# Patient Record
Sex: FEMALE | Race: OTHER | HISPANIC OR LATINO
[De-identification: names, ages, dates, MRNs, and addresses within clinical notes are randomized per-mention and may not be internally consistent; named-entity substitution may affect disease eponyms.]

---

## 2020-12-15 ENCOUNTER — APPOINTMENT (OUTPATIENT)
Dept: RADIOLOGY | Facility: CLINIC | Age: 60
End: 2020-12-15
Payer: MEDICAID

## 2020-12-15 ENCOUNTER — OUTPATIENT (OUTPATIENT)
Dept: OUTPATIENT SERVICES | Facility: HOSPITAL | Age: 60
LOS: 1 days | End: 2020-12-15

## 2020-12-15 PROCEDURE — 77080 DXA BONE DENSITY AXIAL: CPT | Mod: 26

## 2021-01-26 ENCOUNTER — APPOINTMENT (OUTPATIENT)
Dept: INTERNAL MEDICINE | Facility: CLINIC | Age: 61
End: 2021-01-26
Payer: MEDICAID

## 2021-01-26 ENCOUNTER — RESULT CHARGE (OUTPATIENT)
Age: 61
End: 2021-01-26

## 2021-01-26 VITALS
RESPIRATION RATE: 15 BRPM | TEMPERATURE: 97.6 F | WEIGHT: 218 LBS | HEART RATE: 79 BPM | BODY MASS INDEX: 43.95 KG/M2 | DIASTOLIC BLOOD PRESSURE: 80 MMHG | OXYGEN SATURATION: 99 % | HEIGHT: 59 IN | SYSTOLIC BLOOD PRESSURE: 143 MMHG

## 2021-01-26 DIAGNOSIS — Z86.79 PERSONAL HISTORY OF OTHER DISEASES OF THE CIRCULATORY SYSTEM: ICD-10-CM

## 2021-01-26 DIAGNOSIS — E23.6 OTHER DISORDERS OF PITUITARY GLAND: ICD-10-CM

## 2021-01-26 DIAGNOSIS — Z87.19 PERSONAL HISTORY OF OTHER DISEASES OF THE DIGESTIVE SYSTEM: ICD-10-CM

## 2021-01-26 DIAGNOSIS — Z87.2 PERSONAL HISTORY OF DISEASES OF THE SKIN AND SUBCUTANEOUS TISSUE: ICD-10-CM

## 2021-01-26 DIAGNOSIS — G40.909 EPILEPSY, UNSPECIFIED, NOT INTRACTABLE, W/OUT STATUS EPILEPTICUS: ICD-10-CM

## 2021-01-26 DIAGNOSIS — Z86.39 PERSONAL HISTORY OF OTHER ENDOCRINE, NUTRITIONAL AND METABOLIC DISEASE: ICD-10-CM

## 2021-01-26 DIAGNOSIS — H40.9 UNSPECIFIED GLAUCOMA: ICD-10-CM

## 2021-01-26 DIAGNOSIS — Z86.19 PERSONAL HISTORY OF OTHER INFECTIOUS AND PARASITIC DISEASES: ICD-10-CM

## 2021-01-26 DIAGNOSIS — Z87.898 PERSONAL HISTORY OF OTHER SPECIFIED CONDITIONS: ICD-10-CM

## 2021-01-26 DIAGNOSIS — Z86.69 PERSONAL HISTORY OF OTHER DISEASES OF THE NERVOUS SYSTEM AND SENSE ORGANS: ICD-10-CM

## 2021-01-26 DIAGNOSIS — T78.2XXA ANAPHYLACTIC SHOCK, UNSPECIFIED, INITIAL ENCOUNTER: ICD-10-CM

## 2021-01-26 LAB — HBA1C MFR BLD HPLC: 6.3

## 2021-01-26 PROCEDURE — 99386 PREV VISIT NEW AGE 40-64: CPT | Mod: 25

## 2021-01-26 PROCEDURE — 99072 ADDL SUPL MATRL&STAF TM PHE: CPT

## 2021-01-26 PROCEDURE — 99214 OFFICE O/P EST MOD 30 MIN: CPT | Mod: 25,GC

## 2021-01-26 PROCEDURE — 83036 HEMOGLOBIN GLYCOSYLATED A1C: CPT | Mod: QW

## 2021-01-27 LAB
CREAT SPEC-SCNC: 46 MG/DL
MICROALBUMIN 24H UR DL<=1MG/L-MCNC: <1.2 MG/DL
MICROALBUMIN/CREAT 24H UR-RTO: NORMAL MG/G

## 2021-02-05 ENCOUNTER — OUTPATIENT (OUTPATIENT)
Dept: OUTPATIENT SERVICES | Facility: HOSPITAL | Age: 61
LOS: 1 days | End: 2021-02-05
Payer: COMMERCIAL

## 2021-02-05 ENCOUNTER — APPOINTMENT (OUTPATIENT)
Dept: ULTRASOUND IMAGING | Facility: HOSPITAL | Age: 61
End: 2021-02-05
Payer: MEDICAID

## 2021-02-05 PROCEDURE — 93971 EXTREMITY STUDY: CPT | Mod: 26,LT

## 2021-02-05 PROCEDURE — 93971 EXTREMITY STUDY: CPT

## 2021-02-05 PROCEDURE — 73590 X-RAY EXAM OF LOWER LEG: CPT

## 2021-02-05 PROCEDURE — 73590 X-RAY EXAM OF LOWER LEG: CPT | Mod: 26,50

## 2021-02-10 ENCOUNTER — TRANSCRIPTION ENCOUNTER (OUTPATIENT)
Age: 61
End: 2021-02-10

## 2021-03-13 ENCOUNTER — INPATIENT (INPATIENT)
Facility: HOSPITAL | Age: 61
LOS: 2 days | Discharge: HOME | DRG: 190 | End: 2021-03-16
Attending: INTERNAL MEDICINE | Admitting: INTERNAL MEDICINE
Payer: COMMERCIAL

## 2021-03-13 VITALS
OXYGEN SATURATION: 98 % | RESPIRATION RATE: 20 BRPM | DIASTOLIC BLOOD PRESSURE: 111 MMHG | WEIGHT: 220.02 LBS | SYSTOLIC BLOOD PRESSURE: 181 MMHG | HEART RATE: 91 BPM | TEMPERATURE: 98 F

## 2021-03-13 DIAGNOSIS — I10 ESSENTIAL (PRIMARY) HYPERTENSION: ICD-10-CM

## 2021-03-13 DIAGNOSIS — E05.00 THYROTOXICOSIS WITH DIFFUSE GOITER WITHOUT THYROTOXIC CRISIS OR STORM: ICD-10-CM

## 2021-03-13 DIAGNOSIS — J96.91 RESPIRATORY FAILURE, UNSPECIFIED WITH HYPOXIA: ICD-10-CM

## 2021-03-13 DIAGNOSIS — Z59.0 HOMELESSNESS: ICD-10-CM

## 2021-03-13 DIAGNOSIS — J44.9 CHRONIC OBSTRUCTIVE PULMONARY DISEASE, UNSPECIFIED: ICD-10-CM

## 2021-03-13 DIAGNOSIS — R06.02 SHORTNESS OF BREATH: ICD-10-CM

## 2021-03-13 DIAGNOSIS — C50.919 MALIGNANT NEOPLASM OF UNSPECIFIED SITE OF UNSPECIFIED FEMALE BREAST: ICD-10-CM

## 2021-03-13 DIAGNOSIS — R63.8 OTHER SYMPTOMS AND SIGNS CONCERNING FOOD AND FLUID INTAKE: ICD-10-CM

## 2021-03-13 LAB
ALBUMIN SERPL ELPH-MCNC: 3.9 G/DL — SIGNIFICANT CHANGE UP (ref 3.3–5)
ALP SERPL-CCNC: 96 U/L — SIGNIFICANT CHANGE UP (ref 40–120)
ALT FLD-CCNC: 25 U/L — SIGNIFICANT CHANGE UP (ref 10–45)
ANION GAP SERPL CALC-SCNC: 10 MMOL/L — SIGNIFICANT CHANGE UP (ref 5–17)
AST SERPL-CCNC: 25 U/L — SIGNIFICANT CHANGE UP (ref 10–40)
BASOPHILS # BLD AUTO: 0.02 K/UL — SIGNIFICANT CHANGE UP (ref 0–0.2)
BASOPHILS NFR BLD AUTO: 0.3 % — SIGNIFICANT CHANGE UP (ref 0–2)
BILIRUB SERPL-MCNC: 0.2 MG/DL — SIGNIFICANT CHANGE UP (ref 0.2–1.2)
BUN SERPL-MCNC: 16 MG/DL — SIGNIFICANT CHANGE UP (ref 7–23)
CALCIUM SERPL-MCNC: 9 MG/DL — SIGNIFICANT CHANGE UP (ref 8.4–10.5)
CHLORIDE SERPL-SCNC: 103 MMOL/L — SIGNIFICANT CHANGE UP (ref 96–108)
CO2 SERPL-SCNC: 28 MMOL/L — SIGNIFICANT CHANGE UP (ref 22–31)
CREAT SERPL-MCNC: 0.61 MG/DL — SIGNIFICANT CHANGE UP (ref 0.5–1.3)
CRP SERPL-MCNC: 18.9 MG/L — HIGH (ref 0–4)
D DIMER BLD IA.RAPID-MCNC: 199 NG/ML DDU — SIGNIFICANT CHANGE UP
EOSINOPHIL # BLD AUTO: 0.25 K/UL — SIGNIFICANT CHANGE UP (ref 0–0.5)
EOSINOPHIL NFR BLD AUTO: 3.4 % — SIGNIFICANT CHANGE UP (ref 0–6)
FERRITIN SERPL-MCNC: 62 NG/ML — SIGNIFICANT CHANGE UP (ref 15–150)
FLU A RESULT: SIGNIFICANT CHANGE UP
FLU A RESULT: SIGNIFICANT CHANGE UP
FLUAV AG NPH QL: SIGNIFICANT CHANGE UP
FLUBV AG NPH QL: SIGNIFICANT CHANGE UP
GLUCOSE SERPL-MCNC: 127 MG/DL — HIGH (ref 70–99)
HCT VFR BLD CALC: 36.9 % — SIGNIFICANT CHANGE UP (ref 34.5–45)
HGB BLD-MCNC: 11.5 G/DL — SIGNIFICANT CHANGE UP (ref 11.5–15.5)
IMM GRANULOCYTES NFR BLD AUTO: 0.4 % — SIGNIFICANT CHANGE UP (ref 0–1.5)
LYMPHOCYTES # BLD AUTO: 1.6 K/UL — SIGNIFICANT CHANGE UP (ref 1–3.3)
LYMPHOCYTES # BLD AUTO: 21.7 % — SIGNIFICANT CHANGE UP (ref 13–44)
MCHC RBC-ENTMCNC: 26.1 PG — LOW (ref 27–34)
MCHC RBC-ENTMCNC: 31.2 GM/DL — LOW (ref 32–36)
MCV RBC AUTO: 83.9 FL — SIGNIFICANT CHANGE UP (ref 80–100)
MONOCYTES # BLD AUTO: 0.55 K/UL — SIGNIFICANT CHANGE UP (ref 0–0.9)
MONOCYTES NFR BLD AUTO: 7.5 % — SIGNIFICANT CHANGE UP (ref 2–14)
NEUTROPHILS # BLD AUTO: 4.92 K/UL — SIGNIFICANT CHANGE UP (ref 1.8–7.4)
NEUTROPHILS NFR BLD AUTO: 66.7 % — SIGNIFICANT CHANGE UP (ref 43–77)
NRBC # BLD: 0 /100 WBCS — SIGNIFICANT CHANGE UP (ref 0–0)
NT-PROBNP SERPL-SCNC: 27 PG/ML — SIGNIFICANT CHANGE UP (ref 0–300)
PLATELET # BLD AUTO: 318 K/UL — SIGNIFICANT CHANGE UP (ref 150–400)
POTASSIUM SERPL-MCNC: 4.2 MMOL/L — SIGNIFICANT CHANGE UP (ref 3.5–5.3)
POTASSIUM SERPL-SCNC: 4.2 MMOL/L — SIGNIFICANT CHANGE UP (ref 3.5–5.3)
PROCALCITONIN SERPL-MCNC: 0.04 NG/ML — SIGNIFICANT CHANGE UP (ref 0.02–0.1)
PROT SERPL-MCNC: 7.6 G/DL — SIGNIFICANT CHANGE UP (ref 6–8.3)
RAPID RVP RESULT: SIGNIFICANT CHANGE UP
RBC # BLD: 4.4 M/UL — SIGNIFICANT CHANGE UP (ref 3.8–5.2)
RBC # FLD: 14.1 % — SIGNIFICANT CHANGE UP (ref 10.3–14.5)
RSV RESULT: SIGNIFICANT CHANGE UP
RSV RNA RESP QL NAA+PROBE: SIGNIFICANT CHANGE UP
SARS-COV-2 RNA SPEC QL NAA+PROBE: SIGNIFICANT CHANGE UP
SARS-COV-2 RNA SPEC QL NAA+PROBE: SIGNIFICANT CHANGE UP
SODIUM SERPL-SCNC: 141 MMOL/L — SIGNIFICANT CHANGE UP (ref 135–145)
WBC # BLD: 7.37 K/UL — SIGNIFICANT CHANGE UP (ref 3.8–10.5)
WBC # FLD AUTO: 7.37 K/UL — SIGNIFICANT CHANGE UP (ref 3.8–10.5)

## 2021-03-13 PROCEDURE — 99223 1ST HOSP IP/OBS HIGH 75: CPT | Mod: GC

## 2021-03-13 PROCEDURE — 93010 ELECTROCARDIOGRAM REPORT: CPT

## 2021-03-13 PROCEDURE — 71045 X-RAY EXAM CHEST 1 VIEW: CPT | Mod: 26

## 2021-03-13 PROCEDURE — 99285 EMERGENCY DEPT VISIT HI MDM: CPT | Mod: 25

## 2021-03-13 RX ORDER — LATANOPROST 0.05 MG/ML
1 SOLUTION/ DROPS OPHTHALMIC; TOPICAL AT BEDTIME
Refills: 0 | Status: DISCONTINUED | OUTPATIENT
Start: 2021-03-13 | End: 2021-03-16

## 2021-03-13 RX ORDER — GABAPENTIN 400 MG/1
300 CAPSULE ORAL AT BEDTIME
Refills: 0 | Status: DISCONTINUED | OUTPATIENT
Start: 2021-03-13 | End: 2021-03-16

## 2021-03-13 RX ORDER — METOPROLOL TARTRATE 50 MG
25 TABLET ORAL ONCE
Refills: 0 | Status: COMPLETED | OUTPATIENT
Start: 2021-03-13 | End: 2021-03-13

## 2021-03-13 RX ORDER — ATORVASTATIN CALCIUM 80 MG/1
10 TABLET, FILM COATED ORAL DAILY
Refills: 0 | Status: DISCONTINUED | OUTPATIENT
Start: 2021-03-13 | End: 2021-03-16

## 2021-03-13 RX ORDER — AMLODIPINE BESYLATE 2.5 MG/1
10 TABLET ORAL DAILY
Refills: 0 | Status: DISCONTINUED | OUTPATIENT
Start: 2021-03-14 | End: 2021-03-16

## 2021-03-13 RX ORDER — ACETAMINOPHEN 500 MG
650 TABLET ORAL EVERY 6 HOURS
Refills: 0 | Status: DISCONTINUED | OUTPATIENT
Start: 2021-03-13 | End: 2021-03-16

## 2021-03-13 RX ORDER — LOSARTAN POTASSIUM 100 MG/1
100 TABLET, FILM COATED ORAL DAILY
Refills: 0 | Status: DISCONTINUED | OUTPATIENT
Start: 2021-03-13 | End: 2021-03-16

## 2021-03-13 RX ORDER — ENOXAPARIN SODIUM 100 MG/ML
40 INJECTION SUBCUTANEOUS AT BEDTIME
Refills: 0 | Status: DISCONTINUED | OUTPATIENT
Start: 2021-03-13 | End: 2021-03-16

## 2021-03-13 RX ORDER — BRIMONIDINE TARTRATE 2 MG/MG
1 SOLUTION/ DROPS OPHTHALMIC THREE TIMES A DAY
Refills: 0 | Status: DISCONTINUED | OUTPATIENT
Start: 2021-03-13 | End: 2021-03-16

## 2021-03-13 RX ORDER — IPRATROPIUM/ALBUTEROL SULFATE 18-103MCG
3 AEROSOL WITH ADAPTER (GRAM) INHALATION
Refills: 0 | Status: COMPLETED | OUTPATIENT
Start: 2021-03-13 | End: 2021-03-13

## 2021-03-13 RX ORDER — DORZOLAMIDE HYDROCHLORIDE TIMOLOL MALEATE 20; 5 MG/ML; MG/ML
1 SOLUTION/ DROPS OPHTHALMIC
Refills: 0 | Status: DISCONTINUED | OUTPATIENT
Start: 2021-03-13 | End: 2021-03-16

## 2021-03-13 RX ORDER — FLUTICASONE PROPIONATE 220 MCG
1 AEROSOL WITH ADAPTER (GRAM) INHALATION EVERY 12 HOURS
Refills: 0 | Status: DISCONTINUED | OUTPATIENT
Start: 2021-03-13 | End: 2021-03-16

## 2021-03-13 RX ORDER — IPRATROPIUM/ALBUTEROL SULFATE 18-103MCG
3 AEROSOL WITH ADAPTER (GRAM) INHALATION EVERY 6 HOURS
Refills: 0 | Status: DISCONTINUED | OUTPATIENT
Start: 2021-03-13 | End: 2021-03-13

## 2021-03-13 RX ORDER — IPRATROPIUM/ALBUTEROL SULFATE 18-103MCG
3 AEROSOL WITH ADAPTER (GRAM) INHALATION EVERY 4 HOURS
Refills: 0 | Status: DISCONTINUED | OUTPATIENT
Start: 2021-03-13 | End: 2021-03-15

## 2021-03-13 RX ORDER — FAMOTIDINE 10 MG/ML
40 INJECTION INTRAVENOUS AT BEDTIME
Refills: 0 | Status: DISCONTINUED | OUTPATIENT
Start: 2021-03-13 | End: 2021-03-16

## 2021-03-13 RX ORDER — ATORVASTATIN CALCIUM 80 MG/1
10 TABLET, FILM COATED ORAL DAILY
Refills: 0 | Status: DISCONTINUED | OUTPATIENT
Start: 2021-03-13 | End: 2021-03-13

## 2021-03-13 RX ADMIN — Medication 3 MILLILITER(S): at 23:19

## 2021-03-13 RX ADMIN — Medication 25 MILLIGRAM(S): at 09:56

## 2021-03-13 RX ADMIN — ATORVASTATIN CALCIUM 10 MILLIGRAM(S): 80 TABLET, FILM COATED ORAL at 15:15

## 2021-03-13 RX ADMIN — Medication 3 MILLILITER(S): at 10:49

## 2021-03-13 RX ADMIN — BRIMONIDINE TARTRATE 1 DROP(S): 2 SOLUTION/ DROPS OPHTHALMIC at 13:13

## 2021-03-13 RX ADMIN — ENOXAPARIN SODIUM 40 MILLIGRAM(S): 100 INJECTION SUBCUTANEOUS at 21:35

## 2021-03-13 RX ADMIN — LOSARTAN POTASSIUM 100 MILLIGRAM(S): 100 TABLET, FILM COATED ORAL at 13:13

## 2021-03-13 RX ADMIN — Medication 3 MILLILITER(S): at 09:25

## 2021-03-13 RX ADMIN — GABAPENTIN 300 MILLIGRAM(S): 400 CAPSULE ORAL at 21:33

## 2021-03-13 RX ADMIN — Medication 1 PUFF(S): at 21:37

## 2021-03-13 RX ADMIN — LATANOPROST 1 DROP(S): 0.05 SOLUTION/ DROPS OPHTHALMIC; TOPICAL at 21:36

## 2021-03-13 RX ADMIN — FAMOTIDINE 40 MILLIGRAM(S): 10 INJECTION INTRAVENOUS at 21:35

## 2021-03-13 RX ADMIN — Medication 200 MILLIGRAM(S): at 21:35

## 2021-03-13 RX ADMIN — Medication 125 MILLIGRAM(S): at 09:25

## 2021-03-13 RX ADMIN — Medication 3 MILLILITER(S): at 10:30

## 2021-03-13 RX ADMIN — DORZOLAMIDE HYDROCHLORIDE TIMOLOL MALEATE 1 DROP(S): 20; 5 SOLUTION/ DROPS OPHTHALMIC at 17:54

## 2021-03-13 RX ADMIN — BRIMONIDINE TARTRATE 1 DROP(S): 2 SOLUTION/ DROPS OPHTHALMIC at 21:38

## 2021-03-13 SDOH — ECONOMIC STABILITY - HOUSING INSECURITY: HOMELESSNESS: Z59.0

## 2021-03-13 NOTE — H&P ADULT - PROBLEM SELECTOR PLAN 8
F: None   E: Replete for K<4 Mag<2  N: DASH Diet  A: HSQ  Disposition: RMF F: None   E: Replete for K<4 Mag<2  N: DASH Diet  A: Lovenox   Disposition: RMF

## 2021-03-13 NOTE — CHART NOTE - NSCHARTNOTEFT_GEN_A_CORE
Asked by primary team to evaluate whether patient can be stepped down.    She presented with subjective fevers and body aches.  Admitted for COPD exacerbation.  Multiple COVID tests in house are negative.  She also had a negative one outside the hospital.  She is not having fevers or hypoxia.  No evidence of lymphopenia in labs.  CXR is clear.    COVID-19 is unlikely. More likely cause is a different viral pathogen triggered COPD exacerbation.  From ID standpoint, airborne/droplet precautions can be stopped and she can be moved off COVID-19 floor

## 2021-03-13 NOTE — PATIENT PROFILE ADULT - NSPROIMPLANTSMEDDEV_GEN_A_NUR
None Patient has right chest wall chemo port. Patient states that she has had the port in 2005. Was changed in 2007 ./None

## 2021-03-13 NOTE — H&P ADULT - ASSESSMENT
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

## 2021-03-13 NOTE — H&P ADULT - PROBLEM SELECTOR PLAN 5
PMH of Breast CA s/p mastectomy Dx___ PMH of HTN, home medication valsartan missed today, therefore sBP 180s initially s/p lopressor 25 with improvement.  - C/w home amlodipine and valsartan

## 2021-03-13 NOTE — H&P ADULT - PROBLEM SELECTOR PLAN 1
Patient endorsing increasing SOB resistant to outpatient albuterol. DDX include COPD exacerbation vs infectious (CXR with no definitive infiltrate, possibly viral in setting of symptoms mentioned), vs less likely PE (as dimer low), vs cardiac (HF).   - Treat COPD exacerbation as below  - F/u BNP to r/o HF   - F/u RVP/covid swabs Patient endorsing increasing SOB resistant to outpatient albuterol. DDX include COPD exacerbation vs infectious (CXR with no definitive infiltrate, possibly viral in setting of symptoms mentioned), vs less likely PE (as dimer low), vs cardiac (HF).   - Treat COPD exacerbation as below  - F/u BNP to r/o HF   - F/u RVP/covid swabs  - high suspicion for COVID, if 2 negative tests, ID consult

## 2021-03-13 NOTE — H&P ADULT - ATTENDING COMMENTS
Pt. seen and examined by me earlier today; I have read Dr. Raines's H&P, I agree w/ his findings and plan of care as documented

## 2021-03-13 NOTE — CHART NOTE - NSCHARTNOTEFT_GEN_A_CORE
Patient seen for COPD bundle  In COPD exacerbation  Currently on short acting bronchodilators appropriately and on Steroids.   Seems mild- moderate COPD exacerbation  she is on ICS ?? she does not know much about her medications.   Would benefit with addition of ICS + LABA inhaler ( HFA ) once she good enough to use it. use Adviar or symbicort.   Does not smoke.       Would need out patient follow up to establish care for COPD.  - Please make an appointment with in 2 weeks of discharge with [facility] on [address], 3rd floor Wednesday afternoon.   - Rest as per medicine team  - call us with questions.

## 2021-03-13 NOTE — H&P ADULT - NSICDXPASTMEDICALHX_GEN_ALL_CORE_FT
PAST MEDICAL HISTORY:  Breast cancer     COPD exacerbation     Epilepsy     Graves disease     HTN (hypertension)

## 2021-03-13 NOTE — H&P ADULT - PROBLEM SELECTOR PLAN 6
F: None   E: Replete for K<4 Mag<2  N: DASH Diet  A: HSQ  Disposition: RMF Pt reports history of graves disease that she says is 2/2 a pituitary tumor that has been stable for years.  -cont home synthroid

## 2021-03-13 NOTE — H&P ADULT - HISTORY OF PRESENT ILLNESS
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 Pt has baseline COPD, but is not on home O2, no chronic cough, able to ambulate normally, and has not had previous exacerbations. 7 days ago pt started to have dry cough and was tested for covid, but was negative. Pt continued to have cough and then 4 days ago started to have sore throat, b/l ear aches, diffuse myalgias, pleuritic chest pain, and worsening SOB with only being able to walk a block. Then last night, pt started to notice that her voice was a little "squeakier" than normal and started to have fever, body aches, diarrhea, nausea, and a sense that her mouth was dry.     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 Pt has baseline COPD, but is not on home O2, no chronic cough, able to ambulate normally, and has not had previous exacerbations. 7 days ago pt started to have dry cough and was tested for covid, but was negative. Pt continued to have cough and then 4 days ago started to have sore throat, loss of taste/smell, b/l ear aches, diffuse myalgias, pleuritic chest pain, and worsening SOB with only being able to walk a block. Then last night, pt started to notice that her voice was a little "squeakier" than normal and started to have fever, body aches, diarrhea, nausea, and a sense that her mouth was dry.     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 Pt has baseline COPD, but is not on home O2, no chronic cough, able to ambulate normally, and has not had previous exacerbations. 7 days ago pt started to have dry cough and was tested for covid, but was negative. Pt continued to have cough and then 4 days ago started to have sore throat, loss of taste/smell, b/l ear aches, diffuse myalgias, pleuritic chest pain, and worsening SOB with only being able to walk a block. Then last night, pt started to notice that her voice was a little "squeakier" than normal and started to have fever, body aches, diarrhea, nausea, and a sense that her mouth was dry.     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 Pt has baseline COPD, but is not on home O2, no chronic cough, able to ambulate normally, and has not had previous exacerbations. 7 days ago pt started to have dry cough and was tested for covid, but was negative. Pt continued to have cough and then 4 days ago started to have sore throat, loss of taste/smell, b/l ear aches, diffuse myalgias, pleuritic chest pain, and worsening SOB with only being able to walk a block. Then last night, pt started to notice that her voice was a little "squeakier" than normal and started to have fever, body aches, diarrhea, nausea, and a sense that her mouth was dry.     ED Course:  VS: T max 98,1, HR 70-90s, -180/80-110s (had not taken BP meds this AM), R20s 98%RA at rest, 85% RA upon ambulation   Labs s/f: CBC, CMP WNL, Flu/RSV neg   CXR: possible Left sided infiltrate, port in place   EKG: NSR NL axis no ischemic changes   Pt received: Solumedrol 125, Lopressor 25, and Duoneb x3

## 2021-03-13 NOTE — H&P ADULT - PROBLEM SELECTOR PLAN 2
PMH of COPD presenting with SOB. S/p Solumedrol 125 and nebs in ED.  - C/w Prednisone 40qd for 4 more days  - C/w Duonebs q6h standing PMH of COPD presenting with SOB. S/p Solumedrol 125 and nebs in ED.  - C/w Prednisone 40qd for total 5 days (3/13-)  - C/w Duonebs q6h standing  - c/w home flovent PMH of COPD presenting with SOB, increased cough and sputum. S/p Solumedrol 125 and nebs in ED.  - C/w Prednisone 40qd for total 5 days (3/13-)  - C/w Duonebs q6h standing  - c/w home flovent PMH of COPD presenting with SOB, increased cough and sputum. S/p Solumedrol 125 and nebs in ED.  - C/w Prednisone 40qd for total 5 days (3/13-)  - C/w Duonebs q6h standing  - c/w home flovent  - f/u pulm COPD bundle

## 2021-03-13 NOTE — H&P ADULT - PROBLEM SELECTOR PLAN 4
PMH of HTN, home medication __ missed today, therefore sBP 180s initially s/p lopressor 25 with improvement.  - C/w home__ Pt says she is currently homeless and is staying at her daughter's and friend's houses when available.  -social work consult

## 2021-03-13 NOTE — ED ADULT NURSE NOTE - OBJECTIVE STATEMENT
Patient A&Ox4, respirations even and unlabored, hoarse voice, ambulatory, skin warm and dry good for color, bilateral limb bracelets observed due to bilateral mastectomy, right chest port observed and accessed, access ok by provider. Patient arrives with complaints of cough, sore throat, SOB. States took covid test last week and was negative. Patient denies chest pain. Hx breast CA, HTN. Maintained in isolation room 7. Will continue to monitor patient.

## 2021-03-13 NOTE — H&P ADULT - NSHPSOCIALHISTORY_GEN_ALL_CORE
Previous smoked 1.5PPD x40 years quit 20 years ago. Occasional alcohol use. No other drugs. Currently homeless and will sleep at friend's and daughter's houses.

## 2021-03-13 NOTE — ED PROVIDER NOTE - PHYSICAL EXAMINATION
CONSTITUTIONAL: tired appearing NAD +active coughing   HEAD: Normocephalic; atraumatic.   EYES: PERRL; EOM intact; conjunctiva and sclera clear  ENT: normal nose; no rhinorrhea; normal pharynx with no erythema or lesions.   NECK: Supple; non-tender; no LAD  CARDIOVASCULAR: Normal S1, S2; no murmurs, rubs, or gallops. Regular rate and rhythm.   RESPIRATORY: Breathing easily;  dec air movement b/l no wheezing   GI: Soft; non-distended; non-tender; no palpable organomegaly.   MSK: FROM at all extremities, normal tone   EXT: No cyanosis or edema; N/V intact  SKIN: Normal for age and race; warm; dry; good turgor; no apparent lesions or rash.   NEURO: A & O x 3; face symmetric; grossly unremarkable.   PSYCHOLOGICAL: The patient’s mood and manner are appropriate.

## 2021-03-13 NOTE — ED PROVIDER NOTE - ATTENDING CONTRIBUTION TO CARE
60 year old f w hx of breast CA (remission), COPD, epilepsy, HTN presents to ED with concern for dry cough, sore throat, ear ache, shortness of breath, fever over the past several days.  Notes she took tylenol yesterday.  COVID test negative last week prior to onset of symptoms.  Notes using her neices nebulizer with little improvement in symptoms.  On exam, patient is non toxic in appearance.  HRRR, lungs clear with decreased air movement.  Abd soft, NT/ND.  Posterior oropharynx clear, without edema or exudates.  No PTA visualized.  Will check labs, CXR, covid, give nebs, steroid and dispo accordingly.

## 2021-03-13 NOTE — ED PROVIDER NOTE - OBJECTIVE STATEMENT
61 yo f with pmh of COPD, diverticulitis, epilepsy,br ca s/p mastectomy, HTN c/o dry cough x 4 days. Associated with sore throat, earache, nausea and diarrhea. Reports hoarseness of voice starting yesterday. Fever of 101 last night, took tylenol. Also reports chest tightness and sob. Tried her albuterol inhaler and a nebulizer with minimal relief. Denies travel, sick contacts, n/v, ha, dizziness. Pt had a negative covid test last week. 59 yo f with pmh of COPD, diverticulitis, epilepsy,br ca s/p mastectomy, HTN c/o dry cough x 4 days. Associated with sore throat, earache, nausea and diarrhea. Reports hoarseness of voice and bodyaches starting yesterday. Fever of 101 last night, took tylenol. Also reports chest tightness and sob. Tried her albuterol inhaler and a nebulizer with minimal relief. Denies travel, sick contacts, n/v, ha, dizziness. Pt had a negative covid test last week.

## 2021-03-13 NOTE — H&P ADULT - NSHPLABSRESULTS_GEN_ALL_CORE
LABS:                         11.5   7.37  )-----------( 318      ( 13 Mar 2021 09:32 )             36.9     03-13    141  |  103  |  16  ----------------------------<  127<H>  4.2   |  28  |  0.61    Ca    9.0      13 Mar 2021 09:32    TPro  7.6  /  Alb  3.9  /  TBili  0.2  /  DBili  x   /  AST  25  /  ALT  25  /  AlkPhos  96  03-13                  RADIOLOGY, EKG & ADDITIONAL TESTS: Reviewed.

## 2021-03-13 NOTE — ED ADULT NURSE NOTE - NSIMPLEMENTINTERV_GEN_ALL_ED
Implemented All Universal Safety Interventions:  [place] to call system. Call bell, personal items and telephone within reach. Instruct patient to call for assistance. Room bathroom lighting operational. Non-slip footwear when patient is off stretcher. Physically safe environment: no spills, clutter or unnecessary equipment. Stretcher in lowest position, wheels locked, appropriate side rails in place.

## 2021-03-13 NOTE — H&P ADULT - NSHPPHYSICALEXAM_GEN_ALL_CORE
VITAL SIGNS:  T(F): 98.1 (03-13-21 @ 10:41), Max: 98.1 (03-13-21 @ 10:41)  HR: 95 (03-13-21 @ 10:43) (73 - 95)  BP: 155/81 (03-13-21 @ 10:41) (155/81 - 181/111)  BP(mean): --  RR: 24 (03-13-21 @ 10:43) (20 - 24)  SpO2: 85% (03-13-21 @ 10:43) (85% - 98%)    PHYSICAL EXAM:    Constitutional: WDWN resting comfortably in bed; NAD  HEENT: NC/AT, PERRL, EOMI, anicteric sclera, no nasal discharge; uvula midline, no oropharyngeal erythema or exudates; MMM  Neck: supple; no JVD or thyromegaly  Respiratory: CTA B/L; no W/R/R, no retractions  Cardiac: +S1/S2; RRR; no M/R/G; PMI non-displaced  Gastrointestinal: soft, NT/ND; no rebound or guarding; +BSx4  Back: spine midline, no bony tenderness or step-offs; no CVAT B/L  Extremities: WWP, no clubbing or cyanosis; no peripheral edema  Musculoskeletal: NROM x4; no joint swelling, tenderness or erythema  Vascular: 2+ radial, femoral, DP/PT pulses B/L  Dermatologic: skin warm, dry and intact; no rashes, wounds, or scars  Lymphatic: no submandibular or cervical LAD  Neurologic: AAOx3; CNII-XII grossly intact; no focal deficits  Psychiatric: affect and characteristics of appearance, verbalizations, behaviors are appropriate, denies SI/HI/AH/VH VITAL SIGNS:  T(F): 98.1 (03-13-21 @ 10:41), Max: 98.1 (03-13-21 @ 10:41)  HR: 95 (03-13-21 @ 10:43) (73 - 95)  BP: 155/81 (03-13-21 @ 10:41) (155/81 - 181/111)  BP(mean): --  RR: 24 (03-13-21 @ 10:43) (20 - 24)  SpO2: 85% (03-13-21 @ 10:43) (85% - 98%)    PHYSICAL EXAM:    Constitutional: NAD, no accessory muscle use, high pitched voice without stridor  HEENT: MMM  Neck: supple  Respiratory: coarse breath sounds b/l without wheezes or crackles  Cardiac: +S1/S2; RRR; no M/R/G  Gastrointestinal: soft, NT/ND; no rebound or guarding; +BSx4  Extremities: WWP, no clubbing or cyanosis; no peripheral edema  Musculoskeletal: NROM x4; no joint swelling, tenderness or erythema  Vascular: 2+ radial, DP/PT pulses B/L  Dermatologic: skin warm, dry and intact; no rashes, wounds, or scars  Neurologic: AAOx3; CNII-XII grossly intact; no focal deficits  Psychiatric: affect and characteristics of appearance, verbalizations, behaviors are appropriate, denies SI/HI/AH/VH

## 2021-03-13 NOTE — H&P ADULT - PROBLEM SELECTOR PLAN 3
Upon presentation 98%RA at rest, 85% RA upon ambulation likely 2/2 COPD and/or viral infection.   - C/w close monitoring of respiratory status- on RA  - Treatment as per COPD

## 2021-03-13 NOTE — H&P ADULT - PROBLEM SELECTOR PLAN 7
PMH of Breast CA s/p mastectomy. Currently in remission. PMH of Breast CA s/p mastectomy. Currently in remission.  -had b/l lymph nodes removed so unable to draw blood from arms. Has port that blood can be drawn from that was placed in 2005 and revised in 2010 that needs to be flushed every 8 weeks

## 2021-03-13 NOTE — ED PROVIDER NOTE - CLINICAL SUMMARY MEDICAL DECISION MAKING FREE TEXT BOX
61 yo f with pmh of COPD, diverticulitis, epilepsy,br ca s/p mastectomy, HTN c/o dry cough x 4 days. Associated with sore throat, earache, nausea and diarrhea. Reports hoarseness of voice and bodyaches starting yesterday. Fever of 101 last night, took tylenol. Also reports chest tightness and sob. Tried her albuterol inhaler and a nebulizer with minimal relief. Denies travel, sick contacts, n/v, ha, dizziness. Pt had a negative covid test last week. O2 sat 98% on RA. Afebrile. /111, pt did not take her BP meds this morning. +active coughing. Breathing easily;  dec air movement b/l no wheezing. r/o pna r/o covid, will treat with nebs, steroids for COPD exacerbation.

## 2021-03-14 DIAGNOSIS — J44.1 CHRONIC OBSTRUCTIVE PULMONARY DISEASE WITH (ACUTE) EXACERBATION: ICD-10-CM

## 2021-03-14 DIAGNOSIS — I10 ESSENTIAL (PRIMARY) HYPERTENSION: ICD-10-CM

## 2021-03-14 DIAGNOSIS — E66.01 MORBID (SEVERE) OBESITY DUE TO EXCESS CALORIES: ICD-10-CM

## 2021-03-14 DIAGNOSIS — J96.01 ACUTE RESPIRATORY FAILURE WITH HYPOXIA: ICD-10-CM

## 2021-03-14 DIAGNOSIS — Z85.3 PERSONAL HISTORY OF MALIGNANT NEOPLASM OF BREAST: ICD-10-CM

## 2021-03-14 PROCEDURE — 99233 SBSQ HOSP IP/OBS HIGH 50: CPT | Mod: GC

## 2021-03-14 RX ORDER — SODIUM CHLORIDE 0.65 %
1 AEROSOL, SPRAY (ML) NASAL ONCE
Refills: 0 | Status: COMPLETED | OUTPATIENT
Start: 2021-03-14 | End: 2021-03-14

## 2021-03-14 RX ADMIN — Medication 1 PUFF(S): at 21:12

## 2021-03-14 RX ADMIN — Medication 1 PUFF(S): at 09:06

## 2021-03-14 RX ADMIN — Medication 200 MILLIGRAM(S): at 17:16

## 2021-03-14 RX ADMIN — BRIMONIDINE TARTRATE 1 DROP(S): 2 SOLUTION/ DROPS OPHTHALMIC at 21:13

## 2021-03-14 RX ADMIN — ATORVASTATIN CALCIUM 10 MILLIGRAM(S): 80 TABLET, FILM COATED ORAL at 11:38

## 2021-03-14 RX ADMIN — FAMOTIDINE 40 MILLIGRAM(S): 10 INJECTION INTRAVENOUS at 21:12

## 2021-03-14 RX ADMIN — Medication 650 MILLIGRAM(S): at 15:18

## 2021-03-14 RX ADMIN — DORZOLAMIDE HYDROCHLORIDE TIMOLOL MALEATE 1 DROP(S): 20; 5 SOLUTION/ DROPS OPHTHALMIC at 06:56

## 2021-03-14 RX ADMIN — BRIMONIDINE TARTRATE 1 DROP(S): 2 SOLUTION/ DROPS OPHTHALMIC at 06:57

## 2021-03-14 RX ADMIN — BRIMONIDINE TARTRATE 1 DROP(S): 2 SOLUTION/ DROPS OPHTHALMIC at 14:26

## 2021-03-14 RX ADMIN — GABAPENTIN 300 MILLIGRAM(S): 400 CAPSULE ORAL at 21:13

## 2021-03-14 RX ADMIN — Medication 200 MILLIGRAM(S): at 09:06

## 2021-03-14 RX ADMIN — LATANOPROST 1 DROP(S): 0.05 SOLUTION/ DROPS OPHTHALMIC; TOPICAL at 21:13

## 2021-03-14 RX ADMIN — ENOXAPARIN SODIUM 40 MILLIGRAM(S): 100 INJECTION SUBCUTANEOUS at 21:12

## 2021-03-14 RX ADMIN — Medication 200 MILLIGRAM(S): at 23:27

## 2021-03-14 RX ADMIN — DORZOLAMIDE HYDROCHLORIDE TIMOLOL MALEATE 1 DROP(S): 20; 5 SOLUTION/ DROPS OPHTHALMIC at 17:17

## 2021-03-14 RX ADMIN — Medication 3 MILLILITER(S): at 22:45

## 2021-03-14 RX ADMIN — Medication 650 MILLIGRAM(S): at 17:09

## 2021-03-14 RX ADMIN — AMLODIPINE BESYLATE 10 MILLIGRAM(S): 2.5 TABLET ORAL at 06:46

## 2021-03-14 RX ADMIN — Medication 1 SPRAY(S): at 22:52

## 2021-03-14 RX ADMIN — Medication 3 MILLILITER(S): at 11:38

## 2021-03-14 RX ADMIN — Medication 3 MILLILITER(S): at 17:16

## 2021-03-14 RX ADMIN — LOSARTAN POTASSIUM 100 MILLIGRAM(S): 100 TABLET, FILM COATED ORAL at 06:46

## 2021-03-14 RX ADMIN — Medication 3 MILLILITER(S): at 07:00

## 2021-03-14 NOTE — PROGRESS NOTE ADULT - SUBJECTIVE AND OBJECTIVE BOX
OVERNIGHT EVENTS:     SUBJECTIVE / INTERVAL HPI: Patient seen and examined at bedside.     VITAL SIGNS:  Vital Signs Last 24 Hrs  T(C): 36.7 (14 Mar 2021 06:07), Max: 36.8 (13 Mar 2021 13:19)  T(F): 98 (14 Mar 2021 06:07), Max: 98.3 (13 Mar 2021 13:19)  HR: 76 (14 Mar 2021 06:07) (57 - 95)  BP: 142/69 (14 Mar 2021 06:07) (106/73 - 161/72)  BP(mean): --  RR: 18 (14 Mar 2021 06:07) (18 - 19)  SpO2: 95% (14 Mar 2021 06:07) (94% - 95%)    PHYSICAL EXAM:    General: WDWN  HEENT: NC/AT; PERRL, anicteric sclera; MMM  Neck: supple  Cardiovascular: +S1/S2; RRR  Respiratory: CTA B/L; no W/R/R  Gastrointestinal: soft, NT/ND; +BSx4  Extremities: WWP; no edema, clubbing or cyanosis  Vascular: 2+ radial, DP/PT pulses B/L  Neurological: AAOx3; no focal deficits    MEDICATIONS:  MEDICATIONS  (STANDING):  amLODIPine   Tablet 10 milliGRAM(s) Oral daily  atorvastatin 10 milliGRAM(s) Oral daily  brimonidine 0.2% Ophthalmic Solution 1 Drop(s) Both EYES three times a day  dorzolamide 2%/timolol 0.5% Ophthalmic Solution 1 Drop(s) Both EYES two times a day  enoxaparin Injectable 40 milliGRAM(s) SubCutaneous at bedtime  famotidine    Tablet 40 milliGRAM(s) Oral at bedtime  fluticasone propionate   220 MICROgram(s) HFA Inhaler 1 Puff(s) Inhalation every 12 hours  gabapentin 300 milliGRAM(s) Oral at bedtime  latanoprost 0.005% Ophthalmic Solution 1 Drop(s) Both EYES at bedtime  losartan 100 milliGRAM(s) Oral daily  predniSONE   Tablet 40 milliGRAM(s) Oral daily    MEDICATIONS  (PRN):  acetaminophen   Tablet .. 650 milliGRAM(s) Oral every 6 hours PRN Temp greater or equal to 38C (100.4F)  albuterol/ipratropium for Nebulization 3 milliLiter(s) Nebulizer every 4 hours PRN Shortness of Breath and/or Wheezing  guaiFENesin   Syrup  (Sugar-Free) 200 milliGRAM(s) Oral every 6 hours PRN Cough      ALLERGIES:  Allergies    IV Contrast (Short breath)  lisinopril (Short breath)    Intolerances        LABS:                        11.5   7.37  )-----------( 318      ( 13 Mar 2021 09:32 )             36.9     03-13    141  |  103  |  16  ----------------------------<  127<H>  4.2   |  28  |  0.61    Ca    9.0      13 Mar 2021 09:32    TPro  7.6  /  Alb  3.9  /  TBili  0.2  /  DBili  x   /  AST  25  /  ALT  25  /  AlkPhos  96  03-13        CAPILLARY BLOOD GLUCOSE          RADIOLOGY & ADDITIONAL TESTS: Reviewed.   OVERNIGHT EVENTS: DARINEL.    SUBJECTIVE / INTERVAL HPI: Patient seen and examined at bedside. Continues to complain of cough and mild SOB but claims it is improved. Denies f/c, cp.     VITAL SIGNS:  Vital Signs Last 24 Hrs  T(C): 36.7 (14 Mar 2021 06:07), Max: 36.8 (13 Mar 2021 13:19)  T(F): 98 (14 Mar 2021 06:07), Max: 98.3 (13 Mar 2021 13:19)  HR: 76 (14 Mar 2021 06:07) (57 - 95)  BP: 142/69 (14 Mar 2021 06:07) (106/73 - 161/72)  BP(mean): --  RR: 18 (14 Mar 2021 06:07) (18 - 19)  SpO2: 95% (14 Mar 2021 06:07) (94% - 95%)    PHYSICAL EXAM:    General: WDWN  HEENT: NC/AT; PERRL, anicteric sclera; MMM  Neck: supple  Cardiovascular: +S1/S2; RRR  Respiratory: coarse breath sounds b/l without wheezes or crackles  Gastrointestinal: soft, NT/ND; +BSx4  Extremities: WWP; no edema, clubbing or cyanosis  Vascular: 2+ radial, DP/PT pulses B/L  Neurological: AAOx3; no focal deficits    MEDICATIONS:  MEDICATIONS  (STANDING):  amLODIPine   Tablet 10 milliGRAM(s) Oral daily  atorvastatin 10 milliGRAM(s) Oral daily  brimonidine 0.2% Ophthalmic Solution 1 Drop(s) Both EYES three times a day  dorzolamide 2%/timolol 0.5% Ophthalmic Solution 1 Drop(s) Both EYES two times a day  enoxaparin Injectable 40 milliGRAM(s) SubCutaneous at bedtime  famotidine    Tablet 40 milliGRAM(s) Oral at bedtime  fluticasone propionate   220 MICROgram(s) HFA Inhaler 1 Puff(s) Inhalation every 12 hours  gabapentin 300 milliGRAM(s) Oral at bedtime  latanoprost 0.005% Ophthalmic Solution 1 Drop(s) Both EYES at bedtime  losartan 100 milliGRAM(s) Oral daily  predniSONE   Tablet 40 milliGRAM(s) Oral daily    MEDICATIONS  (PRN):  acetaminophen   Tablet .. 650 milliGRAM(s) Oral every 6 hours PRN Temp greater or equal to 38C (100.4F)  albuterol/ipratropium for Nebulization 3 milliLiter(s) Nebulizer every 4 hours PRN Shortness of Breath and/or Wheezing  guaiFENesin   Syrup  (Sugar-Free) 200 milliGRAM(s) Oral every 6 hours PRN Cough      ALLERGIES:  Allergies    IV Contrast (Short breath)  lisinopril (Short breath)    Intolerances        LABS:                        11.5   7.37  )-----------( 318      ( 13 Mar 2021 09:32 )             36.9     03-13    141  |  103  |  16  ----------------------------<  127<H>  4.2   |  28  |  0.61    Ca    9.0      13 Mar 2021 09:32    TPro  7.6  /  Alb  3.9  /  TBili  0.2  /  DBili  x   /  AST  25  /  ALT  25  /  AlkPhos  96  03-13        CAPILLARY BLOOD GLUCOSE          RADIOLOGY & ADDITIONAL TESTS: Reviewed.

## 2021-03-14 NOTE — PROGRESS NOTE ADULT - SUBJECTIVE AND OBJECTIVE BOX
Patient is a 60y old  Female who presents with a chief complaint of shortness of breath (14 Mar 2021 11:55)      INTERVAL HPI/OVERNIGHT EVENTS:    Pt. seen and examined earlier today  Pt. feels somewhat better today  c/o cough, sore throat, myalgia, JALLOH  No F/C    Review of Systems: 12 point review of systems otherwise negative    MEDICATIONS  (STANDING):  amLODIPine   Tablet 10 milliGRAM(s) Oral daily  atorvastatin 10 milliGRAM(s) Oral daily  brimonidine 0.2% Ophthalmic Solution 1 Drop(s) Both EYES three times a day  dorzolamide 2%/timolol 0.5% Ophthalmic Solution 1 Drop(s) Both EYES two times a day  enoxaparin Injectable 40 milliGRAM(s) SubCutaneous at bedtime  famotidine    Tablet 40 milliGRAM(s) Oral at bedtime  fluticasone propionate   220 MICROgram(s) HFA Inhaler 1 Puff(s) Inhalation every 12 hours  gabapentin 300 milliGRAM(s) Oral at bedtime  latanoprost 0.005% Ophthalmic Solution 1 Drop(s) Both EYES at bedtime  losartan 100 milliGRAM(s) Oral daily  predniSONE   Tablet 40 milliGRAM(s) Oral daily    MEDICATIONS  (PRN):  acetaminophen   Tablet .. 650 milliGRAM(s) Oral every 6 hours PRN Temp greater or equal to 38C (100.4F)  albuterol/ipratropium for Nebulization 3 milliLiter(s) Nebulizer every 4 hours PRN Shortness of Breath and/or Wheezing  guaiFENesin   Syrup  (Sugar-Free) 200 milliGRAM(s) Oral every 6 hours PRN Cough      Allergies    IV Contrast (Short breath)  lisinopril (Short breath)    Intolerances          Vital Signs Last 24 Hrs  T(C): 36.7 (14 Mar 2021 06:07), Max: 36.7 (13 Mar 2021 16:36)  T(F): 98 (14 Mar 2021 06:07), Max: 98.1 (13 Mar 2021 16:36)  HR: 76 (14 Mar 2021 06:07) (57 - 76)  BP: 142/69 (14 Mar 2021 06:07) (106/73 - 161/72)  BP(mean): --  RR: 18 (14 Mar 2021 06:07) (18 - 19)  SpO2: 95% (14 Mar 2021 06:07) (94% - 95%)  CAPILLARY BLOOD GLUCOSE           @ 06:01  -  14 @ 07:00  --------------------------------------------------------  IN: 240 mL / OUT: 200 mL / NET: 40 mL        Physical Exam:  (earlier today)  Daily Height in cm: 152.4 (14 Mar 2021 00:13)    Daily Weight in k (14 Mar 2021 00:13)  General: non-toxic appearing in NAD  HEENT: MMM +rhinophonia +chemoport  CV:  RRR  Lungs: decreased B.S. B/L, normal WOB on RA  Abdomen: morbidly obese  Extremities: no edema B/L LE  Skin:  WWP  Neuro:  AAOx3    LABS:                        11.5   7.37  )-----------( 318      ( 13 Mar 2021 09:32 )             36.9     03-13    141  |  103  |  16  ----------------------------<  127<H>  4.2   |  28  |  0.61    Ca    9.0      13 Mar 2021 09:32    TPro  7.6  /  Alb  3.9  /  TBili  0.2  /  DBili  x   /  AST  25  /  ALT  25  /  AlkPhos  96  03-13

## 2021-03-15 LAB
ANION GAP SERPL CALC-SCNC: 8 MMOL/L — SIGNIFICANT CHANGE UP (ref 5–17)
BASE EXCESS BLDA CALC-SCNC: 2.5 MMOL/L — SIGNIFICANT CHANGE UP (ref -2–3)
BASOPHILS # BLD AUTO: 0.03 K/UL — SIGNIFICANT CHANGE UP (ref 0–0.2)
BASOPHILS NFR BLD AUTO: 0.3 % — SIGNIFICANT CHANGE UP (ref 0–2)
BUN SERPL-MCNC: 17 MG/DL — SIGNIFICANT CHANGE UP (ref 7–23)
CALCIUM SERPL-MCNC: 9.2 MG/DL — SIGNIFICANT CHANGE UP (ref 8.4–10.5)
CHLORIDE SERPL-SCNC: 102 MMOL/L — SIGNIFICANT CHANGE UP (ref 96–108)
CO2 SERPL-SCNC: 30 MMOL/L — SIGNIFICANT CHANGE UP (ref 22–31)
CREAT SERPL-MCNC: 0.72 MG/DL — SIGNIFICANT CHANGE UP (ref 0.5–1.3)
EOSINOPHIL # BLD AUTO: 0.06 K/UL — SIGNIFICANT CHANGE UP (ref 0–0.5)
EOSINOPHIL NFR BLD AUTO: 0.6 % — SIGNIFICANT CHANGE UP (ref 0–6)
GLUCOSE SERPL-MCNC: 148 MG/DL — HIGH (ref 70–99)
HCO3 BLDA-SCNC: 27 MMOL/L — SIGNIFICANT CHANGE UP (ref 21–28)
HCT VFR BLD CALC: 39.4 % — SIGNIFICANT CHANGE UP (ref 34.5–45)
HGB BLD-MCNC: 12.1 G/DL — SIGNIFICANT CHANGE UP (ref 11.5–15.5)
IMM GRANULOCYTES NFR BLD AUTO: 0.4 % — SIGNIFICANT CHANGE UP (ref 0–1.5)
LYMPHOCYTES # BLD AUTO: 1.75 K/UL — SIGNIFICANT CHANGE UP (ref 1–3.3)
LYMPHOCYTES # BLD AUTO: 18.9 % — SIGNIFICANT CHANGE UP (ref 13–44)
MAGNESIUM SERPL-MCNC: 2.2 MG/DL — SIGNIFICANT CHANGE UP (ref 1.6–2.6)
MCHC RBC-ENTMCNC: 26.1 PG — LOW (ref 27–34)
MCHC RBC-ENTMCNC: 30.7 GM/DL — LOW (ref 32–36)
MCV RBC AUTO: 85.1 FL — SIGNIFICANT CHANGE UP (ref 80–100)
MONOCYTES # BLD AUTO: 0.47 K/UL — SIGNIFICANT CHANGE UP (ref 0–0.9)
MONOCYTES NFR BLD AUTO: 5.1 % — SIGNIFICANT CHANGE UP (ref 2–14)
NEUTROPHILS # BLD AUTO: 6.93 K/UL — SIGNIFICANT CHANGE UP (ref 1.8–7.4)
NEUTROPHILS NFR BLD AUTO: 74.7 % — SIGNIFICANT CHANGE UP (ref 43–77)
NRBC # BLD: 0 /100 WBCS — SIGNIFICANT CHANGE UP (ref 0–0)
PCO2 BLDA: 42 MMHG — SIGNIFICANT CHANGE UP (ref 32–45)
PH BLDA: 7.43 — SIGNIFICANT CHANGE UP (ref 7.35–7.45)
PHOSPHATE SERPL-MCNC: 3.7 MG/DL — SIGNIFICANT CHANGE UP (ref 2.5–4.5)
PLATELET # BLD AUTO: 345 K/UL — SIGNIFICANT CHANGE UP (ref 150–400)
PO2 BLDA: 29 MMHG — CRITICAL LOW (ref 83–108)
POTASSIUM SERPL-MCNC: 3.9 MMOL/L — SIGNIFICANT CHANGE UP (ref 3.5–5.3)
POTASSIUM SERPL-SCNC: 3.9 MMOL/L — SIGNIFICANT CHANGE UP (ref 3.5–5.3)
RBC # BLD: 4.63 M/UL — SIGNIFICANT CHANGE UP (ref 3.8–5.2)
RBC # FLD: 14.3 % — SIGNIFICANT CHANGE UP (ref 10.3–14.5)
SAO2 % BLDA: 63 % — LOW (ref 95–100)
SARS-COV-2 RNA SPEC QL NAA+PROBE: SIGNIFICANT CHANGE UP
SODIUM SERPL-SCNC: 140 MMOL/L — SIGNIFICANT CHANGE UP (ref 135–145)
WBC # BLD: 9.28 K/UL — SIGNIFICANT CHANGE UP (ref 3.8–10.5)
WBC # FLD AUTO: 9.28 K/UL — SIGNIFICANT CHANGE UP (ref 3.8–10.5)

## 2021-03-15 PROCEDURE — 71045 X-RAY EXAM CHEST 1 VIEW: CPT | Mod: 26

## 2021-03-15 PROCEDURE — 99233 SBSQ HOSP IP/OBS HIGH 50: CPT | Mod: GC

## 2021-03-15 RX ORDER — IPRATROPIUM/ALBUTEROL SULFATE 18-103MCG
3 AEROSOL WITH ADAPTER (GRAM) INHALATION EVERY 6 HOURS
Refills: 0 | Status: DISCONTINUED | OUTPATIENT
Start: 2021-03-15 | End: 2021-03-16

## 2021-03-15 RX ORDER — ACETYLCYSTEINE 200 MG/ML
5 VIAL (ML) MISCELLANEOUS THREE TIMES A DAY
Refills: 0 | Status: COMPLETED | OUTPATIENT
Start: 2021-03-15 | End: 2021-03-16

## 2021-03-15 RX ORDER — LEVOTHYROXINE SODIUM 125 MCG
175 TABLET ORAL DAILY
Refills: 0 | Status: DISCONTINUED | OUTPATIENT
Start: 2021-03-15 | End: 2021-03-16

## 2021-03-15 RX ORDER — FUROSEMIDE 40 MG
20 TABLET ORAL ONCE
Refills: 0 | Status: COMPLETED | OUTPATIENT
Start: 2021-03-15 | End: 2021-03-15

## 2021-03-15 RX ADMIN — LATANOPROST 1 DROP(S): 0.05 SOLUTION/ DROPS OPHTHALMIC; TOPICAL at 23:42

## 2021-03-15 RX ADMIN — LOSARTAN POTASSIUM 100 MILLIGRAM(S): 100 TABLET, FILM COATED ORAL at 05:27

## 2021-03-15 RX ADMIN — Medication 40 MILLIGRAM(S): at 05:25

## 2021-03-15 RX ADMIN — ENOXAPARIN SODIUM 40 MILLIGRAM(S): 100 INJECTION SUBCUTANEOUS at 21:52

## 2021-03-15 RX ADMIN — Medication 3 MILLILITER(S): at 10:33

## 2021-03-15 RX ADMIN — Medication 650 MILLIGRAM(S): at 04:18

## 2021-03-15 RX ADMIN — Medication 20 MILLIGRAM(S): at 05:24

## 2021-03-15 RX ADMIN — ATORVASTATIN CALCIUM 10 MILLIGRAM(S): 80 TABLET, FILM COATED ORAL at 12:30

## 2021-03-15 RX ADMIN — Medication 3 MILLILITER(S): at 18:40

## 2021-03-15 RX ADMIN — BRIMONIDINE TARTRATE 1 DROP(S): 2 SOLUTION/ DROPS OPHTHALMIC at 21:53

## 2021-03-15 RX ADMIN — Medication 3 MILLILITER(S): at 21:52

## 2021-03-15 RX ADMIN — Medication 1 PUFF(S): at 23:42

## 2021-03-15 RX ADMIN — Medication 200 MILLIGRAM(S): at 05:24

## 2021-03-15 RX ADMIN — Medication 5 MILLILITER(S): at 21:52

## 2021-03-15 RX ADMIN — DORZOLAMIDE HYDROCHLORIDE TIMOLOL MALEATE 1 DROP(S): 20; 5 SOLUTION/ DROPS OPHTHALMIC at 21:55

## 2021-03-15 RX ADMIN — FAMOTIDINE 40 MILLIGRAM(S): 10 INJECTION INTRAVENOUS at 21:52

## 2021-03-15 RX ADMIN — BRIMONIDINE TARTRATE 1 DROP(S): 2 SOLUTION/ DROPS OPHTHALMIC at 09:30

## 2021-03-15 RX ADMIN — AMLODIPINE BESYLATE 10 MILLIGRAM(S): 2.5 TABLET ORAL at 05:25

## 2021-03-15 RX ADMIN — Medication 3 MILLILITER(S): at 03:48

## 2021-03-15 RX ADMIN — GABAPENTIN 300 MILLIGRAM(S): 400 CAPSULE ORAL at 21:52

## 2021-03-15 RX ADMIN — DORZOLAMIDE HYDROCHLORIDE TIMOLOL MALEATE 1 DROP(S): 20; 5 SOLUTION/ DROPS OPHTHALMIC at 09:31

## 2021-03-15 RX ADMIN — Medication 650 MILLIGRAM(S): at 03:48

## 2021-03-15 NOTE — PROGRESS NOTE ADULT - PROBLEM SELECTOR PLAN 7
PMH of Breast CA s/p mastectomy. Currently in remission.  -had b/l lymph nodes removed so unable to draw blood from arms. Has port that blood can be drawn from that was placed in 2005 and revised in 2010 that needs to be flushed every 8 weeks
PMH of Breast CA s/p mastectomy. Currently in remission.  -had b/l lymph nodes removed so unable to draw blood from arms. Has port that blood can be drawn from that was placed in 2005 and revised in 2010 that needs to be flushed every 8 weeks

## 2021-03-15 NOTE — PROGRESS NOTE ADULT - PROBLEM SELECTOR PLAN 8
F: None   E: Replete for K<4 Mag<2  N: DASH Diet  A: Lovenox   Disposition: RMF
F: None   E: Replete for K<4 Mag<2  N: DASH Diet  A: Lovenox   Disposition: RMF

## 2021-03-15 NOTE — PROGRESS NOTE ADULT - PROBLEM SELECTOR PROBLEM 2
COPD (chronic obstructive pulmonary disease)
Acute respiratory failure with hypoxia
COPD (chronic obstructive pulmonary disease)

## 2021-03-15 NOTE — CHART NOTE - NSCHARTNOTEFT_GEN_A_CORE
Overnight, notified by nurse that patient is c/o chest tightness and productive cough. Patient assessed at bedside. Patient had multiple coughing fits while physician was in room, but not SOB. Patient afebrile, HR 69, /81, RR 20, and SpO2 97% on RA. Patient awake, alert, able to verbally communicate with writer, and AAOx3. Patient took Duonebs about 15 minutes prior to being seen by physician and reports no improvement in symptoms. Ordered CXR, ABG, and BiPAP. CXR showed no consolidations or acute changes compared to CXR from 3/13. However, CXR is notable for cardiomegaly and possible congestion. Ordered lasix 20mg IV x1. Patient was started on BiPAP and symptoms improved. Patient denies headache, chest pain, abdominal pain, n/v/d. Episode likely 2/2 COPD exacerbation (patient was admitted for COPD exacerbation 2/2 viral pathogen).     Of note, patient was initially admitted to COVID floor due to high suspicion. Patient has had multiple negative in-house COVID tests and was cleared by ID to be moved to a non-COVID floor. Will continue to monitor.

## 2021-03-15 NOTE — PROGRESS NOTE ADULT - PROBLEM SELECTOR PLAN 2
d/t COPD exacerbation; resolved; stable on room air; cont. mgmt as above
PMH of COPD presenting with SOB, increased cough and sputum. S/p Solumedrol 125 and nebs in ED.  - C/w Prednisone 40qd for total 5 days (3/13-)  - C/w Duonebs q6h standing  - c/w home flovent  - f/u pulm COPD bundle  - likely d/c tomorrow
PMH of COPD presenting with SOB, increased cough and sputum. S/p Solumedrol 125 and nebs in ED.  - C/w Prednisone 40qd for total 5 days (3/13-3/17)  - C/w Duonebs q6h standing  - c/w home flovent  - f/u pulm COPD bundle  - likely d/c tomorrow  - At 4:00pm, patient was given 24 hour notice. Discussed the stipulations of 24 hour notice and she was informed of her right to appeal however she declined. Patient agreed and understood. Form was signed and placed in chart.

## 2021-03-15 NOTE — PROGRESS NOTE ADULT - PROBLEM SELECTOR PLAN 3
cont. Norvasc, ARB, DASH diet
Upon presentation 98%RA at rest, 85% RA upon ambulation likely 2/2 COPD and/or viral infection.   - C/w close monitoring of respiratory status- on RA  - Treatment as per COPD
Upon presentation 98%RA at rest, 85% RA upon ambulation likely 2/2 COPD and/or viral infection.   - C/w close monitoring of respiratory status- on RA  - Treatment as per COPD

## 2021-03-15 NOTE — GOALS OF CARE CONVERSATION - ADVANCED CARE PLANNING - CONVERSATION DETAILS
Patient filled out HCP form and asked if she can also fill out an official form stating her wishes for a DNR/DNI. Stated she had a bad experience in the past being intubated and that if it is her time, she doesn't want any extraordinary measures. If the time comes, she would like comfort measures only, no feeding tube, trial of IV fluids, and the use of abx.

## 2021-03-15 NOTE — PROGRESS NOTE ADULT - SUBJECTIVE AND OBJECTIVE BOX
OVERNIGHT EVENTS: See chart note.     SUBJECTIVE / INTERVAL HPI: Patient seen and examined at bedside. On BiPAP but appears comfortable. Continues to complain of ear pain, sore throat, and body aches. States that the BiPAP helps her feel more comfortable.     VITAL SIGNS:  Vital Signs Last 24 Hrs  T(C): 36.4 (15 Mar 2021 12:26), Max: 36.8 (15 Mar 2021 03:39)  T(F): 97.5 (15 Mar 2021 12:26), Max: 98.3 (15 Mar 2021 03:39)  HR: 74 (15 Mar 2021 12:26) (67 - 78)  BP: 152/76 (15 Mar 2021 12:26) (144/81 - 160/90)  BP(mean): --  RR: 18 (15 Mar 2021 12:26) (18 - 25)  SpO2: 95% (15 Mar 2021 12:26) (95% - 100%)    PHYSICAL EXAM:    General: WDWN  HEENT: NC/AT; PERRL, anicteric sclera; MMM  Neck: supple  Cardiovascular: +S1/S2; RRR  Respiratory: coarse breath sounds b/l without wheezes or crackles  Gastrointestinal: soft, NT/ND; +BSx4  Extremities: WWP; no edema, clubbing or cyanosis  Vascular: 2+ radial, DP/PT pulses B/L  Neurological: AAOx3; no focal deficits    MEDICATIONS:  MEDICATIONS  (STANDING):  acetylcysteine 10%  Inhalation 5 milliLiter(s) Inhalation three times a day  albuterol/ipratropium for Nebulization 3 milliLiter(s) Nebulizer every 6 hours  amLODIPine   Tablet 10 milliGRAM(s) Oral daily  atorvastatin 10 milliGRAM(s) Oral daily  brimonidine 0.2% Ophthalmic Solution 1 Drop(s) Both EYES three times a day  dorzolamide 2%/timolol 0.5% Ophthalmic Solution 1 Drop(s) Both EYES two times a day  enoxaparin Injectable 40 milliGRAM(s) SubCutaneous at bedtime  famotidine    Tablet 40 milliGRAM(s) Oral at bedtime  fluticasone propionate   220 MICROgram(s) HFA Inhaler 1 Puff(s) Inhalation every 12 hours  gabapentin 300 milliGRAM(s) Oral at bedtime  latanoprost 0.005% Ophthalmic Solution 1 Drop(s) Both EYES at bedtime  levothyroxine 175 MICROGram(s) Oral daily  losartan 100 milliGRAM(s) Oral daily  predniSONE   Tablet 40 milliGRAM(s) Oral daily    MEDICATIONS  (PRN):  acetaminophen   Tablet .. 650 milliGRAM(s) Oral every 6 hours PRN Temp greater or equal to 38C (100.4F)  guaiFENesin   Syrup  (Sugar-Free) 200 milliGRAM(s) Oral every 6 hours PRN Cough      ALLERGIES:  Allergies    IV Contrast (Short breath)  lisinopril (Short breath)    Intolerances        LABS:                        12.1   9.28  )-----------( 345      ( 15 Mar 2021 08:12 )             39.4     03-15    140  |  102  |  17  ----------------------------<  148<H>  3.9   |  30  |  0.72    Ca    9.2      15 Mar 2021 08:12  Phos  3.7     03-15  Mg     2.2     03-15          CAPILLARY BLOOD GLUCOSE          RADIOLOGY & ADDITIONAL TESTS: Reviewed.

## 2021-03-15 NOTE — PROGRESS NOTE ADULT - PROBLEM SELECTOR PLAN 5
PMH of HTN, home medication valsartan missed today, therefore sBP 180s initially s/p lopressor 25 with improvement.  - C/w home amlodipine and valsartan
has a port
PMH of HTN, home medication valsartan missed today, therefore sBP 180s initially s/p lopressor 25 with improvement.  - C/w home amlodipine and valsartan

## 2021-03-15 NOTE — PROGRESS NOTE ADULT - ASSESSMENT
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
61 y/o F w/
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

## 2021-03-15 NOTE — PROGRESS NOTE ADULT - PROBLEM SELECTOR PLAN 4
Nutrition consult
Pt says she is currently homeless and is staying at her daughter's and friend's houses when available.  -social work consult
Pt says she is currently homeless and is staying at her daughter's and friend's houses when available.  -social work consult

## 2021-03-15 NOTE — PROGRESS NOTE ADULT - ATTENDING COMMENTS
Dispo: home pending progress, likely tomorrow
Patient seen and examined at bedside. Agree with exam, a/p as above with the following addendum/edits:    BIPAP placed overnight however was not in respiratory distress. Of most concern to her is her cough, which has improved w/ mucomyst. She was recently taken off ?ICS by her pulmonologist and seems her COPD has gotten worse. Lungs clear on exam but cough persistent and bothersome. Seen in afternoon walking to bathroom w/o any respiratory distress. Stable for d/c, given 24 hour notice. Needs pulm f/u as part of COPD bundle.

## 2021-03-15 NOTE — PROGRESS NOTE ADULT - PROBLEM SELECTOR PLAN 1
Patient endorsing increasing SOB resistant to outpatient albuterol. DDX include COPD exacerbation vs infectious (CXR with no definitive infiltrate, possibly viral in setting of symptoms mentioned), vs less likely PE (as dimer low), vs cardiac (HF).   - Treat COPD exacerbation as below  - ID cleared for neg COVID test
likely d/t viral syndrome by hx and sx (although RVP negative); cont. prednisone (day #2/at least 5), A/A nebs, ICS, supportive care; f/u Pulm recs
Patient endorsing increasing SOB resistant to outpatient albuterol. DDX include COPD exacerbation vs infectious (CXR with no definitive infiltrate, possibly viral in setting of symptoms mentioned), vs less likely PE (as dimer low), vs cardiac (HF).   - Treat COPD exacerbation as below  - ID cleared for neg covid test

## 2021-03-15 NOTE — PROGRESS NOTE ADULT - NUTRITIONAL ASSESSMENT
This patient has been assessed with a concern for Malnutrition and has been determined to have a diagnosis/diagnoses of Morbid obesity (BMI > 40).    This patient is being managed with:   Diet DASH/TLC-  Sodium & Cholesterol Restricted  Entered: Mar 13 2021 11:26AM    
This patient has been assessed with a concern for Malnutrition and has been determined to have a diagnosis/diagnoses of Morbid obesity (BMI > 40).    This patient is being managed with:   Diet DASH/TLC-  Sodium & Cholesterol Restricted  Entered: Mar 13 2021 11:26AM

## 2021-03-15 NOTE — PROGRESS NOTE ADULT - PROBLEM SELECTOR PLAN 6
Pt. stays w/ friends; SW consult
Pt reports history of graves disease that she says is 2/2 a pituitary tumor that has been stable for years.  -cont home synthroid
Pt reports history of graves disease that she says is 2/2 a pituitary tumor that has been stable for years.  -cont home synthroid

## 2021-03-16 ENCOUNTER — TRANSCRIPTION ENCOUNTER (OUTPATIENT)
Age: 61
End: 2021-03-16

## 2021-03-16 VITALS
TEMPERATURE: 98 F | SYSTOLIC BLOOD PRESSURE: 120 MMHG | DIASTOLIC BLOOD PRESSURE: 84 MMHG | OXYGEN SATURATION: 99 % | HEART RATE: 68 BPM | RESPIRATION RATE: 20 BRPM

## 2021-03-16 PROBLEM — G40.909 EPILEPSY, UNSPECIFIED, NOT INTRACTABLE, WITHOUT STATUS EPILEPTICUS: Chronic | Status: ACTIVE | Noted: 2021-03-13

## 2021-03-16 PROBLEM — J44.1 CHRONIC OBSTRUCTIVE PULMONARY DISEASE WITH (ACUTE) EXACERBATION: Chronic | Status: ACTIVE | Noted: 2021-03-13

## 2021-03-16 PROBLEM — C50.919 MALIGNANT NEOPLASM OF UNSPECIFIED SITE OF UNSPECIFIED FEMALE BREAST: Chronic | Status: ACTIVE | Noted: 2021-03-13

## 2021-03-16 PROBLEM — E05.00 THYROTOXICOSIS WITH DIFFUSE GOITER WITHOUT THYROTOXIC CRISIS OR STORM: Chronic | Status: ACTIVE | Noted: 2021-03-13

## 2021-03-16 PROCEDURE — 83735 ASSAY OF MAGNESIUM: CPT

## 2021-03-16 PROCEDURE — 84100 ASSAY OF PHOSPHORUS: CPT

## 2021-03-16 PROCEDURE — 36415 COLL VENOUS BLD VENIPUNCTURE: CPT

## 2021-03-16 PROCEDURE — U0003: CPT

## 2021-03-16 PROCEDURE — 83880 ASSAY OF NATRIURETIC PEPTIDE: CPT

## 2021-03-16 PROCEDURE — U0005: CPT

## 2021-03-16 PROCEDURE — 0225U NFCT DS DNA&RNA 21 SARSCOV2: CPT

## 2021-03-16 PROCEDURE — 85025 COMPLETE CBC W/AUTO DIFF WBC: CPT

## 2021-03-16 PROCEDURE — 82803 BLOOD GASES ANY COMBINATION: CPT

## 2021-03-16 PROCEDURE — 84145 PROCALCITONIN (PCT): CPT

## 2021-03-16 PROCEDURE — 93005 ELECTROCARDIOGRAM TRACING: CPT

## 2021-03-16 PROCEDURE — 80053 COMPREHEN METABOLIC PANEL: CPT

## 2021-03-16 PROCEDURE — 94660 CPAP INITIATION&MGMT: CPT

## 2021-03-16 PROCEDURE — 99285 EMERGENCY DEPT VISIT HI MDM: CPT | Mod: 25

## 2021-03-16 PROCEDURE — 96374 THER/PROPH/DIAG INJ IV PUSH: CPT

## 2021-03-16 PROCEDURE — 85379 FIBRIN DEGRADATION QUANT: CPT

## 2021-03-16 PROCEDURE — 80048 BASIC METABOLIC PNL TOTAL CA: CPT

## 2021-03-16 PROCEDURE — 71045 X-RAY EXAM CHEST 1 VIEW: CPT

## 2021-03-16 PROCEDURE — 94640 AIRWAY INHALATION TREATMENT: CPT

## 2021-03-16 PROCEDURE — 86140 C-REACTIVE PROTEIN: CPT

## 2021-03-16 PROCEDURE — 87631 RESP VIRUS 3-5 TARGETS: CPT

## 2021-03-16 PROCEDURE — 99239 HOSP IP/OBS DSCHRG MGMT >30: CPT | Mod: GC

## 2021-03-16 PROCEDURE — 82728 ASSAY OF FERRITIN: CPT

## 2021-03-16 RX ORDER — IPRATROPIUM/ALBUTEROL SULFATE 18-103MCG
3 AEROSOL WITH ADAPTER (GRAM) INHALATION
Qty: 84 | Refills: 0
Start: 2021-03-16 | End: 2021-03-22

## 2021-03-16 RX ORDER — FLUTICASONE PROPIONATE 220 MCG
1 AEROSOL WITH ADAPTER (GRAM) INHALATION
Qty: 1 | Refills: 0
Start: 2021-03-16 | End: 2021-04-14

## 2021-03-16 RX ORDER — FLUTICASONE PROPIONATE 220 MCG
1 AEROSOL WITH ADAPTER (GRAM) INHALATION
Qty: 0 | Refills: 0 | DISCHARGE

## 2021-03-16 RX ADMIN — Medication 40 MILLIGRAM(S): at 06:09

## 2021-03-16 RX ADMIN — Medication 1 PUFF(S): at 10:37

## 2021-03-16 RX ADMIN — Medication 3 MILLILITER(S): at 06:07

## 2021-03-16 RX ADMIN — Medication 175 MICROGRAM(S): at 06:09

## 2021-03-16 RX ADMIN — DORZOLAMIDE HYDROCHLORIDE TIMOLOL MALEATE 1 DROP(S): 20; 5 SOLUTION/ DROPS OPHTHALMIC at 06:10

## 2021-03-16 RX ADMIN — BRIMONIDINE TARTRATE 1 DROP(S): 2 SOLUTION/ DROPS OPHTHALMIC at 06:09

## 2021-03-16 RX ADMIN — AMLODIPINE BESYLATE 10 MILLIGRAM(S): 2.5 TABLET ORAL at 06:09

## 2021-03-16 RX ADMIN — ATORVASTATIN CALCIUM 10 MILLIGRAM(S): 80 TABLET, FILM COATED ORAL at 11:59

## 2021-03-16 RX ADMIN — LOSARTAN POTASSIUM 100 MILLIGRAM(S): 100 TABLET, FILM COATED ORAL at 06:09

## 2021-03-16 RX ADMIN — Medication 5 MILLILITER(S): at 06:08

## 2021-03-16 RX ADMIN — Medication 500 UNIT(S): at 11:59

## 2021-03-16 NOTE — DISCHARGE NOTE PROVIDER - DETAILS OF MALNUTRITION DIAGNOSIS/DIAGNOSES
This patient has been assessed with a concern for Malnutrition and was treated during this hospitalization for the following Nutrition diagnosis/diagnoses:     -  03/14/2021: Morbid obesity (BMI > 40)   This patient has been assessed with a concern for Malnutrition and was treated during this hospitalization for the following Nutrition diagnosis/diagnoses:     -  03/14/2021: Morbid obesity (BMI > 40)    This patient has been assessed with a concern for Malnutrition and was treated during this hospitalization for the following Nutrition diagnosis/diagnoses:     -  03/14/2021: Morbid obesity (BMI > 40)   This patient has been assessed with a concern for Malnutrition and was treated during this hospitalization for the following Nutrition diagnosis/diagnoses:     -  03/14/2021: Morbid obesity (BMI > 40)    This patient has been assessed with a concern for Malnutrition and was treated during this hospitalization for the following Nutrition diagnosis/diagnoses:     -  03/14/2021: Morbid obesity (BMI > 40)    This patient has been assessed with a concern for Malnutrition and was treated during this hospitalization for the following Nutrition diagnosis/diagnoses:     -  03/14/2021: Morbid obesity (BMI > 40)   This patient has been assessed with a concern for Malnutrition and was treated during this hospitalization for the following Nutrition diagnosis/diagnoses:     -  03/14/2021: Morbid obesity (BMI > 40)    This patient has been assessed with a concern for Malnutrition and was treated during this hospitalization for the following Nutrition diagnosis/diagnoses:     -  03/14/2021: Morbid obesity (BMI > 40)    This patient has been assessed with a concern for Malnutrition and was treated during this hospitalization for the following Nutrition diagnosis/diagnoses:     -  03/14/2021: Morbid obesity (BMI > 40)    This patient has been assessed with a concern for Malnutrition and was treated during this hospitalization for the following Nutrition diagnosis/diagnoses:     -  03/14/2021: Morbid obesity (BMI > 40)

## 2021-03-16 NOTE — DISCHARGE NOTE PROVIDER - CARE PROVIDER_API CALL
Bety Isaacs)  Critical Care Medicine; Pulmonary Disease  [address], [address] [place] [id number]  Phone: [phone]  Fax: [phone]  Scheduled Appointment: 05/12/2021 01:40 PM    Samara Suero)  Internal Medicine  [address] [place] [address]  Phone: [phone]  Fax: [phone]  Follow Up Time:

## 2021-03-16 NOTE — DISCHARGE NOTE PROVIDER - PROVIDER TOKENS
PROVIDER:[TOKEN:[[id number]],SCHEDULEDAPPT:[05/12/2021],SCHEDULEDAPPTTIME:[01:40 PM]],PROVIDER:[TOKEN:[[id number]]] yes

## 2021-03-16 NOTE — DISCHARGE NOTE NURSING/CASE MANAGEMENT/SOCIAL WORK - PATIENT PORTAL LINK FT
You can access the FollowMyHealth Patient Portal offered by [facility] by registering at the following website: [email or web address]. By joining [employer]’s FollowMyHealth portal, you will also be able to view your health information using other applications (apps) compatible with our system.

## 2021-03-16 NOTE — DISCHARGE NOTE PROVIDER - NSDCCPCAREPLAN_GEN_ALL_CORE_FT
PRINCIPAL DISCHARGE DIAGNOSIS  Diagnosis: SOB (shortness of breath)  Assessment and Plan of Treatment:       SECONDARY DISCHARGE DIAGNOSES  Diagnosis: Cough  Assessment and Plan of Treatment:      PRINCIPAL DISCHARGE DIAGNOSIS  Diagnosis: COPD exacerbation  Assessment and Plan of Treatment: COPD is a lung disease that makes it hard to breathe. In people with COPD, the airways (the branching tubes that carry air within the lungs) become narrow and damaged. This makes people feel out of breath and tired. COPD can be a serious illness. It cannot be cured and it usually gets worse over time. But there are treatments that can help. Common symptoms include shortness of breath, wheezing, and worsening cough and mucus production. COPD can put you at an increased risk for lung infections, lung cancer and heart problems. There are a lot of medicines to treat COPD. Most people use inhalers that help open up their airways or decrease swelling in the airways. Often people need more than one inhaler at a time. You might need to take a steroid medicine in a pill for a flare of COPD. If the disease gets worse, you might need to use oxygen. Pulmonary rehabilitation may be recommended and can teach you how to improve your symptoms through exercises and different ways to breathe. In order to prevent COPD exacerbations it is important that you take your prescribed medications and follow the recommendations of your primary care and pulmonary doctors. If your shortness of breath worsens or your experience an increase in or change of your sputum production you should seek evaluation by your primary care doctor, or, if severe, an emergency room. The medications that you are prescribed are: Prednisone (steroid) 40mg that you will take for 1 more day TOMORROW 3/17/2021, Flovent (fluticasone inhaler) 1puff every 12 hours daily, Albuterol (inhaler) 2 puffs every 6 hours AS NEEDED for shortness of breath. Furthermore, we have sent a prescription for a nebulizer to your pharmacy as well as the solution (ipratropium-albuterol) that you can take every 6 hours as needed for symptomatic improvement.        SECONDARY DISCHARGE DIAGNOSES  Diagnosis: Cough  Assessment and Plan of Treatment: Your COPD has caused the cough that you came in with.  Please continue to take the cough medicine, Robitussin (guaiFENesin) 10mL every 6 hours as needed for cough and to bring up sputum. Please follow up with your new pulmonologist, Dr. Isaacs on 05/12/2021 at 1:40PM.

## 2021-03-16 NOTE — DISCHARGE NOTE PROVIDER - NSDCQMERRANDS_GEN_ALL_CORE
Nephrology Consult Note  [phone]  [phone]   [email or web address].        Reason for Consult:  syncope    HISTORY OF PRESENT ILLNESS:                This is a patient with significant past medical history who was recently hospitalized for chest pain, AICD firing,complicated by ALEN peak Scr 1.5-improved to 1.0 at discharge,  h/o CHF, h/o colon cancer s/p XRT, radiation, who had a witnessed syncopal episode while waiting in ER Pt states he came to Er for worsening SOB, weight gain of 20#. He was asked to come to ER further evaluation. He was seen by cardiology 3/6 and lasix PO was changed to torsemide. He was taking both entresto and lisinopril after last discharge. He is also on eplerenone. He denies chest pain at this time. He denies fever, chill, N/V. He denies any difficulty breathing. Given lasix in ER  -Scr at adm 1.7--> 1.8  -started on dobutamine and lasix drip  -hypotensive with BP in 60's range ON-started on pressors    Past Medical History:        Diagnosis Date    Allergic     Anesthesia     tremors    Arthritis     CAD (coronary artery disease)     Cancer (HCC)     colon    Chemotherapy adverse reaction 4/29/2019    CHF (congestive heart failure) (HCC)     Chronic knee pain     Clostridium difficile infection 07/11/2016, 12/21/19    PCR+    Depression motion     Diverticulitis     Diverticulosis     History of alcohol abuse     Hyperlipidemia     Hypertension     Irregular heart beat     states been told he has had in past. Never treated. Sees PCP every 3 mos.  and EKG yearly    Pneumonia     Right knee pain     Shoulder injury     and arthrisits, injury rotaotr cuff    Sleep difficulties     with depression       Past Surgical History:        Procedure Laterality Date    ABDOMEN SURGERY      ABSCESS DRAINAGE      BREAST SURGERY Right 1/21/2019    RIGHT BREAST EXCISIONAL BIOPSY performed by Emilia Blanco MD at [facility]  09/14/2016    No stents placed    CARDIAC DEFIBRILLATOR PLACEMENT  10/2017    COLECTOMY      COLONOSCOPY  08/29/2016    with biopsy and polypectomy    COLONOSCOPY  03/19/2018    COLONOSCOPY N/A 5/14/2019    COLONOSCOPY WITH DILATION performed by Vaibhav Jay MD at [facility] (LOWER)  09/14/2016    for staging - Dr. Vivek Vidal, COLON, DIAGNOSTIC     [address] HERNIA REPAIR  12/01/2017    peristomal hernia repair    HERNIA REPAIR  04/18/2018    S/P: colostomy closure with hernia repair with mesh, with Dr. Pankaj Best at [facility].  INNER EAR SURGERY  tube right ear    JOINT REPLACEMENT      left TKR    KNEE ARTHROSCOPY Right [id number]    KNEE ARTHROSCOPY Right 8/4/2014    medial meniscus    KNEE SURGERY  08/24/2011    removal of tibial component    OTHER SURGICAL HISTORY  7/1/13    ACL Rt knee meniscus repair synovectomy    OTHER SURGICAL HISTORY  12/01/2017    takedown of ileostomy    PACEMAKER PLACEMENT      icd 10/2017    REVISION COLOSTOMY  04/18/2018    S/P: colostomy closure with hernia repair with mesh, with Dr. Pankaj Best at [facility].     SHOULDER ARTHROSCOPY Right 9/22/15    SUBACROMIAL DECOMPRESSION, DISTAL CLAVICLE EXCISION, LABRAL DEBRIDEMETN    SIGMOIDOSCOPY N/A 11/23/2018    SIGMOIDOSCOPY BIOPSY FLEXIBLE performed by Vaibhav Jay MD at [facility]  4 surgeries    SMALL INTESTINE SURGERY  01/11/2017    LAPAROSCOPIC LOW ANTERIOR RESECTION, ILEOSTOMY    SMALL INTESTINE SURGERY  12/01/2017    small bowel resection    SMALL INTESTINE SURGERY N/A 8/21/2019    OPEN COLOSTOMY AND PARASTOMAL HERNIA REPAIR WITH MESH performed by Dago Miguel MD at [address] TUNNELED VENOUS PORT PLACEMENT Left 02/13/2017    PORT-A-CATH INSERTION                     TUNNELED VENOUS PORT PLACEMENT      UMBILICAL HERNIA REPAIR  11/11/14    UPPER GASTROINTESTINAL ENDOSCOPY  10/03/2017       Current No History    Marital status:      Spouse name: Not on file    Number of children: Not on file    Years of education: Not on file    Highest education level: Not on file   Occupational History    Not on file   Social Needs    Financial resource strain: Not on file    Food insecurity     Worry: Not on file     Inability: Not on file    Transportation needs     Medical: Not on file     Non-medical: Not on file   Tobacco Use    Smoking status: Former Smoker     Packs/day: 1.00     Years: 5.00     Pack years: 5.00     Last attempt to quit: 2007     Years since quittin.2    Smokeless tobacco: Former User     Types: Maven date: 8/10/2017   Substance and Sexual Activity    Alcohol use: No     Comment: history of alcohol abuse 20 years ago    Drug use: No    Sexual activity: Never     Partners: Female   Lifestyle    Physical activity     Days per week: Not on file     Minutes per session: Not on file    Stress: Not on file   Relationships    Social connections     Talks on phone: Not on file     Gets together: Not on file     Attends Judaism service: Not on file     Active member of club or organization: Not on file     Attends meetings of clubs or organizations: Not on file     Relationship status: Not on file    Intimate partner violence     Fear of current or ex partner: Not on file     Emotionally abused: Not on file     Physically abused: Not on file     Forced sexual activity: Not on file   Other Topics Concern    Not on file   Social History Narrative    Not on file       Family History:       Problem Relation Age of Onset    Cancer Mother     High Blood Pressure Father     Cancer Father          ROS: A 12  Point ROS is reviewed and is negative except as stated in HPI    PHYSICAL EXAM:    Vitals:    BP 92/78   Pulse 98   Temp 97.2 °F (36.2 °C) (Temporal)   Resp 14   Ht 5' 10\" (1.778 m)   Wt 226 lb 6.6 oz (102.7 kg)   SpO2 99%   BMI 32.49 kg/m²   I/O last 3 completed shifts: In: 10 [I.V.:10]  Out: 200 [Urine:200]  No intake/output data recorded. Physical Exam:  Gen: Resting in bed, NAD.  obese  HEENT: MMM, OP clear. Anicteric, NC/AT  CV: +S1/S2, RRR  Lungs: Good respiratory effort, diminished in bases   Left PORT intact  Abd: S/NT +BS-obese  Ext: + edema, no cyanosis  Skin: Warm. No rashes appreciated. : No TTP over bladder, nondistended. Neuro: Alert and oriented x 3, nonfocal.  Joints: No erythema noted over joints. DATA:    CBC:   Lab Results   Component Value Date    WBC 8.8 03/11/2020    RBC 4.46 03/11/2020    RBC 3.74 07/05/2017    HGB 13.9 03/11/2020    HCT 43.0 03/11/2020    MCV 96.5 03/11/2020    MCH 31.2 03/11/2020    MCHC 32.3 03/11/2020    RDW 19.7 03/11/2020     03/11/2020    MPV 8.4 03/11/2020     BMP:    Lab Results   Component Value Date     03/11/2020    K 4.9 03/11/2020    CL 94 03/11/2020    CO2 21 03/11/2020    BUN 31 03/11/2020    LABALBU 4.0 03/11/2020    CREATININE 1.8 03/11/2020    CALCIUM 9.3 03/11/2020    GFRAA 49 03/11/2020    GFRAA >60 12/19/2012    LABGLOM 40 03/11/2020    GLUCOSE 87 03/11/2020    GLUCOSE 131 07/05/2017       IMPRESSION/RECOMMENDATIONS:    ALEN on CKD stage 2: recurrent-likely CRS-exacerbated by combined dual RASI and Enteresto/hypotension  -check urine lytes  -agree with lasix/dobumtamine drip for volume management  -continue midodrine/pressors to keep MAP > 60  -strict I/O  -daily weight  -no RRT needed  -check bladder scan    Syncope: due to hypotension  -plan as below    Hypotension: multifactorial, CHF combined with meds  -continue midodrine and pressors  -started on dobutamine/lasix drip    Hyponatremia: hypervolemic  -continue lasix    CHF: per cardiololy  -on lasix and dobutamine    VT: s/p AICD    H/o colon CA: in remission per patient      Critical care time 35mins: 07:35-08:05    Thank you for allowing me to participate in the care of this patient. I will continue to follow along.   Please call with questions.     Janet Pizano MD

## 2021-03-16 NOTE — DISCHARGE NOTE PROVIDER - CARE PROVIDERS DIRECT ADDRESSES
[email or web address]

## 2021-03-16 NOTE — DISCHARGE NOTE PROVIDER - NSDCFUADDAPPT_GEN_ALL_CORE_FT
Please follow up with your PCP, Dr. Raines on 3/18/2021 at 12:00PM.     Please follow up with your new pulmonologist, Dr. Isaacs on 05/12/2021 at 1:40PM.

## 2021-03-16 NOTE — DISCHARGE NOTE PROVIDER - NSDCFUSCHEDAPPT_GEN_ALL_CORE_FT
ZAC VELEZ ; 03/18/2021 ; [facility] [address] ZAC VELEZ ; 03/18/2021 ; [facility] [address]  ZAC VELEZ ; 05/12/2021 ; [facility] [address]

## 2021-03-16 NOTE — DISCHARGE NOTE PROVIDER - HOSPITAL COURSE
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     Problem List/Inpatient treatment course:   1. SOB (shortness of breath)-Patient endorsing increasing SOB resistant to outpatient albuterol. DDX include COPD exacerbation vs infectious (CXR with no definitive infiltrate, possibly viral in setting of symptoms mentioned), vs less likely PE (as dimer low), vs cardiac (HF).   - Treat COPD exacerbation as below  - ID cleared for neg COVID test.     2. COPD (chronic obstructive pulmonary disease)- PMH of COPD presenting with SOB, increased cough and sputum. S/p Solumedrol 125 and nebs in ED.  - C/w Prednisone 40qd for total 5 days (3/13-3/17)  - C/w Duonebs q6h standing  - c/w home flovent  - f/u pulm COPD bundle  - At 4:00pm on 3/15, patient was given 24 hour notice. Discussed the stipulations of 24 hour notice and she was informed of her right to appeal however she declined. Patient agreed and understood. Form was signed and placed in chart.     3. Respiratory failure with hypoxia- Upon presentation 98%RA at rest, 85% RA upon ambulation likely 2/2 COPD and/or viral infection.   - C/w close monitoring of respiratory status- on RA  - Treatment as per COPD.     4. Homelessness- Pt says she is currently homeless and is staying at her daughter's and friend's houses when available.  -She is going to stay with her granddaughter until she feels better    5. HTN (hypertension)- PMH of HTN, home medication valsartan missed today, therefore sBP 180s initially s/p lopressor 25 with improvement.  - C/w home amlodipine and valsartan.     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     Problem List/Inpatient treatment course:   1. SOB (shortness of breath)-Patient endorsing increasing SOB resistant to outpatient albuterol. DDX include COPD exacerbation vs infectious (CXR with no definitive infiltrate, possibly viral in setting of symptoms mentioned), vs less likely PE (as dimer low), vs cardiac (HF).   - Treat COPD exacerbation as below  - ID cleared for neg COVID test.     2. COPD (chronic obstructive pulmonary disease)- PMH of COPD presenting with SOB, increased cough and sputum. S/p Solumedrol 125 and nebs in ED.  - C/w Prednisone 40qd for total 5 days (3/13-3/17)  - C/w Duonebs q6h standing  - c/w home flovent  - f/u pulm COPD bundle  - At 4:00pm on 3/15, patient was given 24 hour notice. Discussed the stipulations of 24 hour notice and she was informed of her right to appeal however she declined. Patient agreed and understood. Form was signed and placed in chart.     3. Respiratory failure with hypoxia- Upon presentation 98%RA at rest, 85% RA upon ambulation likely 2/2 COPD and/or viral infection.   - C/w close monitoring of respiratory status- on RA  - Treatment as per COPD.     4. Homelessness- Pt says she is currently homeless and is staying at her daughter's and friend's houses when available.  -She is going to stay with her granddaughter until she feels better    5. HTN (hypertension)- PMH of HTN, home medication valsartan missed today, therefore sBP 180s initially s/p lopressor 25 with improvement.  - C/w home amlodipine and valsartan.     6. Morbid obesity - BMI 43, complicates all aspects of care    New medications: Prednisone 40mg x1 day, Flovent, duoneb     Labs to be followed outpatient: N/A    Exam to be followed outpatient: N/A    ATTENDING ATTESTATION  Date of Service: 3/16/21    I interviewed and examined Eloisa Sapp on the day of discharge and greater than 30 minutes were spent by the team on processing their hospital discharge and coordinating their post-hospital care.     I discussed the care plan with the resident team. I agree with the discharge plan as outlined in the Discharge Summary. I have personally reviewed the above discharge summary and made changes where necessary, and as noted below.    Physical exam on day of discharge:  General: pleasant, appropriate, no acute distress. Participating appropriately in interview.  HEENT: NC/AT, MMM  Neck: soft, supple, no lymphadenopathy  Cardiac: regular rhythm, normal rate, normal s1/s2, no murmurs, rubs, or gallops  Lungs: diffuse rhonchi bilaterally and expiratory wheezes. Normal work of breathing. Speaking in complete sentences.  Abdomen: mobridly obese, soft, nontender, nondistended. Bowel sounds present and normoactive.   Extremities: moving all extremities. No edema.  Neuro: awake, alert, oriented x4. Follows commands. Moving all extremities. Sensation intact.  Psych: no evidence of AVH.    Wes Flor MD  Attending Hospitalist

## 2021-03-16 NOTE — DISCHARGE NOTE PROVIDER - NSDCMRMEDTOKEN_GEN_ALL_CORE_FT
albuterol 90 mcg/inh inhalation powder: 2 puff(s) inhaled every 6 hours, As Needed  amLODIPine 10 mg oral tablet: 1 tab(s) orally once a day  atorvastatin 10 mg oral tablet: 1 tab(s) orally once a day  brimonidine 0.2% ophthalmic solution: 1 drop(s) to each affected eye 3 times a day  ciclopirox 8% topical solution: Apply topically to affected area once a day  dorzolamide-timolol 2.23%-0.68% ophthalmic solution: 1 drop(s) to each affected eye 2 times a day  famotidine 40 mg oral tablet: 1 tab(s) orally once a day (at bedtime)  Flovent 220 mcg/inh inhalation aerosol with adapter: 1 dose(s) inhaled every 12 hours  gabapentin 300 mg oral capsule: 1 cap(s) orally once a day  latanoprost 0.005% ophthalmic solution: 1 drop(s) to each affected eye once a day (in the evening)  levothyroxine 175 mcg (0.175 mg) oral tablet: 1 tab(s) orally once a day  losartan 100 mg oral tablet: 1 tab(s) orally once a day  meloxicam 15 mg oral tablet: 1 tab(s) orally once a day  methocarbamol 750 mg oral tablet: 2 tab(s) orally once a day (at bedtime)   albuterol 90 mcg/inh inhalation powder: 2 puff(s) inhaled every 6 hours, As Needed  amLODIPine 10 mg oral tablet: 1 tab(s) orally once a day  atorvastatin 10 mg oral tablet: 1 tab(s) orally once a day  brimonidine 0.2% ophthalmic solution: 1 drop(s) to each affected eye 3 times a day  ciclopirox 8% topical solution: Apply topically to affected area once a day  dorzolamide-timolol 2.23%-0.68% ophthalmic solution: 1 drop(s) to each affected eye 2 times a day  famotidine 40 mg oral tablet: 1 tab(s) orally once a day (at bedtime)  Flovent  mcg/inh inhalation aerosol: 1 puff(s) inhaled 2 times a day   gabapentin 300 mg oral capsule: 1 cap(s) orally once a day  guaiFENesin 100 mg/5 mL oral liquid: 10 milliliter(s) orally every 6 hours, As needed, Cough  ipratropium-albuterol 0.5 mg-2.5 mg/3 mLinhalation solution: 3 milliliter(s) inhaled every 6 hours, As Needed -for cough - for shortness of breath and/or wheezing   latanoprost 0.005% ophthalmic solution: 1 drop(s) to each affected eye once a day (in the evening)  levothyroxine 175 mcg (0.175 mg) oral tablet: 1 tab(s) orally once a day  losartan 100 mg oral tablet: 1 tab(s) orally once a day  meloxicam 15 mg oral tablet: 1 tab(s) orally once a day  methocarbamol 750 mg oral tablet: 2 tab(s) orally once a day (at bedtime)  NEBULIZER: Patient needs nebulizer machine.     ICD: J44.9  predniSONE 20 mg oral tablet: 2 tab(s) orally once a day

## 2021-03-18 ENCOUNTER — APPOINTMENT (OUTPATIENT)
Dept: INTERNAL MEDICINE | Facility: CLINIC | Age: 61
End: 2021-03-18
Payer: MEDICAID

## 2021-03-18 ENCOUNTER — NON-APPOINTMENT (OUTPATIENT)
Age: 61
End: 2021-03-18

## 2021-03-18 VITALS
DIASTOLIC BLOOD PRESSURE: 68 MMHG | BODY MASS INDEX: 44.43 KG/M2 | WEIGHT: 220 LBS | SYSTOLIC BLOOD PRESSURE: 132 MMHG | HEART RATE: 79 BPM | TEMPERATURE: 97.8 F | OXYGEN SATURATION: 96 %

## 2021-03-18 DIAGNOSIS — L98.9 DISORDER OF THE SKIN AND SUBCUTANEOUS TISSUE, UNSPECIFIED: ICD-10-CM

## 2021-03-18 PROCEDURE — 99072 ADDL SUPL MATRL&STAF TM PHE: CPT

## 2021-03-18 PROCEDURE — 99496 TRANSJ CARE MGMT HIGH F2F 7D: CPT | Mod: GC

## 2021-03-18 RX ORDER — NEBULIZER ACCESSORIES
KIT MISCELLANEOUS
Qty: 1 | Refills: 0 | Status: DISCONTINUED | COMMUNITY
Start: 2021-03-18 | End: 2021-03-18

## 2021-03-18 RX ORDER — FLUTICASONE PROPIONATE AND SALMETEROL 250; 50 UG/1; UG/1
250-50 POWDER RESPIRATORY (INHALATION)
Qty: 1 | Refills: 3 | Status: DISCONTINUED | COMMUNITY
Start: 2021-03-18 | End: 2021-03-18

## 2021-03-18 RX ORDER — SOFT LENS DISINFECTANT
SOLUTION, NON-ORAL MISCELLANEOUS
Qty: 1 | Refills: 0 | Status: ACTIVE | COMMUNITY
Start: 2021-03-18 | End: 1900-01-01

## 2021-03-18 RX ORDER — SOFT LENS DISINFECTANT
SOLUTION, NON-ORAL MISCELLANEOUS
Qty: 1 | Refills: 0 | Status: DISCONTINUED | COMMUNITY
Start: 2021-03-18 | End: 2021-03-18

## 2021-03-19 ENCOUNTER — EMERGENCY (EMERGENCY)
Facility: HOSPITAL | Age: 61
LOS: 1 days | Discharge: HOME | End: 2021-03-19
Attending: EMERGENCY MEDICINE | Admitting: EMERGENCY MEDICINE
Payer: COMMERCIAL

## 2021-03-19 VITALS
SYSTOLIC BLOOD PRESSURE: 184 MMHG | HEIGHT: 60 IN | DIASTOLIC BLOOD PRESSURE: 119 MMHG | HEART RATE: 107 BPM | RESPIRATION RATE: 20 BRPM | WEIGHT: 199.96 LBS | TEMPERATURE: 98 F | OXYGEN SATURATION: 98 %

## 2021-03-19 VITALS
SYSTOLIC BLOOD PRESSURE: 108 MMHG | TEMPERATURE: 99 F | HEART RATE: 89 BPM | RESPIRATION RATE: 18 BRPM | OXYGEN SATURATION: 96 % | DIASTOLIC BLOOD PRESSURE: 72 MMHG

## 2021-03-19 DIAGNOSIS — Z85.3 PERSONAL HISTORY OF MALIGNANT NEOPLASM OF BREAST: ICD-10-CM

## 2021-03-19 DIAGNOSIS — R06.02 SHORTNESS OF BREATH: ICD-10-CM

## 2021-03-19 DIAGNOSIS — Z79.51 LONG TERM (CURRENT) USE OF INHALED STEROIDS: ICD-10-CM

## 2021-03-19 DIAGNOSIS — T48.4X5A ADVERSE EFFECT OF EXPECTORANTS, INITIAL ENCOUNTER: ICD-10-CM

## 2021-03-19 DIAGNOSIS — Z20.822 CONTACT WITH AND (SUSPECTED) EXPOSURE TO COVID-19: ICD-10-CM

## 2021-03-19 DIAGNOSIS — J44.9 CHRONIC OBSTRUCTIVE PULMONARY DISEASE, UNSPECIFIED: ICD-10-CM

## 2021-03-19 DIAGNOSIS — Z79.899 OTHER LONG TERM (CURRENT) DRUG THERAPY: ICD-10-CM

## 2021-03-19 DIAGNOSIS — Z88.8 ALLERGY STATUS TO OTHER DRUGS, MEDICAMENTS AND BIOLOGICAL SUBSTANCES: ICD-10-CM

## 2021-03-19 DIAGNOSIS — I10 ESSENTIAL (PRIMARY) HYPERTENSION: ICD-10-CM
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
## 2021-03-19 DIAGNOSIS — Z79.891 LONG TERM (CURRENT) USE OF OPIATE ANALGESIC: ICD-10-CM

## 2021-03-19 DIAGNOSIS — L50.9 URTICARIA, UNSPECIFIED: ICD-10-CM

## 2021-03-19 DIAGNOSIS — E05.00 THYROTOXICOSIS WITH DIFFUSE GOITER WITHOUT THYROTOXIC CRISIS OR STORM: ICD-10-CM

## 2021-03-19 DIAGNOSIS — M79.10 MYALGIA, UNSPECIFIED SITE: ICD-10-CM

## 2021-03-19 DIAGNOSIS — J06.9 ACUTE UPPER RESPIRATORY INFECTION, UNSPECIFIED: ICD-10-CM

## 2021-03-19 DIAGNOSIS — Z90.13 ACQUIRED ABSENCE OF BILATERAL BREASTS AND NIPPLES: ICD-10-CM

## 2021-03-19 DIAGNOSIS — Z91.041 RADIOGRAPHIC DYE ALLERGY STATUS: ICD-10-CM

## 2021-03-19 DIAGNOSIS — Z90.13 ACQUIRED ABSENCE OF BILATERAL BREASTS AND NIPPLES: Chronic | ICD-10-CM

## 2021-03-19 DIAGNOSIS — G40.909 EPILEPSY, UNSPECIFIED, NOT INTRACTABLE, WITHOUT STATUS EPILEPTICUS: ICD-10-CM

## 2021-03-19 LAB
ALBUMIN SERPL ELPH-MCNC: 3.6 G/DL — SIGNIFICANT CHANGE UP (ref 3.3–5)
ALP SERPL-CCNC: 101 U/L — SIGNIFICANT CHANGE UP (ref 40–120)
ALT FLD-CCNC: 31 U/L — SIGNIFICANT CHANGE UP (ref 10–45)
ANION GAP SERPL CALC-SCNC: 10 MMOL/L — SIGNIFICANT CHANGE UP (ref 5–17)
AST SERPL-CCNC: 23 U/L — SIGNIFICANT CHANGE UP (ref 10–40)
BASOPHILS # BLD AUTO: 0.02 K/UL — SIGNIFICANT CHANGE UP (ref 0–0.2)
BASOPHILS NFR BLD AUTO: 0.1 % — SIGNIFICANT CHANGE UP (ref 0–2)
BILIRUB SERPL-MCNC: 0.3 MG/DL — SIGNIFICANT CHANGE UP (ref 0.2–1.2)
BUN SERPL-MCNC: 19 MG/DL — SIGNIFICANT CHANGE UP (ref 7–23)
CALCIUM SERPL-MCNC: 9 MG/DL — SIGNIFICANT CHANGE UP (ref 8.4–10.5)
CHLORIDE SERPL-SCNC: 101 MMOL/L — SIGNIFICANT CHANGE UP (ref 96–108)
CO2 SERPL-SCNC: 27 MMOL/L — SIGNIFICANT CHANGE UP (ref 22–31)
CREAT SERPL-MCNC: 0.73 MG/DL — SIGNIFICANT CHANGE UP (ref 0.5–1.3)
D DIMER BLD IA.RAPID-MCNC: 317 NG/ML DDU — HIGH
EOSINOPHIL # BLD AUTO: 0.04 K/UL — SIGNIFICANT CHANGE UP (ref 0–0.5)
EOSINOPHIL NFR BLD AUTO: 0.3 % — SIGNIFICANT CHANGE UP (ref 0–6)
GLUCOSE SERPL-MCNC: 196 MG/DL — HIGH (ref 70–99)
HCT VFR BLD CALC: 41.1 % — SIGNIFICANT CHANGE UP (ref 34.5–45)
HGB BLD-MCNC: 12.8 G/DL — SIGNIFICANT CHANGE UP (ref 11.5–15.5)
IMM GRANULOCYTES NFR BLD AUTO: 0.6 % — SIGNIFICANT CHANGE UP (ref 0–1.5)
LYMPHOCYTES # BLD AUTO: 1.14 K/UL — SIGNIFICANT CHANGE UP (ref 1–3.3)
LYMPHOCYTES # BLD AUTO: 8.1 % — LOW (ref 13–44)
MCHC RBC-ENTMCNC: 26.4 PG — LOW (ref 27–34)
MCHC RBC-ENTMCNC: 31.1 GM/DL — LOW (ref 32–36)
MCV RBC AUTO: 84.9 FL — SIGNIFICANT CHANGE UP (ref 80–100)
MONOCYTES # BLD AUTO: 0.28 K/UL — SIGNIFICANT CHANGE UP (ref 0–0.9)
MONOCYTES NFR BLD AUTO: 2 % — SIGNIFICANT CHANGE UP (ref 2–14)
NEUTROPHILS # BLD AUTO: 12.52 K/UL — HIGH (ref 1.8–7.4)
NEUTROPHILS NFR BLD AUTO: 88.9 % — HIGH (ref 43–77)
NRBC # BLD: 0 /100 WBCS — SIGNIFICANT CHANGE UP (ref 0–0)
PLATELET # BLD AUTO: 334 K/UL — SIGNIFICANT CHANGE UP (ref 150–400)
POTASSIUM SERPL-MCNC: 3.8 MMOL/L — SIGNIFICANT CHANGE UP (ref 3.5–5.3)
POTASSIUM SERPL-SCNC: 3.8 MMOL/L — SIGNIFICANT CHANGE UP (ref 3.5–5.3)
PROT SERPL-MCNC: 7.3 G/DL — SIGNIFICANT CHANGE UP (ref 6–8.3)
RBC # BLD: 4.84 M/UL — SIGNIFICANT CHANGE UP (ref 3.8–5.2)
RBC # FLD: 13.9 % — SIGNIFICANT CHANGE UP (ref 10.3–14.5)
SARS-COV-2 RNA SPEC QL NAA+PROBE: SIGNIFICANT CHANGE UP
SODIUM SERPL-SCNC: 138 MMOL/L — SIGNIFICANT CHANGE UP (ref 135–145)
WBC # BLD: 14.08 K/UL — HIGH (ref 3.8–10.5)
WBC # FLD AUTO: 14.08 K/UL — HIGH (ref 3.8–10.5)

## 2021-03-19 PROCEDURE — 80053 COMPREHEN METABOLIC PANEL: CPT

## 2021-03-19 PROCEDURE — 99284 EMERGENCY DEPT VISIT MOD MDM: CPT

## 2021-03-19 PROCEDURE — 99284 EMERGENCY DEPT VISIT MOD MDM: CPT | Mod: 25

## 2021-03-19 PROCEDURE — 36415 COLL VENOUS BLD VENIPUNCTURE: CPT

## 2021-03-19 PROCEDURE — 71046 X-RAY EXAM CHEST 2 VIEWS: CPT | Mod: 26

## 2021-03-19 PROCEDURE — 96375 TX/PRO/DX INJ NEW DRUG ADDON: CPT

## 2021-03-19 PROCEDURE — 96374 THER/PROPH/DIAG INJ IV PUSH: CPT

## 2021-03-19 PROCEDURE — 94640 AIRWAY INHALATION TREATMENT: CPT

## 2021-03-19 PROCEDURE — 85025 COMPLETE CBC W/AUTO DIFF WBC: CPT

## 2021-03-19 PROCEDURE — 71046 X-RAY EXAM CHEST 2 VIEWS: CPT

## 2021-03-19 PROCEDURE — 85379 FIBRIN DEGRADATION QUANT: CPT

## 2021-03-19 PROCEDURE — 96376 TX/PRO/DX INJ SAME DRUG ADON: CPT

## 2021-03-19 PROCEDURE — U0003: CPT

## 2021-03-19 PROCEDURE — U0005: CPT

## 2021-03-19 RX ORDER — MORPHINE SULFATE 50 MG/1
4 CAPSULE, EXTENDED RELEASE ORAL ONCE
Refills: 0 | Status: DISCONTINUED | OUTPATIENT
Start: 2021-03-19 | End: 2021-03-19

## 2021-03-19 RX ORDER — GABAPENTIN 400 MG/1
1 CAPSULE ORAL
Qty: 30 | Refills: 0
Start: 2021-03-19 | End: 2021-04-02

## 2021-03-19 RX ORDER — DIPHENHYDRAMINE HCL 50 MG
50 CAPSULE ORAL ONCE
Refills: 0 | Status: DISCONTINUED | OUTPATIENT
Start: 2021-03-19 | End: 2021-03-19

## 2021-03-19 RX ORDER — KETOROLAC TROMETHAMINE 30 MG/ML
15 SYRINGE (ML) INJECTION ONCE
Refills: 0 | Status: DISCONTINUED | OUTPATIENT
Start: 2021-03-19 | End: 2021-03-19

## 2021-03-19 RX ORDER — DIPHENHYDRAMINE HCL 50 MG
25 CAPSULE ORAL ONCE
Refills: 0 | Status: COMPLETED | OUTPATIENT
Start: 2021-03-19 | End: 2021-03-19

## 2021-03-19 RX ORDER — IPRATROPIUM/ALBUTEROL SULFATE 18-103MCG
3 AEROSOL WITH ADAPTER (GRAM) INHALATION
Refills: 0 | Status: DISCONTINUED | OUTPATIENT
Start: 2021-03-19 | End: 2021-03-19

## 2021-03-19 RX ORDER — FAMOTIDINE 10 MG/ML
20 INJECTION INTRAVENOUS ONCE
Refills: 0 | Status: COMPLETED | OUTPATIENT
Start: 2021-03-19 | End: 2021-03-19

## 2021-03-19 RX ADMIN — MORPHINE SULFATE 4 MILLIGRAM(S): 50 CAPSULE, EXTENDED RELEASE ORAL at 19:23

## 2021-03-19 RX ADMIN — MORPHINE SULFATE 4 MILLIGRAM(S): 50 CAPSULE, EXTENDED RELEASE ORAL at 19:45

## 2021-03-19 RX ADMIN — Medication 3 MILLILITER(S): at 16:40

## 2021-03-19 RX ADMIN — Medication 300 UNIT(S): at 20:58

## 2021-03-19 RX ADMIN — Medication 15 MILLIGRAM(S): at 15:55

## 2021-03-19 RX ADMIN — Medication 25 MILLIGRAM(S): at 19:24

## 2021-03-19 RX ADMIN — Medication 3 MILLILITER(S): at 16:56

## 2021-03-19 RX ADMIN — Medication 125 MILLIGRAM(S): at 16:08

## 2021-03-19 RX ADMIN — FAMOTIDINE 20 MILLIGRAM(S): 10 INJECTION INTRAVENOUS at 16:08

## 2021-03-19 RX ADMIN — MORPHINE SULFATE 4 MILLIGRAM(S): 50 CAPSULE, EXTENDED RELEASE ORAL at 18:12

## 2021-03-19 NOTE — ED ADULT NURSE REASSESSMENT NOTE - NS ED NURSE REASSESS COMMENT FT1
Pt completed treatment of albuterol. Lung sounds clear. Pt reports new onset pruritis and hives to the upper extremity.  Pt reports that she took albuterol and Siltussin at the same time today. Reports that when admitted to [facility] earlier in the week she experienced Mild Pruritis "But it was nothing like I feel like now and I did not think much of it so did not tell anyone". Pt speaking in clear concise sentences, no drooling noted. MD Rice made aware. Albuterol treatment D/Baron. Pt completed two treatment of albuterol. Lung sounds clear. Pt reports new onset pruritis and hives to the upper extremity.  Pt reports that she took albuterol and Siltussin at the same time today. Reports that when admitted to [facility] earlier in the week she experienced Mild Pruritis "But it was nothing like I feel like now and I did not think much of it so I did not tell anyone". Pt speaking in clear concise sentences, no drooling noted. MD Rice made aware. Third Albuterol treatment D/Baron.

## 2021-03-19 NOTE — ED PROVIDER NOTE - CLINICAL SUMMARY MEDICAL DECISION MAKING FREE TEXT BOX
Pt diffuse body ache Pt diffuse body ache, weakness Pt diffuse whole body aches, weakness, possible allergic reaction w hives, cc of throat tightness after cough syrup. recent admission for high susp covid although multiple neg tests. treated for allergic rxn here w improvement in ED, scoped by ENT wnl, will dc w supportive care, strict return prec given.

## 2021-03-19 NOTE — ED PROVIDER NOTE - NSFOLLOWUPINSTRUCTIONS_ED_ALL_ED_FT
Viral Respiratory Infection    A viral respiratory infection is an illness that affects parts of the body used for breathing, like the lungs, nose, and throat. It is caused by a germ called a virus. Symptoms can include runny nose, coughing, sneezing, fatigue, body aches, sore throat, fever, or headache. Over the counter medicine can be used to manage the symptoms but the infection typically goes away on its own in 5 to 10 days.     SEEK IMMEDIATE MEDICAL CARE IF YOU HAVE ANY OF THE FOLLOWING SYMPTOMS: shortness of breath, chest pain, fever over 10 days, or lightheadedness/dizziness.    Musculoskeletal Pain    WHAT YOU NEED TO KNOW:    Muscle pain can be dull, achy, or sharp. You may have pain and tenderness to the touch as well. It can occur anywhere on your body and is often brought on by exercise. Muscle pain may occur from an injury, such as a sprain, tendonitis, or bone fracture. Muscle pain can also be the result of medical conditions, such as polymyositis, fibromyalgia, and connective tissue disorders.     DISCHARGE INSTRUCTIONS:    Self care:     Rest as directed and avoid activity that causes pain. You may be able to return to normal activity when you can move without pain. Follow directions for rest and activity. You are at risk for injury for 3 weeks after your symptoms go away.       Ice your painful muscle area to decrease pain and swelling. Use an ice pack, or put ice in a plastic bag and cover it with a towel. Always put a cloth between the ice and your skin. Apply the ice as often as directed for the first 24 to 48 hours.       Compression with a splint, brace, or elastic bandage helps decrease pain and swelling. This may be needed for muscle pain in arms or legs. A splint, brace, or bandage will also help protect the painful area when you move around.       Elevate a painful arm or leg to reduce swelling and pain. Elevate your limb while you are sitting or lying down. Prop a painful leg on pillows to keep it above the level of your heart.    Medicines:     NSAIDs help decrease swelling and pain or fever. This medicine is available with or without a doctor's order. NSAIDs can cause stomach bleeding or kidney problems in certain people. If you take blood thinner medicine, always ask your healthcare provider if NSAIDs are safe for you. Always read the medicine label and follow directions.      Acetaminophen is used to decrease pain. It is available without a doctor's order. Ask your healthcare provider how much to take and when to take it. Follow directions. Acetaminophen can cause liver damage if not taken correctly. Do not take more than one medicine that contains acetaminophen unless directed.       Muscle relaxers help relax your muscles to decrease pain and muscle spasms.       Steroids may be given to decrease redness, pain, and swelling.      Take your medicine as directed. Contact your healthcare provider if you think your medicine is not helping or if you have side effects. Tell him if you are allergic to any medicine. Keep a list of the medicines, vitamins, and herbs you take. Include the amounts, and when and why you take them. Bring the list or the pill bottles to follow-up visits. Carry your medicine list with you in case of an emergency.    Follow up with your healthcare provider as directed: You may need more tests to help healthcare providers find the cause of your muscle pain. You may need physical therapy to learn muscle strengthening exercises. Write down your questions so you remember to ask them during your visits.     Contact your healthcare provider if:     You have a fever.       You have trouble sleeping because of your pain.       Your painful area becomes more tender, red, and warm to the touch.       You have decreased movement of the painful area.       You have questions or concerns about your condition or care.    Return to the emergency department if:     You have increased severe pain when you move the painful muscle area.       You lose feeling in your painful muscle area.       You have new or worse swelling in the painful area. Your skin may feel tight.       You have increased muscle pain or pain that does not improve with treatment.

## 2021-03-19 NOTE — ED PROVIDER NOTE - PATIENT PORTAL LINK FT
You can access the FollowMyHealth Patient Portal offered by [facility] by registering at the following website: [email or web address]. By joining [facility]’s FollowMyHealth portal, you will also be able to view your health information using other applications (apps) compatible with our system.

## 2021-03-19 NOTE — ED PROVIDER NOTE - NSFOLLOWUPCLINICS_GEN_ALL_ED_FT
[facility]  Family Medicine  [facility]  [place], [place] [facility]  Phone: [phone]  Fax:   Follow Up Time:

## 2021-03-19 NOTE — ED PROVIDER NOTE - WR ORDER ID 1
Patient is a 89y old  Female who presents with a chief complaint of SOB (2019 09:44)    Awake, alert, lying in bed in NAD.    INTERVAL HPI/OVERNIGHT EVENTS:      VITAL SIGNS:  T(F): 98.5 (19 @ 07:08)  HR: 70 (19 @ 09:49)  BP: 140/52 (19 @ 07:08)  RR: 18 (19 @ 07:08)  SpO2: 95% (19 @ 09:49)  Wt(kg): --  I&O's Detail    2019 07:01  -  2019 07:00  --------------------------------------------------------  IN:    Oral Fluid: 850 mL  Total IN: 850 mL    OUT:  Total OUT: 0 mL    Total NET: 850 mL              REVIEW OF SYSTEMS:    CONSTITUTIONAL:  No fevers, chills, sweats    HEENT:  Eyes:  No diplopia or blurred vision. ENT:  No earache, sore throat or runny nose.    CARDIOVASCULAR:  No pressure, squeezing, tightness, or heaviness about the chest; no palpitations.    RESPIRATORY:  Per HPI    GASTROINTESTINAL:  No abdominal pain, nausea, vomiting or diarrhea.    GENITOURINARY:  No dysuria, frequency or urgency.    NEUROLOGIC:  No paresthesias, fasciculations, seizures or weakness.    PSYCHIATRIC:  No disorder of thought or mood.      PHYSICAL EXAM:    Constitutional: Well developed and nourished  Eyes:Perrla  ENMT: normal  Neck:supple  Respiratory: + basal lung crackles  and scattered wheezing  Cardiovascular: S1 S2 regular  Gastrointestinal: Soft, Non tender  Extremities: + edema  Vascular:normal  Neurological:Awake, alert,Ox3  Musculoskeletal:Normal      MEDICATIONS  (STANDING):  ALBUTerol    90 MICROgram(s) HFA Inhaler 1 Puff(s) Inhalation every 4 hours  ALBUTerol/ipratropium for Nebulization 3 milliLiter(s) Nebulizer every 6 hours  aspirin  chewable 81 milliGRAM(s) Oral daily  dronedarone 400 milliGRAM(s) Oral two times a day  enoxaparin Injectable 30 milliGRAM(s) SubCutaneous daily  ergocalciferol 13259 Unit(s) Oral <User Schedule>  furosemide   Injectable 20 milliGRAM(s) IV Push daily  hydrALAZINE 75 milliGRAM(s) Oral every 8 hours  influenza   Vaccine 0.5 milliLiter(s) IntraMuscular once  metoprolol tartrate 100 milliGRAM(s) Oral two times a day  pantoprazole    Tablet 40 milliGRAM(s) Oral before breakfast  potassium chloride    Tablet ER 20 milliEquivalent(s) Oral once  simvastatin 40 milliGRAM(s) Oral at bedtime  tiotropium 18 MICROgram(s) Capsule 1 Capsule(s) Inhalation daily    MEDICATIONS  (PRN):      Allergies    No Known Allergies    Intolerances        LABS:                        11.5   5.49  )-----------( 178      ( 2019 07:42 )             35.3     04-07    140  |  103  |  23<H>  ----------------------------<  131<H>  3.4<L>   |  30  |  1.22    Ca    9.1      2019 08:38  Phos  2.7     04-06  Mg     2.2     04-06    TPro  7.8  /  Alb  3.8  /  TBili  0.7  /  DBili  x   /  AST  37  /  ALT  49  /  AlkPhos  83  04-05    PT/INR - ( 2019 11:15 )   PT: 11.4 sec;   INR: 1.03 ratio         PTT - ( 2019 11:15 )  PTT:31.1 sec  Urinalysis Basic - ( 2019 20:07 )    Color: Yellow / Appearance: Clear / S.020 / pH: x  Gluc: x / Ketone: Negative  / Bili: Negative / Urobili: Negative   Blood: x / Protein: 15 / Nitrite: Negative   Leuk Esterase: Trace / RBC: 0-2 /HPF / WBC 6-10 /HPF   Sq Epi: x / Non Sq Epi: Moderate /HPF / Bacteria: Few /HPF      ABG - ( 2019 11:44 )  pH, Arterial: 7.40  pH, Blood: x     /  pCO2: 43    /  pO2: 119   / HCO3: 26    / Base Excess: 1.3   /  SaO2: 99                CARDIAC MARKERS ( 2019 23:43 )  <0.015 ng/mL / x     / 53 U/L / x     / 1.6 ng/mL  CARDIAC MARKERS ( 2019 18:52 )  <0.015 ng/mL / x     / 64 U/L / x     / 1.3 ng/mL  CARDIAC MARKERS ( 2019 11:15 )  <0.015 ng/mL / x     / x     / x     / x          CAPILLARY BLOOD GLUCOSE        pro-bnp --  @ 15:46     d-dimer 1030  04 @ 15:46  pro-bnp 2291  @ 11:15     d-dimer --   @ 11:15      RADIOLOGY & ADDITIONAL TESTS:    CXR:  < from: Xray Chest 1 View-PORTABLE IMMEDIATE (19 @ 11:23) >  IMPRESSION: Increased interstitial opacities are identified suggesting   acute interstitial pulmonary edema (noted CT chest evaluation performed   2019 showed no evidence for interstitial lung disease).    < end of copied text >    Ct scan chest:  < from: CT Angio Chest w/ IV Cont (19 @ 19:40) >  No evidence of pulmonary embolism. Pulmonary outflow tract, right main   pulmonary artery, left main pulmonary artery and visualized secondary   branches of the pulmonary arterial system patent.    No thoracic aortic aneurysm or pericardial effusion. Cardiac device is   visible in the soft tissues of the left anterior chest.    Central airway intact. Enlarged heterogeneous thyroid with calcification   likely related to the presence of a goiter.    No mediastinal lesions evident. No hilar lesions evident.    Small bibasilar effusions present with associated airspace   disease-compressive atelectasis.    No evidence of adrenal lesion. The spleen is not enlarged. Hypodensity   anterior left hepatic lobe likely a cyst too small to characterize. No   acute appearing osseous abnormalities.    IMPRESSION: No evidence of pulmonary embolism, see above report.    < end of copied text >    ekg;    echo: Patient is a 89y old  Female who presents with a chief complaint of SOB (2019 09:44)    Awake, alert, lying in bed in NAD. Feeling better. BP better controlled    INTERVAL HPI/OVERNIGHT EVENTS:      VITAL SIGNS:  T(F): 98.5 (19 @ 07:08)  HR: 70 (19 @ 09:49)  BP: 140/52 (19 @ 07:08)  RR: 18 (19 @ 07:08)  SpO2: 95% (19 @ 09:49)  Wt(kg): --  I&O's Detail    2019 07:01  -  2019 07:00  --------------------------------------------------------  IN:    Oral Fluid: 850 mL  Total IN: 850 mL    OUT:  Total OUT: 0 mL    Total NET: 850 mL              REVIEW OF SYSTEMS:    CONSTITUTIONAL:  No fevers, chills, sweats    HEENT:  Eyes:  No diplopia or blurred vision. ENT:  No earache, sore throat or runny nose.    CARDIOVASCULAR:  No pressure, squeezing, tightness, or heaviness about the chest; no palpitations.    RESPIRATORY:  Per HPI    GASTROINTESTINAL:  No abdominal pain, nausea, vomiting or diarrhea.    GENITOURINARY:  No dysuria, frequency or urgency.    NEUROLOGIC:  No paresthesias, fasciculations, seizures or weakness.    PSYCHIATRIC:  No disorder of thought or mood.      PHYSICAL EXAM:    Constitutional: Well developed and nourished  Eyes:Perrla  ENMT: normal  Neck:supple  Respiratory: + basal lung crackles  and scattered wheezing  Cardiovascular: S1 S2 regular  Gastrointestinal: Soft, Non tender  Extremities: + edema  Vascular:normal  Neurological:Awake, alert,Ox3  Musculoskeletal:Normal      MEDICATIONS  (STANDING):  ALBUTerol    90 MICROgram(s) HFA Inhaler 1 Puff(s) Inhalation every 4 hours  ALBUTerol/ipratropium for Nebulization 3 milliLiter(s) Nebulizer every 6 hours  aspirin  chewable 81 milliGRAM(s) Oral daily  dronedarone 400 milliGRAM(s) Oral two times a day  enoxaparin Injectable 30 milliGRAM(s) SubCutaneous daily  ergocalciferol 69093 Unit(s) Oral <User Schedule>  furosemide   Injectable 20 milliGRAM(s) IV Push daily  hydrALAZINE 75 milliGRAM(s) Oral every 8 hours  influenza   Vaccine 0.5 milliLiter(s) IntraMuscular once  metoprolol tartrate 100 milliGRAM(s) Oral two times a day  pantoprazole    Tablet 40 milliGRAM(s) Oral before breakfast  potassium chloride    Tablet ER 20 milliEquivalent(s) Oral once  simvastatin 40 milliGRAM(s) Oral at bedtime  tiotropium 18 MICROgram(s) Capsule 1 Capsule(s) Inhalation daily    MEDICATIONS  (PRN):      Allergies    No Known Allergies    Intolerances        LABS:                        11.5   5.49  )-----------( 178      ( 2019 07:42 )             35.3     04-07    140  |  103  |  23<H>  ----------------------------<  131<H>  3.4<L>   |  30  |  1.22    Ca    9.1      2019 08:38  Phos  2.7     04-06  Mg     2.2     04-06    TPro  7.8  /  Alb  3.8  /  TBili  0.7  /  DBili  x   /  AST  37  /  ALT  49  /  AlkPhos  83  04-05    PT/INR - ( 2019 11:15 )   PT: 11.4 sec;   INR: 1.03 ratio         PTT - ( 2019 11:15 )  PTT:31.1 sec  Urinalysis Basic - ( 2019 20:07 )    Color: Yellow / Appearance: Clear / S.020 / pH: x  Gluc: x / Ketone: Negative  / Bili: Negative / Urobili: Negative   Blood: x / Protein: 15 / Nitrite: Negative   Leuk Esterase: Trace / RBC: 0-2 /HPF / WBC 6-10 /HPF   Sq Epi: x / Non Sq Epi: Moderate /HPF / Bacteria: Few /HPF      ABG - ( 2019 11:44 )  pH, Arterial: 7.40  pH, Blood: x     /  pCO2: 43    /  pO2: 119   / HCO3: 26    / Base Excess: 1.3   /  SaO2: 99                CARDIAC MARKERS ( 2019 23:43 )  <0.015 ng/mL / x     / 53 U/L / x     / 1.6 ng/mL  CARDIAC MARKERS ( 2019 18:52 )  <0.015 ng/mL / x     / 64 U/L / x     / 1.3 ng/mL  CARDIAC MARKERS ( 2019 11:15 )  <0.015 ng/mL / x     / x     / x     / x          CAPILLARY BLOOD GLUCOSE        pro-bnp --  @ 15:46     d-dimer 1030   @ 15:46  pro-bnp 2291  @ 11:15     d-dimer --   @ 11:15      RADIOLOGY & ADDITIONAL TESTS:    CXR:  < from: Xray Chest 1 View-PORTABLE IMMEDIATE (19 @ 11:23) >  IMPRESSION: Increased interstitial opacities are identified suggesting   acute interstitial pulmonary edema (noted CT chest evaluation performed   2019 showed no evidence for interstitial lung disease).    < end of copied text >    Ct scan chest:  < from: CT Angio Chest w/ IV Cont (19 @ 19:40) >  No evidence of pulmonary embolism. Pulmonary outflow tract, right main   pulmonary artery, left main pulmonary artery and visualized secondary   branches of the pulmonary arterial system patent.    No thoracic aortic aneurysm or pericardial effusion. Cardiac device is   visible in the soft tissues of the left anterior chest.    Central airway intact. Enlarged heterogeneous thyroid with calcification   likely related to the presence of a goiter.    No mediastinal lesions evident. No hilar lesions evident.    Small bibasilar effusions present with associated airspace   disease-compressive atelectasis.    No evidence of adrenal lesion. The spleen is not enlarged. Hypodensity   anterior left hepatic lobe likely a cyst too small to characterize. No   acute appearing osseous abnormalities.    IMPRESSION: No evidence of pulmonary embolism, see above report.    < end of copied text >    ekg;    echo: Patient is a 89y old  Female who presents with a chief complaint of SOB (2019 09:44)    Awake, alert, lying in bed in NAD. Feeling better. BP better controlled    INTERVAL HPI/OVERNIGHT EVENTS:      VITAL SIGNS:  T(F): 98.5 (19 @ 07:08)  HR: 70 (19 @ 09:49)  BP: 140/52 (19 @ 07:08)  RR: 18 (19 @ 07:08)  SpO2: 95% (19 @ 09:49)  Wt(kg): --  I&O's Detail    2019 07:01  -  2019 07:00  --------------------------------------------------------  IN:    Oral Fluid: 850 mL  Total IN: 850 mL    OUT:  Total OUT: 0 mL    Total NET: 850 mL              REVIEW OF SYSTEMS:    CONSTITUTIONAL:  No fevers, chills, sweats    HEENT:  Eyes:  No diplopia or blurred vision. ENT:  No earache, sore throat or runny nose.    CARDIOVASCULAR:  No pressure, squeezing, tightness, or heaviness about the chest; no palpitations.    RESPIRATORY:  Per HPI    GASTROINTESTINAL:  No abdominal pain, nausea, vomiting or diarrhea.    GENITOURINARY:  No dysuria, frequency or urgency.    NEUROLOGIC:  No paresthesias, fasciculations, seizures or weakness.    PSYCHIATRIC:  No disorder of thought or mood.      PHYSICAL EXAM:    Constitutional: Well developed and nourished  Eyes:Perrla  ENMT: normal  Neck:supple  Respiratory: + basal lung crackles  and scattered wheezing  Cardiovascular: S1 S2 regular  Gastrointestinal: Soft, Non tender  Extremities: + edema  Vascular:normal  Neurological:Awake, alert,Ox3  Musculoskeletal:Normal      MEDICATIONS  (STANDING):  ALBUTerol    90 MICROgram(s) HFA Inhaler 1 Puff(s) Inhalation every 4 hours  ALBUTerol/ipratropium for Nebulization 3 milliLiter(s) Nebulizer every 6 hours  aspirin  chewable 81 milliGRAM(s) Oral daily  dronedarone 400 milliGRAM(s) Oral two times a day  enoxaparin Injectable 30 milliGRAM(s) SubCutaneous daily  ergocalciferol 76137 Unit(s) Oral <User Schedule>  furosemide   Injectable 20 milliGRAM(s) IV Push daily  hydrALAZINE 75 milliGRAM(s) Oral every 8 hours  influenza   Vaccine 0.5 milliLiter(s) IntraMuscular once  metoprolol tartrate 100 milliGRAM(s) Oral two times a day  pantoprazole    Tablet 40 milliGRAM(s) Oral before breakfast  potassium chloride    Tablet ER 20 milliEquivalent(s) Oral once  simvastatin 40 milliGRAM(s) Oral at bedtime  tiotropium 18 MICROgram(s) Capsule 1 Capsule(s) Inhalation daily    MEDICATIONS  (PRN):      Allergies    No Known Allergies    Intolerances        LABS:                        11.5   5.49  )-----------( 178      ( 2019 07:42 )             35.3     04-07    140  |  103  |  23<H>  ----------------------------<  131<H>  3.4<L>   |  30  |  1.22    Ca    9.1      2019 08:38  Phos  2.7     04-06  Mg     2.2     04-06    TPro  7.8  /  Alb  3.8  /  TBili  0.7  /  DBili  x   /  AST  37  /  ALT  49  /  AlkPhos  83  04-05    PT/INR - ( 2019 11:15 )   PT: 11.4 sec;   INR: 1.03 ratio         PTT - ( 2019 11:15 )  PTT:31.1 sec  Urinalysis Basic - ( 2019 20:07 )    Color: Yellow / Appearance: Clear / S.020 / pH: x  Gluc: x / Ketone: Negative  / Bili: Negative / Urobili: Negative   Blood: x / Protein: 15 / Nitrite: Negative   Leuk Esterase: Trace / RBC: 0-2 /HPF / WBC 6-10 /HPF   Sq Epi: x / Non Sq Epi: Moderate /HPF / Bacteria: Few /HPF      ABG - ( 2019 11:44 )  pH, Arterial: 7.40  pH, Blood: x     /  pCO2: 43    /  pO2: 119   / HCO3: 26    / Base Excess: 1.3   /  SaO2: 99                CARDIAC MARKERS ( 2019 23:43 )  <0.015 ng/mL / x     / 53 U/L / x     / 1.6 ng/mL  CARDIAC MARKERS ( 2019 18:52 )  <0.015 ng/mL / x     / 64 U/L / x     / 1.3 ng/mL  CARDIAC MARKERS ( 2019 11:15 )  <0.015 ng/mL / x     / x     / x     / x          CAPILLARY BLOOD GLUCOSE        pro-bnp --  @ 15:46     d-dimer 1030   @ 15:46  pro-bnp 2291  @ 11:15     d-dimer --   @ 11:15      RADIOLOGY & ADDITIONAL TESTS:    CXR:  < from: Xray Chest 1 View-PORTABLE IMMEDIATE (19 @ 11:23) >  IMPRESSION: Increased interstitial opacities are identified suggesting   acute interstitial pulmonary edema (noted CT chest evaluation performed   2019 showed no evidence for interstitial lung disease).    < end of copied text >    Ct scan chest:  < from: CT Angio Chest w/ IV Cont (19 @ 19:40) >  No evidence of pulmonary embolism. Pulmonary outflow tract, right main   pulmonary artery, left main pulmonary artery and visualized secondary   branches of the pulmonary arterial system patent.    No thoracic aortic aneurysm or pericardial effusion. Cardiac device is   visible in the soft tissues of the left anterior chest.    Central airway intact. Enlarged heterogeneous thyroid with calcification   likely related to the presence of a goiter.    No mediastinal lesions evident. No hilar lesions evident.    Small bibasilar effusions present with associated airspace   disease-compressive atelectasis.    No evidence of adrenal lesion. The spleen is not enlarged. Hypodensity   anterior left hepatic lobe likely a cyst too small to characterize. No   acute appearing osseous abnormalities.    IMPRESSION: No evidence of pulmonary embolism, see above report.    < end of copied text >    ekg;  < from: 12 Lead ECG (19 @ 10:24) >  Ventricular Rate 70 BPM    Atrial Rate 70 BPM    P-R Interval 270 ms    QRS Duration 156 ms    Q-T Interval 490 ms    QTC Calculation(Bezet) 529 ms    P Axis 74 degrees    R Axis -64 degrees    T Axis 83 degrees    Diagnosis Line Atrial-sensed ventricular-paced rhythm  Abnormal ECG    Confirmed by GENESIS BURCH ([id number]) on 2019 9:26:48 PM    < end of copied text >    echo: [id number]

## 2021-03-19 NOTE — ED ADULT NURSE NOTE - OBJECTIVE STATEMENT
60y Female A&OX3 upgraded to MD [facility] C/O sob, body aches, difficulty swallowing and diaphoresis. Per Pt "I am having an allergic reaction from the medication I took yesterday called Siltussin" for the first time yesterday and again today with worsening symptoms. Pt took 2 Benadryl at 14:00 today. Pt protecting airway with equal chest rise, no drooling noted and speaking in clear sentences.  Denies chest pain, fever, chills, cough. Pt recently discharged from [facility] for high suspicion of covid 19 "but I had 3 negative covid tests. Pt has a right sided port and hx of double mastectomy. Reports

## 2021-03-19 NOTE — ED PROVIDER NOTE - PHYSICAL EXAMINATION

## 2021-03-19 NOTE — ED ADULT TRIAGE NOTE - CHIEF COMPLAINT QUOTE
Pt reports itching, shortness of breath, and difficulty swallowing after taking Siltussin for the first time today. Speaking clearly in full sentences. States she was recently hospitalized for body aches and shortness of breath and was dx with COPD.

## 2021-03-19 NOTE — CONSULT NOTE ADULT - SUBJECTIVE AND OBJECTIVE BOX
ENT Consult Note    60F with COPD and URI for the last 10 days, took cough medicine today and felt throat tightness. No SOB. No dysophagia. Tolerating secretions. Swallowing without issue. ENT consulted for endoscopic exam. No other allergic sxs. Is throat clearing often.     PAST MEDICAL & SURGICAL HISTORY:  Breast cancer    Epilepsy    COPD exacerbation    HTN (hypertension)    Graves disease    H/O bilateral mastectomy      PE:  AVFSS  AAOx4  Sitting in the hospital bed  voice strong  good phonation  OC/Opx: no FOM edema, no Opx edema, full view of posterior pharyngeal wall, uvula without edema    FFL:  Npx clear  Opx clear  No parapharyngeal edema  Laryngeal introitus without edema or obsturction  very mild post cricoid edema  no pooling of sectretions  no edema of arytenoids/ AE folds  TVFs visible and b/l symmetric   Airway widely patent    A/P: 60F with COPD and ?viral uri. Took cough medicine and had ?allergic rxn  and throat tightness and no other allergic sequelae. Scope broadly normal (with mild post cricoid edema likely 2/2 throat clearing and reflux, does not look allergic in appearance.)  - no additional ent intervention at this time  - can treat for possible allergy per ED    - follow up prn     d/w Dr. Ashley

## 2021-03-19 NOTE — ED PROVIDER NOTE - OBJECTIVE STATEMENT
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 States has been taking Siltussin syrup after which symptoms of hives started since yesterday. Came to ED for persisting hives and throat tightness. recent admission for possible covid although with negative tests. Took one tablet of benadryl at 11am prior to arrival.

## 2021-03-22 ENCOUNTER — APPOINTMENT (OUTPATIENT)
Dept: INTERNAL MEDICINE | Facility: CLINIC | Age: 61
End: 2021-03-22

## 2021-03-22 DIAGNOSIS — R06.02 SHORTNESS OF BREATH: ICD-10-CM

## 2021-03-22 DIAGNOSIS — Z59.0 HOMELESSNESS: ICD-10-CM

## 2021-03-22 DIAGNOSIS — J96.01 ACUTE RESPIRATORY FAILURE WITH HYPOXIA: ICD-10-CM

## 2021-03-22 DIAGNOSIS — K57.92 DIVERTICULITIS OF INTESTINE, PART UNSPECIFIED, WITHOUT PERFORATION OR ABSCESS WITHOUT BLEEDING: ICD-10-CM

## 2021-03-22 DIAGNOSIS — E05.00 THYROTOXICOSIS WITH DIFFUSE GOITER WITHOUT THYROTOXIC CRISIS OR STORM: ICD-10-CM

## 2021-03-22 DIAGNOSIS — J44.1 CHRONIC OBSTRUCTIVE PULMONARY DISEASE WITH (ACUTE) EXACERBATION: ICD-10-CM

## 2021-03-22 DIAGNOSIS — Z91.041 RADIOGRAPHIC DYE ALLERGY STATUS: ICD-10-CM

## 2021-03-22 DIAGNOSIS — Z85.3 PERSONAL HISTORY OF MALIGNANT NEOPLASM OF BREAST: ICD-10-CM

## 2021-03-22 DIAGNOSIS — J02.9 ACUTE PHARYNGITIS, UNSPECIFIED: ICD-10-CM

## 2021-03-22 DIAGNOSIS — E66.01 MORBID (SEVERE) OBESITY DUE TO EXCESS CALORIES: ICD-10-CM

## 2021-03-22 DIAGNOSIS — Z90.10 ACQUIRED ABSENCE OF UNSPECIFIED BREAST AND NIPPLE: ICD-10-CM

## 2021-03-22 SDOH — ECONOMIC STABILITY - HOUSING INSECURITY: HOMELESSNESS: Z59.0

## 2021-04-13 ENCOUNTER — APPOINTMENT (OUTPATIENT)
Dept: ENDOCRINOLOGY | Facility: CLINIC | Age: 61
End: 2021-04-13
Payer: MEDICAID

## 2021-04-13 VITALS
SYSTOLIC BLOOD PRESSURE: 147 MMHG | DIASTOLIC BLOOD PRESSURE: 88 MMHG | WEIGHT: 229 LBS | BODY MASS INDEX: 46.16 KG/M2 | HEART RATE: 83 BPM | HEIGHT: 59 IN

## 2021-04-13 PROCEDURE — 99072 ADDL SUPL MATRL&STAF TM PHE: CPT

## 2021-04-13 PROCEDURE — 82962 GLUCOSE BLOOD TEST: CPT

## 2021-04-13 PROCEDURE — 99205 OFFICE O/P NEW HI 60 MIN: CPT | Mod: 25

## 2021-04-15 NOTE — ASSESSMENT
[Levothyroxine] : The patient was instructed to take Levothyroxine on an empty stomach, separate from vitamins, and wait at least 30 minutes before eating [FreeTextEntry1] : 60 y/o F pt with: \par \par 1. 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 She sees the ophthalmologist regularly. \par She appears to be euthyroid, currently on Synthroid 175 mcg qd. \par No symptoms or signs of endocrine autoimmune disorders. \par Send TFTs \par Will contact pt with results. \par \par 2. Hx of DM \par Pt has lost weight in the past that resulted in reduction of insulin resistance. Her DM is diet controlled. Her more recent A1c is 6.3% \par Recommend a nutritionist and exercise specialist. \par \par Return in 2 months.

## 2021-04-15 NOTE — END OF VISIT
[FreeTextEntry3] : All medical record entries made by the Scribe were at my, Dr. Hollis Pemberton, direction and personally dictated by me on 04/13/2021. I have reviewed the chart and agree that the record accurately reflects my personal performance of the history, physical exam, assessment and plan. I have also personally directed, reviewed and agreed with the chart.  [Time Spent: ___ minutes] : I have spent [unfilled] minutes of time on the encounter.

## 2021-04-15 NOTE — PHYSICAL EXAM
[Alert] : alert [No Acute Distress] : no acute distress [Normal Sclera/Conjunctiva] : normal sclera/conjunctiva [Normal Outer Ear/Nose] : the ears and nose were normal in appearance [No Neck Mass] : no neck mass was observed [Thyroid Not Enlarged] : the thyroid was not enlarged [No Thyroid Nodules] : no palpable thyroid nodules [No Respiratory Distress] : no respiratory distress [Clear to Auscultation] : lungs were clear to auscultation bilaterally [Normal S1, S2] : normal S1 and S2 [Normal Rate] : heart rate was normal [Regular Rhythm] : with a regular rhythm [Normal Bowel Sounds] : normal bowel sounds [Normal Gait] : normal gait [No Rash] : no rash [Normal Reflexes] : deep tendon reflexes were 2+ and symmetric [No Tremors] : no tremors [Oriented x3] : oriented to person, place, and time [de-identified] : Mild proptosis  [de-identified] : Thyroid not palpated [de-identified] : 1+ pitting edema in LE  [de-identified] : no hand tremors

## 2021-04-15 NOTE — REVIEW OF SYSTEMS
[Recent Weight Gain (___ Lbs)] : recent weight gain: [unfilled] lbs [Negative] : Heme/Lymph [As Noted in HPI] : as noted in HPI [FreeTextEntry3] : Intermittent double vision  [FreeTextEntry8] : Symptoms of menopause

## 2021-04-15 NOTE — ADDENDUM
[FreeTextEntry1] : I, Rina Marquis, acted solely as a scribe for Dr. Hollis Pemberton on this date. 04/13/2021

## 2021-04-15 NOTE — REASON FOR VISIT
[Initial Evaluation] : an initial evaluation [Hypothyroidism] : hypothyroidism [FreeTextEntry2] : Dr. Marily Olivera

## 2021-04-15 NOTE — HISTORY OF PRESENT ILLNESS
[FreeTextEntry1] : 61 year y/o female pt, with Hx of Hypothyroidism post ROBERTS x 2 for Graves disease, referred by Dr. Marily Olivera from [facility] on [address], presents today with establish endocrine care with me \par Other PMHx: Thyroid disorders, DM (dx 10 years ago at age 51) currently treated with diet management \par PSHx: Bilateral transplant mastectomy \par SHx: non-smoker, no exposure to cigarettes, no ETOH use \par Pt sees ophthalmologist on regular basis. Her next eye appointment is in 2 weeks at [facility]. \par \par 04/13/2021\par - Review note from internist Dr. Hari Aleman from 1/2021: Former smoker, Hx of breast CA (dx in 2000) currently in remission, Graves disease, COPD, controlled epilepsy, HTN, DM, shingles\par \par Pt developed eye discomfort (burning, inability to see) in 1994. She went to [facility], where she was dx with Graves disease. Pt received radioiodine ablation. Issues were recurrent, so pt received radioiodine ablation a second time within one year. Pt subsequently received 9 eye surgeries. Her last eye surgery was 2001(muscle alignment). Pt began taking thyroid medications in 1994. After the second radiation, pt became hypothyroidism and pt began taking Levothyroxine.  \par Pt was hospitalized at [facility] 3/13/21 for SOB, fever, myalgia, with impression of COPD exacerbation. COVID test result negative 3 times. \par  \par Today pt presents with POCT 197, /88, BMI 46.25, with c/o of fatigue. \par Pt reports weight loss 67 pounds from Oct 2020 - Feb 2021, then weight gain 29 lbs since Feb 2021\par Pt reports experiencing symptoms of menopause, and c/o intermittent double vision. \par Thyroid levels are in good range as per pt\par \par Current Medications: Levothyroxine 175 mcg \par \par Labs: \par - 02/05/21: US Venous Duplex Doppler No DVT \par - 01/06/21: A1c 6.3

## 2021-04-15 NOTE — CONSULT LETTER
[Dear  ___] : Dear  [unfilled], [Consult Letter:] : I had the pleasure of evaluating your patient, [unfilled]. [Sincerely,] : Sincerely, [FreeTextEntry3] : Hollis Pemberton

## 2021-05-12 ENCOUNTER — APPOINTMENT (OUTPATIENT)
Dept: PULMONOLOGY | Facility: CLINIC | Age: 61
End: 2021-05-12
Payer: MEDICAID

## 2021-05-12 VITALS
DIASTOLIC BLOOD PRESSURE: 80 MMHG | SYSTOLIC BLOOD PRESSURE: 158 MMHG | HEIGHT: 59 IN | TEMPERATURE: 97.9 F | WEIGHT: 225 LBS | BODY MASS INDEX: 45.36 KG/M2 | HEART RATE: 75 BPM | OXYGEN SATURATION: 98 %

## 2021-05-12 DIAGNOSIS — Z87.891 PERSONAL HISTORY OF NICOTINE DEPENDENCE: ICD-10-CM

## 2021-05-12 PROCEDURE — 99205 OFFICE O/P NEW HI 60 MIN: CPT

## 2021-05-12 PROCEDURE — 99072 ADDL SUPL MATRL&STAF TM PHE: CPT

## 2021-05-12 NOTE — HISTORY OF PRESENT ILLNESS
[Former] : former [Never] : never [TextBox_4] : she got sick one month ago.  She had a bad cough and tried to treat it at home as she thought it was a cold.  Few days after, she experiencing pain in her all body as if she has the flu.  She ahd fever and diarrhea and came to the ER.  She was admitted and was covid test and was negative.  She had all the tests and all was negative.  her course was complicated with worsening her lung status.  She was put on the bipap.  Her condition was related to her copd exacerbation and was given lasix.  She was seen by ENT and was told she was OK except the postnasal drip.  She is better but still tired.  She got the vaccine for covid Pfizer.  She had mucus and little cough but she is better now.  Her cough was productive and now is less and brownish to yellow.  her acid reflux was bad.\par She feels something stuck in the throat.  She is sob with exertion.  She has runny nose, postnasal drip.  She always had sinus issues but is worse in the last month.  \par She is on and off with the cpap machine as it is malfunctioning.  She ahs the machine for the last 3 years but she gained weight

## 2021-05-12 NOTE — END OF VISIT
[FreeTextEntry2] : I reviewed all the records and the images from her prior hospitalization [Time Spent: ___ minutes] : I have spent [unfilled] minutes of time on the encounter. [>50% of the face to face encounter time was spent on counseling and/or coordination of care for ___] : Greater than 50% of the face to face encounter time was spent on counseling and/or coordination of care for [unfilled]

## 2021-05-12 NOTE — REVIEW OF SYSTEMS
[Nasal Congestion] : nasal congestion [Postnasal Drip] : postnasal drip [Sinus Problems] : sinus problems [Cough] : cough [Sputum] : sputum [Wheezing] : wheezing [SOB on Exertion] : sob on exertion [Negative] : Endocrine [Dry Eyes] : no dry eyes [Ear Disturbance] : no ear disturbance [Epistaxis] : no epistaxis [Sore Throat] : no sore throat [Eye Irritation] : no eye irritation [Dry Mouth] : no dry mouth [Mouth Ulcers] : no mouth ulcers [Poor Dentition] : no poor dentition [Edentulous] : no edentulous [Hemoptysis] : no hemoptysis [Chest Tightness] : no chest tightness [Frequent URIs] : no frequent URIs [Dyspnea] : no dyspnea [Pleuritic Pain] : no pleuritic pain [A.M. Dry Mouth] : no a.m. dry mouth

## 2021-05-12 NOTE — ASSESSMENT
[FreeTextEntry1] : COPD\par \par The patient had a recent hospitalization secondary to acute exacerbation of COPD.  The episode was triggered by acute bronchitis which could be either viral or bacteria.  Patient has daily productive cough which improved since her discharge but that is a chronic problem for years.  The patient has emphysema and chronic bronchitis phenotypes.\par \par The patient is on an inhaler as needed basis and not on any maintenance therapy for COPD.  I educated the patient on COPD.  I provided her with the handbook patient educational material.  I informed the patient with the action plan and went through it with her.\par \par The patient is class D as her CAT score is 22 and the had hospitalization for acute exacerbation of COPD within the last year.\par \par I started the patient on Anoro 1 puff a day.  Patient was given 7-day sample and instruction how to use it.  The patient will call in 1 week for update on her condition prior to prescribing it.\par \par I informed the patient with the gold grades ABC and D and prior possibility of escalation of therapy to include inhaled steroids and macrolide or Roflumilast.\par \par PFT and further recommendation will follow.\par \par Obstructive sleep apnea\par \par Patient on CPAP and she is compliant with it.  I informed the patient of the CPAP which is a full facemask that might be worsening her sinusitis.\par \par Chronic sinusitis\par \par I explained to the patient that she had chronic sinusitis and also she had surgery in the past.  Now her condition is positive probably complicating her COPD from the postnasal drip.  I will follow-up on CT scan of the sinus and she will require referral to ENT.\par \par Latent tuberculosis\par \par No clinical evidence of acute infection.\par \par Patient will follow up in 1 month.  The patient will provide records from his previous pulmonologist especially with CT scan and PFT.\par \par We will follow-up with CT scan of the chest as the patient has chronic cough, chronic smoker 41 pack/year, and history of breast cancer which can metastasize to the chest.

## 2021-05-13 ENCOUNTER — LABORATORY RESULT (OUTPATIENT)
Age: 61
End: 2021-05-13

## 2021-05-14 ENCOUNTER — NON-APPOINTMENT (OUTPATIENT)
Age: 61
End: 2021-05-14

## 2021-05-14 ENCOUNTER — APPOINTMENT (OUTPATIENT)
Dept: INTERNAL MEDICINE | Facility: CLINIC | Age: 61
End: 2021-05-14
Payer: MEDICAID

## 2021-05-14 VITALS
BODY MASS INDEX: 46.25 KG/M2 | DIASTOLIC BLOOD PRESSURE: 83 MMHG | TEMPERATURE: 98 F | WEIGHT: 229 LBS | HEART RATE: 71 BPM | OXYGEN SATURATION: 99 % | SYSTOLIC BLOOD PRESSURE: 146 MMHG

## 2021-05-14 DIAGNOSIS — Z22.7 LATENT TUBERCULOSIS: ICD-10-CM

## 2021-05-14 DIAGNOSIS — Z86.39 PERSONAL HISTORY OF OTHER ENDOCRINE, NUTRITIONAL AND METABOLIC DISEASE: ICD-10-CM

## 2021-05-14 PROCEDURE — 99072 ADDL SUPL MATRL&STAF TM PHE: CPT

## 2021-05-14 PROCEDURE — 99215 OFFICE O/P EST HI 40 MIN: CPT | Mod: GC

## 2021-05-17 ENCOUNTER — APPOINTMENT (OUTPATIENT)
Age: 61
End: 2021-05-17

## 2021-05-17 ENCOUNTER — OUTPATIENT (OUTPATIENT)
Dept: OUTPATIENT SERVICES | Facility: HOSPITAL | Age: 61
LOS: 1 days | End: 2021-05-17
Payer: COMMERCIAL

## 2021-05-17 VITALS
SYSTOLIC BLOOD PRESSURE: 125 MMHG | OXYGEN SATURATION: 96 % | TEMPERATURE: 97 F | DIASTOLIC BLOOD PRESSURE: 73 MMHG | WEIGHT: 229.06 LBS | HEART RATE: 72 BPM | RESPIRATION RATE: 18 BRPM | HEIGHT: 59 IN

## 2021-05-17 DIAGNOSIS — Z02.1 ENCOUNTER FOR PRE-EMPLOYMENT EXAMINATION: ICD-10-CM

## 2021-05-17 DIAGNOSIS — Z90.13 ACQUIRED ABSENCE OF BILATERAL BREASTS AND NIPPLES: Chronic | ICD-10-CM

## 2021-05-17 LAB
ANION GAP SERPL CALC-SCNC: 11 MMOL/L — SIGNIFICANT CHANGE UP (ref 5–17)
BUN SERPL-MCNC: 27 MG/DL — HIGH (ref 7–23)
CALCIUM SERPL-MCNC: 9.4 MG/DL — SIGNIFICANT CHANGE UP (ref 8.4–10.5)
CHLORIDE SERPL-SCNC: 103 MMOL/L — SIGNIFICANT CHANGE UP (ref 96–108)
CO2 SERPL-SCNC: 24 MMOL/L — SIGNIFICANT CHANGE UP (ref 22–31)
CREAT SERPL-MCNC: 0.73 MG/DL — SIGNIFICANT CHANGE UP (ref 0.5–1.3)
GLUCOSE SERPL-MCNC: 136 MG/DL — HIGH (ref 70–99)
MEV IGG SER-ACNC: >300 AU/ML — SIGNIFICANT CHANGE UP
MEV IGG+IGM SER-IMP: POSITIVE — SIGNIFICANT CHANGE UP
MUV AB SER-ACNC: POSITIVE — SIGNIFICANT CHANGE UP
MUV IGG FLD-ACNC: 135 AU/ML — SIGNIFICANT CHANGE UP
POTASSIUM SERPL-MCNC: 3.7 MMOL/L — SIGNIFICANT CHANGE UP (ref 3.5–5.3)
POTASSIUM SERPL-SCNC: 3.7 MMOL/L — SIGNIFICANT CHANGE UP (ref 3.5–5.3)
RUBV IGG SER-ACNC: 5 INDEX — SIGNIFICANT CHANGE UP
RUBV IGG SER-IMP: POSITIVE — SIGNIFICANT CHANGE UP
SODIUM SERPL-SCNC: 138 MMOL/L — SIGNIFICANT CHANGE UP (ref 135–145)

## 2021-05-17 PROCEDURE — 86480 TB TEST CELL IMMUN MEASURE: CPT

## 2021-05-17 PROCEDURE — 96523 IRRIG DRUG DELIVERY DEVICE: CPT

## 2021-05-17 PROCEDURE — 86765 RUBEOLA ANTIBODY: CPT

## 2021-05-17 PROCEDURE — 86762 RUBELLA ANTIBODY: CPT

## 2021-05-17 PROCEDURE — 36415 COLL VENOUS BLD VENIPUNCTURE: CPT

## 2021-05-17 PROCEDURE — 80048 BASIC METABOLIC PNL TOTAL CA: CPT

## 2021-05-17 PROCEDURE — 86735 MUMPS ANTIBODY: CPT

## 2021-05-17 RX ADMIN — Medication 300 UNIT(S): at 09:35

## 2021-05-18 LAB
GAMMA INTERFERON BACKGROUND BLD IA-ACNC: 0.03 IU/ML — SIGNIFICANT CHANGE UP
M TB IFN-G BLD-IMP: NEGATIVE — SIGNIFICANT CHANGE UP
M TB IFN-G CD4+ BCKGRND COR BLD-ACNC: 0.17 IU/ML — SIGNIFICANT CHANGE UP
M TB IFN-G CD4+CD8+ BCKGRND COR BLD-ACNC: 0.22 IU/ML — SIGNIFICANT CHANGE UP
QUANT TB PLUS MITOGEN MINUS NIL: 8.92 IU/ML — SIGNIFICANT CHANGE UP

## 2021-05-25 ENCOUNTER — APPOINTMENT (OUTPATIENT)
Dept: CT IMAGING | Facility: HOSPITAL | Age: 61
End: 2021-05-25
Payer: MEDICAID

## 2021-05-25 ENCOUNTER — OUTPATIENT (OUTPATIENT)
Dept: OUTPATIENT SERVICES | Facility: HOSPITAL | Age: 61
LOS: 1 days | End: 2021-05-25
Payer: COMMERCIAL

## 2021-05-25 DIAGNOSIS — Z90.13 ACQUIRED ABSENCE OF BILATERAL BREASTS AND NIPPLES: Chronic | ICD-10-CM

## 2021-05-25 PROCEDURE — 70486 CT MAXILLOFACIAL W/O DYE: CPT | Mod: 26

## 2021-05-25 PROCEDURE — 71250 CT THORAX DX C-: CPT | Mod: 26

## 2021-05-25 PROCEDURE — 71250 CT THORAX DX C-: CPT

## 2021-05-25 PROCEDURE — 70486 CT MAXILLOFACIAL W/O DYE: CPT

## 2021-06-10 ENCOUNTER — APPOINTMENT (OUTPATIENT)
Dept: PULMONOLOGY | Facility: CLINIC | Age: 61
End: 2021-06-10

## 2021-07-13 ENCOUNTER — APPOINTMENT (OUTPATIENT)
Dept: ENDOCRINOLOGY | Facility: CLINIC | Age: 61
End: 2021-07-13

## 2021-08-12 ENCOUNTER — APPOINTMENT (OUTPATIENT)
Dept: INTERNAL MEDICINE | Facility: CLINIC | Age: 61
End: 2021-08-12
Payer: MEDICAID

## 2021-08-12 VITALS
SYSTOLIC BLOOD PRESSURE: 143 MMHG | TEMPERATURE: 98 F | DIASTOLIC BLOOD PRESSURE: 77 MMHG | HEART RATE: 73 BPM | OXYGEN SATURATION: 98 % | RESPIRATION RATE: 18 BRPM

## 2021-08-12 LAB
CONTROL LINE: PRESENT
HIV 1+2 AB+HIV1P24 AG SERPLBLD IA.RAPID: NEGATIVE

## 2021-08-12 PROCEDURE — 86701 HIV-1ANTIBODY: CPT | Mod: QW

## 2021-08-12 PROCEDURE — 99214 OFFICE O/P EST MOD 30 MIN: CPT | Mod: GC,25

## 2021-08-13 NOTE — END OF VISIT
[FreeTextEntry3] : I saw and evaluated the patient.  The findings and assessment were discussed with the resident and I agree with resident’s plan as documented in the above, in the resident’s note.\par \par Pertinent exam findings: Well-appearing, NAD. \par \par High risk sexual encounter\par COPD\par Concern for melanoma\par Refractory HTN\par Renal Mass\par \par I spent more than 30 minutes for the total time of this encounter, including time spent face-to-face with the patient, chart review, coordinating care, and documentation. \par Labs at infusion center.

## 2021-08-13 NOTE — REVIEW OF SYSTEMS
[Mole Changes] : mole changes [Nail Changes] : nail changes [Negative] : Musculoskeletal [Itching] : no itching [Hair Changes] : no hair changes
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
## 2021-08-13 NOTE — PHYSICAL EXAM
[Normal] : soft, non-tender, non-distended, no masses palpated, no HSM and normal bowel sounds [de-identified] : obese [de-identified] : chemoport under skin in right breast, multiple nevi in bilateral feet
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
## 2021-08-23 ENCOUNTER — APPOINTMENT (OUTPATIENT)
Age: 61
End: 2021-08-23

## 2021-08-23 ENCOUNTER — OUTPATIENT (OUTPATIENT)
Dept: OUTPATIENT SERVICES | Facility: HOSPITAL | Age: 61
LOS: 1 days | End: 2021-08-23
Payer: COMMERCIAL

## 2021-08-23 VITALS
RESPIRATION RATE: 18 BRPM | TEMPERATURE: 97 F | OXYGEN SATURATION: 96 % | HEART RATE: 74 BPM | SYSTOLIC BLOOD PRESSURE: 110 MMHG | DIASTOLIC BLOOD PRESSURE: 76 MMHG

## 2021-08-23 DIAGNOSIS — L98.9 DISORDER OF THE SKIN AND SUBCUTANEOUS TISSUE, UNSPECIFIED: ICD-10-CM

## 2021-08-23 DIAGNOSIS — E11.9 TYPE 2 DIABETES MELLITUS WITHOUT COMPLICATIONS: ICD-10-CM

## 2021-08-23 DIAGNOSIS — Z90.13 ACQUIRED ABSENCE OF BILATERAL BREASTS AND NIPPLES: Chronic | ICD-10-CM

## 2021-08-23 DIAGNOSIS — Z11.3 ENCOUNTER FOR SCREENING FOR INFECTIONS WITH A PREDOMINANTLY SEXUAL MODE OF TRANSMISSION: ICD-10-CM

## 2021-08-23 LAB
ALBUMIN SERPL ELPH-MCNC: 4.3 G/DL — SIGNIFICANT CHANGE UP (ref 3.3–5)
ALP SERPL-CCNC: 98 U/L — SIGNIFICANT CHANGE UP (ref 40–120)
ALT FLD-CCNC: 28 U/L — SIGNIFICANT CHANGE UP (ref 10–45)
ANION GAP SERPL CALC-SCNC: 11 MMOL/L — SIGNIFICANT CHANGE UP (ref 5–17)
AST SERPL-CCNC: 26 U/L — SIGNIFICANT CHANGE UP (ref 10–40)
BILIRUB SERPL-MCNC: 0.3 MG/DL — SIGNIFICANT CHANGE UP (ref 0.2–1.2)
BUN SERPL-MCNC: 25 MG/DL — HIGH (ref 7–23)
CALCIUM SERPL-MCNC: 9.6 MG/DL — SIGNIFICANT CHANGE UP (ref 8.4–10.5)
CHLORIDE SERPL-SCNC: 101 MMOL/L — SIGNIFICANT CHANGE UP (ref 96–108)
CHOLEST SERPL-MCNC: 163 MG/DL — SIGNIFICANT CHANGE UP
CO2 SERPL-SCNC: 24 MMOL/L — SIGNIFICANT CHANGE UP (ref 22–31)
CREAT SERPL-MCNC: 1.05 MG/DL — SIGNIFICANT CHANGE UP (ref 0.5–1.3)
GLUCOSE SERPL-MCNC: 105 MG/DL — HIGH (ref 70–99)
HBV SURFACE AG SER-ACNC: SIGNIFICANT CHANGE UP
HCV AB S/CO SERPL IA: 0.04 S/CO — SIGNIFICANT CHANGE UP
HCV AB SERPL-IMP: SIGNIFICANT CHANGE UP
HDLC SERPL-MCNC: 33 MG/DL — LOW
HSV1 IGG SER-ACNC: 13.2 INDEX — HIGH
HSV1 IGG SERPL QL IA: POSITIVE
HSV2 IGG FLD-ACNC: 12.5 INDEX — HIGH
HSV2 IGG SERPL QL IA: POSITIVE
LIPID PNL WITH DIRECT LDL SERPL: 100 MG/DL — HIGH
NON HDL CHOLESTEROL: 130 MG/DL — HIGH
POTASSIUM SERPL-MCNC: 4.2 MMOL/L — SIGNIFICANT CHANGE UP (ref 3.5–5.3)
POTASSIUM SERPL-SCNC: 4.2 MMOL/L — SIGNIFICANT CHANGE UP (ref 3.5–5.3)
PROT SERPL-MCNC: 7.6 G/DL — SIGNIFICANT CHANGE UP (ref 6–8.3)
SODIUM SERPL-SCNC: 136 MMOL/L — SIGNIFICANT CHANGE UP (ref 135–145)
T PALLIDUM AB TITR SER: NEGATIVE — SIGNIFICANT CHANGE UP
TRIGL SERPL-MCNC: 152 MG/DL — HIGH

## 2021-08-23 PROCEDURE — 86780 TREPONEMA PALLIDUM: CPT

## 2021-08-23 PROCEDURE — 80061 LIPID PANEL: CPT

## 2021-08-23 PROCEDURE — 86695 HERPES SIMPLEX TYPE 1 TEST: CPT

## 2021-08-23 PROCEDURE — 82088 ASSAY OF ALDOSTERONE: CPT

## 2021-08-23 PROCEDURE — 87340 HEPATITIS B SURFACE AG IA: CPT

## 2021-08-23 PROCEDURE — 86696 HERPES SIMPLEX TYPE 2 TEST: CPT

## 2021-08-23 PROCEDURE — 80053 COMPREHEN METABOLIC PANEL: CPT

## 2021-08-23 PROCEDURE — 86803 HEPATITIS C AB TEST: CPT

## 2021-08-23 PROCEDURE — 84244 ASSAY OF RENIN: CPT

## 2021-08-23 PROCEDURE — 96523 IRRIG DRUG DELIVERY DEVICE: CPT

## 2021-08-23 RX ADMIN — Medication 300 UNIT(S): at 08:20

## 2021-08-24 LAB — ALDOST SERPL-MCNC: 16.1 NG/DL — SIGNIFICANT CHANGE UP

## 2021-08-27 LAB — RENIN PLAS-CCNC: 22.65 NG/ML/HR — HIGH (ref 0.17–5.38)

## 2021-09-02 ENCOUNTER — APPOINTMENT (OUTPATIENT)
Dept: ENDOCRINOLOGY | Facility: CLINIC | Age: 61
End: 2021-09-02

## 2021-09-27 ENCOUNTER — APPOINTMENT (OUTPATIENT)
Dept: CT IMAGING | Facility: HOSPITAL | Age: 61
End: 2021-09-27
Payer: MEDICAID

## 2021-09-27 ENCOUNTER — OUTPATIENT (OUTPATIENT)
Dept: OUTPATIENT SERVICES | Facility: HOSPITAL | Age: 61
LOS: 1 days | End: 2021-09-27
Payer: COMMERCIAL

## 2021-09-27 DIAGNOSIS — Z90.13 ACQUIRED ABSENCE OF BILATERAL BREASTS AND NIPPLES: Chronic | ICD-10-CM

## 2021-09-27 PROCEDURE — 70486 CT MAXILLOFACIAL W/O DYE: CPT | Mod: 26

## 2021-09-27 PROCEDURE — 70486 CT MAXILLOFACIAL W/O DYE: CPT

## 2021-10-08 NOTE — HISTORY OF PRESENT ILLNESS
[de-identified] : Patient is 61yr F with Phx of COPD/JESÚS, DM, glaucoma, HTN/HLD, hypothyroidism, kidney mass, latent TB, BLE pain, pituitary cancer, epilepsy, diverticulitis, GERD, Grave's Disease and psoriasis who presents today for management of known breast cancer. Referred by Dr. Pascual.\par \par \par Onc Hx:\par Patient presents with hx of breast cancer (dx in 2000), s/p radiation, diffuse lymph node removal and bilateral mastectomy w/reconstruction x 2.\par Patient states she is currently in remission.

## 2021-10-11 ENCOUNTER — APPOINTMENT (OUTPATIENT)
Dept: HEMATOLOGY ONCOLOGY | Facility: CLINIC | Age: 61
End: 2021-10-11

## 2021-10-26 ENCOUNTER — APPOINTMENT (OUTPATIENT)
Dept: MRI IMAGING | Facility: HOSPITAL | Age: 61
End: 2021-10-26

## 2021-10-26 ENCOUNTER — OUTPATIENT (OUTPATIENT)
Dept: OUTPATIENT SERVICES | Facility: HOSPITAL | Age: 61
LOS: 1 days | End: 2021-10-26
Payer: COMMERCIAL

## 2021-10-26 ENCOUNTER — OUTPATIENT (OUTPATIENT)
Dept: OUTPATIENT SERVICES | Facility: HOSPITAL | Age: 61
LOS: 1 days | End: 2021-10-26

## 2021-10-26 DIAGNOSIS — M79.89 OTHER SPECIFIED SOFT TISSUE DISORDERS: ICD-10-CM

## 2021-10-26 DIAGNOSIS — Z90.13 ACQUIRED ABSENCE OF BILATERAL BREASTS AND NIPPLES: Chronic | ICD-10-CM

## 2021-10-26 PROCEDURE — 70553 MRI BRAIN STEM W/O & W/DYE: CPT

## 2021-10-26 PROCEDURE — A9585: CPT

## 2021-10-26 PROCEDURE — 70553 MRI BRAIN STEM W/O & W/DYE: CPT | Mod: 26

## 2021-10-28 ENCOUNTER — NON-APPOINTMENT (OUTPATIENT)
Age: 61
End: 2021-10-28

## 2021-10-28 ENCOUNTER — APPOINTMENT (OUTPATIENT)
Dept: OPHTHALMOLOGY | Facility: CLINIC | Age: 61
End: 2021-10-28
Payer: MEDICAID

## 2021-10-28 PROCEDURE — 92060 SENSORIMOTOR EXAMINATION: CPT

## 2021-10-28 PROCEDURE — 92133 CPTRZD OPH DX IMG PST SGM ON: CPT

## 2021-10-28 PROCEDURE — 92004 COMPRE OPH EXAM NEW PT 1/>: CPT

## 2021-11-08 ENCOUNTER — NON-APPOINTMENT (OUTPATIENT)
Age: 61
End: 2021-11-08

## 2021-11-08 ENCOUNTER — APPOINTMENT (OUTPATIENT)
Dept: OPHTHALMOLOGY | Facility: CLINIC | Age: 61
End: 2021-11-08
Payer: MEDICAID

## 2021-11-08 PROCEDURE — 92014 COMPRE OPH EXAM EST PT 1/>: CPT

## 2021-11-08 PROCEDURE — 92060 SENSORIMOTOR EXAMINATION: CPT

## 2021-11-08 PROCEDURE — 92083 EXTENDED VISUAL FIELD XM: CPT

## 2021-11-09 ENCOUNTER — APPOINTMENT (OUTPATIENT)
Dept: INTERNAL MEDICINE | Facility: CLINIC | Age: 61
End: 2021-11-09
Payer: MEDICAID

## 2021-11-09 ENCOUNTER — MED ADMIN CHARGE (OUTPATIENT)
Age: 61
End: 2021-11-09

## 2021-11-09 VITALS
HEART RATE: 77 BPM | DIASTOLIC BLOOD PRESSURE: 87 MMHG | BODY MASS INDEX: 42.62 KG/M2 | TEMPERATURE: 97.7 F | SYSTOLIC BLOOD PRESSURE: 137 MMHG | WEIGHT: 211 LBS | OXYGEN SATURATION: 97 %

## 2021-11-09 DIAGNOSIS — Z23 ENCOUNTER FOR IMMUNIZATION: ICD-10-CM

## 2021-11-09 DIAGNOSIS — M25.511 PAIN IN RIGHT SHOULDER: ICD-10-CM

## 2021-11-09 PROCEDURE — G0008: CPT

## 2021-11-09 PROCEDURE — 99214 OFFICE O/P EST MOD 30 MIN: CPT | Mod: GC,25

## 2021-11-09 PROCEDURE — 90686 IIV4 VACC NO PRSV 0.5 ML IM: CPT

## 2021-11-09 RX ORDER — ATORVASTATIN CALCIUM 10 MG/1
10 TABLET, FILM COATED ORAL
Qty: 90 | Refills: 1 | Status: DISCONTINUED | COMMUNITY
Start: 2021-01-26 | End: 2021-11-09

## 2021-11-15 ENCOUNTER — APPOINTMENT (OUTPATIENT)
Age: 61
End: 2021-11-15

## 2021-11-15 DIAGNOSIS — Z71.9 COUNSELING, UNSPECIFIED: ICD-10-CM

## 2021-11-20 ENCOUNTER — INPATIENT (INPATIENT)
Facility: HOSPITAL | Age: 61
LOS: 3 days | Discharge: HOME | DRG: 194 | End: 2021-11-24
Payer: COMMERCIAL

## 2021-11-20 VITALS
RESPIRATION RATE: 20 BRPM | HEART RATE: 106 BPM | HEIGHT: 59 IN | SYSTOLIC BLOOD PRESSURE: 196 MMHG | DIASTOLIC BLOOD PRESSURE: 80 MMHG | OXYGEN SATURATION: 98 % | TEMPERATURE: 100 F | WEIGHT: 210.98 LBS

## 2021-11-20 DIAGNOSIS — I10 ESSENTIAL (PRIMARY) HYPERTENSION: ICD-10-CM

## 2021-11-20 DIAGNOSIS — H40.9 UNSPECIFIED GLAUCOMA: ICD-10-CM

## 2021-11-20 DIAGNOSIS — E66.01 MORBID (SEVERE) OBESITY DUE TO EXCESS CALORIES: ICD-10-CM

## 2021-11-20 DIAGNOSIS — Z90.13 ACQUIRED ABSENCE OF BILATERAL BREASTS AND NIPPLES: Chronic | ICD-10-CM

## 2021-11-20 DIAGNOSIS — J18.9 PNEUMONIA, UNSPECIFIED ORGANISM: ICD-10-CM

## 2021-11-20 DIAGNOSIS — J44.9 CHRONIC OBSTRUCTIVE PULMONARY DISEASE, UNSPECIFIED: ICD-10-CM

## 2021-11-20 DIAGNOSIS — G40.909 EPILEPSY, UNSPECIFIED, NOT INTRACTABLE, WITHOUT STATUS EPILEPTICUS: ICD-10-CM

## 2021-11-20 DIAGNOSIS — Z85.3 PERSONAL HISTORY OF MALIGNANT NEOPLASM OF BREAST: ICD-10-CM

## 2021-11-20 DIAGNOSIS — R06.02 SHORTNESS OF BREATH: ICD-10-CM

## 2021-11-20 DIAGNOSIS — Z29.9 ENCOUNTER FOR PROPHYLACTIC MEASURES, UNSPECIFIED: ICD-10-CM

## 2021-11-20 DIAGNOSIS — E05.00 THYROTOXICOSIS WITH DIFFUSE GOITER WITHOUT THYROTOXIC CRISIS OR STORM: ICD-10-CM

## 2021-11-20 DIAGNOSIS — J44.0 CHRONIC OBSTRUCTIVE PULMONARY DISEASE WITH (ACUTE) LOWER RESPIRATORY INFECTION: ICD-10-CM

## 2021-11-20 DIAGNOSIS — A04.72 ENTEROCOLITIS DUE TO CLOSTRIDIUM DIFFICILE, NOT SPECIFIED AS RECURRENT: ICD-10-CM

## 2021-11-20 DIAGNOSIS — A09 INFECTIOUS GASTROENTERITIS AND COLITIS, UNSPECIFIED: ICD-10-CM

## 2021-11-20 DIAGNOSIS — J44.1 CHRONIC OBSTRUCTIVE PULMONARY DISEASE WITH (ACUTE) EXACERBATION: ICD-10-CM

## 2021-11-20 LAB
ALBUMIN SERPL ELPH-MCNC: 4.1 G/DL — SIGNIFICANT CHANGE UP (ref 3.3–5)
ALP SERPL-CCNC: 120 U/L — SIGNIFICANT CHANGE UP (ref 40–120)
ALT FLD-CCNC: 27 U/L — SIGNIFICANT CHANGE UP (ref 10–45)
ANION GAP SERPL CALC-SCNC: 12 MMOL/L — SIGNIFICANT CHANGE UP (ref 5–17)
APPEARANCE UR: CLEAR — SIGNIFICANT CHANGE UP
AST SERPL-CCNC: 32 U/L — SIGNIFICANT CHANGE UP (ref 10–40)
BASOPHILS # BLD AUTO: 0.02 K/UL — SIGNIFICANT CHANGE UP (ref 0–0.2)
BASOPHILS NFR BLD AUTO: 0.3 % — SIGNIFICANT CHANGE UP (ref 0–2)
BILIRUB SERPL-MCNC: 0.2 MG/DL — SIGNIFICANT CHANGE UP (ref 0.2–1.2)
BILIRUB UR-MCNC: NEGATIVE — SIGNIFICANT CHANGE UP
BUN SERPL-MCNC: 21 MG/DL — SIGNIFICANT CHANGE UP (ref 7–23)
CALCIUM SERPL-MCNC: 9.1 MG/DL — SIGNIFICANT CHANGE UP (ref 8.4–10.5)
CHLORIDE SERPL-SCNC: 102 MMOL/L — SIGNIFICANT CHANGE UP (ref 96–108)
CO2 SERPL-SCNC: 25 MMOL/L — SIGNIFICANT CHANGE UP (ref 22–31)
COLOR SPEC: YELLOW — SIGNIFICANT CHANGE UP
CREAT SERPL-MCNC: 0.66 MG/DL — SIGNIFICANT CHANGE UP (ref 0.5–1.3)
DIFF PNL FLD: NEGATIVE — SIGNIFICANT CHANGE UP
EOSINOPHIL # BLD AUTO: 0.1 K/UL — SIGNIFICANT CHANGE UP (ref 0–0.5)
EOSINOPHIL NFR BLD AUTO: 1.4 % — SIGNIFICANT CHANGE UP (ref 0–6)
FLUAV + FLUBV RNA SPEC NAA+PROBE: SIGNIFICANT CHANGE UP
FLUAV AG NPH QL: SIGNIFICANT CHANGE UP
FLUBV AG NPH QL: SIGNIFICANT CHANGE UP
GLUCOSE SERPL-MCNC: 150 MG/DL — HIGH (ref 70–99)
GLUCOSE UR QL: NEGATIVE — SIGNIFICANT CHANGE UP
HCT VFR BLD CALC: 36.7 % — SIGNIFICANT CHANGE UP (ref 34.5–45)
HGB BLD-MCNC: 12 G/DL — SIGNIFICANT CHANGE UP (ref 11.5–15.5)
IMM GRANULOCYTES NFR BLD AUTO: 0.3 % — SIGNIFICANT CHANGE UP (ref 0–1.5)
KETONES UR-MCNC: NEGATIVE — SIGNIFICANT CHANGE UP
LACTATE SERPL-SCNC: 1.7 MMOL/L — SIGNIFICANT CHANGE UP (ref 0.5–2)
LEUKOCYTE ESTERASE UR-ACNC: NEGATIVE — SIGNIFICANT CHANGE UP
LIDOCAIN IGE QN: 29 U/L — SIGNIFICANT CHANGE UP (ref 7–60)
LYMPHOCYTES # BLD AUTO: 0.77 K/UL — LOW (ref 1–3.3)
LYMPHOCYTES # BLD AUTO: 10.9 % — LOW (ref 13–44)
MAGNESIUM SERPL-MCNC: 1.9 MG/DL — SIGNIFICANT CHANGE UP (ref 1.6–2.6)
MCHC RBC-ENTMCNC: 28.2 PG — SIGNIFICANT CHANGE UP (ref 27–34)
MCHC RBC-ENTMCNC: 32.7 GM/DL — SIGNIFICANT CHANGE UP (ref 32–36)
MCV RBC AUTO: 86.2 FL — SIGNIFICANT CHANGE UP (ref 80–100)
MONOCYTES # BLD AUTO: 0.4 K/UL — SIGNIFICANT CHANGE UP (ref 0–0.9)
MONOCYTES NFR BLD AUTO: 5.7 % — SIGNIFICANT CHANGE UP (ref 2–14)
NEUTROPHILS # BLD AUTO: 5.76 K/UL — SIGNIFICANT CHANGE UP (ref 1.8–7.4)
NEUTROPHILS NFR BLD AUTO: 81.4 % — HIGH (ref 43–77)
NITRITE UR-MCNC: NEGATIVE — SIGNIFICANT CHANGE UP
NRBC # BLD: 0 /100 WBCS — SIGNIFICANT CHANGE UP (ref 0–0)
PH UR: 6.5 — SIGNIFICANT CHANGE UP (ref 5–8)
PLATELET # BLD AUTO: 321 K/UL — SIGNIFICANT CHANGE UP (ref 150–400)
POTASSIUM SERPL-MCNC: 4 MMOL/L — SIGNIFICANT CHANGE UP (ref 3.5–5.3)
POTASSIUM SERPL-SCNC: 4 MMOL/L — SIGNIFICANT CHANGE UP (ref 3.5–5.3)
PROT SERPL-MCNC: 7.5 G/DL — SIGNIFICANT CHANGE UP (ref 6–8.3)
PROT UR-MCNC: NEGATIVE MG/DL — SIGNIFICANT CHANGE UP
RAPID RVP RESULT: SIGNIFICANT CHANGE UP
RBC # BLD: 4.26 M/UL — SIGNIFICANT CHANGE UP (ref 3.8–5.2)
RBC # FLD: 15.4 % — HIGH (ref 10.3–14.5)
RSV RNA NPH QL NAA+NON-PROBE: SIGNIFICANT CHANGE UP
SARS-COV-2 RNA SPEC QL NAA+PROBE: SIGNIFICANT CHANGE UP
SARS-COV-2 RNA SPEC QL NAA+PROBE: SIGNIFICANT CHANGE UP
SODIUM SERPL-SCNC: 139 MMOL/L — SIGNIFICANT CHANGE UP (ref 135–145)
SP GR SPEC: 1.02 — SIGNIFICANT CHANGE UP (ref 1–1.03)
UROBILINOGEN FLD QL: 0.2 E.U./DL — SIGNIFICANT CHANGE UP
WBC # BLD: 7.07 K/UL — SIGNIFICANT CHANGE UP (ref 3.8–10.5)
WBC # FLD AUTO: 7.07 K/UL — SIGNIFICANT CHANGE UP (ref 3.8–10.5)

## 2021-11-20 PROCEDURE — 99232 SBSQ HOSP IP/OBS MODERATE 35: CPT

## 2021-11-20 PROCEDURE — 93010 ELECTROCARDIOGRAM REPORT: CPT

## 2021-11-20 PROCEDURE — 99285 EMERGENCY DEPT VISIT HI MDM: CPT

## 2021-11-20 PROCEDURE — 71045 X-RAY EXAM CHEST 1 VIEW: CPT | Mod: 26

## 2021-11-20 RX ORDER — LOSARTAN POTASSIUM 100 MG/1
100 TABLET, FILM COATED ORAL EVERY 24 HOURS
Refills: 0 | Status: DISCONTINUED | OUTPATIENT
Start: 2021-11-21 | End: 2021-11-24

## 2021-11-20 RX ORDER — AZITHROMYCIN 500 MG/1
500 TABLET, FILM COATED ORAL ONCE
Refills: 0 | Status: COMPLETED | OUTPATIENT
Start: 2021-11-20 | End: 2021-11-20

## 2021-11-20 RX ORDER — GABAPENTIN 400 MG/1
1 CAPSULE ORAL
Qty: 0 | Refills: 0 | DISCHARGE

## 2021-11-20 RX ORDER — SODIUM CHLORIDE 9 MG/ML
1000 INJECTION INTRAMUSCULAR; INTRAVENOUS; SUBCUTANEOUS ONCE
Refills: 0 | Status: COMPLETED | OUTPATIENT
Start: 2021-11-20 | End: 2021-11-20

## 2021-11-20 RX ORDER — ONDANSETRON 8 MG/1
4 TABLET, FILM COATED ORAL THREE TIMES A DAY
Refills: 0 | Status: DISCONTINUED | OUTPATIENT
Start: 2021-11-20 | End: 2021-11-24

## 2021-11-20 RX ORDER — ACETAMINOPHEN 500 MG
650 TABLET ORAL EVERY 6 HOURS
Refills: 0 | Status: DISCONTINUED | OUTPATIENT
Start: 2021-11-20 | End: 2021-11-24

## 2021-11-20 RX ORDER — ACETAMINOPHEN 500 MG
1000 TABLET ORAL ONCE
Refills: 0 | Status: COMPLETED | OUTPATIENT
Start: 2021-11-20 | End: 2021-11-20

## 2021-11-20 RX ORDER — KETOROLAC TROMETHAMINE 30 MG/ML
10 SYRINGE (ML) INJECTION EVERY 6 HOURS
Refills: 0 | Status: DISCONTINUED | OUTPATIENT
Start: 2021-11-20 | End: 2021-11-24

## 2021-11-20 RX ORDER — CICLOPIROX OLAMINE 7.7 MG/G
1 CREAM TOPICAL
Qty: 0 | Refills: 0 | DISCHARGE

## 2021-11-20 RX ORDER — IPRATROPIUM/ALBUTEROL SULFATE 18-103MCG
3 AEROSOL WITH ADAPTER (GRAM) INHALATION EVERY 6 HOURS
Refills: 0 | Status: DISCONTINUED | OUTPATIENT
Start: 2021-11-20 | End: 2021-11-24

## 2021-11-20 RX ORDER — KETOROLAC TROMETHAMINE 30 MG/ML
15 SYRINGE (ML) INJECTION ONCE
Refills: 0 | Status: DISCONTINUED | OUTPATIENT
Start: 2021-11-20 | End: 2021-11-20

## 2021-11-20 RX ORDER — ATORVASTATIN CALCIUM 80 MG/1
10 TABLET, FILM COATED ORAL AT BEDTIME
Refills: 0 | Status: DISCONTINUED | OUTPATIENT
Start: 2021-11-20 | End: 2021-11-24

## 2021-11-20 RX ORDER — LEVOTHYROXINE SODIUM 125 MCG
175 TABLET ORAL EVERY 24 HOURS
Refills: 0 | Status: DISCONTINUED | OUTPATIENT
Start: 2021-11-21 | End: 2021-11-24

## 2021-11-20 RX ORDER — LATANOPROST 0.05 MG/ML
1 SOLUTION/ DROPS OPHTHALMIC; TOPICAL AT BEDTIME
Refills: 0 | Status: DISCONTINUED | OUTPATIENT
Start: 2021-11-20 | End: 2021-11-24

## 2021-11-20 RX ORDER — CEFTRIAXONE 500 MG/1
1000 INJECTION, POWDER, FOR SOLUTION INTRAMUSCULAR; INTRAVENOUS ONCE
Refills: 0 | Status: COMPLETED | OUTPATIENT
Start: 2021-11-20 | End: 2021-11-20

## 2021-11-20 RX ORDER — ENOXAPARIN SODIUM 100 MG/ML
40 INJECTION SUBCUTANEOUS EVERY 12 HOURS
Refills: 0 | Status: DISCONTINUED | OUTPATIENT
Start: 2021-11-20 | End: 2021-11-24

## 2021-11-20 RX ORDER — AMLODIPINE BESYLATE 2.5 MG/1
10 TABLET ORAL EVERY 24 HOURS
Refills: 0 | Status: DISCONTINUED | OUTPATIENT
Start: 2021-11-21 | End: 2021-11-24

## 2021-11-20 RX ORDER — DORZOLAMIDE HYDROCHLORIDE TIMOLOL MALEATE 20; 5 MG/ML; MG/ML
1 SOLUTION/ DROPS OPHTHALMIC EVERY 12 HOURS
Refills: 0 | Status: DISCONTINUED | OUTPATIENT
Start: 2021-11-20 | End: 2021-11-24

## 2021-11-20 RX ORDER — ENOXAPARIN SODIUM 100 MG/ML
40 INJECTION SUBCUTANEOUS EVERY 24 HOURS
Refills: 0 | Status: DISCONTINUED | OUTPATIENT
Start: 2021-11-20 | End: 2021-11-20

## 2021-11-20 RX ORDER — FAMOTIDINE 10 MG/ML
40 INJECTION INTRAVENOUS AT BEDTIME
Refills: 0 | Status: DISCONTINUED | OUTPATIENT
Start: 2021-11-20 | End: 2021-11-20

## 2021-11-20 RX ORDER — SODIUM CHLORIDE 9 MG/ML
10 INJECTION INTRAMUSCULAR; INTRAVENOUS; SUBCUTANEOUS
Refills: 0 | Status: DISCONTINUED | OUTPATIENT
Start: 2021-11-20 | End: 2021-11-24

## 2021-11-20 RX ORDER — BRIMONIDINE TARTRATE 2 MG/MG
1 SOLUTION/ DROPS OPHTHALMIC THREE TIMES A DAY
Refills: 0 | Status: DISCONTINUED | OUTPATIENT
Start: 2021-11-20 | End: 2021-11-24

## 2021-11-20 RX ORDER — CEFTRIAXONE 500 MG/1
1000 INJECTION, POWDER, FOR SOLUTION INTRAMUSCULAR; INTRAVENOUS EVERY 24 HOURS
Refills: 0 | Status: COMPLETED | OUTPATIENT
Start: 2021-11-21 | End: 2021-11-24

## 2021-11-20 RX ORDER — VALACYCLOVIR 500 MG/1
1 TABLET, FILM COATED ORAL
Qty: 0 | Refills: 0 | DISCHARGE

## 2021-11-20 RX ORDER — FAMOTIDINE 10 MG/ML
40 INJECTION INTRAVENOUS AT BEDTIME
Refills: 0 | Status: DISCONTINUED | OUTPATIENT
Start: 2021-11-20 | End: 2021-11-24

## 2021-11-20 RX ORDER — AZITHROMYCIN 500 MG/1
500 TABLET, FILM COATED ORAL EVERY 24 HOURS
Refills: 0 | Status: DISCONTINUED | OUTPATIENT
Start: 2021-11-21 | End: 2021-11-21

## 2021-11-20 RX ADMIN — DORZOLAMIDE HYDROCHLORIDE TIMOLOL MALEATE 1 DROP(S): 20; 5 SOLUTION/ DROPS OPHTHALMIC at 19:22

## 2021-11-20 RX ADMIN — ENOXAPARIN SODIUM 40 MILLIGRAM(S): 100 INJECTION SUBCUTANEOUS at 19:22

## 2021-11-20 RX ADMIN — Medication 650 MILLIGRAM(S): at 19:30

## 2021-11-20 RX ADMIN — SODIUM CHLORIDE 10 MILLILITER(S): 9 INJECTION INTRAMUSCULAR; INTRAVENOUS; SUBCUTANEOUS at 05:31

## 2021-11-20 RX ADMIN — Medication 650 MILLIGRAM(S): at 10:47

## 2021-11-20 RX ADMIN — CEFTRIAXONE 100 MILLIGRAM(S): 500 INJECTION, POWDER, FOR SOLUTION INTRAMUSCULAR; INTRAVENOUS at 05:59

## 2021-11-20 RX ADMIN — Medication 10 MILLIGRAM(S): at 16:14

## 2021-11-20 RX ADMIN — LATANOPROST 1 DROP(S): 0.05 SOLUTION/ DROPS OPHTHALMIC; TOPICAL at 23:02

## 2021-11-20 RX ADMIN — Medication 500 UNIT(S): at 04:30

## 2021-11-20 RX ADMIN — Medication 15 MILLIGRAM(S): at 06:31

## 2021-11-20 RX ADMIN — Medication 400 MILLIGRAM(S): at 04:48

## 2021-11-20 RX ADMIN — BRIMONIDINE TARTRATE 1 DROP(S): 2 SOLUTION/ DROPS OPHTHALMIC at 22:26

## 2021-11-20 RX ADMIN — Medication 10 MILLIGRAM(S): at 22:27

## 2021-11-20 RX ADMIN — ATORVASTATIN CALCIUM 10 MILLIGRAM(S): 80 TABLET, FILM COATED ORAL at 22:27

## 2021-11-20 RX ADMIN — Medication 10 MILLIGRAM(S): at 17:46

## 2021-11-20 RX ADMIN — AZITHROMYCIN 255 MILLIGRAM(S): 500 TABLET, FILM COATED ORAL at 06:06

## 2021-11-20 RX ADMIN — Medication 10 MILLIGRAM(S): at 23:00

## 2021-11-20 RX ADMIN — SODIUM CHLORIDE 1000 MILLILITER(S): 9 INJECTION INTRAMUSCULAR; INTRAVENOUS; SUBCUTANEOUS at 04:14

## 2021-11-20 RX ADMIN — FAMOTIDINE 40 MILLIGRAM(S): 10 INJECTION INTRAVENOUS at 22:27

## 2021-11-20 RX ADMIN — Medication 650 MILLIGRAM(S): at 11:00

## 2021-11-20 RX ADMIN — ONDANSETRON 4 MILLIGRAM(S): 8 TABLET, FILM COATED ORAL at 22:29

## 2021-11-20 RX ADMIN — SODIUM CHLORIDE 10 MILLILITER(S): 9 INJECTION INTRAMUSCULAR; INTRAVENOUS; SUBCUTANEOUS at 06:06

## 2021-11-20 RX ADMIN — BRIMONIDINE TARTRATE 1 DROP(S): 2 SOLUTION/ DROPS OPHTHALMIC at 14:34

## 2021-11-20 NOTE — H&P ADULT - PROBLEM SELECTOR PLAN 7
Fluids: 1L NS  Electrolytes: Mg>2, K>4  Nutrition:  DASH  Prophylaxis: lovenox 40 Q12  Activity: AAT, OOBTC  GI: none  C: DNR/DNI  Dispo: Admit to F

## 2021-11-20 NOTE — ED ADULT TRIAGE NOTE - ARRIVAL FROM
My nurse Inna Kelly called to check on the patient.  He had some nausea and chest tightness last night.  Was seen in the ER at [facility] in [place].  By mom's report, all labs looked fine and he was evaluated by a cardiologist who told her he was fine.  He now feels great - back to baseline.    We will attempt to get ER records.  Patient is coming for labs and echo/ekg in [facility] in 10 days.  Mom will call if he feels bad again.   Home

## 2021-11-20 NOTE — PROGRESS NOTE ADULT - PROBLEM SELECTOR PLAN 2
Patient on anoro ellipta and albuterol at home. No increased sputum production, increased shortness of breath, or increased inhaler use.  -have patient get home anoro ellipta  - duonebs Q6 hours prn for SOB or wheezing

## 2021-11-20 NOTE — ED PROVIDER NOTE - CLINICAL SUMMARY MEDICAL DECISION MAKING FREE TEXT BOX
61 yo HTN, COPD, Diverticulitis, Epilepsy, central graves disease 2/2 pituitary tumor, Breast CA s/p mastectomy presenting with flu-like symptoms x 2 days. plan : labs, CXR, supportive care.

## 2021-11-20 NOTE — H&P ADULT - HISTORY OF PRESENT ILLNESS
HPI: 62 y/o F PMH COPD, HTN, epilepsy (last seizure 2013), grave's dz, breast ca s/p mastectomy in 2000, and recent admission in march 2021 for COPD exacerbation presents w c/o 3 days of fever and myalgias. Three days ago she started experiencing myalgias and malaise associated w dry cough and L sided pleuritic pain on back. She also had 1 episode of NBNB votmiting and 2 episodes of water non bloody diarrhea. She took her temperature which was 102F. She denies dyspnea, lower extremity edema, increased use of inhaler, increased sputum production. ROS positive for chills and sore throat, No recent travel or sick contacts.    ED Course:  Vitals: T: 99.6F (oral) | HR: 106 | BP: 196/80 | RR: 20 | SpO2: 98% on RA   Labs: WBC 7.07 with left shift, lymphopenic, Hb 12.0, Plt 321, Na 139, K 4.0, Cl 102, bicarb 25, AG 12, BUN/Cr 21/0.66, glucose 150, Ca 9.1, Alk phosph 120, AST/ALT 32/27, lactate 1.7, UA negative, RVP negative  EKG:   Imaging:  CXR with increased vasculature and LLL opacity  Interventions: Tylenol 1g Iv, Azithromycin 500mg IV, Ceftriaxone 1g IV, Toradol 15mg IV, 1L NS  Consults: none

## 2021-11-20 NOTE — H&P ADULT - PROBLEM SELECTOR PLAN 5
just takes eye drop medications for ophthalmopathy , brimonidine and dorzolamide-dimolol  - c/w home meds  - f/u TSH

## 2021-11-20 NOTE — H&P ADULT - NSHPPOASURGSITEINCISION_GEN_ALL_CORE
Impression: Dry eye syndrome of bilateral lacrimal glands: H04.123. Plan: Recommend artificial tears OU daily, especially while working at the computer.
Impression: Presbyopia: H52.4. Plan: New glasses rx given to patient today. Patient to call if there are any issues with new glasses rx. Patient to return annually for routine vision exams.
no

## 2021-11-20 NOTE — H&P ADULT - NSHPSOCIALHISTORY_GEN_ALL_CORE
Tobacco use: previous smoker w 60 pack year hx, quit 20 years ago  EtOH use: occasional   Illicit drug use: occasional marijuana use

## 2021-11-20 NOTE — ED PROVIDER NOTE - ATTENDING CONTRIBUTION TO CARE
60F hx htn, copd, breast ca, epilepsy, graves diseases, c/o fever, chills, bodyaches. +diarrhea, vomiting, abd cramping. pt vaccinated for covid.  no recent travel.   gen- nad  heent- ncat, clear conj  cv -rrr  lungs -crackles lower  abd - soft, nt, nd  ext -wwp, no edema  neuro -aox3, mccain  labs sent, cultures, given ivf, tylenol.  L sided infiltrate on CXR - given ceft/azithro. will speak with Dr. Isaacs

## 2021-11-20 NOTE — H&P ADULT - NSHPPHYSICALEXAM_GEN_ALL_CORE
T(C): 36.8 (11-20-21 @ 06:24), Max: 37.6 (11-20-21 @ 03:00)  HR: 68 (11-20-21 @ 10:05) (68 - 106)  BP: 137/67 (11-20-21 @ 10:05) (107/67 - 196/80)  RR: 17 (11-20-21 @ 10:05) (17 - 20)  SpO2: 98% (11-20-21 @ 10:05) (98% - 98%)    GEN - Obese F lying on side, appears uncomfortable   HEENT - moist mucous membranes, no scleral icterus or conjunctival pallor  RESP - faint basilar crackles, no increased work of breathing  CARDIO - NS1S2, RRR. No murmurs/rubs/gallops.  ABD - obese, Soft/Non tender/Non distended.  Ext - No INDU. no signs of venous/arterial stasis ulcers  Neuro - cn 2-12 grossly intact. cerebellar function intact. no visible seizure activity appreciated. no tremor. gait not observed.   Skin - clean, dry, intact. no rashes or lesions.    MSK - no joint swelling or erythema

## 2021-11-20 NOTE — H&P ADULT - PROBLEM SELECTOR PLAN 1
Presents with cough, pleuritic CP, and CXR findings consistent w pneumonia. Pt tachycardic but no fever, leukocytosis, hypoxia. Not septic. In setting of myalgias and diarrhea could be viral vs legionella. Less concern for COPD exacerbation given no increased O2 requirements, use of inhaler, sputum production, dyspnea. S/p CTX and azithromycin in ED. RVP and covid negative.  - C/w CTX 1g daily and azithromycin 500mg daily to treat for CAP  - f/u urine legionella  - f/u blood culture

## 2021-11-20 NOTE — ED PROVIDER NOTE - OBJECTIVE STATEMENT
59 yo HTN, COPD, Diverticulitis, Epilepsy, central graves disease 2/2 pituitary tumor, Breast CA s/p mastectomy presenting with chills, fever, body aches, sore throat, HA, abdominal cramps, diarrhea and vomiting x 2. Symptoms started yesterday. Pt states she feels much worse today. Pt mentioned she was admitted to [facility] for possible covid with similar symptoms in 03/2021. Covid test was negative, but she was very sick and was hospitalized for a week.

## 2021-11-20 NOTE — ED ADULT NURSE NOTE - OBJECTIVE STATEMENT
received A&OX3 61years old female pt with c/o of " I have fever, chills, diarrhea and vomiting." pt reports coughing. no blood in emesis, stool, or vomit. received A&OX3 61years old female pt with c/o of " I have fever, chills, diarrhea and vomiting." pt reports coughing. no blood in emesis, stool, or vomit. bilateral arm can't be used to IV access or blood pressure.

## 2021-11-20 NOTE — ED ADULT TRIAGE NOTE - CHIEF COMPLAINT QUOTE
I had a fever tonight (102.3) ; with body aches, with loose stools (2x) and vomited 2x; with cough, feeling weak and dry

## 2021-11-20 NOTE — H&P ADULT - PROBLEM SELECTOR PLAN 2
Patient on flovent and albuterol at home. No increased sputum production, increased shortness of breath, or increased inhaler use.  - c/w home meds  - duonebs Q6 hours prn for SOB or wheezing Patient on anoro ellipta and albuterol at home. No increased sputum production, increased shortness of breath, or increased inhaler use.  -have patient get home anoro ellipta  - duonebs Q6 hours prn for SOB or wheezing

## 2021-11-20 NOTE — ED ADULT NURSE NOTE - NSIMPLEMENTINTERV_GEN_ALL_ED
Implemented All Universal Safety Interventions:  [place] to call system. Call bell, personal items and telephone within reach. Instruct patient to call for assistance. Room bathroom lighting operational. Non-slip footwear when patient is off stretcher. Physically safe environment: no spills, clutter or unnecessary equipment. Stretcher in lowest position, wheels locked, appropriate side rails in place.

## 2021-11-20 NOTE — H&P ADULT - ASSESSMENT
60 y/o F PMH COPD, HTN, epilepsy (last seizure 2013), grave's dz, breast ca s/p mastectomy in 2000, and recent admission in march 2021 for COPD exacerbation presents w c/o 3 days of fever and myalgias and admitted for CAP.

## 2021-11-20 NOTE — ED PROVIDER NOTE - RESPIRATORY, MLM
no labored breathing, no wheezes, Breath sounds clear and equal bilaterally. no labored breathing, no wheezes, + lower lobes crackles

## 2021-11-20 NOTE — PROGRESS NOTE ADULT - SUBJECTIVE AND OBJECTIVE BOX
Interval Events: Reviewed  Patient seen and examined at bedside.    Patient is a 61y old  Female who presents with a chief complaint of Pneumonia (2021 10:05)  no acute event overnight, pt was in ED holding overnight. admits to bodyache, chills.         PAST MEDICAL & SURGICAL HISTORY:  Graves disease    HTN (hypertension)    COPD exacerbation    Epilepsy    Breast cancer    H/O bilateral mastectomy        MEDICATIONS:  Pulmonary:  albuterol/ipratropium for Nebulization 3 milliLiter(s) Nebulizer every 6 hours PRN    Antimicrobials:    Anticoagulants:  enoxaparin Injectable 40 milliGRAM(s) SubCutaneous every 12 hours    Cardiac:      Allergies    iodine (Anaphylaxis)  IV Contrast (Short breath)  latex (Anaphylaxis)  lisinopril (Short breath)  shellfish (Anaphylaxis)    Intolerances        Vital Signs Last 24 Hrs  T(C): 36.8 (2021 06:24), Max: 37.6 (2021 03:00)  T(F): 98.2 (2021 06:24), Max: 99.6 (2021 03:00)  HR: 68 (2021 10:05) (68 - 106)  BP: 137/67 (2021 10:05) (107/67 - 196/80)  BP(mean): --  RR: 17 (2021 10:05) (17 - 20)  SpO2: 98% (2021 10:05) (98% - 98%)        Review of Systems:   •	General: positive bodyache, chills  •	Skin/Breast: negative  •	Ophthalmologic: negative  •	ENMT: negative  •	Respiratory and Thorax: negative  •	Cardiovascular: negative  •	Gastrointestinal: negative  •	Genitourinary: negative  •	Musculoskeletal: negative  •	Neurological: negative  •	Psychiatric: negative  •	Hematology/Lymphatics: negative  •	Endocrine: negative  •	Allergic/Immunologic: negative    Physical Exam:   • Constitutional:	Well-developed, well nourished  • Eyes:	EOMI; PERRL; no drainage or redness  • ENMT:	No oral lesions; no gross abnormalities  • Neck	no thyromegaly or nodules  • Breasts:	not examined  • Back:	No deformity or limitation of movement  • Respiratory:	Breath Sounds equal & clear to auscultation, no accessory muscle use, faint basilar rales  • Cardiovascular:	Regular rate & rhythm, normal S1, S2; no murmurs, gallops or rubs; no S3, S4  • Gastrointestinal:	Soft, non-tender, no hepatosplenomegaly, normal bowel sounds  • Genitourinary:	not examined  • Rectal: not examined  • Extremities:	No cyanosis, clubbing or edema  • Vascular:	Equal and normal pulses (dorsalis pedis)  • Neurologica:l	not examined  • Skin:	No lesions; no rash  • Lymph Nodes:	No lymphadedenopathy  • Musculoskeletal:	No joint pain, swelling or deformity; no limitation of movement        LABS:      CBC Full  -  ( 2021 04:22 )  WBC Count : 7.07 K/uL  RBC Count : 4.26 M/uL  Hemoglobin : 12.0 g/dL  Hematocrit : 36.7 %  Platelet Count - Automated : 321 K/uL  Mean Cell Volume : 86.2 fl  Mean Cell Hemoglobin : 28.2 pg  Mean Cell Hemoglobin Concentration : 32.7 gm/dL  Auto Neutrophil # : 5.76 K/uL  Auto Lymphocyte # : 0.77 K/uL  Auto Monocyte # : 0.40 K/uL  Auto Eosinophil # : 0.10 K/uL  Auto Basophil # : 0.02 K/uL  Auto Neutrophil % : 81.4 %  Auto Lymphocyte % : 10.9 %  Auto Monocyte % : 5.7 %  Auto Eosinophil % : 1.4 %  Auto Basophil % : 0.3 %        139  |  102  |  21  ----------------------------<  150<H>  4.0   |  25  |  0.66    Ca    9.1      2021 04:22  Mg     1.9     11-20    TPro  7.5  /  Alb  4.1  /  TBili  0.2  /  DBili  x   /  AST  32  /  ALT  27  /  AlkPhos  120  11-20          Urinalysis Basic - ( 2021 04:22 )    Color: Yellow / Appearance: Clear / S.020 / pH: x  Gluc: x / Ketone: NEGATIVE  / Bili: Negative / Urobili: 0.2 E.U./dL   Blood: x / Protein: NEGATIVE mg/dL / Nitrite: NEGATIVE   Leuk Esterase: NEGATIVE / RBC: x / WBC x   Sq Epi: x / Non Sq Epi: x / Bacteria: x                  RADIOLOGY & ADDITIONAL STUDIES (The following images were personally reviewed):  Gunter:                                     No  Urine output:                       adequate  DVT prophylaxis:                 Yes  Flattus:                                  Yes  Bowel movement:              No

## 2021-11-20 NOTE — H&P ADULT - NSHPLABSRESULTS_GEN_ALL_CORE
.  LABS:                         12.0   7.07  )-----------( 321      ( 2021 04:22 )             36.7         139  |  102  |  21  ----------------------------<  150<H>  4.0   |  25  |  0.66    Ca    9.1      2021 04:22  Mg     1.9         TPro  7.5  /  Alb  4.1  /  TBili  0.2  /  DBili  x   /  AST  32  /  ALT  27  /  AlkPhos  120        Urinalysis Basic - ( 2021 04:22 )    Color: Yellow / Appearance: Clear / S.020 / pH: x  Gluc: x / Ketone: NEGATIVE  / Bili: Negative / Urobili: 0.2 E.U./dL   Blood: x / Protein: NEGATIVE mg/dL / Nitrite: NEGATIVE   Leuk Esterase: NEGATIVE / RBC: x / WBC x   Sq Epi: x / Non Sq Epi: x / Bacteria: x        Lactate, Blood: 1.7 mmol/L ( @ 04:34)      RADIOLOGY, EKG & ADDITIONAL TESTS: Reviewed.

## 2021-11-21 LAB — LEGIONELLA AG UR QL: NEGATIVE — SIGNIFICANT CHANGE UP

## 2021-11-21 PROCEDURE — 99232 SBSQ HOSP IP/OBS MODERATE 35: CPT

## 2021-11-21 RX ORDER — GABAPENTIN 400 MG/1
300 CAPSULE ORAL ONCE
Refills: 0 | Status: COMPLETED | OUTPATIENT
Start: 2021-11-21 | End: 2021-11-21

## 2021-11-21 RX ORDER — POLYETHYLENE GLYCOL 3350 17 G/17G
17 POWDER, FOR SOLUTION ORAL DAILY
Refills: 0 | Status: DISCONTINUED | OUTPATIENT
Start: 2021-11-21 | End: 2021-11-24

## 2021-11-21 RX ORDER — ONDANSETRON 8 MG/1
4 TABLET, FILM COATED ORAL ONCE
Refills: 0 | Status: COMPLETED | OUTPATIENT
Start: 2021-11-21 | End: 2021-11-21

## 2021-11-21 RX ADMIN — ATORVASTATIN CALCIUM 10 MILLIGRAM(S): 80 TABLET, FILM COATED ORAL at 22:38

## 2021-11-21 RX ADMIN — Medication 10 MILLIGRAM(S): at 08:00

## 2021-11-21 RX ADMIN — POLYETHYLENE GLYCOL 3350 17 GRAM(S): 17 POWDER, FOR SOLUTION ORAL at 22:37

## 2021-11-21 RX ADMIN — AMLODIPINE BESYLATE 10 MILLIGRAM(S): 2.5 TABLET ORAL at 21:07

## 2021-11-21 RX ADMIN — AZITHROMYCIN 255 MILLIGRAM(S): 500 TABLET, FILM COATED ORAL at 07:21

## 2021-11-21 RX ADMIN — ENOXAPARIN SODIUM 40 MILLIGRAM(S): 100 INJECTION SUBCUTANEOUS at 06:07

## 2021-11-21 RX ADMIN — BRIMONIDINE TARTRATE 1 DROP(S): 2 SOLUTION/ DROPS OPHTHALMIC at 06:06

## 2021-11-21 RX ADMIN — ENOXAPARIN SODIUM 40 MILLIGRAM(S): 100 INJECTION SUBCUTANEOUS at 18:41

## 2021-11-21 RX ADMIN — Medication 10 MILLIGRAM(S): at 14:30

## 2021-11-21 RX ADMIN — BRIMONIDINE TARTRATE 1 DROP(S): 2 SOLUTION/ DROPS OPHTHALMIC at 13:53

## 2021-11-21 RX ADMIN — DORZOLAMIDE HYDROCHLORIDE TIMOLOL MALEATE 1 DROP(S): 20; 5 SOLUTION/ DROPS OPHTHALMIC at 20:45

## 2021-11-21 RX ADMIN — Medication 175 MICROGRAM(S): at 06:06

## 2021-11-21 RX ADMIN — LATANOPROST 1 DROP(S): 0.05 SOLUTION/ DROPS OPHTHALMIC; TOPICAL at 22:37

## 2021-11-21 RX ADMIN — DORZOLAMIDE HYDROCHLORIDE TIMOLOL MALEATE 1 DROP(S): 20; 5 SOLUTION/ DROPS OPHTHALMIC at 07:50

## 2021-11-21 RX ADMIN — GABAPENTIN 300 MILLIGRAM(S): 400 CAPSULE ORAL at 06:07

## 2021-11-21 RX ADMIN — GABAPENTIN 300 MILLIGRAM(S): 400 CAPSULE ORAL at 22:38

## 2021-11-21 RX ADMIN — ONDANSETRON 4 MILLIGRAM(S): 8 TABLET, FILM COATED ORAL at 09:17

## 2021-11-21 RX ADMIN — Medication 10 MILLIGRAM(S): at 07:26

## 2021-11-21 RX ADMIN — CEFTRIAXONE 100 MILLIGRAM(S): 500 INJECTION, POWDER, FOR SOLUTION INTRAMUSCULAR; INTRAVENOUS at 06:04

## 2021-11-21 RX ADMIN — Medication 10 MILLIGRAM(S): at 13:54

## 2021-11-21 RX ADMIN — LOSARTAN POTASSIUM 100 MILLIGRAM(S): 100 TABLET, FILM COATED ORAL at 09:04

## 2021-11-21 RX ADMIN — BRIMONIDINE TARTRATE 1 DROP(S): 2 SOLUTION/ DROPS OPHTHALMIC at 22:38

## 2021-11-21 RX ADMIN — FAMOTIDINE 40 MILLIGRAM(S): 10 INJECTION INTRAVENOUS at 22:38

## 2021-11-21 RX ADMIN — ONDANSETRON 4 MILLIGRAM(S): 8 TABLET, FILM COATED ORAL at 18:41

## 2021-11-21 NOTE — PROGRESS NOTE ADULT - SUBJECTIVE AND OBJECTIVE BOX
Interval Events: Reviewed  Patient seen and examined at bedside.    Patient is a 61y old  Female who presents with a chief complaint of Pneumonia (2021 12:30)  no acute event overnight, RA96 %, decrease appetite      PAST MEDICAL & SURGICAL HISTORY:  Graves disease    HTN (hypertension)    COPD exacerbation    Epilepsy    Breast cancer    H/O bilateral mastectomy        MEDICATIONS:  Pulmonary:  albuterol/ipratropium for Nebulization 3 milliLiter(s) Nebulizer every 6 hours PRN    Antimicrobials:  azithromycin  IVPB 500 milliGRAM(s) IV Intermittent every 24 hours  cefTRIAXone   IVPB 1000 milliGRAM(s) IV Intermittent every 24 hours    Anticoagulants:  enoxaparin Injectable 40 milliGRAM(s) SubCutaneous every 12 hours    Cardiac:  amLODIPine   Tablet 10 milliGRAM(s) Oral every 24 hours  losartan 100 milliGRAM(s) Oral every 24 hours      Allergies    iodine (Anaphylaxis)  IV Contrast (Short breath)  latex (Anaphylaxis)  lisinopril (Short breath)  shellfish (Anaphylaxis)    Intolerances        Vital Signs Last 24 Hrs  T(C): 36.7 (2021 06:20), Max: 36.7 (2021 21:08)  T(F): 98.1 (2021 06:20), Max: 98.1 (2021 21:08)  HR: 68 (2021 06:20) (68 - 72)  BP: 152/75 (2021 06:20) (117/70 - 152/75)  BP(mean): --  RR: 18 (2021 06:20) (17 - 18)  SpO2: 96% (2021 06:20) (96% - 98%)        Review of Systems:   •	General: negative  •	Skin/Breast: negative  •	Ophthalmologic: negative  •	ENMT: negative  •	Respiratory and Thorax: negative  •	Cardiovascular: negative  •	Gastrointestinal: negative  •	Genitourinary: negative  •	Musculoskeletal: negative  •	Neurological: negative  •	Psychiatric: negative  •	Hematology/Lymphatics: negative  •	Endocrine: negative  •	Allergic/Immunologic: negative    Physical Exam:   • Constitutional:	Well-developed, well nourished  • Eyes:	EOMI; PERRL; no drainage or redness  • ENMT:	No oral lesions; no gross abnormalities  • Neck	no thyromegaly or nodules  • Breasts:	not examined  • Back:	No deformity or limitation of movement  • Respiratory:	Breath Sounds equal & clear to auscultation, no accessory muscle use  • Cardiovascular:	Regular rate & rhythm, normal S1, S2; no murmurs, gallops or rubs; no S3, S4  • Gastrointestinal:	Soft, non-tender, no hepatosplenomegaly, normal bowel sounds  • Genitourinary:	not examined  • Rectal: not examined  • Extremities:	No cyanosis, clubbing or edema  • Vascular:	Equal and normal pulses (dorsalis pedis)  • Neurologica:l	not examined  • Skin:	No lesions; no rash  • Lymph Nodes:	No lymphadedenopathy  • Musculoskeletal:	No joint pain, swelling or deformity; no limitation of movement        LABS:      CBC Full  -  ( 2021 04:22 )  WBC Count : 7.07 K/uL  RBC Count : 4.26 M/uL  Hemoglobin : 12.0 g/dL  Hematocrit : 36.7 %  Platelet Count - Automated : 321 K/uL  Mean Cell Volume : 86.2 fl  Mean Cell Hemoglobin : 28.2 pg  Mean Cell Hemoglobin Concentration : 32.7 gm/dL  Auto Neutrophil # : 5.76 K/uL  Auto Lymphocyte # : 0.77 K/uL  Auto Monocyte # : 0.40 K/uL  Auto Eosinophil # : 0.10 K/uL  Auto Basophil # : 0.02 K/uL  Auto Neutrophil % : 81.4 %  Auto Lymphocyte % : 10.9 %  Auto Monocyte % : 5.7 %  Auto Eosinophil % : 1.4 %  Auto Basophil % : 0.3 %        139  |  102  |  21  ----------------------------<  150<H>  4.0   |  25  |  0.66    Ca    9.1      2021 04:22  Mg     1.9         TPro  7.5  /  Alb  4.1  /  TBili  0.2  /  DBili  x   /  AST  32  /  ALT  27  /  AlkPhos  120            Urinalysis Basic - ( 2021 04:22 )    Color: Yellow / Appearance: Clear / S.020 / pH: x  Gluc: x / Ketone: NEGATIVE  / Bili: Negative / Urobili: 0.2 E.U./dL   Blood: x / Protein: NEGATIVE mg/dL / Nitrite: NEGATIVE   Leuk Esterase: NEGATIVE / RBC: x / WBC x   Sq Epi: x / Non Sq Epi: x / Bacteria: x              Culture Results:   No growth at 12 hours (11-20 @ 11:20)  Culture Results:   No growth at 12 hours (11-20 @ 11:20)      RADIOLOGY & ADDITIONAL STUDIES (The following images were personally reviewed):  Gunter:                                     No  Urine output:                       adequate  DVT prophylaxis:                 Yes  Flattus:                                  Yes  Bowel movement:              No

## 2021-11-22 DIAGNOSIS — R10.9 UNSPECIFIED ABDOMINAL PAIN: ICD-10-CM

## 2021-11-22 PROCEDURE — 74018 RADEX ABDOMEN 1 VIEW: CPT | Mod: 26

## 2021-11-22 PROCEDURE — 99233 SBSQ HOSP IP/OBS HIGH 50: CPT | Mod: GC

## 2021-11-22 RX ORDER — GABAPENTIN 400 MG/1
300 CAPSULE ORAL EVERY 12 HOURS
Refills: 0 | Status: DISCONTINUED | OUTPATIENT
Start: 2021-11-22 | End: 2021-11-24

## 2021-11-22 RX ADMIN — CEFTRIAXONE 100 MILLIGRAM(S): 500 INJECTION, POWDER, FOR SOLUTION INTRAMUSCULAR; INTRAVENOUS at 06:45

## 2021-11-22 RX ADMIN — Medication 10 MILLIGRAM(S): at 07:45

## 2021-11-22 RX ADMIN — Medication 10 MILLIGRAM(S): at 17:31

## 2021-11-22 RX ADMIN — FAMOTIDINE 40 MILLIGRAM(S): 10 INJECTION INTRAVENOUS at 22:06

## 2021-11-22 RX ADMIN — ATORVASTATIN CALCIUM 10 MILLIGRAM(S): 80 TABLET, FILM COATED ORAL at 22:06

## 2021-11-22 RX ADMIN — GABAPENTIN 300 MILLIGRAM(S): 400 CAPSULE ORAL at 19:10

## 2021-11-22 RX ADMIN — Medication 30 MILLILITER(S): at 19:10

## 2021-11-22 RX ADMIN — ENOXAPARIN SODIUM 40 MILLIGRAM(S): 100 INJECTION SUBCUTANEOUS at 06:45

## 2021-11-22 RX ADMIN — BRIMONIDINE TARTRATE 1 DROP(S): 2 SOLUTION/ DROPS OPHTHALMIC at 16:01

## 2021-11-22 RX ADMIN — POLYETHYLENE GLYCOL 3350 17 GRAM(S): 17 POWDER, FOR SOLUTION ORAL at 11:38

## 2021-11-22 RX ADMIN — DORZOLAMIDE HYDROCHLORIDE TIMOLOL MALEATE 1 DROP(S): 20; 5 SOLUTION/ DROPS OPHTHALMIC at 07:35

## 2021-11-22 RX ADMIN — GABAPENTIN 300 MILLIGRAM(S): 400 CAPSULE ORAL at 11:38

## 2021-11-22 RX ADMIN — Medication 10 MILLIGRAM(S): at 22:49

## 2021-11-22 RX ADMIN — LOSARTAN POTASSIUM 100 MILLIGRAM(S): 100 TABLET, FILM COATED ORAL at 09:09

## 2021-11-22 RX ADMIN — DORZOLAMIDE HYDROCHLORIDE TIMOLOL MALEATE 1 DROP(S): 20; 5 SOLUTION/ DROPS OPHTHALMIC at 22:06

## 2021-11-22 RX ADMIN — Medication 10 MILLIGRAM(S): at 23:59

## 2021-11-22 RX ADMIN — ENOXAPARIN SODIUM 40 MILLIGRAM(S): 100 INJECTION SUBCUTANEOUS at 19:10

## 2021-11-22 RX ADMIN — Medication 10 MILLIGRAM(S): at 07:12

## 2021-11-22 RX ADMIN — ONDANSETRON 4 MILLIGRAM(S): 8 TABLET, FILM COATED ORAL at 07:31

## 2021-11-22 RX ADMIN — LATANOPROST 1 DROP(S): 0.05 SOLUTION/ DROPS OPHTHALMIC; TOPICAL at 22:06

## 2021-11-22 RX ADMIN — Medication 175 MICROGRAM(S): at 06:45

## 2021-11-22 RX ADMIN — AMLODIPINE BESYLATE 10 MILLIGRAM(S): 2.5 TABLET ORAL at 19:23

## 2021-11-22 RX ADMIN — BRIMONIDINE TARTRATE 1 DROP(S): 2 SOLUTION/ DROPS OPHTHALMIC at 06:45

## 2021-11-22 RX ADMIN — BRIMONIDINE TARTRATE 1 DROP(S): 2 SOLUTION/ DROPS OPHTHALMIC at 22:06

## 2021-11-22 RX ADMIN — Medication 10 MILLIGRAM(S): at 16:46

## 2021-11-22 NOTE — PROGRESS NOTE ADULT - SUBJECTIVE AND OBJECTIVE BOX
Internal Medicine Progress Note  Gregory Mack, PGY-1  Pager: [phone]    ******INCOMPLETE******    Patient is a 61y old  Female who presents with a chief complaint of Pneumonia (21 Nov 2021 06:27)    OVERNIGHT EVENTS/INTERVAL HPI:    REVIEW OF SYSTEMS:  All other review of systems is negative unless indicated above.    OBJECTIVE:  T(C): 37.3 (11-22-21 @ 06:08), Max: 37.3 (11-22-21 @ 06:08)  HR: 79 (11-22-21 @ 06:08) (59 - 79)  BP: 131/74 (11-22-21 @ 06:08) (115/62 - 176/90)  RR: 18 (11-22-21 @ 06:08) (18 - 18)  SpO2: 97% (11-22-21 @ 06:08) (96% - 97%)  Daily     Daily     Physical Exam:  General: in no acute distress  Eyes: EOMI intact bilaterally. Anicteric sclerae, moist conjunctivae  HENT: Moist mucous membranes  Neck: Trachea midline, supple  Lungs: CTA B/L. No wheezes, rales, or ronchi  Cardiovascular: RRR. No murmurs, rubs, or gallops  Abdomen: Soft, non-tender non-distended; No rebound or guarding  Extremities: Normal range of motion, No clubbing, cyanosis or edema  MSK: No midline bony tenderness. No CVA tenderness bilaterally  Neurological: Alert and oriented x3  Skin: Warm and dry. No obvious rash     Medications:  MEDICATIONS  (STANDING):  amLODIPine   Tablet 10 milliGRAM(s) Oral every 24 hours  atorvastatin 10 milliGRAM(s) Oral at bedtime  brimonidine 0.2% Ophthalmic Solution 1 Drop(s) Both EYES three times a day  cefTRIAXone   IVPB 1000 milliGRAM(s) IV Intermittent every 24 hours  dorzolamide 2%/timolol 0.5% Ophthalmic Solution 1 Drop(s) Both EYES every 12 hours  enoxaparin Injectable 40 milliGRAM(s) SubCutaneous every 12 hours  famotidine    Tablet 40 milliGRAM(s) Oral at bedtime  latanoprost 0.005% Ophthalmic Solution 1 Drop(s) Both EYES at bedtime  levothyroxine 175 MICROGram(s) Oral every 24 hours  losartan 100 milliGRAM(s) Oral every 24 hours  polyethylene glycol 3350 17 Gram(s) Oral daily    MEDICATIONS  (PRN):  acetaminophen     Tablet .. 650 milliGRAM(s) Oral every 6 hours PRN Temp greater or equal to 38C (100.4F), Mild Pain (1 - 3)  albuterol/ipratropium for Nebulization 3 milliLiter(s) Nebulizer every 6 hours PRN Shortness of Breath and/or Wheezing  ketorolac 10 milliGRAM(s) Oral every 6 hours PRN Moderate Pain (4 - 6)  ondansetron    Tablet 4 milliGRAM(s) Oral three times a day PRN Nausea and/or Vomiting  sodium chloride 0.9% lock flush 10 milliLiter(s) IV Push every 1 hour PRN Pre/post blood products, medications, blood draw, and to maintain line patency      Labs:              SARS-CoV-2: Donal (20 Nov 2021 07:41)      Radiology: Reviewed Internal Medicine Progress Note  Gregory Mack, PGY-1  Pager: [phone]    ********INCOMPLETE NOTE**********    Patient is a 61y old  Female who presents with a chief complaint of Pneumonia (21 Nov 2021 06:27)    OVERNIGHT EVENTS/INTERVAL HPI: As per night team, no overnight events. Patient seen and examined at bedside. Pt states that she had one episode of NBNB vomiting overnight. Now with mild nausea, improved. Notes significant improvement in her cough and left pleuritic pain. Now complaining of left sided abdominal pain, with symptoms similar to history of reported diverticulitis in the past. Otherwise, no complaints at this time. Patient denies fevers, sweats, chills, SOB, dyspnea, chest pain, diarrhea, constipation. 12 pt ROS otherwise negative.     REVIEW OF SYSTEMS:  All other review of systems is negative unless indicated above.    OBJECTIVE:  T(C): 37.3 (11-22-21 @ 06:08), Max: 37.3 (11-22-21 @ 06:08)  HR: 79 (11-22-21 @ 06:08) (59 - 79)  BP: 131/74 (11-22-21 @ 06:08) (115/62 - 176/90)  RR: 18 (11-22-21 @ 06:08) (18 - 18)  SpO2: 97% (11-22-21 @ 06:08) (96% - 97%)  Daily     Daily     Physical Exam:  General: in no acute distress. obese. uncomfortable appearing.  Eyes: EOMI intact bilaterally. Anicteric sclerae, moist conjunctivae  HENT: Moist mucous membranes  Neck: Trachea midline, supple  Lungs: CTA B/L. No wheezes, rales, or ronchi  Cardiovascular: RRR. No murmurs, rubs, or gallops  Abdomen: Soft, non-distended; Tenderness to deep palpation of LUQ/LLQ. No rebound or guarding. +BS.  Extremities: Normal range of motion, No clubbing, cyanosis or edema  MSK: No midline bony tenderness. No CVA tenderness bilaterally  Neurological: Alert and oriented x3  Skin: Warm and dry. No obvious rash     Medications:  MEDICATIONS  (STANDING):  amLODIPine   Tablet 10 milliGRAM(s) Oral every 24 hours  atorvastatin 10 milliGRAM(s) Oral at bedtime  brimonidine 0.2% Ophthalmic Solution 1 Drop(s) Both EYES three times a day  cefTRIAXone   IVPB 1000 milliGRAM(s) IV Intermittent every 24 hours  dorzolamide 2%/timolol 0.5% Ophthalmic Solution 1 Drop(s) Both EYES every 12 hours  enoxaparin Injectable 40 milliGRAM(s) SubCutaneous every 12 hours  famotidine    Tablet 40 milliGRAM(s) Oral at bedtime  latanoprost 0.005% Ophthalmic Solution 1 Drop(s) Both EYES at bedtime  levothyroxine 175 MICROGram(s) Oral every 24 hours  losartan 100 milliGRAM(s) Oral every 24 hours  polyethylene glycol 3350 17 Gram(s) Oral daily    MEDICATIONS  (PRN):  acetaminophen     Tablet .. 650 milliGRAM(s) Oral every 6 hours PRN Temp greater or equal to 38C (100.4F), Mild Pain (1 - 3)  albuterol/ipratropium for Nebulization 3 milliLiter(s) Nebulizer every 6 hours PRN Shortness of Breath and/or Wheezing  ketorolac 10 milliGRAM(s) Oral every 6 hours PRN Moderate Pain (4 - 6)  ondansetron    Tablet 4 milliGRAM(s) Oral three times a day PRN Nausea and/or Vomiting  sodium chloride 0.9% lock flush 10 milliLiter(s) IV Push every 1 hour PRN Pre/post blood products, medications, blood draw, and to maintain line patency      Labs:      SARS-CoV-2: Donal (20 Nov 2021 07:41)      Radiology: Reviewed Internal Medicine Progress Note  Gregory Mack, PGY-1  Pager: [phone]    Patient is a 61y old  Female who presents with a chief complaint of Pneumonia (21 Nov 2021 06:27)    OVERNIGHT EVENTS/INTERVAL HPI: As per night team, no overnight events. Patient seen and examined at bedside. Pt states that she had one episode of NBNB vomiting overnight. Now with mild nausea, improved. Notes significant improvement in her cough and left pleuritic pain. Now complaining of left sided abdominal pain, with symptoms similar to history of reported diverticulitis in the past. Otherwise, no complaints at this time. Patient denies fevers, sweats, chills, SOB, dyspnea, chest pain, diarrhea, constipation. 12 pt ROS otherwise negative.     REVIEW OF SYSTEMS:  All other review of systems is negative unless indicated above.    OBJECTIVE:  T(C): 37.3 (11-22-21 @ 06:08), Max: 37.3 (11-22-21 @ 06:08)  HR: 79 (11-22-21 @ 06:08) (59 - 79)  BP: 131/74 (11-22-21 @ 06:08) (115/62 - 176/90)  RR: 18 (11-22-21 @ 06:08) (18 - 18)  SpO2: 97% (11-22-21 @ 06:08) (96% - 97%)  Daily     Daily     Physical Exam:  General: in no acute distress. obese. uncomfortable appearing.  Eyes: EOMI intact bilaterally. Anicteric sclerae, moist conjunctivae  HENT: Moist mucous membranes  Neck: Trachea midline, supple  Lungs: CTA B/L. No wheezes, rales, or ronchi  Cardiovascular: RRR. No murmurs, rubs, or gallops  Abdomen: Soft, non-distended; Tenderness to deep palpation of LUQ/LLQ. No rebound or guarding. +BS.  Extremities: Normal range of motion, No clubbing, cyanosis or edema  MSK: No midline bony tenderness. No CVA tenderness bilaterally  Neurological: Alert and oriented x3  Skin: Warm and dry. No obvious rash     Medications:  MEDICATIONS  (STANDING):  amLODIPine   Tablet 10 milliGRAM(s) Oral every 24 hours  atorvastatin 10 milliGRAM(s) Oral at bedtime  brimonidine 0.2% Ophthalmic Solution 1 Drop(s) Both EYES three times a day  cefTRIAXone   IVPB 1000 milliGRAM(s) IV Intermittent every 24 hours  dorzolamide 2%/timolol 0.5% Ophthalmic Solution 1 Drop(s) Both EYES every 12 hours  enoxaparin Injectable 40 milliGRAM(s) SubCutaneous every 12 hours  famotidine    Tablet 40 milliGRAM(s) Oral at bedtime  latanoprost 0.005% Ophthalmic Solution 1 Drop(s) Both EYES at bedtime  levothyroxine 175 MICROGram(s) Oral every 24 hours  losartan 100 milliGRAM(s) Oral every 24 hours  polyethylene glycol 3350 17 Gram(s) Oral daily    MEDICATIONS  (PRN):  acetaminophen     Tablet .. 650 milliGRAM(s) Oral every 6 hours PRN Temp greater or equal to 38C (100.4F), Mild Pain (1 - 3)  albuterol/ipratropium for Nebulization 3 milliLiter(s) Nebulizer every 6 hours PRN Shortness of Breath and/or Wheezing  ketorolac 10 milliGRAM(s) Oral every 6 hours PRN Moderate Pain (4 - 6)  ondansetron    Tablet 4 milliGRAM(s) Oral three times a day PRN Nausea and/or Vomiting  sodium chloride 0.9% lock flush 10 milliLiter(s) IV Push every 1 hour PRN Pre/post blood products, medications, blood draw, and to maintain line patency      Labs:      SARS-CoV-2: Donal (20 Nov 2021 07:41)      Radiology: Reviewed Internal Medicine Progress Note  Gregory Mack, PGY-1  Pager: [phone]    Patient is a 61y old  Female who presents with a chief complaint of Pneumonia (21 Nov 2021 06:27)    OVERNIGHT EVENTS/INTERVAL HPI: As per night team, no overnight events. Patient seen and examined at bedside. Pt states that she had one episode of NBNB vomiting overnight. Now with mild nausea, improved. Multiple episodes of nonbloody loose stool last night after miralax. Notes significant improvement in her cough and left pleuritic pain. Now complaining of left sided abdominal pain, with symptoms similar to history of reported diverticulitis in the past. Otherwise, no complaints at this time. Patient denies fevers, sweats, chills, SOB, dyspnea, chest pain, diarrhea, constipation. 12 pt ROS otherwise negative.     REVIEW OF SYSTEMS:  All other review of systems is negative unless indicated above.    OBJECTIVE:  T(C): 37.3 (11-22-21 @ 06:08), Max: 37.3 (11-22-21 @ 06:08)  HR: 79 (11-22-21 @ 06:08) (59 - 79)  BP: 131/74 (11-22-21 @ 06:08) (115/62 - 176/90)  RR: 18 (11-22-21 @ 06:08) (18 - 18)  SpO2: 97% (11-22-21 @ 06:08) (96% - 97%)  Daily     Daily     Physical Exam:  General: in no acute distress. obese. uncomfortable appearing.  Eyes: EOMI intact bilaterally. Anicteric sclerae, moist conjunctivae  HENT: Moist mucous membranes  Neck: Trachea midline, supple  Lungs: CTA B/L. No wheezes, rales, or ronchi  Cardiovascular: RRR. No murmurs, rubs, or gallops  Abdomen: Soft, non-distended; Tenderness to deep palpation of LUQ/LLQ. No rebound or guarding. +BS.  Extremities: Normal range of motion, No clubbing, cyanosis or edema  MSK: No midline bony tenderness. No CVA tenderness bilaterally  Neurological: Alert and oriented x3  Skin: Warm and dry. No obvious rash     Medications:  MEDICATIONS  (STANDING):  amLODIPine   Tablet 10 milliGRAM(s) Oral every 24 hours  atorvastatin 10 milliGRAM(s) Oral at bedtime  brimonidine 0.2% Ophthalmic Solution 1 Drop(s) Both EYES three times a day  cefTRIAXone   IVPB 1000 milliGRAM(s) IV Intermittent every 24 hours  dorzolamide 2%/timolol 0.5% Ophthalmic Solution 1 Drop(s) Both EYES every 12 hours  enoxaparin Injectable 40 milliGRAM(s) SubCutaneous every 12 hours  famotidine    Tablet 40 milliGRAM(s) Oral at bedtime  latanoprost 0.005% Ophthalmic Solution 1 Drop(s) Both EYES at bedtime  levothyroxine 175 MICROGram(s) Oral every 24 hours  losartan 100 milliGRAM(s) Oral every 24 hours  polyethylene glycol 3350 17 Gram(s) Oral daily    MEDICATIONS  (PRN):  acetaminophen     Tablet .. 650 milliGRAM(s) Oral every 6 hours PRN Temp greater or equal to 38C (100.4F), Mild Pain (1 - 3)  albuterol/ipratropium for Nebulization 3 milliLiter(s) Nebulizer every 6 hours PRN Shortness of Breath and/or Wheezing  ketorolac 10 milliGRAM(s) Oral every 6 hours PRN Moderate Pain (4 - 6)  ondansetron    Tablet 4 milliGRAM(s) Oral three times a day PRN Nausea and/or Vomiting  sodium chloride 0.9% lock flush 10 milliLiter(s) IV Push every 1 hour PRN Pre/post blood products, medications, blood draw, and to maintain line patency      Labs:      SARS-CoV-2: Donal (20 Nov 2021 07:41)      Radiology: Reviewed

## 2021-11-22 NOTE — PROGRESS NOTE ADULT - PROBLEM SELECTOR PLAN 5
just takes eye drop medications for ophthalmopathy , brimonidine and dorzolamide-dimolol  - c/w home meds  - f/u TSH just takes eye drop medications for ophthalmopathy, brimonidine and dorzolamide-dimolol. Pt refused TSH screening.  - c/w home meds Pt takes home amlodipine and losartan.  - c/w home norvasc 10mg daily  - c/w home losartan 100mg daily

## 2021-11-22 NOTE — PROGRESS NOTE ADULT - PROBLEM SELECTOR PLAN 2
Patient on anoro ellipta and albuterol at home. No increased sputum production, increased shortness of breath, or increased inhaler use.  -have patient get home anoro ellipta  - duonebs Q6 hours prn for SOB or wheezing Patient on anoro ellipta and albuterol at home. No increased sputum production, increased shortness of breath, or increased inhaler use.  - have patient get home anoro ellipta  - duonebs Q6 hours prn for SOB or wheezing

## 2021-11-22 NOTE — PROGRESS NOTE ADULT - PROBLEM SELECTOR PLAN 6
Same medications as above, c/w medications just takes eye drop medications for ophthalmopathy, brimonidine and dorzolamide-dimolol. Pt refused TSH screening.  - c/w home meds

## 2021-11-22 NOTE — PROGRESS NOTE ADULT - PROBLEM SELECTOR PLAN 7
Fluids: 1L NS  Electrolytes: Mg>2, K>4  Nutrition:  DASH  Prophylaxis: lovenox 40 Q12  Activity: AAT, OOBTC  GI: none  C: DNR/DNI  Dispo: Admit to F Fluids: PO as tolerated  Electrolytes: Mg>2, K>4  Nutrition:  DASH  Prophylaxis: lovenox 40mg q12h  Activity: AAT, OOBTC  C: DNR/DNI  Dispo: Admit to F Fluids: PO as tolerated  Electrolytes: Mg>2, K>4  Nutrition:  DASH  Prophylaxis: Lovenox 40mg q12h  Activity: AAT, OOBTC  C: DNR/DNI  Dispo: Admit to F Same medications as above, c/w medications

## 2021-11-22 NOTE — SBIRT NOTE ADULT - NSSBIRTDRGCOMPLAIN_GEN_A_CORE
Patient stated that family has expressed concerns about marijuana usage, but patient refuses to stop because she believes it is the best way for her to manage her pain/Yes

## 2021-11-22 NOTE — PROGRESS NOTE ADULT - PROBLEM SELECTOR PLAN 8
Fluids: PO as tolerated  Electrolytes: Mg>2, K>4  Nutrition:  DASH  Prophylaxis: Lovenox 40mg q12h  Activity: AAT, OOBTC  C: DNR/DNI  Dispo: Admit to F

## 2021-11-22 NOTE — PROGRESS NOTE ADULT - PROBLEM SELECTOR PLAN 1
Presents with cough, pleuritic CP, and CXR findings consistent w pneumonia. Pt tachycardic but no fever, leukocytosis, hypoxia. Not septic. In setting of myalgias and diarrhea could be viral vs legionella. Less concern for COPD exacerbation given no increased O2 requirements, use of inhaler, sputum production, dyspnea. S/p CTX and azithromycin in ED. RVP and covid negative.  - C/w CTX 1g daily and azithromycin 500mg daily to treat for CAP  - f/u urine legionella  - f/u blood culture Presents with cough, pleuritic CP, and CXR findings consistent w pneumonia. Pt tachycardic but no fever, leukocytosis, hypoxia. Not septic. In setting of myalgias and diarrhea could be viral vs legionella. Less concern for COPD exacerbation given no increased O2 requirements, use of inhaler, sputum production, dyspnea. S/p CTX and azithromycin in ED. RVP and covid negative. Urine legionella negative.  - C/w CTX 1g daily and azithromycin 500mg daily to treat for CAP  - f/u blood culture (NGTD)

## 2021-11-22 NOTE — PROGRESS NOTE ADULT - PROBLEM SELECTOR PLAN 3
BMI over 40 w HTN.  - DASH diet  - treat HTN per below Pt c/o LUQ/LLQ abdominal pain since today. Notable past medical hx of diverticulitis. Currently afebrile, no leukocytosis. AXR wet read showing signs of gas, no evidence of obstruction.  - maalox 30ml q6h PRN

## 2021-11-22 NOTE — PROGRESS NOTE ADULT - TIME BILLING
Patient seen and examined ; Respiratory status stable;  Has abdominal pain; History of diverticular disease;  Obtain abdominal pain

## 2021-11-22 NOTE — PROGRESS NOTE ADULT - PROBLEM SELECTOR PLAN 4
C/w home norvasc 10mg daily and losartan 100mg daily Pt takes home amlodipine and losartan.  - c/w home norvasc 10mg daily  - c/w home losartan 100mg daily BMI over 40 w HTN.  - DASH diet  - treat HTN per below

## 2021-11-23 ENCOUNTER — TRANSCRIPTION ENCOUNTER (OUTPATIENT)
Age: 61
End: 2021-11-23

## 2021-11-23 LAB
-  CANDIDA ALBICANS: SIGNIFICANT CHANGE UP
-  CANDIDA GLABRATA: SIGNIFICANT CHANGE UP
-  CANDIDA KRUSEI: SIGNIFICANT CHANGE UP
-  CANDIDA PARAPSILOSIS: SIGNIFICANT CHANGE UP
-  CANDIDA TROPICALIS: SIGNIFICANT CHANGE UP
-  COAGULASE NEGATIVE STAPHYLOCOCCUS: SIGNIFICANT CHANGE UP
-  K. PNEUMONIAE GROUP: SIGNIFICANT CHANGE UP
-  STREPTOCOCCUS SP. (NOT GRP A, B OR S PNEUMONIAE): SIGNIFICANT CHANGE UP
A BAUMANNII DNA SPEC QL NAA+PROBE: SIGNIFICANT CHANGE UP
E CLOAC COMP DNA BLD POS QL NAA+PROBE: SIGNIFICANT CHANGE UP
E COLI DNA BLD POS QL NAA+NON-PROBE: SIGNIFICANT CHANGE UP
ENTEROCOC DNA BLD POS QL NAA+NON-PROBE: SIGNIFICANT CHANGE UP
ENTEROCOC DNA BLD POS QL NAA+NON-PROBE: SIGNIFICANT CHANGE UP
GP B STREP DNA BLD POS QL NAA+NON-PROBE: SIGNIFICANT CHANGE UP
GRAM STN FLD: SIGNIFICANT CHANGE UP
HAEM INFLU DNA BLD POS QL NAA+NON-PROBE: SIGNIFICANT CHANGE UP
K OXYTOCA DNA BLD POS QL NAA+NON-PROBE: SIGNIFICANT CHANGE UP
L MONOCYTOG DNA BLD POS QL NAA+NON-PROBE: SIGNIFICANT CHANGE UP
METHOD TYPE: SIGNIFICANT CHANGE UP
MRSA SPEC QL CULT: SIGNIFICANT CHANGE UP
MSSA DNA SPEC QL NAA+PROBE: SIGNIFICANT CHANGE UP
N MEN ISLT CULT: SIGNIFICANT CHANGE UP
P AERUGINOSA DNA BLD POS NAA+NON-PROBE: SIGNIFICANT CHANGE UP
PROTEUS SP DNA BLD POS QL NAA+NON-PROBE: SIGNIFICANT CHANGE UP
S MARCESCENS DNA BLD POS NAA+NON-PROBE: SIGNIFICANT CHANGE UP
S PNEUM DNA BLD POS QL NAA+NON-PROBE: SIGNIFICANT CHANGE UP
S PYO DNA BLD POS QL NAA+NON-PROBE: SIGNIFICANT CHANGE UP

## 2021-11-23 PROCEDURE — 99233 SBSQ HOSP IP/OBS HIGH 50: CPT | Mod: GC

## 2021-11-23 RX ORDER — SENNA PLUS 8.6 MG/1
2 TABLET ORAL AT BEDTIME
Refills: 0 | Status: DISCONTINUED | OUTPATIENT
Start: 2021-11-23 | End: 2021-11-24

## 2021-11-23 RX ORDER — SENNA PLUS 8.6 MG/1
2 TABLET ORAL ONCE
Refills: 0 | Status: COMPLETED | OUTPATIENT
Start: 2021-11-23 | End: 2021-11-23

## 2021-11-23 RX ORDER — SENNA PLUS 8.6 MG/1
2 TABLET ORAL
Qty: 0 | Refills: 0 | DISCHARGE
Start: 2021-11-23

## 2021-11-23 RX ORDER — POLYETHYLENE GLYCOL 3350 17 G/17G
17 POWDER, FOR SOLUTION ORAL
Qty: 0 | Refills: 0 | DISCHARGE
Start: 2021-11-23

## 2021-11-23 RX ORDER — POLYETHYLENE GLYCOL 3350 17 G/17G
17 POWDER, FOR SOLUTION ORAL ONCE
Refills: 0 | Status: COMPLETED | OUTPATIENT
Start: 2021-11-23 | End: 2021-11-23

## 2021-11-23 RX ADMIN — POLYETHYLENE GLYCOL 3350 17 GRAM(S): 17 POWDER, FOR SOLUTION ORAL at 10:57

## 2021-11-23 RX ADMIN — GABAPENTIN 300 MILLIGRAM(S): 400 CAPSULE ORAL at 17:12

## 2021-11-23 RX ADMIN — ENOXAPARIN SODIUM 40 MILLIGRAM(S): 100 INJECTION SUBCUTANEOUS at 06:30

## 2021-11-23 RX ADMIN — GABAPENTIN 300 MILLIGRAM(S): 400 CAPSULE ORAL at 06:31

## 2021-11-23 RX ADMIN — POLYETHYLENE GLYCOL 3350 17 GRAM(S): 17 POWDER, FOR SOLUTION ORAL at 17:12

## 2021-11-23 RX ADMIN — Medication 10 MILLIGRAM(S): at 06:38

## 2021-11-23 RX ADMIN — BRIMONIDINE TARTRATE 1 DROP(S): 2 SOLUTION/ DROPS OPHTHALMIC at 21:25

## 2021-11-23 RX ADMIN — DORZOLAMIDE HYDROCHLORIDE TIMOLOL MALEATE 1 DROP(S): 20; 5 SOLUTION/ DROPS OPHTHALMIC at 21:26

## 2021-11-23 RX ADMIN — Medication 10 MILLIGRAM(S): at 21:25

## 2021-11-23 RX ADMIN — DORZOLAMIDE HYDROCHLORIDE TIMOLOL MALEATE 1 DROP(S): 20; 5 SOLUTION/ DROPS OPHTHALMIC at 09:04

## 2021-11-23 RX ADMIN — ENOXAPARIN SODIUM 40 MILLIGRAM(S): 100 INJECTION SUBCUTANEOUS at 17:12

## 2021-11-23 RX ADMIN — AMLODIPINE BESYLATE 10 MILLIGRAM(S): 2.5 TABLET ORAL at 19:01

## 2021-11-23 RX ADMIN — Medication 10 MILLIGRAM(S): at 07:38

## 2021-11-23 RX ADMIN — Medication 10 MILLIGRAM(S): at 22:25

## 2021-11-23 RX ADMIN — SENNA PLUS 2 TABLET(S): 8.6 TABLET ORAL at 21:25

## 2021-11-23 RX ADMIN — FAMOTIDINE 40 MILLIGRAM(S): 10 INJECTION INTRAVENOUS at 21:25

## 2021-11-23 RX ADMIN — Medication 1 DROP(S): at 13:30

## 2021-11-23 RX ADMIN — Medication 10 MILLIGRAM(S): at 14:42

## 2021-11-23 RX ADMIN — ONDANSETRON 4 MILLIGRAM(S): 8 TABLET, FILM COATED ORAL at 06:38

## 2021-11-23 RX ADMIN — SENNA PLUS 2 TABLET(S): 8.6 TABLET ORAL at 10:57

## 2021-11-23 RX ADMIN — ATORVASTATIN CALCIUM 10 MILLIGRAM(S): 80 TABLET, FILM COATED ORAL at 21:25

## 2021-11-23 RX ADMIN — LATANOPROST 1 DROP(S): 0.05 SOLUTION/ DROPS OPHTHALMIC; TOPICAL at 21:25

## 2021-11-23 RX ADMIN — BRIMONIDINE TARTRATE 1 DROP(S): 2 SOLUTION/ DROPS OPHTHALMIC at 06:32

## 2021-11-23 RX ADMIN — CEFTRIAXONE 100 MILLIGRAM(S): 500 INJECTION, POWDER, FOR SOLUTION INTRAMUSCULAR; INTRAVENOUS at 06:30

## 2021-11-23 RX ADMIN — BRIMONIDINE TARTRATE 1 DROP(S): 2 SOLUTION/ DROPS OPHTHALMIC at 13:29

## 2021-11-23 RX ADMIN — Medication 175 MICROGRAM(S): at 06:31

## 2021-11-23 RX ADMIN — LOSARTAN POTASSIUM 100 MILLIGRAM(S): 100 TABLET, FILM COATED ORAL at 09:04

## 2021-11-23 RX ADMIN — Medication 10 MILLIGRAM(S): at 13:32

## 2021-11-23 NOTE — PROGRESS NOTE ADULT - PROBLEM SELECTOR PROBLEM 2
COPD without exacerbation

## 2021-11-23 NOTE — PROGRESS NOTE ADULT - PROBLEM SELECTOR PLAN 6
just takes eye drop medications for ophthalmopathy, brimonidine and dorzolamide-dimolol. Pt refused TSH screening.  - c/w home meds Just takes eye drop medications for ophthalmopathy, brimonidine and dorzolamide-dimolol. Pt refused TSH screening.  - c/w home meds

## 2021-11-23 NOTE — PROGRESS NOTE ADULT - SUBJECTIVE AND OBJECTIVE BOX
Internal Medicine Progress Note  Gregory Mack, PGY-1  Pager: [phone]    ******INCOMPLETE******    Patient is a 61y old  Female who presents with a chief complaint of Pneumonia (22 Nov 2021 07:49)    OVERNIGHT EVENTS/INTERVAL HPI: As per night team, no overnight events. Patient seen and examined at bedside. ________________. Otherwise, no complaints at this time. Patient denies fevers, sweats, chills, SOB, dyspnea, chest pain, nausea/vomiting, diarrhea, constipation, abdominal pain. 12 pt ROS otherwise negative.     REVIEW OF SYSTEMS:  All other review of systems is negative unless indicated above.    OBJECTIVE:  T(C): 36.6 (11-23-21 @ 06:14), Max: 37.2 (11-22-21 @ 19:15)  HR: 66 (11-23-21 @ 06:14) (64 - 75)  BP: 134/76 (11-23-21 @ 06:14) (128/69 - 149/63)  RR: 18 (11-23-21 @ 06:14) (16 - 18)  SpO2: 94% (11-23-21 @ 06:14) (94% - 96%)  Daily     Daily     Physical Exam:  General: in no acute distress  Eyes: EOMI intact bilaterally. Anicteric sclerae, moist conjunctivae  HENT: Moist mucous membranes  Neck: Trachea midline, supple  Lungs: CTA B/L. No wheezes, rales, or ronchi  Cardiovascular: RRR. No murmurs, rubs, or gallops  Abdomen: Soft, non-tender non-distended; No rebound or guarding  Extremities: Normal range of motion, No clubbing, cyanosis or edema  MSK: No midline bony tenderness. No CVA tenderness bilaterally  Neurological: Alert and oriented x3  Skin: Warm and dry. No obvious rash     Medications:  MEDICATIONS  (STANDING):  amLODIPine   Tablet 10 milliGRAM(s) Oral every 24 hours  atorvastatin 10 milliGRAM(s) Oral at bedtime  brimonidine 0.2% Ophthalmic Solution 1 Drop(s) Both EYES three times a day  cefTRIAXone   IVPB 1000 milliGRAM(s) IV Intermittent every 24 hours  dorzolamide 2%/timolol 0.5% Ophthalmic Solution 1 Drop(s) Both EYES every 12 hours  enoxaparin Injectable 40 milliGRAM(s) SubCutaneous every 12 hours  famotidine    Tablet 40 milliGRAM(s) Oral at bedtime  gabapentin 300 milliGRAM(s) Oral every 12 hours  latanoprost 0.005% Ophthalmic Solution 1 Drop(s) Both EYES at bedtime  levothyroxine 175 MICROGram(s) Oral every 24 hours  losartan 100 milliGRAM(s) Oral every 24 hours  polyethylene glycol 3350 17 Gram(s) Oral daily    MEDICATIONS  (PRN):  acetaminophen     Tablet .. 650 milliGRAM(s) Oral every 6 hours PRN Temp greater or equal to 38C (100.4F), Mild Pain (1 - 3)  albuterol/ipratropium for Nebulization 3 milliLiter(s) Nebulizer every 6 hours PRN Shortness of Breath and/or Wheezing  aluminum hydroxide/magnesium hydroxide/simethicone Suspension 30 milliLiter(s) Oral every 6 hours PRN Dyspepsia  ketorolac 10 milliGRAM(s) Oral every 6 hours PRN Moderate Pain (4 - 6)  ondansetron    Tablet 4 milliGRAM(s) Oral three times a day PRN Nausea and/or Vomiting  sodium chloride 0.9% lock flush 10 milliLiter(s) IV Push every 1 hour PRN Pre/post blood products, medications, blood draw, and to maintain line patency      Labs:              SARS-CoV-2: Donal (20 Nov 2021 07:41)      Radiology: Reviewed Internal Medicine Progress Note  Gregory Mack, PGY-1  Pager: [phone]    Patient is a 61y old  Female who presents with a chief complaint of Pneumonia (22 Nov 2021 07:49)    OVERNIGHT EVENTS/INTERVAL HPI: As per night team, no overnight events. Patient seen and examined at bedside. States that her cough and shortness of breath feel continually improved. Notes some bloating and crampy left sided abdominal pain, unchanged from yesterday. Has been passing flatus, but has not had a bowel movement since yesterday. Otherwise, no complaints at this time. Patient denies fevers, sweats, chills, SOB, dyspnea, chest pain, nausea/vomiting, diarrhea.    REVIEW OF SYSTEMS:  All other review of systems is negative unless indicated above.    OBJECTIVE:  T(C): 36.6 (11-23-21 @ 06:14), Max: 37.2 (11-22-21 @ 19:15)  HR: 66 (11-23-21 @ 06:14) (64 - 75)  BP: 134/76 (11-23-21 @ 06:14) (128/69 - 149/63)  RR: 18 (11-23-21 @ 06:14) (16 - 18)  SpO2: 94% (11-23-21 @ 06:14) (94% - 96%)  Daily     Physical Exam:  General: in no acute distress. obese. uncomfortable appearing.  Eyes: EOMI intact bilaterally. Anicteric sclerae, moist conjunctivae  HENT: Moist mucous membranes  Neck: Trachea midline, supple  Lungs: Mildly decreased breath sounds at B/L bases. No wheezes, rales, or ronchi.  Cardiovascular: RRR. No murmurs, rubs, or gallops  Abdomen: Soft, non-distended; Tenderness to deep palpation of LUQ/LLQ. No rebound or guarding. +BS.  Extremities: Normal range of motion, No clubbing, cyanosis or edema  MSK: No midline bony tenderness. No CVA tenderness bilaterally. Mild left sided tenderness to palpation of left posterior thoracic region.  Neurological: Alert and oriented x3  Skin: Warm and dry. No obvious rash    Medications:  MEDICATIONS  (STANDING):  amLODIPine   Tablet 10 milliGRAM(s) Oral every 24 hours  atorvastatin 10 milliGRAM(s) Oral at bedtime  brimonidine 0.2% Ophthalmic Solution 1 Drop(s) Both EYES three times a day  cefTRIAXone   IVPB 1000 milliGRAM(s) IV Intermittent every 24 hours  dorzolamide 2%/timolol 0.5% Ophthalmic Solution 1 Drop(s) Both EYES every 12 hours  enoxaparin Injectable 40 milliGRAM(s) SubCutaneous every 12 hours  famotidine    Tablet 40 milliGRAM(s) Oral at bedtime  gabapentin 300 milliGRAM(s) Oral every 12 hours  latanoprost 0.005% Ophthalmic Solution 1 Drop(s) Both EYES at bedtime  levothyroxine 175 MICROGram(s) Oral every 24 hours  losartan 100 milliGRAM(s) Oral every 24 hours  polyethylene glycol 3350 17 Gram(s) Oral daily    MEDICATIONS  (PRN):  acetaminophen     Tablet .. 650 milliGRAM(s) Oral every 6 hours PRN Temp greater or equal to 38C (100.4F), Mild Pain (1 - 3)  albuterol/ipratropium for Nebulization 3 milliLiter(s) Nebulizer every 6 hours PRN Shortness of Breath and/or Wheezing  aluminum hydroxide/magnesium hydroxide/simethicone Suspension 30 milliLiter(s) Oral every 6 hours PRN Dyspepsia  ketorolac 10 milliGRAM(s) Oral every 6 hours PRN Moderate Pain (4 - 6)  ondansetron    Tablet 4 milliGRAM(s) Oral three times a day PRN Nausea and/or Vomiting  sodium chloride 0.9% lock flush 10 milliLiter(s) IV Push every 1 hour PRN Pre/post blood products, medications, blood draw, and to maintain line patency      Labs:              SARS-CoV-2: Donal (20 Nov 2021 07:41)      Radiology: Reviewed

## 2021-11-23 NOTE — PROGRESS NOTE ADULT - PROBLEM SELECTOR PLAN 8
Fluids: PO as tolerated  Electrolytes: Mg>2, K>4  Nutrition:  DASH  Prophylaxis: Lovenox 40mg q12h  Activity: AAT, OOBTC  C: DNR/DNI  Dispo: Admit to F Fluids: PO as tolerated  Electrolytes: Mg>2, K>4  Nutrition: DASH  Prophylaxis: Lovenox 40mg q12h  Activity: AAT, OOBTC  C: DNR/DNI  Dispo: Admit to F

## 2021-11-23 NOTE — DISCHARGE NOTE PROVIDER - NSDCFUSCHEDAPPT_GEN_ALL_CORE_FT
ZAC VELEZ STUFF ; 12/07/2021 ; NPP Orthosurg [address]  [place], [place] STUFF ; 01/04/2022 ; [facility]NET[facility] [address]  [facility] ; [facility]

## 2021-11-23 NOTE — PROGRESS NOTE ADULT - ASSESSMENT
62 y/o F PMH COPD, HTN, epilepsy (last seizure 2013), grave's dz, breast ca s/p mastectomy in 2000, and recent admission in march 2021 for COPD exacerbation presents w c/o 3 days of fever and myalgias and admitted for CAP.
62 y/o F PMH COPD, HTN, epilepsy (last seizure 2013), grave's dz, breast ca s/p mastectomy in 2000, and recent admission in march 2021 for COPD exacerbation presents w c/o 3 days of fever and myalgias and admitted for CAP.
60 y/o F PMH COPD, HTN, epilepsy (last seizure 2013), grave's dz, breast ca s/p mastectomy in 2000, and recent admission in march 2021 for COPD exacerbation presents w c/o 3 days of fever and myalgias and admitted for CAP.
60 y/o F PMH COPD, HTN, epilepsy (last seizure 2013), grave's dz, breast ca s/p mastectomy in 2000, and recent admission in march 2021 for COPD exacerbation presents w c/o 3 days of fever and myalgias and admitted for CAP.

## 2021-11-23 NOTE — PROGRESS NOTE ADULT - PROBLEM SELECTOR PLAN 1
Presents with cough, pleuritic CP, and CXR findings consistent w pneumonia. Pt tachycardic but no fever, leukocytosis, hypoxia. Not septic. In setting of myalgias and diarrhea could be viral vs legionella. Less concern for COPD exacerbation given no increased O2 requirements, use of inhaler, sputum production, dyspnea. S/p CTX and azithromycin in ED. RVP and covid negative. Urine legionella negative.  - C/w CTX 1g daily and azithromycin 500mg daily to treat for CAP  - f/u blood culture (NGTD) Presents with cough, pleuritic CP, and CXR findings consistent w pneumonia. Pt tachycardic but no fever, leukocytosis, hypoxia. Not septic. In setting of myalgias and diarrhea could be viral vs legionella. Less concern for COPD exacerbation given no increased O2 requirements, use of inhaler, sputum production, dyspnea. S/p CTX and azithromycin in ED. RVP and covid negative. S/p azithromycin 500mg x2 doses, discontinued as urine legionella negative.  - c/w CTX 1g daily to treat for CAP (11/20-11/24)  - f/u blood culture (NGTD)

## 2021-11-23 NOTE — DISCHARGE NOTE PROVIDER - CARE PROVIDER_API CALL
Bety Isaacs)  Critical Care Medicine; Pulmonary Disease  [address] [place] [address]  Phone: [phone]  Fax: [phone]  Follow Up Time: 1 week

## 2021-11-23 NOTE — DISCHARGE NOTE PROVIDER - NSDCFUADDAPPT_GEN_ALL_CORE_FT
Please follow up with Dr. Isaacs within 2-3 weeks of discharge.    Please follow up with your Primary Care Provider at your scheduled appointment on 1/04/2022 at 10:00 AM.

## 2021-11-23 NOTE — PROGRESS NOTE ADULT - PROBLEM SELECTOR PLAN 3
Pt c/o LUQ/LLQ abdominal pain since today. Notable past medical hx of diverticulitis. Currently afebrile, no leukocytosis. AXR wet read showing signs of gas, no evidence of obstruction.  - maalox 30ml q6h PRN Pt c/o LUQ/LLQ abdominal pain since today. Notable past medical hx of diverticulitis. Currently afebrile, no leukocytosis. AXR wet read showing signs of gas, no evidence of obstruction.  - maalox 30ml q6h PRN  - increased bowel regimen (senna, miralax)

## 2021-11-23 NOTE — DISCHARGE NOTE NURSING/CASE MANAGEMENT/SOCIAL WORK - PATIENT PORTAL LINK FT
You can access the FollowMyHealth Patient Portal offered by [facility] by registering at the following website: [email or web address]. By joining [facility]’s FollowMyHealth portal, you will also be able to view your health information using other applications (apps) compatible with our system.

## 2021-11-23 NOTE — PROGRESS NOTE ADULT - PROBLEM SELECTOR PROBLEM 1
Community acquired pneumonia

## 2021-11-23 NOTE — PROGRESS NOTE ADULT - PROBLEM SELECTOR PLAN 2
Patient on anoro ellipta and albuterol at home. No increased sputum production, increased shortness of breath, or increased inhaler use.  - have patient get home anoro ellipta  - duonebs Q6 hours prn for SOB or wheezing

## 2021-11-23 NOTE — DISCHARGE NOTE PROVIDER - NSFOLLOWUPCLINICS_GEN_ALL_ED_FT
[facility]  Family Medicine  [address]  [place], [place] [address]  Phone: [phone]  Fax:

## 2021-11-23 NOTE — DISCHARGE NOTE PROVIDER - NSDCCAREPROVSEEN_GEN_ALL_CORE_FT
Leon, Jennifer Isaacs, Bety CHOUDHURY Jennifer Quintero Bushra A  The patient was discharged today with seeing her.  I will follow-up as an outpatient.

## 2021-11-23 NOTE — PROGRESS NOTE ADULT - PROBLEM SELECTOR PLAN 5
Pt takes home amlodipine and losartan.  - c/w home norvasc 10mg daily  - c/w home losartan 100mg daily

## 2021-11-23 NOTE — DISCHARGE NOTE PROVIDER - NSDCMRMEDTOKEN_GEN_ALL_CORE_FT
albuterol 90 mcg/inh inhalation powder: 2 puff(s) inhaled every 6 hours, As Needed  amLODIPine 10 mg oral tablet: 1 tab(s) orally once a day  Anoro Ellipta 62.5 mcg-25 mcg/inh inhalation powder: 1 puff(s) inhaled once a day  atorvastatin 10 mg oral tablet: 1 tab(s) orally once a day  brimonidine 0.2% ophthalmic solution: 1 drop(s) to each affected eye 3 times a day  dorzolamide-timolol 2.23%-0.68% ophthalmic solution: 1 drop(s) to each affected eye 2 times a day  famotidine 40 mg oral tablet: 1 tab(s) orally once a day (at bedtime)  gabapentin 300 mg oral capsule: 1 cap(s) orally 2 times a day  latanoprost 0.005% ophthalmic solution: 1 drop(s) to each affected eye once a day (in the evening)  levothyroxine 175 mcg (0.175 mg) oral tablet: 1 tab(s) orally once a day  losartan 100 mg oral tablet: 1 tab(s) orally once a day  meloxicam 15 mg oral tablet: 1 tab(s) orally once a day  methocarbamol 750 mg oral tablet: 2 tab(s) orally once a day (at bedtime)  valACYclovir 1 g oral tablet: 1 tab(s) orally 2 times a day, As Needed for outbreak   albuterol 90 mcg/inh inhalation powder: 2 puff(s) inhaled every 6 hours, As Needed  amLODIPine 10 mg oral tablet: 1 tab(s) orally once a day  Anoro Ellipta 62.5 mcg-25 mcg/inh inhalation powder: 1 puff(s) inhaled once a day  atorvastatin 10 mg oral tablet: 1 tab(s) orally once a day  brimonidine 0.2% ophthalmic solution: 1 drop(s) to each affected eye 3 times a day  dorzolamide-timolol 2.23%-0.68% ophthalmic solution: 1 drop(s) to each affected eye 2 times a day  famotidine 40 mg oral tablet: 1 tab(s) orally once a day (at bedtime)  gabapentin 300 mg oral capsule: 1 cap(s) orally 2 times a day  latanoprost 0.005% ophthalmic solution: 1 drop(s) to each affected eye once a day (in the evening)  levothyroxine 175 mcg (0.175 mg) oral tablet: 1 tab(s) orally once a day  losartan 100 mg oral tablet: 1 tab(s) orally once a day  meloxicam 15 mg oral tablet: 1 tab(s) orally once a day  methocarbamol 750 mg oral tablet: 2 tab(s) orally once a day (at bedtime)  polyethylene glycol 3350 oral powder for reconstitution: 17 gram(s) orally once a day  senna oral tablet: 2 tab(s) orally once a day (at bedtime)  valACYclovir 1 g oral tablet: 1 tab(s) orally 2 times a day, As Needed for outbreak   albuterol 90 mcg/inh inhalation powder: 2 puff(s) inhaled every 6 hours, As Needed  amLODIPine 10 mg oral tablet: 1 tab(s) orally once a day  Anoro Ellipta 62.5 mcg-25 mcg/inh inhalation powder: 1 puff(s) inhaled once a day  atorvastatin 10 mg oral tablet: 1 tab(s) orally once a day  brimonidine 0.2% ophthalmic solution: 1 drop(s) to each affected eye 3 times a day  cefpodoxime 200 mg oral tablet: 1 tab(s) orally every 12 hours   dorzolamide-timolol 2.23%-0.68% ophthalmic solution: 1 drop(s) to each affected eye 2 times a day  famotidine 40 mg oral tablet: 1 tab(s) orally once a day (at bedtime)  gabapentin 300 mg oral capsule: 1 cap(s) orally 2 times a day  latanoprost 0.005% ophthalmic solution: 1 drop(s) to each affected eye once a day (in the evening)  levothyroxine 175 mcg (0.175 mg) oral tablet: 1 tab(s) orally once a day  losartan 100 mg oral tablet: 1 tab(s) orally once a day  meloxicam 15 mg oral tablet: 1 tab(s) orally once a day  methocarbamol 750 mg oral tablet: 2 tab(s) orally once a day (at bedtime)  polyethylene glycol 3350 oral powder for reconstitution: 17 gram(s) orally once a day  senna oral tablet: 2 tab(s) orally once a day (at bedtime)  valACYclovir 1 g oral tablet: 1 tab(s) orally 2 times a day, As Needed for outbreak   albuterol 90 mcg/inh inhalation powder: 2 puff(s) inhaled every 6 hours, As Needed  amLODIPine 10 mg oral tablet: 1 tab(s) orally once a day  Anoro Ellipta 62.5 mcg-25 mcg/inh inhalation powder: 1 puff(s) inhaled once a day  atorvastatin 10 mg oral tablet: 1 tab(s) orally once a day  brimonidine 0.2% ophthalmic solution: 1 drop(s) to each affected eye 3 times a day  cefpodoxime 200 mg oral tablet: 1 tab(s) orally every 12 hours   dorzolamide-timolol 2.23%-0.68% ophthalmic solution: 1 drop(s) to each affected eye 2 times a day  famotidine 40 mg oral tablet: 1 tab(s) orally once a day (at bedtime)  gabapentin 300 mg oral capsule: 1 cap(s) orally 2 times a day  latanoprost 0.005% ophthalmic solution: 1 drop(s) to each affected eye once a day (in the evening)  levothyroxine 175 mcg (0.175 mg) oral tablet: 1 tab(s) orally once a day  losartan 100 mg oral tablet: 1 tab(s) orally once a day  meloxicam 15 mg oral tablet: 1 tab(s) orally once a day  methocarbamol 750 mg oral tablet: 2 tab(s) orally once a day (at bedtime)  metroNIDAZOLE 500 mg oral tablet: 1 tab(s) orally 3 times a day   polyethylene glycol 3350 oral powder for reconstitution: 17 gram(s) orally once a day  senna oral tablet: 2 tab(s) orally once a day (at bedtime)  valACYclovir 1 g oral tablet: 1 tab(s) orally 2 times a day, As Needed for outbreak

## 2021-11-23 NOTE — DISCHARGE NOTE PROVIDER - NSDCCPCAREPLAN_GEN_ALL_CORE_FT
PRINCIPAL DISCHARGE DIAGNOSIS  Diagnosis: Pneumonia  Assessment and Plan of Treatment: Community acquired pneumonia (CAP) is a lung infection that you get outside of a hospital or nursing home setting. Your lungs become inflamed and cannot work well. CAP may be caused by bacteria, viruses, or fungi. You had a chest x-ray done during your hospitalization which showed signs that were consistent with pneumonia. You were started on antibiotics called ceftriaxone and azithromycin. Your pneumonia improved on these antibiotics, and you will be sent home with an additional two days of oral antibiotics to finish your treatment. Please seek medical attention if you experience worsening symptoms including uncontrollable fevers, chills, trouble breathing, shortness of breath, confusion, or if your lips or fingernails turn gray or blue.  PLEASE TAKE YOUR PRESCRIBED ANTIBIOTICS STARTING TOMORROW, FOR THE NEXT 2 DAYS. PLEASE ALSO FOLLOW UP WITH YOUR PRIMARY CARE PHYSICIAN AND PULMONOLOGIST, DR. HERNANDEZ.      SECONDARY DISCHARGE DIAGNOSES  Diagnosis: Abdominal pain  Assessment and Plan of Treatment: During your hospitalization, you had experienced left sided abdominal pain with constipation. An x-ray of your abdomen was done, which showed signs of increased gas. You were placed on a bowel regimen to help with your bowel movements. You may use over the counter stool softeners, such as Miralax, senna, colace, or Metamucil to help with your bowel movements and to prevent constipation. Please seek medical attention if you have worsening signs including sudden fevers, chills, uncontrollable worsening abdominal pain, or worsened bloody bowel movements.    Diagnosis: COPD without exacerbation  Assessment and Plan of Treatment: You have a known history of COPD. During your hospitalization, your pneumonia improved on the antibiotics that were prescribed to you. Please continue to take your home inhalers, as prescribed by your primary care physician and pulmonologist. PLEASE CONTINUE TO FOLLOW UP WITH YOUR PRIMARY CARE PHYSICIAN AND PULMONOLOGIST, DR. HERNANDEZ.     PRINCIPAL DISCHARGE DIAGNOSIS  Diagnosis: Pneumonia  Assessment and Plan of Treatment: Community acquired pneumonia (CAP) is a lung infection that you get outside of a hospital or nursing home setting. Your lungs become inflamed and cannot work well. CAP may be caused by bacteria, viruses, or fungi. You had a chest x-ray done during your hospitalization which showed signs that were consistent with pneumonia. You were started on antibiotics called ceftriaxone and azithromycin. Your pneumonia improved on these antibiotics, and you will be sent home with an additional two days of oral antibiotics to finish your treatment. Please seek medical attention if you experience worsening symptoms including uncontrollable fevers, chills, trouble breathing, shortness of breath, confusion, or if your lips or fingernails turn gray or blue.  PLEASE TAKE YOUR PRESCRIBED ANTIBIOTICS (CEFPODOXIME) STARTING TOMORROW, FOR THE NEXT 3 DAYS. YOU WILL TAKE CEFPODOXIME 200 MG EVERY 12 HOURS UNTIL 11/26. PLEASE ALSO FOLLOW UP WITH YOUR PRIMARY CARE PHYSICIAN AND PULMONOLOGIST, DR. HERNANDEZ.      SECONDARY DISCHARGE DIAGNOSES  Diagnosis: COPD without exacerbation  Assessment and Plan of Treatment: You have a known history of COPD. During your hospitalization, your pneumonia improved on the antibiotics that were prescribed to you. Please continue to take your home inhalers, as prescribed by your primary care physician and pulmonologist. PLEASE CONTINUE TO FOLLOW UP WITH YOUR PRIMARY CARE PHYSICIAN AND PULMONOLOGIST, DR. HERNANDEZ.    Diagnosis: Abdominal pain  Assessment and Plan of Treatment: During your hospitalization, you had experienced left sided abdominal pain with constipation. An x-ray of your abdomen was done, which showed signs of increased gas. You were placed on a bowel regimen to help with your bowel movements. You may use over the counter stool softeners, such as Miralax, senna, colace, or Metamucil to help with your bowel movements and to prevent constipation. Please seek medical attention if you have worsening signs including sudden fevers, chills, uncontrollable worsening abdominal pain, or worsened bloody bowel movements.     PRINCIPAL DISCHARGE DIAGNOSIS  Diagnosis: Pneumonia  Assessment and Plan of Treatment: Community acquired pneumonia (CAP) is a lung infection that you get outside of a hospital or nursing home setting. Your lungs become inflamed and cannot work well. CAP may be caused by bacteria, viruses, or fungi. You had a chest x-ray done during your hospitalization which showed signs that were consistent with pneumonia. You were started on antibiotics called ceftriaxone and azithromycin. Your pneumonia improved on these antibiotics, and you will be sent home with an additional two days of oral antibiotics to finish your treatment. Please seek medical attention if you experience worsening symptoms including uncontrollable fevers, chills, trouble breathing, shortness of breath, confusion, or if your lips or fingernails turn gray or blue.  PLEASE TAKE YOUR PRESCRIBED ANTIBIOTICS (CEFPODOXIME) STARTING TOMORROW, FOR THE NEXT 2 DAYS. YOU WILL TAKE CEFPODOXIME 200 MG EVERY 12 HOURS UNTIL 11/26. PLEASE ALSO FOLLOW UP WITH YOUR PRIMARY CARE PHYSICIAN AND PULMONOLOGIST, DR. HERNANDEZ WITHIN 2-3 WEEKS OF DISCHARGE.      SECONDARY DISCHARGE DIAGNOSES  Diagnosis: COPD without exacerbation  Assessment and Plan of Treatment: You have a known history of COPD. During your hospitalization, your pneumonia improved on the antibiotics that were prescribed to you. Please continue to take your home inhalers, as prescribed by your primary care physician and pulmonologist. PLEASE CONTINUE TO FOLLOW UP WITH YOUR PRIMARY CARE PHYSICIAN AND PULMONOLOGIST, DR. HERNANDEZ.    Diagnosis: Abdominal pain  Assessment and Plan of Treatment: During your hospitalization, you had experienced left sided abdominal pain with constipation. An x-ray of your abdomen was done, which showed signs of increased gas. You were placed on a bowel regimen to help with your bowel movements. You may use over the counter stool softeners, such as Miralax, senna, colace, or Metamucil to help with your bowel movements and to prevent constipation. Please seek medical attention if you have worsening signs including sudden fevers, chills, uncontrollable worsening abdominal pain, or worsened bloody bowel movements.     PRINCIPAL DISCHARGE DIAGNOSIS  Diagnosis: Pneumonia  Assessment and Plan of Treatment: Community acquired pneumonia (CAP) is a lung infection that you get outside of a hospital or nursing home setting. Your lungs become inflamed and cannot work well. CAP may be caused by bacteria, viruses, or fungi. You had a chest x-ray done during your hospitalization which showed signs that were consistent with pneumonia. You were started on antibiotics called ceftriaxone and azithromycin. Your pneumonia improved on these antibiotics, and you will be sent home with an additional two days of oral antibiotics to finish your treatment. Please seek medical attention if you experience worsening symptoms including uncontrollable fevers, chills, trouble breathing, shortness of breath, confusion, or if your lips or fingernails turn gray or blue.  PLEASE TAKE YOUR PRESCRIBED ANTIBIOTICS (CEFPODOXIME) STARTING TOMORROW, FOR THE NEXT 2 DAYS. YOU WILL TAKE CEFPODOXIME 200 MG EVERY 12 HOURS UNTIL 11/26. PLEASE ALSO FOLLOW UP WITH YOUR PRIMARY CARE PHYSICIAN AND PULMONOLOGIST, DR. HERNANDEZ WITHIN 2-3 WEEKS OF DISCHARGE.      SECONDARY DISCHARGE DIAGNOSES  Diagnosis: Enteritis presumed infectious  Assessment and Plan of Treatment: During your hospitalization, you had experienced left sided abdominal pain with constipation. An x-ray of your abdomen was done, which showed signs of increased gas. You were placed on a bowel regimen to help with your bowel movements. You may use over the counter stool softeners, such as Miralax, senna, colace, or Metamucil to help with your bowel movements and to prevent constipation. Do not take any additional stool softeners if you are experiencing diarrhea. Please seek medical attention if you have worsening signs including sudden fevers, chills, uncontrollable worsening abdominal pain, or worsened bloody bowel movements. If you do have an intestinal infection, you may experience diarrhea. Take your medications as prescribed and be sure to stay hydrated.  You have been started on a medication called METRONIDAZOLE for possible abdominal infection. Please take your medication every 8 hours FOR THE NEXT WEEK, until completion (even if you are not experiencing abdominal symptoms).    Diagnosis: COPD without exacerbation  Assessment and Plan of Treatment: You have a known history of COPD. During your hospitalization, your pneumonia improved on the antibiotics that were prescribed to you. Please continue to take your home inhalers, as prescribed by your primary care physician and pulmonologist. PLEASE CONTINUE TO FOLLOW UP WITH YOUR PRIMARY CARE PHYSICIAN AND PULMONOLOGIST, DR. HERNANDEZ.

## 2021-11-23 NOTE — DISCHARGE NOTE PROVIDER - HOSPITAL COURSE
#Discharge: do not delete    Patient is __ yo M/F with past medical history of _____ presented with _____, found to have _____ (one liner)    Hospital course (by problem):     Patient was discharged to: (home/[facility]/acute rehab/hospice, etc, and with what services – home health PT/RN? Home O2?)    New medications:   Changes to old medications:  Medications that were stopped:    Items to follow up as outpatient:    Physical exam at the time of discharge:       #Discharge: do not delete    Patient is a 60 y/o F PMH COPD, HTN, epilepsy (last seizure 2013), grave's dz, breast ca s/p mastectomy in 2000, and recent admission in march 2021 for COPD exacerbation presents w c/o 3 days of fever and myalgias and admitted for CAP.    Hospital course (by problem):     #Community acquired pneumonia  Presented with cough, pleuritic CP, and CXR findings consistent w pneumonia. Pt tachycardic but no fever, leukocytosis, hypoxia. Not septic. In setting of myalgias and diarrhea could be viral vs legionella. Less concern for COPD exacerbation given no increased O2 requirements, use of inhaler, sputum production, dyspnea. RVP and COVID negative. Urine legionella negative. BCx negative at time of discharge.  - CXR showed discoid change and/or infiltrate at left lung base  - Received azithromycin 500mg daily x2d   - Received CTX 1g daily and azithromycin 500mg daily to treat for CAP    #COPD without exacerbation  Patient on anoro ellipta and albuterol at home. No increased sputum production, increased shortness of breath, or increased inhaler use. Asymptomatic.  - Duonebs Q6 hours PRN for SOB or wheezing.    #Abdominal pain  Pt had complained of LUQ/LLQ abdominal pain on deep palpation. Notable past medical hx of diverticulitis. Afebrile, no leukocytosis.   - AXR done, wet read showing signs of gas, no evidence of obstruction.  - Gave maalox 30ml q6h PRN  - Gave bowel regimen (senna, miralax)    #HTN (hypertension)  Pt takes home amlodipine and losartan.  - Continued home norvasc 10mg daily  - Continued home losartan 100mg daily.    #Graves disease  Takes home brimonidine and dorzolamide-dimolol drops. Pt refused TSH screening.  - Continued home medications.    #Glaucoma.   Same medications as above  - Continued home medications.    Patient was discharged to: Home (with sister)    New medications:   Changes to old medications:  Medications that were stopped:    Items to follow up as outpatient: PCP f/u, pulmonology f/u    Physical exam at the time of discharge:  General: in no acute distress. obese.  Eyes: EOMI intact bilaterally. Anicteric sclerae, moist conjunctivae  HENT: Moist mucous membranes  Neck: Trachea midline, supple  Lungs: CTA B/L. No wheezes, rales, or ronchi  Cardiovascular: RRR. No murmurs, rubs, or gallops  Abdomen: Obese abdomen. Soft, non-distended; Tenderness to deep palpation of LUQ/LLQ. No rebound or guarding. +BS.  Extremities: Normal range of motion, No clubbing, cyanosis or edema  MSK: No midline bony tenderness. No CVA tenderness bilaterally  Neurological: Alert and oriented x3  Skin: Warm and dry. No obvious rash #Discharge: do not delete    Patient is a 62 y/o F PMH COPD, HTN, epilepsy (last seizure 2013), grave's dz, breast ca s/p mastectomy in 2000, and recent admission in march 2021 for COPD exacerbation presents w c/o 3 days of fever and myalgias and admitted for CAP.    Hospital course (by problem):     #Community acquired pneumonia  Presented with cough, pleuritic CP, and CXR findings consistent w pneumonia. Pt tachycardic but no fever, leukocytosis, hypoxia. Not septic. In setting of myalgias and diarrhea could be viral vs legionella. Less concern for COPD exacerbation given no increased O2 requirements, use of inhaler, sputum production, dyspnea. RVP and COVID negative. Urine legionella negative. BCx negative at time of discharge.  - CXR showed discoid change and/or infiltrate at left lung base  - Received azithromycin 500mg daily x2d   - Received CTX 1g daily and azithromycin 500mg daily to treat for CAP    #COPD without exacerbation  Patient on anoro ellipta and albuterol at home. No increased sputum production, increased shortness of breath, or increased inhaler use. Asymptomatic.  - Duonebs Q6 hours PRN for SOB or wheezing.    #Abdominal pain  Pt had complained of LUQ/LLQ abdominal pain on deep palpation. Notable past medical hx of diverticulitis. Afebrile, no leukocytosis.   - AXR done, wet read showing signs of gas, no evidence of obstruction.  - Gave maalox 30ml q6h PRN  - Gave bowel regimen (senna, miralax)    #HTN (hypertension)  Pt takes home amlodipine and losartan.  - Continued home norvasc 10mg daily  - Continued home losartan 100mg daily.    #Graves disease  Takes home brimonidine and dorzolamide-dimolol drops. Pt refused TSH screening.  - Continued home medications.    #Glaucoma  Same medications as above  - Continued home medications.    Patient was discharged to: Home (with sister)    New medications:   Changes to old medications:  Medications that were stopped:    Items to follow up as outpatient: PCP f/u, pulmonology f/u    Physical exam at the time of discharge:  General: in no acute distress. obese.  Eyes: EOMI intact bilaterally. Anicteric sclerae, moist conjunctivae  HENT: Moist mucous membranes  Neck: Trachea midline, supple  Lungs: CTA B/L. No wheezes, rales, or ronchi  Cardiovascular: RRR. No murmurs, rubs, or gallops  Abdomen: Obese abdomen. Soft, non-distended; Tenderness to deep palpation of LUQ/LLQ. No rebound or guarding. +BS.  Extremities: Normal range of motion, No clubbing, cyanosis or edema  MSK: No midline bony tenderness. No CVA tenderness bilaterally  Neurological: Alert and oriented x3  Skin: Warm and dry. No obvious rash #Discharge: do not delete    Patient is a 62 y/o F PMH COPD, HTN, epilepsy (last seizure 2013), grave's dz, breast ca s/p mastectomy in 2000, and recent admission in march 2021 for COPD exacerbation presents w c/o 3 days of fever and myalgias and admitted for CAP.    Hospital course (by problem):     #Community acquired pneumonia  Presented with cough, pleuritic CP, and CXR findings consistent w pneumonia. Pt tachycardic but no fever, leukocytosis, hypoxia. Not septic. In setting of myalgias and diarrhea could be viral vs legionella. Less concern for COPD exacerbation given no increased O2 requirements, use of inhaler, sputum production, dyspnea. RVP and COVID negative. Urine legionella negative. BCx negative at time of discharge.  - CXR showed discoid change and/or infiltrate at left lung base  - Received azithromycin 500mg daily x2d, urine legionella negative   - Received 4 doses of CTX 1g qd inpatient, will d/c with 3 additional days of antibiotics (Cefpodoxime 200 mg BID) to complete 7 day course     #COPD without exacerbation  Patient on anoro ellipta and albuterol at home. No increased sputum production, increased shortness of breath, or increased inhaler use. Asymptomatic.  - Duonebs Q6 hours PRN for SOB or wheezing.  - Follow up with pulm outpatient     #Abdominal pain  Pt had complained of LUQ/LLQ abdominal pain on deep palpation. Notable past medical hx of diverticulitis. Afebrile, no leukocytosis.   - AXR done, wet read showing signs of gas, no evidence of obstruction.  - Gave maalox 30ml q6h PRN  - Gave bowel regimen (senna, miralax)    #HTN (hypertension)  Pt takes home amlodipine and losartan.  - Continued home norvasc 10mg daily  - Continued home losartan 100mg daily.    #Graves disease  Takes home brimonidine and dorzolamide-dimolol drops. Pt refused TSH screening.  - Continued home medications.    #Glaucoma  Same medications as above  - Continued home medications.    Patient was discharged to: Home (with sister)    New medications: Cefpodoxime 200 mg BID for 3 additional days   Changes to old medications: N/A  Medications that were stopped: N/A    Items to follow up as outpatient: PCP f/u, pulmonology f/u    Physical exam at the time of discharge:  General: in no acute distress. obese.  Eyes: EOMI intact bilaterally. Anicteric sclerae, moist conjunctivae  HENT: Moist mucous membranes  Neck: Trachea midline, supple  Lungs: CTA B/L. No wheezes, rales, or ronchi  Cardiovascular: RRR. No murmurs, rubs, or gallops  Abdomen: Obese abdomen. Soft, non-distended; Tenderness to deep palpation of LUQ/LLQ. No rebound or guarding. +BS.  Extremities: Normal range of motion, No clubbing, cyanosis or edema  MSK: No midline bony tenderness. No CVA tenderness bilaterally  Neurological: Alert and oriented x3  Skin: Warm and dry. No obvious rash #Discharge: do not delete    Patient is a 60 y/o F PMH COPD, HTN, epilepsy (last seizure 2013), grave's dz, breast ca s/p mastectomy in 2000, and recent admission in march 2021 for COPD exacerbation presents w c/o 3 days of fever and myalgias and admitted for CAP.    Hospital course (by problem):     #Community acquired pneumonia  Presented with cough, pleuritic CP, and CXR findings consistent w pneumonia. Pt tachycardic but no fever, leukocytosis, hypoxia. Not septic. In setting of myalgias and diarrhea could be viral vs legionella. Less concern for COPD exacerbation given no increased O2 requirements, use of inhaler, sputum production, dyspnea. RVP and COVID negative. Urine legionella negative. BCx negative at time of discharge.  - CXR showed discoid change and/or infiltrate at left lung base  - Received azithromycin 500mg daily x2d, urine legionella negative   - Received 4 doses of CTX 1g qd inpatient, will d/c with 2 additional days of antibiotics (Cefpodoxime 200 mg BID) to complete 7 day course     #COPD without exacerbation  Patient on anoro ellipta and albuterol at home. No increased sputum production, increased shortness of breath, or increased inhaler use. Asymptomatic.  - Duonebs Q6 hours PRN for SOB or wheezing.  - Follow up with pulm outpatient     #Abdominal pain  Pt had complained of LUQ/LLQ abdominal pain on deep palpation. Notable past medical hx of diverticulitis. Afebrile, no leukocytosis.   - AXR done, wet read showing signs of gas, no evidence of obstruction.  - Gave maalox 30ml q6h PRN  - Gave bowel regimen (senna, miralax)    #HTN (hypertension)  Pt takes home amlodipine and losartan.  - Continued home norvasc 10mg daily  - Continued home losartan 100mg daily.    #Graves disease  Takes home brimonidine and dorzolamide-dimolol drops. Pt refused TSH screening.  - Continued home medications.    #Glaucoma  Same medications as above  - Continued home medications.    Patient was discharged to: Home (with sister)    New medications: Cefpodoxime 200 mg BID for 2 additional days   Changes to old medications: N/A  Medications that were stopped: N/A    Items to follow up as outpatient: PCP f/u, pulmonology f/u    Physical exam at the time of discharge:  General: in no acute distress. obese.  Eyes: EOMI intact bilaterally. Anicteric sclerae, moist conjunctivae  HENT: Moist mucous membranes  Neck: Trachea midline, supple  Lungs: CTA B/L. No wheezes, rales, or ronchi  Cardiovascular: RRR. No murmurs, rubs, or gallops  Abdomen: Obese abdomen. Soft, non-distended; Tenderness to deep palpation of LUQ/LLQ. No rebound or guarding. +BS.  Extremities: Normal range of motion, No clubbing, cyanosis or edema  MSK: No midline bony tenderness. No CVA tenderness bilaterally  Neurological: Alert and oriented x3  Skin: Warm and dry. No obvious rash #Discharge: do not delete    Patient is a 62 y/o F PMH COPD, HTN, epilepsy (last seizure 2013), grave's dz, breast ca s/p mastectomy in 2000, and recent admission in march 2021 for COPD exacerbation presents w c/o 3 days of fever and myalgias and admitted for CAP.    Hospital course (by problem):     #Community acquired pneumonia  Presented with cough, pleuritic CP, and CXR findings consistent w pneumonia. Pt tachycardic but no fever, leukocytosis, hypoxia. Not septic. In setting of myalgias and diarrhea could be viral vs legionella. Less concern for COPD exacerbation given no increased O2 requirements, use of inhaler, sputum production, dyspnea. RVP and COVID negative. Urine legionella negative. BCx negative at time of discharge.  - CXR showed discoid change and/or infiltrate at left lung base  - Received azithromycin 500mg daily x2d, urine legionella negative   - Received 4 doses of CTX 1g qd inpatient, will d/c with 2 additional days of antibiotics (Cefpodoxime 200 mg BID) to complete 7 day course     #Enteritis  Pt found to have gram positive rods in two anaerobic bottles. Had complained of LUQ/LLQ abdominal pain on deep palpation. Notable past medical hx of diverticulitis. Afebrile, no leukocytosis.   - AXR done, wet read showing signs of gas, no evidence of obstruction.  - Gave maalox 30ml q6h PRN  - Gave bowel regimen (senna, miralax)  - Blood cultures sent, positive for gram positive rods in two anaerobic bottles. Reviewed with ID antibiotic stewardship (Dr. Prather).  - Metronidazole 500mg TID x7d for concern for enteritis with associated gram positive scottie bacteremia (possibly clostridium).    #COPD without exacerbation  Patient on anoro ellipta and albuterol at home. No increased sputum production, increased shortness of breath, or increased inhaler use. Asymptomatic.  - Duonebs Q6 hours PRN for SOB or wheezing.  - Follow up with pulm outpatient     #HTN (hypertension)  Pt takes home amlodipine and losartan.  - Continued home norvasc 10mg daily  - Continued home losartan 100mg daily.    #Graves disease  Takes home brimonidine and dorzolamide-dimolol drops. Pt refused TSH screening.  - Continued home medications.    #Glaucoma  Same medications as above  - Continued home medications.    Patient was discharged to: Home (with sister)    New medications: Cefpodoxime 200 mg BID for 2 additional days   Changes to old medications: N/A  Medications that were stopped: N/A    Items to follow up as outpatient: PCP f/u, pulmonology f/u    Physical exam at the time of discharge:  General: in no acute distress. obese.  Eyes: EOMI intact bilaterally. Anicteric sclerae, moist conjunctivae  HENT: Moist mucous membranes  Neck: Trachea midline, supple  Lungs: CTA B/L. No wheezes, rales, or ronchi  Cardiovascular: RRR. No murmurs, rubs, or gallops  Abdomen: Obese abdomen. Soft, non-distended; Tenderness to deep palpation of LUQ/LLQ. No rebound or guarding. +BS.  Extremities: Normal range of motion, No clubbing, cyanosis or edema  MSK: No midline bony tenderness. No CVA tenderness bilaterally  Neurological: Alert and oriented x3  Skin: Warm and dry. No obvious rash #Discharge: do not delete    Patient is a 60 y/o F PMH COPD, HTN, epilepsy (last seizure 2013), grave's dz, breast ca s/p mastectomy in 2000, and recent admission in march 2021 for COPD exacerbation presents w c/o 3 days of fever and myalgias and admitted for CAP.    Hospital course (by problem):     #Community acquired pneumonia  Presented with cough, pleuritic CP, and CXR findings consistent w pneumonia. Pt tachycardic but no fever, leukocytosis, hypoxia. Not septic. In setting of myalgias and diarrhea could be viral vs legionella. Less concern for COPD exacerbation given no increased O2 requirements, use of inhaler, sputum production, dyspnea. RVP and COVID negative. Urine legionella negative. BCx negative at time of discharge.  - CXR showed discoid change and/or infiltrate at left lung base  - Received azithromycin 500mg daily x2d, urine legionella negative   - Received 4 doses of CTX 1g qd inpatient, will d/c with 2 additional days of antibiotics (Cefpodoxime 200 mg BID) to complete 7 day course     #Enteritis  Pt found to have gram positive rods in two anaerobic bottles. Had complained of LUQ/LLQ abdominal pain on deep palpation. Notable past medical hx of diverticulitis. Afebrile, no leukocytosis.   - AXR done, wet read showing signs of gas, no evidence of obstruction.  - Gave maalox 30ml q6h PRN  - Gave bowel regimen (senna, miralax)  - Blood cultures sent, positive for gram positive rods in two anaerobic bottles. Reviewed with ID antibiotic stewardship (Dr. Prather).  - Metronidazole 500mg TID x7d for concern for enteritis with associated gram positive scottie bacteremia (possibly clostridium).    #COPD without exacerbation  Patient on anoro ellipta and albuterol at home. No increased sputum production, increased shortness of breath, or increased inhaler use. Asymptomatic.  - Duonebs Q6 hours PRN for SOB or wheezing.  - Follow up with pulm outpatient     #HTN (hypertension)  Pt takes home amlodipine and losartan.  - Continued home norvasc 10mg daily  - Continued home losartan 100mg daily.    #Graves disease  Takes home brimonidine and dorzolamide-dimolol drops. Pt refused TSH screening.  - Continued home medications.    #Glaucoma  Same medications as above  - Continued home medications.    Patient was discharged to: Home (with sister)    New medications: Cefpodoxime 200 mg BID for 2 additional days, Metronidazole 500mg TID x7d  Changes to old medications: N/A  Medications that were stopped: N/A    Items to follow up as outpatient: PCP f/u, pulmonology f/u    Physical exam at the time of discharge:  General: in no acute distress. obese.  Eyes: EOMI intact bilaterally. Anicteric sclerae, moist conjunctivae  HENT: Moist mucous membranes  Neck: Trachea midline, supple  Lungs: CTA B/L. No wheezes, rales, or ronchi  Cardiovascular: RRR. No murmurs, rubs, or gallops  Abdomen: Obese abdomen. Soft, non-distended; Tenderness to deep palpation of LUQ/LLQ. No rebound or guarding. +BS.  Extremities: Normal range of motion, No clubbing, cyanosis or edema  MSK: No midline bony tenderness. No CVA tenderness bilaterally  Neurological: Alert and oriented x3  Skin: Warm and dry. No obvious rash

## 2021-11-24 VITALS
SYSTOLIC BLOOD PRESSURE: 144 MMHG | RESPIRATION RATE: 18 BRPM | HEART RATE: 66 BPM | OXYGEN SATURATION: 97 % | TEMPERATURE: 98 F | DIASTOLIC BLOOD PRESSURE: 67 MMHG

## 2021-11-24 LAB — GRAM STN FLD: SIGNIFICANT CHANGE UP

## 2021-11-24 PROCEDURE — 83605 ASSAY OF LACTIC ACID: CPT

## 2021-11-24 PROCEDURE — 0225U NFCT DS DNA&RNA 21 SARSCOV2: CPT

## 2021-11-24 PROCEDURE — 87637 SARSCOV2&INF A&B&RSV AMP PRB: CPT

## 2021-11-24 PROCEDURE — 81003 URINALYSIS AUTO W/O SCOPE: CPT

## 2021-11-24 PROCEDURE — 87449 NOS EACH ORGANISM AG IA: CPT

## 2021-11-24 PROCEDURE — 99285 EMERGENCY DEPT VISIT HI MDM: CPT

## 2021-11-24 PROCEDURE — 71045 X-RAY EXAM CHEST 1 VIEW: CPT

## 2021-11-24 PROCEDURE — 83690 ASSAY OF LIPASE: CPT

## 2021-11-24 PROCEDURE — 36415 COLL VENOUS BLD VENIPUNCTURE: CPT

## 2021-11-24 PROCEDURE — 96375 TX/PRO/DX INJ NEW DRUG ADDON: CPT

## 2021-11-24 PROCEDURE — 87040 BLOOD CULTURE FOR BACTERIA: CPT

## 2021-11-24 PROCEDURE — 85025 COMPLETE CBC W/AUTO DIFF WBC: CPT

## 2021-11-24 PROCEDURE — 80053 COMPREHEN METABOLIC PANEL: CPT

## 2021-11-24 PROCEDURE — 83735 ASSAY OF MAGNESIUM: CPT

## 2021-11-24 PROCEDURE — 96374 THER/PROPH/DIAG INJ IV PUSH: CPT

## 2021-11-24 PROCEDURE — 87150 DNA/RNA AMPLIFIED PROBE: CPT

## 2021-11-24 PROCEDURE — 74018 RADEX ABDOMEN 1 VIEW: CPT

## 2021-11-24 PROCEDURE — 93005 ELECTROCARDIOGRAM TRACING: CPT

## 2021-11-24 RX ORDER — METRONIDAZOLE 500 MG
1 TABLET ORAL
Qty: 21 | Refills: 0
Start: 2021-11-24 | End: 2021-11-30

## 2021-11-24 RX ORDER — CEFPODOXIME PROXETIL 100 MG
1 TABLET ORAL
Qty: 4 | Refills: 0
Start: 2021-11-24 | End: 2021-11-25

## 2021-11-24 RX ORDER — LANOLIN ALCOHOL/MO/W.PET/CERES
5 CREAM (GRAM) TOPICAL AT BEDTIME
Refills: 0 | Status: DISCONTINUED | OUTPATIENT
Start: 2021-11-24 | End: 2021-11-24

## 2021-11-24 RX ADMIN — Medication 175 MICROGRAM(S): at 06:56

## 2021-11-24 RX ADMIN — DORZOLAMIDE HYDROCHLORIDE TIMOLOL MALEATE 1 DROP(S): 20; 5 SOLUTION/ DROPS OPHTHALMIC at 09:25

## 2021-11-24 RX ADMIN — CEFTRIAXONE 100 MILLIGRAM(S): 500 INJECTION, POWDER, FOR SOLUTION INTRAMUSCULAR; INTRAVENOUS at 06:56

## 2021-11-24 RX ADMIN — LOSARTAN POTASSIUM 100 MILLIGRAM(S): 100 TABLET, FILM COATED ORAL at 09:25

## 2021-11-24 RX ADMIN — Medication 650 MILLIGRAM(S): at 03:40

## 2021-11-24 RX ADMIN — POLYETHYLENE GLYCOL 3350 17 GRAM(S): 17 POWDER, FOR SOLUTION ORAL at 11:03

## 2021-11-24 RX ADMIN — GABAPENTIN 300 MILLIGRAM(S): 400 CAPSULE ORAL at 06:56

## 2021-11-24 RX ADMIN — Medication 5 MILLIGRAM(S): at 02:41

## 2021-11-24 RX ADMIN — Medication 650 MILLIGRAM(S): at 02:41

## 2021-11-24 RX ADMIN — ENOXAPARIN SODIUM 40 MILLIGRAM(S): 100 INJECTION SUBCUTANEOUS at 06:57

## 2021-11-24 RX ADMIN — BRIMONIDINE TARTRATE 1 DROP(S): 2 SOLUTION/ DROPS OPHTHALMIC at 06:56

## 2021-11-27 LAB
-  CEFTRIAXONE: SIGNIFICANT CHANGE UP
-  CEFTRIAXONE: SIGNIFICANT CHANGE UP
-  PENICILLIN: SIGNIFICANT CHANGE UP
-  PENICILLIN: SIGNIFICANT CHANGE UP
-  TETRACYCLINE: SIGNIFICANT CHANGE UP
-  TETRACYCLINE: SIGNIFICANT CHANGE UP
CULTURE RESULTS: SIGNIFICANT CHANGE UP
METHOD TYPE: SIGNIFICANT CHANGE UP
ORGANISM # SPEC MICROSCOPIC CNT: SIGNIFICANT CHANGE UP
SPECIMEN SOURCE: SIGNIFICANT CHANGE UP

## 2021-11-28 LAB
-  CEFTRIAXONE: SIGNIFICANT CHANGE UP
-  TETRACYCLINE: SIGNIFICANT CHANGE UP
CULTURE RESULTS: SIGNIFICANT CHANGE UP
METHOD TYPE: SIGNIFICANT CHANGE UP
ORGANISM # SPEC MICROSCOPIC CNT: SIGNIFICANT CHANGE UP
ORGANISM # SPEC MICROSCOPIC CNT: SIGNIFICANT CHANGE UP
SPECIMEN SOURCE: SIGNIFICANT CHANGE UP

## 2021-11-29 LAB
CULTURE RESULTS: SIGNIFICANT CHANGE UP
SPECIMEN SOURCE: SIGNIFICANT CHANGE UP

## 2021-12-14 ENCOUNTER — APPOINTMENT (OUTPATIENT)
Dept: INTERNAL MEDICINE | Facility: CLINIC | Age: 61
End: 2021-12-14

## 2021-12-21 ENCOUNTER — APPOINTMENT (OUTPATIENT)
Dept: INTERNAL MEDICINE | Facility: CLINIC | Age: 61
End: 2021-12-21
Payer: MEDICAID

## 2021-12-21 VITALS
SYSTOLIC BLOOD PRESSURE: 142 MMHG | BODY MASS INDEX: 42.74 KG/M2 | OXYGEN SATURATION: 98 % | TEMPERATURE: 98.5 F | DIASTOLIC BLOOD PRESSURE: 87 MMHG | HEART RATE: 73 BPM | HEIGHT: 59 IN | WEIGHT: 212 LBS

## 2021-12-21 DIAGNOSIS — Z86.19 PERSONAL HISTORY OF OTHER INFECTIOUS AND PARASITIC DISEASES: ICD-10-CM

## 2021-12-21 PROCEDURE — 99213 OFFICE O/P EST LOW 20 MIN: CPT | Mod: GC

## 2021-12-21 NOTE — PHYSICAL EXAM
[Normal] : soft, non-tender, non-distended, no masses palpated, no HSM and normal bowel sounds [Coordination Grossly Intact] : coordination grossly intact [No Focal Deficits] : no focal deficits

## 2021-12-22 NOTE — ASSESSMENT
[FreeTextEntry1] : 62 y/o F with PMH of breast cancer s/p b/l mastectomy (in remission), COPD, DM, HTN, HLD, Neuropathy, JESÚS and psoriasis presents for post-discharge follow up and complaints of B/L LE burning pain from knee to toes for 2 weeks

## 2021-12-22 NOTE — HISTORY OF PRESENT ILLNESS
[FreeTextEntry1] : Post discharge follow up, b/l LE neuropathy [de-identified] : 62 y/o F with PMH of breast cancer s/p b/l mastectomy (in remission), COPD, DM, HTN, HLD, Neuropathy, JESÚS and psoriasis presents for post-discharge follow up and complaints of B/L LE burning pain from knee to toes for 2 weeks. Pt reports that she usually takes gabapentin 600mg TID but has not had her medication for 2 weeks. Pt also would like to request blood work for STD screening via infusion center as she is not allowed to have blood draws peripherally.\par Pt also reports continued L shoulder pain but has plans to see ortho in January; she would also like to be referred to sleep study.\par She was hospitalized for CAP and enteritis last month and treated with abx. She denies any fever, chills, N/V, abdominal pain, chest pain/tightness and SOB at this time.

## 2021-12-22 NOTE — PLAN
[FreeTextEntry1] : #B/L LE neuropathy\par pt has a hx of neuropathy managed with gabapentin and recreational marijuana\par - Gabapentin 600mg TID sent to pharmacy today\par - Pt to contact medical marijuana certified physician to obtain medical marijuana use card\par \par #STD screening\par - Syphillis, HIV and Hep C orders sent to infusion center\par \par #L shoulder pain\par Pt had MRI of L shoulder showing damage to rotator cuff\par - Pt scheduled to see ortho in January\par \par #JESÚS\par - Pt to see Dr. Isaacs in 2 weeks for request of sleep study referral\par \par #HCM\par - Pt uptodate with COVID vax\par - flu shot given at last visit\par - Recent visit to gynecology, pap smear completed and pending results\par

## 2021-12-22 NOTE — END OF VISIT
[FreeTextEntry3] : I saw and evaluated the patient.  The findings and assessment were discussed with the resident and I agree with resident’s plan as documented in the above, in the resident’s note.\par \par Pertinent exam findings: Well-appearing, NAD. \par \par Neuropathy: Inc gabapentin to 600mg (prev dose).  \par STD Screening ordered - needs labs at [facility] infusion center due to port.\par PNA- Resolved.\par To see ortho for shoulder pain\par To see pulm (Ferny) for sleep apnea evaluation / CPAP titration\par Bacteremia - likely contaminate.  Repeat BCx negative and culture that grew was skin nitin.  Asymptomatic.

## 2021-12-23 ENCOUNTER — APPOINTMENT (OUTPATIENT)
Dept: PULMONOLOGY | Facility: CLINIC | Age: 61
End: 2021-12-23
Payer: MEDICAID

## 2021-12-23 PROCEDURE — 99443: CPT

## 2021-12-23 NOTE — ASSESSMENT
[FreeTextEntry1] : Obstructive sleep apnea\par \par The patient does not have a CPAP machine.  I do not have any access to the previous CT sleep study.  We will repeat the sleep apnea and will order a CPAP machine.\par \par COPD\par \par The patient is clinically stable and she did not have any exacerbation of COPD with a recent hospitalization related to bacteremia.  The patient was told she has pneumonia but I reviewed the CT scan I doubt that she had any infiltrate in the left lower lobe.  Patient is to continue on the current bronchodilator.  The patient rarely requires short acting beta agonist.  No indication for escalation of therapy at this point\par \par Patient is complaining of productive cough and she had a recent hospitalization.  I will start antibiotic and will follow clinically.\par \par Repeat the CT scan of the chest in May as the previous one did not reveal any underlying metastatic disease

## 2021-12-23 NOTE — REASON FOR VISIT
[Home] : at home, [unfilled] , at the time of the visit. [Medical Office: ([facility])___] : at the medical office located in  Detail Level: Detailed Size Of Lesion: 0.8cm

## 2021-12-23 NOTE — HISTORY OF PRESENT ILLNESS
[Former] : former [Never] : never [TextBox_4] : she is coughing and has mucus.  She was admitted with pneumonia and bacteria in blood .  She was admitted  and now she is better except for the cough.  She is not wheezing.  She is using the The new pump daily and she does not use the nebulizer nor the ventolin for more than 3 weeks.  She does not have the cpap machine

## 2021-12-29 ENCOUNTER — EMERGENCY (EMERGENCY)
Facility: HOSPITAL | Age: 61
LOS: 1 days | Discharge: HOME | End: 2021-12-29
Attending: EMERGENCY MEDICINE | Admitting: EMERGENCY MEDICINE
Payer: COMMERCIAL

## 2021-12-29 VITALS
OXYGEN SATURATION: 96 % | HEART RATE: 69 BPM | SYSTOLIC BLOOD PRESSURE: 148 MMHG | RESPIRATION RATE: 18 BRPM | TEMPERATURE: 98 F | DIASTOLIC BLOOD PRESSURE: 81 MMHG

## 2021-12-29 VITALS
OXYGEN SATURATION: 98 % | HEART RATE: 83 BPM | SYSTOLIC BLOOD PRESSURE: 153 MMHG | HEIGHT: 59 IN | TEMPERATURE: 98 F | RESPIRATION RATE: 18 BRPM | WEIGHT: 207.9 LBS | DIASTOLIC BLOOD PRESSURE: 93 MMHG

## 2021-12-29 DIAGNOSIS — E03.9 HYPOTHYROIDISM, UNSPECIFIED: ICD-10-CM

## 2021-12-29 DIAGNOSIS — Z91.013 ALLERGY TO SEAFOOD: ICD-10-CM

## 2021-12-29 DIAGNOSIS — J44.9 CHRONIC OBSTRUCTIVE PULMONARY DISEASE, UNSPECIFIED: ICD-10-CM

## 2021-12-29 DIAGNOSIS — Z90.13 ACQUIRED ABSENCE OF BILATERAL BREASTS AND NIPPLES: Chronic | ICD-10-CM

## 2021-12-29 DIAGNOSIS — I10 ESSENTIAL (PRIMARY) HYPERTENSION: ICD-10-CM

## 2021-12-29 DIAGNOSIS — Z90.13 ACQUIRED ABSENCE OF BILATERAL BREASTS AND NIPPLES: ICD-10-CM

## 2021-12-29 DIAGNOSIS — Z87.891 PERSONAL HISTORY OF NICOTINE DEPENDENCE: ICD-10-CM

## 2021-12-29 DIAGNOSIS — Z88.8 ALLERGY STATUS TO OTHER DRUGS, MEDICAMENTS AND BIOLOGICAL SUBSTANCES: ICD-10-CM

## 2021-12-29 DIAGNOSIS — Z91.041 RADIOGRAPHIC DYE ALLERGY STATUS: ICD-10-CM

## 2021-12-29 DIAGNOSIS — R05.9 COUGH, UNSPECIFIED: ICD-10-CM

## 2021-12-29 DIAGNOSIS — J98.8 OTHER SPECIFIED RESPIRATORY DISORDERS: ICD-10-CM

## 2021-12-29 DIAGNOSIS — Z91.040 LATEX ALLERGY STATUS: ICD-10-CM

## 2021-12-29 DIAGNOSIS — Z20.822 CONTACT WITH AND (SUSPECTED) EXPOSURE TO COVID-19: ICD-10-CM

## 2021-12-29 DIAGNOSIS — G40.909 EPILEPSY, UNSPECIFIED, NOT INTRACTABLE, WITHOUT STATUS EPILEPTICUS: ICD-10-CM

## 2021-12-29 LAB
ALBUMIN SERPL ELPH-MCNC: 3.9 G/DL — SIGNIFICANT CHANGE UP (ref 3.3–5)
ALP SERPL-CCNC: 98 U/L — SIGNIFICANT CHANGE UP (ref 40–120)
ALT FLD-CCNC: 19 U/L — SIGNIFICANT CHANGE UP (ref 10–45)
ANION GAP SERPL CALC-SCNC: 11 MMOL/L — SIGNIFICANT CHANGE UP (ref 5–17)
APPEARANCE UR: CLEAR — SIGNIFICANT CHANGE UP
AST SERPL-CCNC: 17 U/L — SIGNIFICANT CHANGE UP (ref 10–40)
BASE EXCESS BLDV CALC-SCNC: 3 MMOL/L — SIGNIFICANT CHANGE UP (ref -2–3)
BASOPHILS # BLD AUTO: 0.03 K/UL — SIGNIFICANT CHANGE UP (ref 0–0.2)
BASOPHILS NFR BLD AUTO: 0.5 % — SIGNIFICANT CHANGE UP (ref 0–2)
BILIRUB SERPL-MCNC: 0.2 MG/DL — SIGNIFICANT CHANGE UP (ref 0.2–1.2)
BILIRUB UR-MCNC: NEGATIVE — SIGNIFICANT CHANGE UP
BUN SERPL-MCNC: 21 MG/DL — SIGNIFICANT CHANGE UP (ref 7–23)
CA-I SERPL-SCNC: 1.23 MMOL/L — SIGNIFICANT CHANGE UP (ref 1.15–1.33)
CALCIUM SERPL-MCNC: 9.2 MG/DL — SIGNIFICANT CHANGE UP (ref 8.4–10.5)
CHLORIDE SERPL-SCNC: 103 MMOL/L — SIGNIFICANT CHANGE UP (ref 96–108)
CO2 BLDV-SCNC: 30.5 MMOL/L — HIGH (ref 22–26)
CO2 SERPL-SCNC: 26 MMOL/L — SIGNIFICANT CHANGE UP (ref 22–31)
COLOR SPEC: YELLOW — SIGNIFICANT CHANGE UP
CREAT SERPL-MCNC: 0.58 MG/DL — SIGNIFICANT CHANGE UP (ref 0.5–1.3)
DIFF PNL FLD: NEGATIVE — SIGNIFICANT CHANGE UP
EOSINOPHIL # BLD AUTO: 0.16 K/UL — SIGNIFICANT CHANGE UP (ref 0–0.5)
EOSINOPHIL NFR BLD AUTO: 2.8 % — SIGNIFICANT CHANGE UP (ref 0–6)
FLUAV AG NPH QL: SIGNIFICANT CHANGE UP
FLUBV AG NPH QL: SIGNIFICANT CHANGE UP
GAS PNL BLDV: 138 MMOL/L — SIGNIFICANT CHANGE UP (ref 136–145)
GAS PNL BLDV: SIGNIFICANT CHANGE UP
GAS PNL BLDV: SIGNIFICANT CHANGE UP
GLUCOSE SERPL-MCNC: 117 MG/DL — HIGH (ref 70–99)
GLUCOSE UR QL: NEGATIVE — SIGNIFICANT CHANGE UP
HCO3 BLDV-SCNC: 29 MMOL/L — SIGNIFICANT CHANGE UP (ref 22–29)
HCT VFR BLD CALC: 35.2 % — SIGNIFICANT CHANGE UP (ref 34.5–45)
HGB BLD-MCNC: 11.5 G/DL — SIGNIFICANT CHANGE UP (ref 11.5–15.5)
IMM GRANULOCYTES NFR BLD AUTO: 0.2 % — SIGNIFICANT CHANGE UP (ref 0–1.5)
KETONES UR-MCNC: NEGATIVE — SIGNIFICANT CHANGE UP
LACTATE SERPL-SCNC: 1.2 MMOL/L — SIGNIFICANT CHANGE UP (ref 0.5–2)
LEUKOCYTE ESTERASE UR-ACNC: NEGATIVE — SIGNIFICANT CHANGE UP
LYMPHOCYTES # BLD AUTO: 2.15 K/UL — SIGNIFICANT CHANGE UP (ref 1–3.3)
LYMPHOCYTES # BLD AUTO: 37.3 % — SIGNIFICANT CHANGE UP (ref 13–44)
MAGNESIUM SERPL-MCNC: 1.9 MG/DL — SIGNIFICANT CHANGE UP (ref 1.6–2.6)
MCHC RBC-ENTMCNC: 28.1 PG — SIGNIFICANT CHANGE UP (ref 27–34)
MCHC RBC-ENTMCNC: 32.7 GM/DL — SIGNIFICANT CHANGE UP (ref 32–36)
MCV RBC AUTO: 86.1 FL — SIGNIFICANT CHANGE UP (ref 80–100)
MONOCYTES # BLD AUTO: 0.36 K/UL — SIGNIFICANT CHANGE UP (ref 0–0.9)
MONOCYTES NFR BLD AUTO: 6.2 % — SIGNIFICANT CHANGE UP (ref 2–14)
NEUTROPHILS # BLD AUTO: 3.06 K/UL — SIGNIFICANT CHANGE UP (ref 1.8–7.4)
NEUTROPHILS NFR BLD AUTO: 53 % — SIGNIFICANT CHANGE UP (ref 43–77)
NITRITE UR-MCNC: NEGATIVE — SIGNIFICANT CHANGE UP
NRBC # BLD: 0 /100 WBCS — SIGNIFICANT CHANGE UP (ref 0–0)
PCO2 BLDV: 49 MMHG — HIGH (ref 39–42)
PH BLDV: 7.38 — SIGNIFICANT CHANGE UP (ref 7.32–7.43)
PH UR: 6 — SIGNIFICANT CHANGE UP (ref 5–8)
PLATELET # BLD AUTO: 282 K/UL — SIGNIFICANT CHANGE UP (ref 150–400)
PO2 BLDV: 53 MMHG — HIGH (ref 25–45)
POTASSIUM BLDV-SCNC: 3.7 MMOL/L — SIGNIFICANT CHANGE UP (ref 3.5–5.1)
POTASSIUM SERPL-MCNC: 3.8 MMOL/L — SIGNIFICANT CHANGE UP (ref 3.5–5.3)
POTASSIUM SERPL-SCNC: 3.8 MMOL/L — SIGNIFICANT CHANGE UP (ref 3.5–5.3)
PROT SERPL-MCNC: 6.6 G/DL — SIGNIFICANT CHANGE UP (ref 6–8.3)
PROT UR-MCNC: NEGATIVE MG/DL — SIGNIFICANT CHANGE UP
RBC # BLD: 4.09 M/UL — SIGNIFICANT CHANGE UP (ref 3.8–5.2)
RBC # FLD: 14.3 % — SIGNIFICANT CHANGE UP (ref 10.3–14.5)
RSV RNA NPH QL NAA+NON-PROBE: SIGNIFICANT CHANGE UP
SAO2 % BLDV: 88.3 % — HIGH (ref 67–88)
SARS-COV-2 RNA SPEC QL NAA+PROBE: SIGNIFICANT CHANGE UP
SODIUM SERPL-SCNC: 140 MMOL/L — SIGNIFICANT CHANGE UP (ref 135–145)
SP GR SPEC: 1.02 — SIGNIFICANT CHANGE UP (ref 1–1.03)
UROBILINOGEN FLD QL: 0.2 E.U./DL — SIGNIFICANT CHANGE UP
WBC # BLD: 5.77 K/UL — SIGNIFICANT CHANGE UP (ref 3.8–10.5)
WBC # FLD AUTO: 5.77 K/UL — SIGNIFICANT CHANGE UP (ref 3.8–10.5)

## 2021-12-29 PROCEDURE — 87637 SARSCOV2&INF A&B&RSV AMP PRB: CPT

## 2021-12-29 PROCEDURE — 82803 BLOOD GASES ANY COMBINATION: CPT

## 2021-12-29 PROCEDURE — 99283 EMERGENCY DEPT VISIT LOW MDM: CPT | Mod: 25

## 2021-12-29 PROCEDURE — 71045 X-RAY EXAM CHEST 1 VIEW: CPT

## 2021-12-29 PROCEDURE — 82330 ASSAY OF CALCIUM: CPT

## 2021-12-29 PROCEDURE — 36415 COLL VENOUS BLD VENIPUNCTURE: CPT

## 2021-12-29 PROCEDURE — 87086 URINE CULTURE/COLONY COUNT: CPT

## 2021-12-29 PROCEDURE — 84132 ASSAY OF SERUM POTASSIUM: CPT

## 2021-12-29 PROCEDURE — 81003 URINALYSIS AUTO W/O SCOPE: CPT

## 2021-12-29 PROCEDURE — 71045 X-RAY EXAM CHEST 1 VIEW: CPT | Mod: 26

## 2021-12-29 PROCEDURE — 83605 ASSAY OF LACTIC ACID: CPT

## 2021-12-29 PROCEDURE — 83735 ASSAY OF MAGNESIUM: CPT

## 2021-12-29 PROCEDURE — 80053 COMPREHEN METABOLIC PANEL: CPT

## 2021-12-29 PROCEDURE — 84295 ASSAY OF SERUM SODIUM: CPT

## 2021-12-29 PROCEDURE — 85025 COMPLETE CBC W/AUTO DIFF WBC: CPT

## 2021-12-29 PROCEDURE — 99284 EMERGENCY DEPT VISIT MOD MDM: CPT

## 2021-12-29 PROCEDURE — 87040 BLOOD CULTURE FOR BACTERIA: CPT

## 2021-12-29 RX ORDER — SODIUM CHLORIDE 9 MG/ML
1000 INJECTION INTRAMUSCULAR; INTRAVENOUS; SUBCUTANEOUS ONCE
Refills: 0 | Status: COMPLETED | OUTPATIENT
Start: 2021-12-29 | End: 2021-12-29

## 2021-12-29 RX ORDER — ACETAMINOPHEN 500 MG
1000 TABLET ORAL ONCE
Refills: 0 | Status: COMPLETED | OUTPATIENT
Start: 2021-12-29 | End: 2021-12-29

## 2021-12-29 RX ADMIN — Medication 100 UNIT(S): at 09:01

## 2021-12-29 RX ADMIN — Medication 1000 MILLIGRAM(S): at 06:21

## 2021-12-29 RX ADMIN — SODIUM CHLORIDE 1000 MILLILITER(S): 9 INJECTION INTRAMUSCULAR; INTRAVENOUS; SUBCUTANEOUS at 05:29

## 2021-12-29 NOTE — ED PROVIDER NOTE - PROGRESS NOTE DETAILS
d/w Dr Isaacs; will await RVP and he will see patient shortly. veena: pt received at sign out from katerina bob/dr coelho as pending eval by dr david - pt w/uri, w/u neg, pt seen by dr david, deemed stable / safe for dc, to con't po abx, supportive care and symptom control

## 2021-12-29 NOTE — ED ADULT NURSE REASSESSMENT NOTE - NS ED NURSE REASSESS COMMENT FT1
consulted ED pharmacist and RN Educator Angelique Landa to confirm appropriate dosage of 300 units / 3mL for Heparin flush of pt. port prior to administering.
received report from nightshift RN. Pt. plan for d/c. VS updated wnl. Pt. port to be d/c with heparin flush as per MD Fabian order and pharmacy.

## 2021-12-29 NOTE — ED PROVIDER NOTE - ATTENDING CONTRIBUTION TO CARE
61 year old f w hx of COPD, remote breast CA, pre-DM, HTN, diverticulitis, hypothyroid, seizure presents to ED with concern for cough x several days.  Notes recent hospitalization for PNA apx 1 month ago.  Patient is known to Dr. Isaacs.  Over the past week she feels her PNA symptoms have returned.  She also notes bodyaches, sore throat.  On exam, patient is non toxic in appearance.  HRRR, lungs clear.  Abd soft, NT/ND.  No peripheral edema.  Will obtain CXR, labs and dispo accordingly.  Will discuss with Dr. Isaacs.

## 2021-12-29 NOTE — ED ADULT TRIAGE NOTE - CHIEF COMPLAINT QUOTE
Patient presents to ER with chief complaints of gen malaise, sore throat, cough, fever x 2 days. Exposed to family members dx with COVID19.

## 2021-12-29 NOTE — ED PROVIDER NOTE - CLINICAL SUMMARY MEDICAL DECISION MAKING FREE TEXT BOX
Suspicious for viral illness, ie covid or flu (family with flu at home).  Afeb VS WNL in ED, and no significant lab abnormalities.  CXR essentially clear.

## 2021-12-29 NOTE — ED ADULT NURSE NOTE - OBJECTIVE STATEMENT
pt c.o fever, chills, throat pain, generalized weakness, nausea and vomiting that started 2 days ago. pt last took tylenol this afternoon. pt is afebrile upon arrival. lungs clear bilaterally upon auscultation. no use of accessory muscles noted.

## 2021-12-29 NOTE — ED PROVIDER NOTE - OBJECTIVE STATEMENT
62 yo fem with Hx COPD, remote hx breast ca, prediabetes, HTN, diverticulitis, hypothyroidism, epilepsy - has had a cough for the past 4-5 days; now with body aches, back pain, fever, and sore throat for the past 2 days.  Recently hospitalized at [facility] for CAP Nov 20-23 last month. Blood cx grew Cutibacterium acnes Nov 20; repeat blood cx Nov 23 were negative. Had a telehealth visit with Dr Isaacs  a few days ago and started on abx (cefdinir?). 60 yo fem with Hx COPD, remote hx breast ca, prediabetes, HTN, diverticulitis, hypothyroidism, epilepsy - has had a cough for the past 4-5 days; now with body aches, back pain, fever, and sore throat for the past 2 days.  Recently hospitalized at [facility] for CAP Nov 20-23 last month. Blood cx grew Cutibacterium acnes Nov 20; repeat blood cx Nov 23 were negative. Had a telehealth visit with Dr Isaacs  a few days ago and started on abx (cefdinir?).  Family members at home diagnosed with flu.

## 2021-12-29 NOTE — ED PROVIDER NOTE - PATIENT PORTAL LINK FT
You can access the FollowMyHealth Patient Portal offered by [facility] by registering at the following website: [email or web address]. By joining [employer]’s FollowMyHealth portal, you will also be able to view your health information using other applications (apps) compatible with our system.

## 2021-12-29 NOTE — ED PROVIDER NOTE - NS ED ROS FT
CONSTITUTIONAL: fever, chills, weakness  NEURO: No headache, no dizziness, no syncope; No focal weakness/tingling/numbness  EYES: No visual changes  ENT: sore throat  PULM: cough  CV: No chest pain or palpitations  GI: No abdominal pain or diarrhea.  Vomited once yesterday.   : No dysuria, hematuria, frequency  MSK: No neck pain or back pain, no joint pain  SKIN: no rash or unusual bruising

## 2021-12-30 LAB
CULTURE RESULTS: NO GROWTH — SIGNIFICANT CHANGE UP
SPECIMEN SOURCE: SIGNIFICANT CHANGE UP

## 2021-12-31 ENCOUNTER — INPATIENT (INPATIENT)
Facility: HOSPITAL | Age: 61
LOS: 3 days | Discharge: HOME | DRG: 177 | End: 2022-01-04
Payer: COMMERCIAL

## 2021-12-31 VITALS
OXYGEN SATURATION: 96 % | SYSTOLIC BLOOD PRESSURE: 133 MMHG | WEIGHT: 207.9 LBS | DIASTOLIC BLOOD PRESSURE: 68 MMHG | RESPIRATION RATE: 22 BRPM | HEART RATE: 130 BPM | TEMPERATURE: 103 F | HEIGHT: 59 IN

## 2021-12-31 DIAGNOSIS — J18.9 PNEUMONIA, UNSPECIFIED ORGANISM: ICD-10-CM

## 2021-12-31 DIAGNOSIS — Z00.00 ENCOUNTER FOR GENERAL ADULT MEDICAL EXAMINATION WITHOUT ABNORMAL FINDINGS: ICD-10-CM

## 2021-12-31 DIAGNOSIS — E03.9 HYPOTHYROIDISM, UNSPECIFIED: ICD-10-CM

## 2021-12-31 DIAGNOSIS — U07.1 COVID-19: ICD-10-CM

## 2021-12-31 DIAGNOSIS — Z90.13 ACQUIRED ABSENCE OF BILATERAL BREASTS AND NIPPLES: Chronic | ICD-10-CM

## 2021-12-31 DIAGNOSIS — R65.10 SYSTEMIC INFLAMMATORY RESPONSE SYNDROME (SIRS) OF NON-INFECTIOUS ORIGIN WITHOUT ACUTE ORGAN DYSFUNCTION: ICD-10-CM

## 2021-12-31 DIAGNOSIS — I10 ESSENTIAL (PRIMARY) HYPERTENSION: ICD-10-CM

## 2021-12-31 DIAGNOSIS — J44.9 CHRONIC OBSTRUCTIVE PULMONARY DISEASE, UNSPECIFIED: ICD-10-CM

## 2021-12-31 DIAGNOSIS — K21.9 GASTRO-ESOPHAGEAL REFLUX DISEASE WITHOUT ESOPHAGITIS: ICD-10-CM

## 2021-12-31 LAB
ALBUMIN SERPL ELPH-MCNC: 4.2 G/DL — SIGNIFICANT CHANGE UP (ref 3.3–5)
ALP SERPL-CCNC: 129 U/L — HIGH (ref 40–120)
ALT FLD-CCNC: 28 U/L — SIGNIFICANT CHANGE UP (ref 10–45)
ANION GAP SERPL CALC-SCNC: 11 MMOL/L — SIGNIFICANT CHANGE UP (ref 5–17)
APPEARANCE UR: CLEAR — SIGNIFICANT CHANGE UP
APTT BLD: 79.3 SEC — HIGH (ref 27.5–35.5)
AST SERPL-CCNC: 30 U/L — SIGNIFICANT CHANGE UP (ref 10–40)
BASE EXCESS BLDV CALC-SCNC: 3.3 MMOL/L — HIGH (ref -2–3)
BASOPHILS # BLD AUTO: 0.02 K/UL — SIGNIFICANT CHANGE UP (ref 0–0.2)
BASOPHILS NFR BLD AUTO: 0.3 % — SIGNIFICANT CHANGE UP (ref 0–2)
BILIRUB SERPL-MCNC: 0.2 MG/DL — SIGNIFICANT CHANGE UP (ref 0.2–1.2)
BILIRUB UR-MCNC: NEGATIVE — SIGNIFICANT CHANGE UP
BUN SERPL-MCNC: 20 MG/DL — SIGNIFICANT CHANGE UP (ref 7–23)
CA-I SERPL-SCNC: 1.19 MMOL/L — SIGNIFICANT CHANGE UP (ref 1.15–1.33)
CALCIUM SERPL-MCNC: 9.4 MG/DL — SIGNIFICANT CHANGE UP (ref 8.4–10.5)
CHLORIDE SERPL-SCNC: 101 MMOL/L — SIGNIFICANT CHANGE UP (ref 96–108)
CO2 BLDV-SCNC: 29.1 MMOL/L — HIGH (ref 22–26)
CO2 SERPL-SCNC: 26 MMOL/L — SIGNIFICANT CHANGE UP (ref 22–31)
COLOR SPEC: YELLOW — SIGNIFICANT CHANGE UP
CREAT SERPL-MCNC: 0.72 MG/DL — SIGNIFICANT CHANGE UP (ref 0.5–1.3)
DIFF PNL FLD: NEGATIVE — SIGNIFICANT CHANGE UP
EOSINOPHIL # BLD AUTO: 0.05 K/UL — SIGNIFICANT CHANGE UP (ref 0–0.5)
EOSINOPHIL NFR BLD AUTO: 0.7 % — SIGNIFICANT CHANGE UP (ref 0–6)
GAS PNL BLDV: 134 MMOL/L — LOW (ref 136–145)
GAS PNL BLDV: SIGNIFICANT CHANGE UP
GLUCOSE SERPL-MCNC: 138 MG/DL — HIGH (ref 70–99)
GLUCOSE UR QL: NEGATIVE — SIGNIFICANT CHANGE UP
HCO3 BLDV-SCNC: 28 MMOL/L — SIGNIFICANT CHANGE UP (ref 22–29)
HCT VFR BLD CALC: 38.4 % — SIGNIFICANT CHANGE UP (ref 34.5–45)
HGB BLD-MCNC: 12.1 G/DL — SIGNIFICANT CHANGE UP (ref 11.5–15.5)
IMM GRANULOCYTES NFR BLD AUTO: 0.3 % — SIGNIFICANT CHANGE UP (ref 0–1.5)
INR BLD: 1.05 — SIGNIFICANT CHANGE UP (ref 0.88–1.16)
KETONES UR-MCNC: NEGATIVE — SIGNIFICANT CHANGE UP
LACTATE SERPL-SCNC: 1.1 MMOL/L — SIGNIFICANT CHANGE UP (ref 0.5–2)
LEUKOCYTE ESTERASE UR-ACNC: NEGATIVE — SIGNIFICANT CHANGE UP
LYMPHOCYTES # BLD AUTO: 0.73 K/UL — LOW (ref 1–3.3)
LYMPHOCYTES # BLD AUTO: 10.1 % — LOW (ref 13–44)
MAGNESIUM SERPL-MCNC: 1.9 MG/DL — SIGNIFICANT CHANGE UP (ref 1.6–2.6)
MCHC RBC-ENTMCNC: 26.3 PG — LOW (ref 27–34)
MCHC RBC-ENTMCNC: 31.5 GM/DL — LOW (ref 32–36)
MCV RBC AUTO: 83.5 FL — SIGNIFICANT CHANGE UP (ref 80–100)
MONOCYTES # BLD AUTO: 0.85 K/UL — SIGNIFICANT CHANGE UP (ref 0–0.9)
MONOCYTES NFR BLD AUTO: 11.8 % — SIGNIFICANT CHANGE UP (ref 2–14)
NEUTROPHILS # BLD AUTO: 5.54 K/UL — SIGNIFICANT CHANGE UP (ref 1.8–7.4)
NEUTROPHILS NFR BLD AUTO: 76.8 % — SIGNIFICANT CHANGE UP (ref 43–77)
NITRITE UR-MCNC: NEGATIVE — SIGNIFICANT CHANGE UP
NRBC # BLD: 0 /100 WBCS — SIGNIFICANT CHANGE UP (ref 0–0)
NT-PROBNP SERPL-SCNC: 106 PG/ML — SIGNIFICANT CHANGE UP (ref 0–300)
PCO2 BLDV: 41 MMHG — SIGNIFICANT CHANGE UP (ref 39–42)
PH BLDV: 7.44 — HIGH (ref 7.32–7.43)
PH UR: 7 — SIGNIFICANT CHANGE UP (ref 5–8)
PHOSPHATE SERPL-MCNC: 3.8 MG/DL — SIGNIFICANT CHANGE UP (ref 2.5–4.5)
PLATELET # BLD AUTO: 282 K/UL — SIGNIFICANT CHANGE UP (ref 150–400)
PO2 BLDV: 45 MMHG — SIGNIFICANT CHANGE UP (ref 25–45)
POTASSIUM BLDV-SCNC: 3.9 MMOL/L — SIGNIFICANT CHANGE UP (ref 3.5–5.1)
POTASSIUM SERPL-MCNC: 4 MMOL/L — SIGNIFICANT CHANGE UP (ref 3.5–5.3)
POTASSIUM SERPL-SCNC: 4 MMOL/L — SIGNIFICANT CHANGE UP (ref 3.5–5.3)
PROT SERPL-MCNC: 7.7 G/DL — SIGNIFICANT CHANGE UP (ref 6–8.3)
PROT UR-MCNC: NEGATIVE MG/DL — SIGNIFICANT CHANGE UP
PROTHROM AB SERPL-ACNC: 12.6 SEC — SIGNIFICANT CHANGE UP (ref 10.6–13.6)
RAPID RVP RESULT: DETECTED
RBC # BLD: 4.6 M/UL — SIGNIFICANT CHANGE UP (ref 3.8–5.2)
RBC # FLD: 14.3 % — SIGNIFICANT CHANGE UP (ref 10.3–14.5)
SAO2 % BLDV: 77.3 % — SIGNIFICANT CHANGE UP (ref 67–88)
SARS-COV-2 RNA SPEC QL NAA+PROBE: DETECTED
SODIUM SERPL-SCNC: 138 MMOL/L — SIGNIFICANT CHANGE UP (ref 135–145)
SP GR SPEC: 1.01 — SIGNIFICANT CHANGE UP (ref 1–1.03)
TROPONIN T SERPL-MCNC: <0.01 NG/ML — SIGNIFICANT CHANGE UP (ref 0–0.01)
UROBILINOGEN FLD QL: 0.2 E.U./DL — SIGNIFICANT CHANGE UP
WBC # BLD: 7.21 K/UL — SIGNIFICANT CHANGE UP (ref 3.8–10.5)
WBC # FLD AUTO: 7.21 K/UL — SIGNIFICANT CHANGE UP (ref 3.8–10.5)

## 2021-12-31 PROCEDURE — 99285 EMERGENCY DEPT VISIT HI MDM: CPT

## 2021-12-31 PROCEDURE — 71045 X-RAY EXAM CHEST 1 VIEW: CPT | Mod: 26

## 2021-12-31 PROCEDURE — 93010 ELECTROCARDIOGRAM REPORT: CPT

## 2021-12-31 PROCEDURE — 99223 1ST HOSP IP/OBS HIGH 75: CPT | Mod: GC

## 2021-12-31 RX ORDER — TIOTROPIUM BROMIDE 18 UG/1
1 CAPSULE ORAL; RESPIRATORY (INHALATION) DAILY
Refills: 0 | Status: DISCONTINUED | OUTPATIENT
Start: 2021-12-31 | End: 2021-12-31

## 2021-12-31 RX ORDER — METHOCARBAMOL 500 MG/1
2 TABLET, FILM COATED ORAL
Qty: 0 | Refills: 0 | DISCHARGE

## 2021-12-31 RX ORDER — ENOXAPARIN SODIUM 100 MG/ML
40 INJECTION SUBCUTANEOUS EVERY 12 HOURS
Refills: 0 | Status: DISCONTINUED | OUTPATIENT
Start: 2021-12-31 | End: 2022-01-04

## 2021-12-31 RX ORDER — DEXAMETHASONE 0.5 MG/5ML
6 ELIXIR ORAL ONCE
Refills: 0 | Status: COMPLETED | OUTPATIENT
Start: 2021-12-31 | End: 2021-12-31

## 2021-12-31 RX ORDER — MELOXICAM 15 MG/1
1 TABLET ORAL
Qty: 0 | Refills: 0 | DISCHARGE

## 2021-12-31 RX ORDER — IPRATROPIUM/ALBUTEROL SULFATE 18-103MCG
3 AEROSOL WITH ADAPTER (GRAM) INHALATION ONCE
Refills: 0 | Status: COMPLETED | OUTPATIENT
Start: 2021-12-31 | End: 2021-12-31

## 2021-12-31 RX ORDER — FAMOTIDINE 10 MG/ML
40 INJECTION INTRAVENOUS DAILY
Refills: 0 | Status: DISCONTINUED | OUTPATIENT
Start: 2021-12-31 | End: 2022-01-04

## 2021-12-31 RX ORDER — FAMOTIDINE 10 MG/ML
40 INJECTION INTRAVENOUS ONCE
Refills: 0 | Status: COMPLETED | OUTPATIENT
Start: 2021-12-31 | End: 2021-12-31

## 2021-12-31 RX ORDER — LEVOTHYROXINE SODIUM 125 MCG
175 TABLET ORAL DAILY
Refills: 0 | Status: DISCONTINUED | OUTPATIENT
Start: 2021-12-31 | End: 2022-01-04

## 2021-12-31 RX ORDER — BRIMONIDINE TARTRATE 2 MG/MG
1 SOLUTION/ DROPS OPHTHALMIC
Refills: 0 | Status: DISCONTINUED | OUTPATIENT
Start: 2021-12-31 | End: 2022-01-04

## 2021-12-31 RX ORDER — SENNA PLUS 8.6 MG/1
2 TABLET ORAL AT BEDTIME
Refills: 0 | Status: DISCONTINUED | OUTPATIENT
Start: 2021-12-31 | End: 2022-01-04

## 2021-12-31 RX ORDER — ONDANSETRON 8 MG/1
4 TABLET, FILM COATED ORAL EVERY 8 HOURS
Refills: 0 | Status: DISCONTINUED | OUTPATIENT
Start: 2021-12-31 | End: 2021-12-31

## 2021-12-31 RX ORDER — IPRATROPIUM/ALBUTEROL SULFATE 18-103MCG
3 AEROSOL WITH ADAPTER (GRAM) INHALATION EVERY 6 HOURS
Refills: 0 | Status: DISCONTINUED | OUTPATIENT
Start: 2021-12-31 | End: 2022-01-01

## 2021-12-31 RX ORDER — ACETAMINOPHEN 500 MG
650 TABLET ORAL ONCE
Refills: 0 | Status: COMPLETED | OUTPATIENT
Start: 2021-12-31 | End: 2021-12-31

## 2021-12-31 RX ORDER — LANOLIN ALCOHOL/MO/W.PET/CERES
3 CREAM (GRAM) TOPICAL AT BEDTIME
Refills: 0 | Status: DISCONTINUED | OUTPATIENT
Start: 2021-12-31 | End: 2021-12-31

## 2021-12-31 RX ORDER — ATORVASTATIN CALCIUM 80 MG/1
40 TABLET, FILM COATED ORAL AT BEDTIME
Refills: 0 | Status: DISCONTINUED | OUTPATIENT
Start: 2021-12-31 | End: 2022-01-04

## 2021-12-31 RX ORDER — AMLODIPINE BESYLATE 2.5 MG/1
10 TABLET ORAL DAILY
Refills: 0 | Status: DISCONTINUED | OUTPATIENT
Start: 2021-12-31 | End: 2022-01-04

## 2021-12-31 RX ORDER — ALBUTEROL 90 UG/1
2 AEROSOL, METERED ORAL EVERY 6 HOURS
Refills: 0 | Status: DISCONTINUED | OUTPATIENT
Start: 2021-12-31 | End: 2022-01-04

## 2021-12-31 RX ORDER — KETOROLAC TROMETHAMINE 30 MG/ML
15 SYRINGE (ML) INJECTION ONCE
Refills: 0 | Status: DISCONTINUED | OUTPATIENT
Start: 2021-12-31 | End: 2021-12-31

## 2021-12-31 RX ORDER — POLYETHYLENE GLYCOL 3350 17 G/17G
17 POWDER, FOR SOLUTION ORAL DAILY
Refills: 0 | Status: DISCONTINUED | OUTPATIENT
Start: 2021-12-31 | End: 2022-01-04

## 2021-12-31 RX ORDER — LATANOPROST 0.05 MG/ML
1 SOLUTION/ DROPS OPHTHALMIC; TOPICAL AT BEDTIME
Refills: 0 | Status: DISCONTINUED | OUTPATIENT
Start: 2021-12-31 | End: 2022-01-04

## 2021-12-31 RX ORDER — LOSARTAN POTASSIUM 100 MG/1
100 TABLET, FILM COATED ORAL DAILY
Refills: 0 | Status: DISCONTINUED | OUTPATIENT
Start: 2021-12-31 | End: 2022-01-04

## 2021-12-31 RX ORDER — DORZOLAMIDE HYDROCHLORIDE TIMOLOL MALEATE 20; 5 MG/ML; MG/ML
1 SOLUTION/ DROPS OPHTHALMIC
Refills: 0 | Status: DISCONTINUED | OUTPATIENT
Start: 2021-12-31 | End: 2022-01-04

## 2021-12-31 RX ORDER — VALACYCLOVIR 500 MG/1
1 TABLET, FILM COATED ORAL
Qty: 0 | Refills: 0 | DISCHARGE

## 2021-12-31 RX ORDER — VANCOMYCIN HCL 1 G
1000 VIAL (EA) INTRAVENOUS ONCE
Refills: 0 | Status: COMPLETED | OUTPATIENT
Start: 2021-12-31 | End: 2021-12-31

## 2021-12-31 RX ORDER — ACETAMINOPHEN 500 MG
650 TABLET ORAL EVERY 6 HOURS
Refills: 0 | Status: DISCONTINUED | OUTPATIENT
Start: 2021-12-31 | End: 2022-01-04

## 2021-12-31 RX ORDER — GABAPENTIN 400 MG/1
300 CAPSULE ORAL
Refills: 0 | Status: DISCONTINUED | OUTPATIENT
Start: 2021-12-31 | End: 2022-01-04

## 2021-12-31 RX ORDER — PIPERACILLIN AND TAZOBACTAM 4; .5 G/20ML; G/20ML
3.38 INJECTION, POWDER, LYOPHILIZED, FOR SOLUTION INTRAVENOUS ONCE
Refills: 0 | Status: COMPLETED | OUTPATIENT
Start: 2021-12-31 | End: 2021-12-31

## 2021-12-31 RX ORDER — KETOROLAC TROMETHAMINE 30 MG/ML
15 SYRINGE (ML) INJECTION ONCE
Refills: 0 | Status: DISCONTINUED | OUTPATIENT
Start: 2022-01-01 | End: 2021-12-31

## 2021-12-31 RX ADMIN — Medication 15 MILLIGRAM(S): at 18:30

## 2021-12-31 RX ADMIN — BRIMONIDINE TARTRATE 1 DROP(S): 2 SOLUTION/ DROPS OPHTHALMIC at 22:34

## 2021-12-31 RX ADMIN — Medication 250 MILLIGRAM(S): at 11:49

## 2021-12-31 RX ADMIN — Medication 650 MILLIGRAM(S): at 10:57

## 2021-12-31 RX ADMIN — AMLODIPINE BESYLATE 10 MILLIGRAM(S): 2.5 TABLET ORAL at 18:14

## 2021-12-31 RX ADMIN — ENOXAPARIN SODIUM 40 MILLIGRAM(S): 100 INJECTION SUBCUTANEOUS at 22:34

## 2021-12-31 RX ADMIN — Medication 15 MILLIGRAM(S): at 12:43

## 2021-12-31 RX ADMIN — ATORVASTATIN CALCIUM 40 MILLIGRAM(S): 80 TABLET, FILM COATED ORAL at 22:33

## 2021-12-31 RX ADMIN — POLYETHYLENE GLYCOL 3350 17 GRAM(S): 17 POWDER, FOR SOLUTION ORAL at 18:38

## 2021-12-31 RX ADMIN — Medication 15 MILLIGRAM(S): at 23:58

## 2021-12-31 RX ADMIN — FAMOTIDINE 40 MILLIGRAM(S): 10 INJECTION INTRAVENOUS at 18:14

## 2021-12-31 RX ADMIN — Medication 3 MILLILITER(S): at 11:11

## 2021-12-31 RX ADMIN — LOSARTAN POTASSIUM 100 MILLIGRAM(S): 100 TABLET, FILM COATED ORAL at 18:14

## 2021-12-31 RX ADMIN — Medication 15 MILLIGRAM(S): at 14:31

## 2021-12-31 RX ADMIN — GABAPENTIN 300 MILLIGRAM(S): 400 CAPSULE ORAL at 18:14

## 2021-12-31 RX ADMIN — LATANOPROST 1 DROP(S): 0.05 SOLUTION/ DROPS OPHTHALMIC; TOPICAL at 22:34

## 2021-12-31 RX ADMIN — DORZOLAMIDE HYDROCHLORIDE TIMOLOL MALEATE 1 DROP(S): 20; 5 SOLUTION/ DROPS OPHTHALMIC at 22:35

## 2021-12-31 RX ADMIN — SENNA PLUS 2 TABLET(S): 8.6 TABLET ORAL at 22:33

## 2021-12-31 RX ADMIN — PIPERACILLIN AND TAZOBACTAM 200 GRAM(S): 4; .5 INJECTION, POWDER, LYOPHILIZED, FOR SOLUTION INTRAVENOUS at 10:58

## 2021-12-31 RX ADMIN — Medication 6 MILLIGRAM(S): at 10:58

## 2021-12-31 RX ADMIN — Medication 15 MILLIGRAM(S): at 18:13

## 2021-12-31 RX ADMIN — Medication 650 MILLIGRAM(S): at 14:09

## 2021-12-31 RX ADMIN — Medication 3 MILLILITER(S): at 22:32

## 2021-12-31 NOTE — H&P ADULT - PROBLEM SELECTOR PLAN 2
Pt tested COVID+ today, received dose of Decadron in the ED  -Not currently hypoxic therefore not a candidate for Decadron/Remdesivir  -continue to monitor oxygen saturation  -as pt with COPD, goal O2 >88%  -f/u CRP, D-dimer, ferritin

## 2021-12-31 NOTE — PATIENT PROFILE ADULT - FALL HARM RISK - HARM RISK INTERVENTIONS

## 2021-12-31 NOTE — ED PROVIDER NOTE - CONSTITUTIONAL, MLM
normal... Overweight female, Well appearing, awake, alert, oriented and in no apparent distress. Skin is warm to touch.

## 2021-12-31 NOTE — H&P ADULT - PROBLEM SELECTOR PLAN 7
Immediate Brief Procedure Note    Patient: Reddy Engel    Pre-op Dx: tarsal tunnel right   Neuritis right leg     Post-op Dx: same    Procedure: Right tarsal tunnel release via general  Neurolysis right leg     Surgeon:  Sher De DPM    Assistants: none    Anesthesia Staff: Anesthesiologist: Varun Lan MD    Anesthesia Type: 20 cc mix 2% lidocaine/.5% marcaine right ankle    Findings: tarsal tunnel right     Estimated Blood Loss: none    Complications: none    Specimens Removed: none  
F: None   E: Replete as necessary K>4 Mg>2  N: DASH/TLC     DVT Prophylaxis: Lovenox 40mg daily   GI prophylaxis: Famotidine 40mg Qd  CODE STATUS: FULL

## 2021-12-31 NOTE — ED PROVIDER NOTE - ENMT, MLM
Airway patent, Nasal mucosa clear. Mouth with normal mucosa. Throat has no vesicles, no oropharyngeal exudates and uvula is midline. Throat clear b/l, TM normal b/l.

## 2021-12-31 NOTE — H&P ADULT - NSICDXPASTMEDICALHX_GEN_ALL_CORE_FT
PAST MEDICAL HISTORY:  Breast cancer     COPD exacerbation     Epilepsy     Graves disease     HTN (hypertension)       Diverticulitis     DM (diabetes mellitus)     GERD (gastroesophageal reflux disease)     Hypothyroidism      numerical 0-10

## 2021-12-31 NOTE — ED ADULT NURSE NOTE - ISOLATION TYPE:
Goal 2: Patient staff will follow swallow safety recommendations to decrease risk of penetration/aspiration during PO intake.      General  Chart Reviewed: Yes  Behavior/Cognition: Alert; Cooperative  O2 Device: None (Room air)  Communication Observation: (SLP ranked functional intelligibility of speech for unfamiliar listeners at 100% in utterances without background noise present.)  Follows Directions: Simple   Dentition: Adequate  Patient Positioning: Upright in bed  Consistencies Administered: Regular solid; Thin - cup    Diet Tolerance:  Patient tolerating current diet level without signs/symptoms of penetration/aspiration. P.O. Trials: Thin    No overt s/s of aspiration. Timely swallow for age. Nectar       Honey       Puree       Solid    Minimal residue. Cleared independently. Impressions:  Pt has no new CXR to compare. No congested lung sounds. No elevated WBC. Pt presents with functional swallow without s/s of aspiration at the bedside. Pt had procedure today and was placed NPO. Pt tolerated regular diet trials without difficulty. SLP recommends upgrade to regular diet with cardiac restrictions per MD. SLP recommends to continue thin liquids, meds in applesauce, and intermittent supervision with meal and meal set-up assistance. Plan:  Continued daily Dysphagia treatment with goals per plan of care.          GINETTE Isaac   Electronically signed by Laura Costa on 1/1/21 at 11:52 AM CST None

## 2021-12-31 NOTE — H&P ADULT - NSHPLABSRESULTS_GEN_ALL_CORE
LABS:                         12.1   7.21  )-----------( 282      ( 31 Dec 2021 10:28 )             38.4         138  |  101  |  20  ----------------------------<  138<H>  4.0   |  26  |  0.72    Ca    9.4      31 Dec 2021 10:28  Phos  3.8       Mg     1.9         TPro  7.7  /  Alb  4.2  /  TBili  0.2  /  DBili  x   /  AST  30  /  ALT  28  /  AlkPhos  129<H>      PT/INR - ( 31 Dec 2021 10:28 )   PT: 12.6 sec;   INR: 1.05          PTT - ( 31 Dec 2021 10:28 )  PTT:79.3 sec  Urinalysis Basic - ( 31 Dec 2021 09:43 )    Color: Yellow / Appearance: Clear / S.015 / pH: x  Gluc: x / Ketone: NEGATIVE  / Bili: Negative / Urobili: 0.2 E.U./dL   Blood: x / Protein: NEGATIVE mg/dL / Nitrite: NEGATIVE   Leuk Esterase: NEGATIVE / RBC: x / WBC x   Sq Epi: x / Non Sq Epi: x / Bacteria: x      CARDIAC MARKERS ( 31 Dec 2021 10:28 )  x     / <0.01 ng/mL / x     / x     / x          Serum Pro-Brain Natriuretic Peptide: 106 pg/mL ( @ 10:28)    Lactate, Blood: 1.1 mmol/L ( @ 10:28)      RADIOLOGY, EKG & ADDITIONAL TESTS:

## 2021-12-31 NOTE — ED ADULT TRIAGE NOTE - CHIEF COMPLAINT QUOTE
SOB since last night. + cough, fever, sore throat. speaking in short sentences.  PMH of COPD, diverticulitis, breast ca in remission

## 2021-12-31 NOTE — ED PROVIDER NOTE - OBJECTIVE STATEMENT
62 y/o F PMHx COPD, former smoker (stopped 20 years ago), HTN, DM, remote h/o breast ca 20 years in remission (radiation with b/l mastectomy and reconstruction), COPD, diverticulitis, hypothyroidism, GERD, SHx hernia repair. Recent admission to [facility] for community acquired PNA at the end of last month. Presents with 5-6 days of "not feeling well" with SOB and states "it is hard to breathe". States for the past week she has been experiencing fevers, cough that is sometimes productive of yellow sputum, nausea with some episodes of "acid-like" emesis, body aches, myalgias, throat pain, ear pain, fatigue, and generalized malaise.  Pt was seen 2 days ago for similar symptoms and had a complete sepsis workup which was all negative, including SARS-CoV-2, influenza, and RSV. She was then discharge home. Reports that since then, her symptoms have worsened. She has been taking Tylenol for fevers with no real relief, last intake last night. Denies urinary symptoms and diarrhea. Pt also c/o mild headache. No neck stiffness. 60 y/o F PMHx COPD, former smoker (stopped 20 years ago), HTN, DM, remote h/o breast ca 20 years in remission (radiation with b/l mastectomy and reconstruction), COPD, diverticulitis, hypothyroidism, GERD, SHx hernia repair. Recent admission to [facility] for community acquired PNA at the end of last month. Presents with 5-6 days of "not feeling well" with SOB and states "it is hard to breathe". States for the past week she has been experiencing fevers, cough that is sometimes productive of yellow sputum, nausea with some episodes of "acid-like" emesis, body aches, myalgias, throat pain, ear pain, fatigue, and generalized malaise.  Pt was seen 2 days ago for similar symptoms and had a complete sepsis workup which was all negative, including SARS-CoV-2, influenza, and RSV. She was then discharge home. Reports that since then, her symptoms have worsened. She has been taking Tylenol for fevers with no real relief, last intake last night. Denies urinary symptoms and diarrhea. Pt also c/o mild headache. No neck stiffness. Pt is fully vaccinated for Covid 19 X 2 doses (did not get the booster) and had the flu vaccine this season. 62 y/o F PMHx COPD, former smoker (stopped 20 years ago), HTN, DM, remote h/o breast ca 20 years in remission (radiation with b/l mastectomy and reconstruction), COPD, diverticulitis, hypothyroidism, GERD, SHx hernia repair, recent admission to [facility] for community acquired PNA at the end of last month, presents with 5-6 days of "not feeling well" with SOB and states "it is hard to breathe". States for the past week she has been experiencing fevers, cough that is sometimes productive of yellow sputum, nausea with some episodes of "acid-like" emesis, body aches, myalgias, throat pain, ear pain, fatigue, and generalized malaise.  Pt was seen 2 days ago for similar symptoms and had a complete sepsis workup which was all negative, including SARS-CoV-2, influenza, and RSV. She was then discharged home. Reports that since then, her symptoms have worsened. She has been taking Tylenol for fevers with no real relief, last intake last night. Denies urinary symptoms and diarrhea. Pt also c/o mild headache. No neck stiffness. Pt is fully vaccinated for Covid 19 X 2 doses (did not get the booster) and had the flu vaccine this season.

## 2021-12-31 NOTE — ED PROVIDER NOTE - CLINICAL SUMMARY MEDICAL DECISION MAKING FREE TEXT BOX
60 y/o F PMHx COPD, former smoker, HTN, DM, remote h/o breast ca 20 years in remission (radiation with b/l mastectomy and reconstruction), COPD, diverticulitis, hypothyroidism, GERD, SHx hernia repair. Presents for fevers, SOB, cough that is intermittently productive, nausea with some episodes of "acid-like" emesis, body aches, myalgias, throat pain, ear pain, fatigue, and generalized malaise x 5-6 days.  ED Course: Vital signs noted. Pt if febrile at 102.7F, heart rate 130, pulse ox 96% on RA, blood pressure WNL. Sepsis workup initiated including RVP and blood cultures. Will also obtain xray. EKG shows sinus tachycardia. Pt given Tylenol, duonebs x 2, decadron, and IV fluids. Will contact PCP Dr. Isaacs. 60 y/o F PMHx COPD, former smoker, HTN, DM, remote h/o breast ca 20 years in remission (radiation with b/l mastectomy and reconstruction), COPD, diverticulitis, hypothyroidism, GERD, SHx hernia repair. Presents for fevers, SOB, cough that is intermittently productive, nausea with some episodes of "acid-like" emesis, body aches, myalgias, throat pain, ear pain, fatigue, and generalized malaise x 5-6 days.  ED Course: Vital signs noted. Pt if febrile at 102.7F, heart rate 130, pulse ox 96% on RA, blood pressure WNL. Sepsis workup initiated including RVP and blood cultures. Will also obtain xray. EKG shows sinus tachycardia. Pt given Tylenol, duonebs x 2, decadron, and IV fluids. Labs/ studies noted. CXR with NAD. Pt tested positive for Covid 19 today. Case discussed with pt;s PCP- Dr. Isaacs and pt to be admitted to him for COPD exacerbation and Covid 19. Findings and plan discussed with pt. Pt improved in ED.

## 2021-12-31 NOTE — H&P ADULT - PROBLEM SELECTOR PLAN 4
-Pt on home amlodipine 10mg and losartan 100mg  -continue with hold parameters - c/w home amlodipine 10mg and losartan 100mg

## 2021-12-31 NOTE — H&P ADULT - ASSESSMENT
61F PMHx COPD (remote smoking Hx), HTN, preDM, Diverticulitis, Hypothyroidism, GERD, Hernia repair with recent admission for CAP. P/w SOB x1d, Fever Tmax 103F, with worsening expectorate cough. Found to be COVID +. Admittted for SOB, stable on RA

## 2021-12-31 NOTE — H&P ADULT - TIME BILLING
Patient seen and examined with house-staff during bedside rounds.  Resident note read, including vitals, physical findings, laboratory data, and radiological reports.   Revisions included below.  Direct personal management at bed side and extensive interpretation of the data.  Plan was outlined and discussed in details with the housestaff.  Decision making of high complexity  Action taken for acute disease activity to reflect the level of care provided:  - medication reconciliation  - review laboratory data  I discussed the case with the ER attending and resident.  She is positive for covid.  Sat is normal. Continue off antiviral.  Follow sat.  Continue inhalers.  Nasal steroids.  Follow cultures

## 2021-12-31 NOTE — ED PROVIDER NOTE - NSICDXPASTMEDICALHX_GEN_ALL_CORE_FT
PAST MEDICAL HISTORY:  Breast cancer     COPD exacerbation     Epilepsy     Graves disease     HTN (hypertension)       Diverticulitis     DM (diabetes mellitus)     GERD (gastroesophageal reflux disease)     Hypothyroidism

## 2021-12-31 NOTE — H&P ADULT - PROBLEM SELECTOR PLAN 3
On anoro ellipta and albuterol at home  -albuterol q6h On anoro ellipta and albuterol at home  - c/w Duoneb nebulizer 3ml q6h

## 2021-12-31 NOTE — H&P ADULT - NSHPPHYSICALEXAM_GEN_ALL_CORE
.  VITAL SIGNS:  T(C): 36.9 (12-31-21 @ 15:30), Max: 39.3 (12-31-21 @ 08:47)  T(F): 98.5 (12-31-21 @ 15:30), Max: 102.7 (12-31-21 @ 08:47)  HR: 76 (12-31-21 @ 15:30) (76 - 130)  BP: 146/85 (12-31-21 @ 15:30) (133/68 - 146/85)  BP(mean): --  RR: 18 (12-31-21 @ 15:30) (18 - 22)  SpO2: 95% (12-31-21 @ 15:30) (95% - 99%)  Wt(kg): --    PHYSICAL EXAM:    Constitutional: WDWN resting comfortably in bed; NAD  Head: NC/AT  Eyes: PERRL, EOMI, clear conjunctiva  ENT: no nasal discharge; uvula midline, no oropharyngeal erythema or exudates; MMM  Neck: supple; no JVD or thyromegaly  Respiratory: CTA B/L; no W/R/R, no retractions  Cardiac: +S1/S2; RRR; no M/R/G;  Gastrointestinal: soft, NT/ND; no rebound or guarding; +BSx4  Renal: L CVA tenderness  Extremities: WWP, no clubbing or cyanosis; no peripheral edema  Musculoskeletal: NROM x4; no joint swelling, tenderness or erythema  Vascular: 2+ radial, femoral, DP/PT pulses B/L  Dermatologic: skin warm, dry and intact; no rashes, wounds, or scars  Neurologic: AAOx3; CNII-XII grossly intact; no focal deficits  Psychiatric: affect and characteristics of appearance, verbalizations, behaviors are appropriate VITAL SIGNS:  T(C): 36.9 (12-31-21 @ 15:30), Max: 39.3 (12-31-21 @ 08:47)  T(F): 98.5 (12-31-21 @ 15:30), Max: 102.7 (12-31-21 @ 08:47)  HR: 76 (12-31-21 @ 15:30) (76 - 130)  BP: 146/85 (12-31-21 @ 15:30) (133/68 - 146/85)  BP(mean): --  RR: 18 (12-31-21 @ 15:30) (18 - 22)  SpO2: 95% (12-31-21 @ 15:30) (95% - 99%)  Wt(kg): --    PHYSICAL EXAM:  Constitutional: WDWN resting comfortably in bed; NAD  Head: NC/AT  Eyes: PERRL, EOMI, clear conjunctiva  ENT: no nasal discharge; uvula midline, no oropharyngeal erythema or exudates; MMM  Neck: supple; no JVD or thyromegaly  Respiratory: CTA B/L; no W/R/R, no retractions  Cardiac: +S1/S2; RRR; no M/R/G;  Gastrointestinal: soft, NT/ND; no rebound or guarding; +BSx4  Renal: L CVA tenderness  Extremities: WWP, no clubbing or cyanosis; no peripheral edema  Musculoskeletal: NROM x4; no joint swelling, tenderness or erythema  Vascular: 2+ radial, femoral, DP/PT pulses B/L  Dermatologic: skin warm, dry and intact; no rashes, wounds, or scars  Neurologic: AAOx3; CNII-XII grossly intact; no focal deficits  Psychiatric: affect and characteristics of appearance, verbalizations, behaviors are appropriate

## 2021-12-31 NOTE — H&P ADULT - HISTORY OF PRESENT ILLNESS
HPI: 61F PMHx COPD, former smoker (stopped 20 years ago), HTN, DM, remote h/o breast ca 20 years in remission (radiation with b/l mastectomy and reconstruction), diverticulitis, hypothyroidism, GERD, SHx hernia repair. Recent admission to [facility] for community acquired PNA at the end of last month. Presents with 5-6 days of "not feeling well" with SOB and states "it is hard to breathe". States for the past week she has been experiencing fevers, cough that is sometimes productive of yellow sputum, nausea with some episodes of "acid-like" emesis, body aches, myalgias, throat pain, ear pain, fatigue, and generalized malaise.  Pt was seen 2 days ago for similar symptoms and had a complete sepsis workup which was all negative, including SARS-CoV-2, influenza, and RSV. She was then discharge home. Reports that since then, her symptoms have worsened. She has been taking Tylenol for fevers with no real relief, last intake last night. Denies urinary symptoms and diarrhea. Pt also c/o mild headache. No neck stiffness. Pt is fully vaccinated for Covid 19 X 2 doses (did not get the booster) and had the flu vaccine this season.    In the ED,  VS: T   , HR   , BP    , RR    , SpO2     % (RA/NC)  Labs:  Urine:  EKG:  CXR:  Imaging: Other  Orders:    HPI: 61F PMHx COPD, former smoker (stopped 20 years ago), HTN, DM, remote h/o breast ca 20 years in remission (radiation with b/l mastectomy and reconstruction), diverticulitis, hypothyroidism, GERD, SHx hernia repair. Recent admission to [facility] for community acquired PNA at the end of last month. Presents with 5-6 days of "not feeling well" with SOB and states "it is hard to breathe". States for the past week she has been experiencing fevers, cough that is sometimes productive of yellow sputum, nausea with some episodes of "acid-like" emesis, body aches, myalgias, throat pain, ear pain, fatigue, and generalized malaise.  Pt was seen 2 days ago for similar symptoms and had a complete sepsis workup which was all negative, including SARS-CoV-2, influenza, and RSV. She was then discharge home. Reports that since then, her symptoms have worsened. She has been taking Tylenol for fevers with no real relief, last intake last night. Denies urinary symptoms and diarrhea. Pt also c/o mild headache. No neck stiffness. Pt is fully vaccinated for Covid 19 X 2 doses (did not get the booster) and had the flu vaccine this season.    In the ED,  VS: T102.7F, , /68, RR 22, SpO2 96%RA  Abnormal Labs: MCHC 31.5, aPTT 79.3, , VBG 7.44/41/45  Urine WNL  RVP: SARS CoV 2  Orders: Acetaminophen 650mg, Dexamethasone 6mg, Ketorolac 15mg, Zosyn 3.375g, Vancomycin 1g, Duoneb 3mlx2

## 2021-12-31 NOTE — H&P ADULT - PROBLEM SELECTOR PLAN 5
-On home levothyroxine 175mcg  -c/w home med  -f/u am TSH - c/w home levothyroxine 175mcg  - f/u am TSH    #Graves dz  - c/w Brimonidine, Timolol, Latanoprost

## 2021-12-31 NOTE — ED ADULT NURSE NOTE - OBJECTIVE STATEMENT
pt is a 62 y/o F PMHx COPD, former smoker (stopped 20 years ago), HTN, DM, remote h/o breast ca 20 years in remission (radiation with b/l mastectomy and reconstruction), COPD, diverticulitis, hypothyroidism, GERD, SHx hernia repair. Recent admission to [facility] for community acquired PNA at the end of last month. pt seen at [facility] ED 12/29/21, had sepsis workup which was negative and d/c home. per pt, since discharge has not felt better, c/o fever, productive cough, generalized body aches, nausea, and SOB, worse on exertion. Denies diarrhea, chest pain, palpitations, recent fall or syncopel. +covid exposure at home.

## 2021-12-31 NOTE — H&P ADULT - PROBLEM SELECTOR PLAN 1
SIRS 3/4VS: T102.7F, , /68, RR 22, SpO2 96%RA SIRS 3/4VS: T102.7F, , /68, RR 22, SpO2 96%RA  Likely 2/2 to COVID PNA, less likely bacterial infection given procalcitonin 0.04  -will hold off on further antibiotics for now  -f/u  urine legionella, urine strep  -f/u RVP SIRS 3/4 (T102.7F, , RR 22), likely 2/2 to COVID PNA, less likely bacterial infection given procalcitonin 0.04  -will hold off on further antibiotics for now  -f/u urine legionella, urine strep  -f/u RVP

## 2022-01-01 LAB
ANION GAP SERPL CALC-SCNC: 12 MMOL/L — SIGNIFICANT CHANGE UP (ref 5–17)
BASOPHILS # BLD AUTO: 0 K/UL — SIGNIFICANT CHANGE UP (ref 0–0.2)
BASOPHILS NFR BLD AUTO: 0 % — SIGNIFICANT CHANGE UP (ref 0–2)
BUN SERPL-MCNC: 27 MG/DL — HIGH (ref 7–23)
CALCIUM SERPL-MCNC: 9.1 MG/DL — SIGNIFICANT CHANGE UP (ref 8.4–10.5)
CHLORIDE SERPL-SCNC: 100 MMOL/L — SIGNIFICANT CHANGE UP (ref 96–108)
CO2 SERPL-SCNC: 26 MMOL/L — SIGNIFICANT CHANGE UP (ref 22–31)
CREAT SERPL-MCNC: 0.78 MG/DL — SIGNIFICANT CHANGE UP (ref 0.5–1.3)
CULTURE RESULTS: SIGNIFICANT CHANGE UP
EOSINOPHIL # BLD AUTO: 0 K/UL — SIGNIFICANT CHANGE UP (ref 0–0.5)
EOSINOPHIL NFR BLD AUTO: 0 % — SIGNIFICANT CHANGE UP (ref 0–6)
GLUCOSE SERPL-MCNC: 173 MG/DL — HIGH (ref 70–99)
HCT VFR BLD CALC: 34.6 % — SIGNIFICANT CHANGE UP (ref 34.5–45)
HGB BLD-MCNC: 11.2 G/DL — LOW (ref 11.5–15.5)
IMM GRANULOCYTES NFR BLD AUTO: 0.3 % — SIGNIFICANT CHANGE UP (ref 0–1.5)
LEGIONELLA AG UR QL: NEGATIVE — SIGNIFICANT CHANGE UP
LYMPHOCYTES # BLD AUTO: 0.71 K/UL — LOW (ref 1–3.3)
LYMPHOCYTES # BLD AUTO: 23.3 % — SIGNIFICANT CHANGE UP (ref 13–44)
MAGNESIUM SERPL-MCNC: 2.4 MG/DL — SIGNIFICANT CHANGE UP (ref 1.6–2.6)
MCHC RBC-ENTMCNC: 27.7 PG — SIGNIFICANT CHANGE UP (ref 27–34)
MCHC RBC-ENTMCNC: 32.4 GM/DL — SIGNIFICANT CHANGE UP (ref 32–36)
MCV RBC AUTO: 85.4 FL — SIGNIFICANT CHANGE UP (ref 80–100)
MONOCYTES # BLD AUTO: 0.31 K/UL — SIGNIFICANT CHANGE UP (ref 0–0.9)
MONOCYTES NFR BLD AUTO: 10.2 % — SIGNIFICANT CHANGE UP (ref 2–14)
NEUTROPHILS # BLD AUTO: 2.02 K/UL — SIGNIFICANT CHANGE UP (ref 1.8–7.4)
NEUTROPHILS NFR BLD AUTO: 66.2 % — SIGNIFICANT CHANGE UP (ref 43–77)
NRBC # BLD: 0 /100 WBCS — SIGNIFICANT CHANGE UP (ref 0–0)
PHOSPHATE SERPL-MCNC: 4.6 MG/DL — HIGH (ref 2.5–4.5)
PLATELET # BLD AUTO: 273 K/UL — SIGNIFICANT CHANGE UP (ref 150–400)
POTASSIUM SERPL-MCNC: 3.3 MMOL/L — LOW (ref 3.5–5.3)
POTASSIUM SERPL-SCNC: 3.3 MMOL/L — LOW (ref 3.5–5.3)
RBC # BLD: 4.05 M/UL — SIGNIFICANT CHANGE UP (ref 3.8–5.2)
RBC # FLD: 14.3 % — SIGNIFICANT CHANGE UP (ref 10.3–14.5)
S PNEUM AG UR QL: NEGATIVE — SIGNIFICANT CHANGE UP
SODIUM SERPL-SCNC: 138 MMOL/L — SIGNIFICANT CHANGE UP (ref 135–145)
SPECIMEN SOURCE: SIGNIFICANT CHANGE UP
WBC # BLD: 3.05 K/UL — LOW (ref 3.8–10.5)
WBC # FLD AUTO: 3.05 K/UL — LOW (ref 3.8–10.5)

## 2022-01-01 PROCEDURE — 99232 SBSQ HOSP IP/OBS MODERATE 35: CPT

## 2022-01-01 RX ORDER — TIOTROPIUM BROMIDE 18 UG/1
1 CAPSULE ORAL; RESPIRATORY (INHALATION) DAILY
Refills: 0 | Status: DISCONTINUED | OUTPATIENT
Start: 2022-01-01 | End: 2022-01-04

## 2022-01-01 RX ORDER — BENZOCAINE AND MENTHOL 5; 1 G/100ML; G/100ML
1 LIQUID ORAL THREE TIMES A DAY
Refills: 0 | Status: DISCONTINUED | OUTPATIENT
Start: 2022-01-01 | End: 2022-01-04

## 2022-01-01 RX ORDER — KETOROLAC TROMETHAMINE 30 MG/ML
15 SYRINGE (ML) INJECTION ONCE
Refills: 0 | Status: DISCONTINUED | OUTPATIENT
Start: 2022-01-01 | End: 2022-01-02

## 2022-01-01 RX ORDER — KETOROLAC TROMETHAMINE 30 MG/ML
15 SYRINGE (ML) INJECTION ONCE
Refills: 0 | Status: DISCONTINUED | OUTPATIENT
Start: 2022-01-01 | End: 2022-01-01

## 2022-01-01 RX ORDER — BENZOCAINE AND MENTHOL 5; 1 G/100ML; G/100ML
1 LIQUID ORAL THREE TIMES A DAY
Refills: 0 | Status: DISCONTINUED | OUTPATIENT
Start: 2022-01-01 | End: 2022-01-01

## 2022-01-01 RX ADMIN — BRIMONIDINE TARTRATE 1 DROP(S): 2 SOLUTION/ DROPS OPHTHALMIC at 06:24

## 2022-01-01 RX ADMIN — DORZOLAMIDE HYDROCHLORIDE TIMOLOL MALEATE 1 DROP(S): 20; 5 SOLUTION/ DROPS OPHTHALMIC at 06:25

## 2022-01-01 RX ADMIN — TIOTROPIUM BROMIDE 1 CAPSULE(S): 18 CAPSULE ORAL; RESPIRATORY (INHALATION) at 11:22

## 2022-01-01 RX ADMIN — Medication 175 MICROGRAM(S): at 06:22

## 2022-01-01 RX ADMIN — BRIMONIDINE TARTRATE 1 DROP(S): 2 SOLUTION/ DROPS OPHTHALMIC at 17:51

## 2022-01-01 RX ADMIN — ATORVASTATIN CALCIUM 40 MILLIGRAM(S): 80 TABLET, FILM COATED ORAL at 22:21

## 2022-01-01 RX ADMIN — POLYETHYLENE GLYCOL 3350 17 GRAM(S): 17 POWDER, FOR SOLUTION ORAL at 11:23

## 2022-01-01 RX ADMIN — Medication 15 MILLIGRAM(S): at 14:13

## 2022-01-01 RX ADMIN — LOSARTAN POTASSIUM 100 MILLIGRAM(S): 100 TABLET, FILM COATED ORAL at 06:21

## 2022-01-01 RX ADMIN — Medication 3 MILLILITER(S): at 04:16

## 2022-01-01 RX ADMIN — ALBUTEROL 2 PUFF(S): 90 AEROSOL, METERED ORAL at 21:01

## 2022-01-01 RX ADMIN — LATANOPROST 1 DROP(S): 0.05 SOLUTION/ DROPS OPHTHALMIC; TOPICAL at 22:21

## 2022-01-01 RX ADMIN — GABAPENTIN 300 MILLIGRAM(S): 400 CAPSULE ORAL at 06:22

## 2022-01-01 RX ADMIN — BENZOCAINE AND MENTHOL 1 LOZENGE: 5; 1 LIQUID ORAL at 13:10

## 2022-01-01 RX ADMIN — ENOXAPARIN SODIUM 40 MILLIGRAM(S): 100 INJECTION SUBCUTANEOUS at 22:21

## 2022-01-01 RX ADMIN — FAMOTIDINE 40 MILLIGRAM(S): 10 INJECTION INTRAVENOUS at 11:22

## 2022-01-01 RX ADMIN — ENOXAPARIN SODIUM 40 MILLIGRAM(S): 100 INJECTION SUBCUTANEOUS at 11:22

## 2022-01-01 RX ADMIN — AMLODIPINE BESYLATE 10 MILLIGRAM(S): 2.5 TABLET ORAL at 06:22

## 2022-01-01 RX ADMIN — Medication 650 MILLIGRAM(S): at 04:57

## 2022-01-01 RX ADMIN — Medication 15 MILLIGRAM(S): at 13:58

## 2022-01-01 RX ADMIN — Medication 15 MILLIGRAM(S): at 00:15

## 2022-01-01 RX ADMIN — DORZOLAMIDE HYDROCHLORIDE TIMOLOL MALEATE 1 DROP(S): 20; 5 SOLUTION/ DROPS OPHTHALMIC at 17:51

## 2022-01-01 RX ADMIN — GABAPENTIN 300 MILLIGRAM(S): 400 CAPSULE ORAL at 17:51

## 2022-01-01 RX ADMIN — BENZOCAINE AND MENTHOL 1 LOZENGE: 5; 1 LIQUID ORAL at 06:22

## 2022-01-01 RX ADMIN — BENZOCAINE AND MENTHOL 1 LOZENGE: 5; 1 LIQUID ORAL at 22:21

## 2022-01-01 RX ADMIN — Medication 650 MILLIGRAM(S): at 05:57

## 2022-01-01 NOTE — PROGRESS NOTE ADULT - PROBLEM SELECTOR PLAN 2
Pt tested COVID+, received dose of Decadron in the ED  -Not currently hypoxic therefore not a candidate for Decadron/Remdesivir  -continue to monitor oxygen saturation  -as pt with COPD, goal O2 >88%  -patient reports she is homeless but has been living with her sister. Pt's sister said she would be unable to quarantine at her home  -patient requesting information regarding covid isolation hotel. will work with CM/SW Pt tested COVID+, received dose of Decadron in the ED  -Not currently hypoxic therefore not a candidate for Decadron/Remdesivir  -continue to monitor oxygen saturation  -as pt with COPD, goal O2 >88%  -symptomatic management. patient complaining of a headache today and requesting toradol, given x1  -patient reports she is homeless but has been living with her sister. Pt's sister said she would be unable to quarantine at her home  -patient requesting information regarding covid isolation hot. will work with CM/SW

## 2022-01-01 NOTE — PROVIDER CONTACT NOTE (OTHER) - BACKGROUND
Patient has a right chest port that is accessed for IV medications and fluids. Nursing not allowed to draw blood form chest port.

## 2022-01-02 PROCEDURE — 99232 SBSQ HOSP IP/OBS MODERATE 35: CPT

## 2022-01-02 RX ORDER — KETOROLAC TROMETHAMINE 30 MG/ML
15 SYRINGE (ML) INJECTION ONCE
Refills: 0 | Status: DISCONTINUED | OUTPATIENT
Start: 2022-01-02 | End: 2022-01-02

## 2022-01-02 RX ORDER — IPRATROPIUM/ALBUTEROL SULFATE 18-103MCG
3 AEROSOL WITH ADAPTER (GRAM) INHALATION ONCE
Refills: 0 | Status: COMPLETED | OUTPATIENT
Start: 2022-01-02 | End: 2022-01-02

## 2022-01-02 RX ADMIN — Medication 1 DROP(S): at 21:56

## 2022-01-02 RX ADMIN — LOSARTAN POTASSIUM 100 MILLIGRAM(S): 100 TABLET, FILM COATED ORAL at 06:40

## 2022-01-02 RX ADMIN — BENZOCAINE AND MENTHOL 1 LOZENGE: 5; 1 LIQUID ORAL at 13:21

## 2022-01-02 RX ADMIN — ENOXAPARIN SODIUM 40 MILLIGRAM(S): 100 INJECTION SUBCUTANEOUS at 11:38

## 2022-01-02 RX ADMIN — Medication 15 MILLIGRAM(S): at 00:03

## 2022-01-02 RX ADMIN — Medication 650 MILLIGRAM(S): at 08:03

## 2022-01-02 RX ADMIN — Medication 15 MILLIGRAM(S): at 16:12

## 2022-01-02 RX ADMIN — BRIMONIDINE TARTRATE 1 DROP(S): 2 SOLUTION/ DROPS OPHTHALMIC at 06:43

## 2022-01-02 RX ADMIN — ENOXAPARIN SODIUM 40 MILLIGRAM(S): 100 INJECTION SUBCUTANEOUS at 21:57

## 2022-01-02 RX ADMIN — BENZOCAINE AND MENTHOL 1 LOZENGE: 5; 1 LIQUID ORAL at 06:40

## 2022-01-02 RX ADMIN — BRIMONIDINE TARTRATE 1 DROP(S): 2 SOLUTION/ DROPS OPHTHALMIC at 18:47

## 2022-01-02 RX ADMIN — BENZOCAINE AND MENTHOL 1 LOZENGE: 5; 1 LIQUID ORAL at 21:56

## 2022-01-02 RX ADMIN — GABAPENTIN 300 MILLIGRAM(S): 400 CAPSULE ORAL at 17:30

## 2022-01-02 RX ADMIN — Medication 650 MILLIGRAM(S): at 07:03

## 2022-01-02 RX ADMIN — Medication 15 MILLIGRAM(S): at 15:57

## 2022-01-02 RX ADMIN — FAMOTIDINE 40 MILLIGRAM(S): 10 INJECTION INTRAVENOUS at 11:39

## 2022-01-02 RX ADMIN — GABAPENTIN 300 MILLIGRAM(S): 400 CAPSULE ORAL at 06:40

## 2022-01-02 RX ADMIN — Medication 15 MILLIGRAM(S): at 00:18

## 2022-01-02 RX ADMIN — Medication 1 DROP(S): at 06:41

## 2022-01-02 RX ADMIN — LATANOPROST 1 DROP(S): 0.05 SOLUTION/ DROPS OPHTHALMIC; TOPICAL at 21:57

## 2022-01-02 RX ADMIN — DORZOLAMIDE HYDROCHLORIDE TIMOLOL MALEATE 1 DROP(S): 20; 5 SOLUTION/ DROPS OPHTHALMIC at 06:44

## 2022-01-02 RX ADMIN — ALBUTEROL 2 PUFF(S): 90 AEROSOL, METERED ORAL at 06:43

## 2022-01-02 RX ADMIN — Medication 175 MICROGRAM(S): at 06:41

## 2022-01-02 RX ADMIN — DORZOLAMIDE HYDROCHLORIDE TIMOLOL MALEATE 1 DROP(S): 20; 5 SOLUTION/ DROPS OPHTHALMIC at 18:47

## 2022-01-02 RX ADMIN — SENNA PLUS 2 TABLET(S): 8.6 TABLET ORAL at 21:57

## 2022-01-02 RX ADMIN — AMLODIPINE BESYLATE 10 MILLIGRAM(S): 2.5 TABLET ORAL at 06:40

## 2022-01-02 RX ADMIN — TIOTROPIUM BROMIDE 1 CAPSULE(S): 18 CAPSULE ORAL; RESPIRATORY (INHALATION) at 11:58

## 2022-01-02 RX ADMIN — Medication 3 MILLILITER(S): at 08:51

## 2022-01-02 RX ADMIN — ATORVASTATIN CALCIUM 40 MILLIGRAM(S): 80 TABLET, FILM COATED ORAL at 21:57

## 2022-01-02 NOTE — PROVIDER CONTACT NOTE (OTHER) - SITUATION
B/L arm restrictions, am blood draw not done per phlebotomist.
pt states she gets cpap machine for mode over night, respiratory team is unable to find order

## 2022-01-02 NOTE — PROGRESS NOTE ADULT - PROBLEM SELECTOR PLAN 2
Pt tested COVID+, received dose of Decadron in the ED  -Not currently hypoxic therefore not a candidate for Decadron/Remdesivir  -continue to monitor oxygen saturation  -as pt with COPD, goal O2 >88%  -symptomatic management. patient complaining of a headache today and requesting toradol, given x1  -patient reports she is homeless but has been living with her sister. Pt's sister said she would be unable to quarantine at her home  -patient requesting information regarding covid isolation hot. will work with CM/SW

## 2022-01-03 ENCOUNTER — TRANSCRIPTION ENCOUNTER (OUTPATIENT)
Age: 62
End: 2022-01-03

## 2022-01-03 LAB
ALBUMIN SERPL ELPH-MCNC: 4 G/DL — SIGNIFICANT CHANGE UP (ref 3.3–5)
ALP SERPL-CCNC: 96 U/L — SIGNIFICANT CHANGE UP (ref 40–120)
ALT FLD-CCNC: 20 U/L — SIGNIFICANT CHANGE UP (ref 10–45)
ANION GAP SERPL CALC-SCNC: 10 MMOL/L — SIGNIFICANT CHANGE UP (ref 5–17)
AST SERPL-CCNC: 18 U/L — SIGNIFICANT CHANGE UP (ref 10–40)
BILIRUB SERPL-MCNC: <0.2 MG/DL — SIGNIFICANT CHANGE UP (ref 0.2–1.2)
BUN SERPL-MCNC: 32 MG/DL — HIGH (ref 7–23)
CALCIUM SERPL-MCNC: 8.4 MG/DL — SIGNIFICANT CHANGE UP (ref 8.4–10.5)
CHLORIDE SERPL-SCNC: 107 MMOL/L — SIGNIFICANT CHANGE UP (ref 96–108)
CO2 SERPL-SCNC: 24 MMOL/L — SIGNIFICANT CHANGE UP (ref 22–31)
CREAT SERPL-MCNC: 0.97 MG/DL — SIGNIFICANT CHANGE UP (ref 0.5–1.3)
CULTURE RESULTS: SIGNIFICANT CHANGE UP
CULTURE RESULTS: SIGNIFICANT CHANGE UP
GLUCOSE SERPL-MCNC: 146 MG/DL — HIGH (ref 70–99)
POTASSIUM SERPL-MCNC: 4.2 MMOL/L — SIGNIFICANT CHANGE UP (ref 3.5–5.3)
POTASSIUM SERPL-SCNC: 4.2 MMOL/L — SIGNIFICANT CHANGE UP (ref 3.5–5.3)
PROT SERPL-MCNC: 6.8 G/DL — SIGNIFICANT CHANGE UP (ref 6–8.3)
SODIUM SERPL-SCNC: 141 MMOL/L — SIGNIFICANT CHANGE UP (ref 135–145)
SPECIMEN SOURCE: SIGNIFICANT CHANGE UP
SPECIMEN SOURCE: SIGNIFICANT CHANGE UP

## 2022-01-03 PROCEDURE — 99232 SBSQ HOSP IP/OBS MODERATE 35: CPT | Mod: GC

## 2022-01-03 RX ORDER — KETOROLAC TROMETHAMINE 30 MG/ML
15 SYRINGE (ML) INJECTION ONCE
Refills: 0 | Status: DISCONTINUED | OUTPATIENT
Start: 2022-01-03 | End: 2022-01-03

## 2022-01-03 RX ADMIN — GABAPENTIN 300 MILLIGRAM(S): 400 CAPSULE ORAL at 06:36

## 2022-01-03 RX ADMIN — BENZOCAINE AND MENTHOL 1 LOZENGE: 5; 1 LIQUID ORAL at 21:21

## 2022-01-03 RX ADMIN — BRIMONIDINE TARTRATE 1 DROP(S): 2 SOLUTION/ DROPS OPHTHALMIC at 18:28

## 2022-01-03 RX ADMIN — Medication 1 DROP(S): at 18:30

## 2022-01-03 RX ADMIN — BRIMONIDINE TARTRATE 1 DROP(S): 2 SOLUTION/ DROPS OPHTHALMIC at 06:37

## 2022-01-03 RX ADMIN — LATANOPROST 1 DROP(S): 0.05 SOLUTION/ DROPS OPHTHALMIC; TOPICAL at 21:24

## 2022-01-03 RX ADMIN — BENZOCAINE AND MENTHOL 1 LOZENGE: 5; 1 LIQUID ORAL at 16:08

## 2022-01-03 RX ADMIN — ENOXAPARIN SODIUM 40 MILLIGRAM(S): 100 INJECTION SUBCUTANEOUS at 11:14

## 2022-01-03 RX ADMIN — Medication 1 DROP(S): at 16:07

## 2022-01-03 RX ADMIN — ATORVASTATIN CALCIUM 40 MILLIGRAM(S): 80 TABLET, FILM COATED ORAL at 21:22

## 2022-01-03 RX ADMIN — Medication 1 DROP(S): at 21:20

## 2022-01-03 RX ADMIN — DORZOLAMIDE HYDROCHLORIDE TIMOLOL MALEATE 1 DROP(S): 20; 5 SOLUTION/ DROPS OPHTHALMIC at 18:28

## 2022-01-03 RX ADMIN — AMLODIPINE BESYLATE 10 MILLIGRAM(S): 2.5 TABLET ORAL at 06:36

## 2022-01-03 RX ADMIN — LOSARTAN POTASSIUM 100 MILLIGRAM(S): 100 TABLET, FILM COATED ORAL at 06:38

## 2022-01-03 RX ADMIN — Medication 650 MILLIGRAM(S): at 21:20

## 2022-01-03 RX ADMIN — Medication 175 MICROGRAM(S): at 06:37

## 2022-01-03 RX ADMIN — Medication 15 MILLIGRAM(S): at 04:03

## 2022-01-03 RX ADMIN — DORZOLAMIDE HYDROCHLORIDE TIMOLOL MALEATE 1 DROP(S): 20; 5 SOLUTION/ DROPS OPHTHALMIC at 06:37

## 2022-01-03 RX ADMIN — ENOXAPARIN SODIUM 40 MILLIGRAM(S): 100 INJECTION SUBCUTANEOUS at 21:23

## 2022-01-03 RX ADMIN — Medication 15 MILLIGRAM(S): at 04:33

## 2022-01-03 RX ADMIN — TIOTROPIUM BROMIDE 1 CAPSULE(S): 18 CAPSULE ORAL; RESPIRATORY (INHALATION) at 16:33

## 2022-01-03 RX ADMIN — FAMOTIDINE 40 MILLIGRAM(S): 10 INJECTION INTRAVENOUS at 11:15

## 2022-01-03 RX ADMIN — SENNA PLUS 2 TABLET(S): 8.6 TABLET ORAL at 21:23

## 2022-01-03 RX ADMIN — GABAPENTIN 300 MILLIGRAM(S): 400 CAPSULE ORAL at 18:30

## 2022-01-03 RX ADMIN — BENZOCAINE AND MENTHOL 1 LOZENGE: 5; 1 LIQUID ORAL at 06:36

## 2022-01-03 RX ADMIN — Medication 1 DROP(S): at 11:11

## 2022-01-03 RX ADMIN — POLYETHYLENE GLYCOL 3350 17 GRAM(S): 17 POWDER, FOR SOLUTION ORAL at 11:15

## 2022-01-03 RX ADMIN — Medication 1 DROP(S): at 06:36

## 2022-01-03 NOTE — DISCHARGE NOTE PROVIDER - NSDCMRMEDTOKEN_GEN_ALL_CORE_FT
albuterol 90 mcg/inh inhalation powder: 2 puff(s) inhaled every 6 hours, As Needed  amLODIPine 10 mg oral tablet: 1 tab(s) orally once a day  Anoro Ellipta 62.5 mcg-25 mcg/inh inhalation powder: 1 puff(s) inhaled once a day  atorvastatin 10 mg oral tablet: 1 tab(s) orally once a day  brimonidine 0.2% ophthalmic solution: 1 drop(s) to each affected eye 3 times a day  dorzolamide-timolol 2.23%-0.68% ophthalmic solution: 1 drop(s) to each affected eye 2 times a day  famotidine 40 mg oral tablet: 1 tab(s) orally once a day (at bedtime)  gabapentin 300 mg oral capsule: 1 cap(s) orally 2 times a day  latanoprost 0.005% ophthalmic solution: 1 drop(s) to each affected eye once a day (in the evening)  levothyroxine 175 mcg (0.175 mg) oral tablet: 1 tab(s) orally once a day  losartan 100 mg oral tablet: 1 tab(s) orally once a day  polyethylene glycol 3350 oral powder for reconstitution: 17 gram(s) orally once a day  senna oral tablet: 2 tab(s) orally once a day (at bedtime)

## 2022-01-03 NOTE — PROGRESS NOTE ADULT - PROBLEM SELECTOR PLAN 6
- c/w home Famotidine 40mg Qd

## 2022-01-03 NOTE — PROGRESS NOTE ADULT - ASSESSMENT
61F PMHx COPD (remote smoking Hx), HTN, preDM, Diverticulitis, Hypothyroidism, GERD, Hernia repair with recent admission for CAP. P/w SOB x1d, Fever Tmax 103F, with worsening expectorate cough. Found to be COVID +. Admittted for SOB, stable on RA
61F PMHx COPD (remote smoking Hx), HTN, preDM, Diverticulitis, Hypothyroidism, GERD, Hernia repair with recent admission for CAP. P/w SOB x1d, Fever Tmax 103F, with worsening expectorate cough. Found to be COVID +. Admittted for SOB, stable on RA, pending dispo.
61F PMHx COPD (remote smoking Hx), HTN, preDM, Diverticulitis, Hypothyroidism, GERD, Hernia repair with recent admission for CAP. P/w SOB x1d, Fever Tmax 103F, with worsening expectorate cough. Found to be COVID +. Admittted for SOB, stable on RA, pending dispo to [facility].
61F PMHx COPD (remote smoking Hx), HTN, preDM, Diverticulitis, Hypothyroidism, GERD, Hernia repair with recent admission for CAP. P/w SOB x1d, Fever Tmax 103F, with worsening expectorate cough. Found to be COVID +. Admittted for SOB, stable on RA, pending dispo to [facility].

## 2022-01-03 NOTE — DISCHARGE NOTE PROVIDER - NSDCCAREPROVSEEN_GEN_ALL_CORE_FT
Bety Isaacs Bety Isaacs A  Patient seen and examined with house-staff during bedside rounds.  Resident note read, including vitals, physical findings, laboratory data, and radiological reports.   Revisions included below.  Direct personal management at bed side and extensive interpretation of the data.  Plan was outlined and discussed in details with the housestaff.  Decision making of high complexity  Action taken for acute disease activity to reflect the level of care provided:  - medication reconciliation  - review laboratory data  Patient clinically stable.  Patient is not short of breath.  We will follow-up in the sleep study as an outpatient her oxygen saturation is normal.  She is tolerating the noninvasive ventilation at night.  The patient continue bronchodilators.  For discharge to the [facility] today

## 2022-01-03 NOTE — PROGRESS NOTE ADULT - PROBLEM SELECTOR PLAN 5
- c/w home levothyroxine 175mcg  - f/u am TSH    #Graves dz  - c/w Brimonidine, Timolol, Latanoprost
- c/w home levothyroxine 175mcg    #Graves dz  - c/w Brimonidine, Timolol, Latanoprost

## 2022-01-03 NOTE — PROGRESS NOTE ADULT - PROBLEM SELECTOR PLAN 3
On anoro ellipta and albuterol at home  -started spiriva (therapeutic interchange)  -discontinued standing duoneb
On anoro ellipta and albuterol at home  - c/w Duoneb nebulizer 3ml q6h

## 2022-01-03 NOTE — DISCHARGE NOTE PROVIDER - HOSPITAL COURSE
#Discharge: do not delete    61F PMHx COPD (remote smoking Hx), HTN, preDM, Diverticulitis, Hypothyroidism, GERD, Hernia repair with recent admission for CAP. P/w SOB x1d, Fever Tmax 103F, with worsening expectorate cough. Found to be COVID +. Admittted for SOB, stable on RA, pending dispo to [facility].    Hospital course (by problem):   # SIRS (systemic inflammatory response syndrome).   -SIRS 3/4 (T102.7F, , RR 22), likely 2/2 to COVID PNA, less likely bacterial infection given procalcitonin 0.04  -no antibiotics during hospitalization  -urine legionella and strep negative  -covid management as listed below.     #Pneumonia due to COVID-19 virus.   -Pt tested COVID+, received dose of Decadron in the ED  -Not further hypoxic during stay, therefore not a candidate for Decadron/Remdesivir  -continue to monitor oxygen saturation  -as pt with COPD, goal O2 >88%  -Pt's sister said she would be unable to quarantine at her home  -pt d/c to [facility]    #Chronic obstructive pulmonary disease (COPD).   -On anoro ellipta and albuterol at home  -started spiriva (therapeutic interchange) during admission. recommend home medications on d/c     # HTN (hypertension).   - c/w home amlodipine 10mg and losartan 100mg.    #Hypothyroidism.   - c/w home levothyroxine 175mcg    #Graves dz  - c/w Brimonidine, Timolol, Latanoprost.    #GERD (gastroesophageal reflux disease).   - c/w home Famotidine 40mg Qd.    Patient was discharged to: [facility]    New medications: None  Changes to old medications: None  Medications that were stopped: None    Items to follow up as outpatient: PCP       #Discharge: do not delete    61F PMHx COPD (remote smoking Hx), HTN, preDM, Diverticulitis, Hypothyroidism, GERD, Hernia repair with recent admission for CAP. P/w SOB x1d, Fever Tmax 103F, with worsening expectorate cough. Found to be COVID +. Admittted for SOB, stable on RA. Quarantine completed on 1/4. Pt discharged to home.    Hospital course (by problem):   # SIRS (systemic inflammatory response syndrome).   -SIRS 3/4 (T102.7F, , RR 22), likely 2/2 to COVID PNA, less likely bacterial infection given procalcitonin 0.04  -no antibiotics during hospitalization  -urine legionella and strep negative  -covid management as listed below.     #Pneumonia due to COVID-19 virus.   -Pt tested COVID+, received dose of Decadron in the ED  -Not further hypoxic during stay, therefore not a candidate for Decadron/Remdesivir  -continue to monitor oxygen saturation  -as pt with COPD, goal O2 >88%  -patient completed quarantine on 1/4 and discharge to home    #Chronic obstructive pulmonary disease (COPD).   -On anoro ellipta and albuterol at home  -started spiriva (therapeutic interchange) during admission. recommend home medications on d/c     # HTN (hypertension).   - c/w home amlodipine 10mg and losartan 100mg.    #Hypothyroidism.   - c/w home levothyroxine 175mcg    #Graves dz  - c/w Brimonidine, Timolol, Latanoprost.    #GERD (gastroesophageal reflux disease).   - c/w home Famotidine 40mg Qd.    Patient was discharged to: Home    New medications: None  Changes to old medications: None  Medications that were stopped: None    Items to follow up as outpatient: PCP, Sleep apnea study

## 2022-01-03 NOTE — PROGRESS NOTE ADULT - PROBLEM SELECTOR PLAN 4
- c/w home amlodipine 10mg and losartan 100mg

## 2022-01-03 NOTE — DISCHARGE NOTE PROVIDER - NSDCFUSCHEDAPPT_GEN_ALL_CORE_FT
ZAC VELEZ [facility] ; 01/10/2022 ; NPP OrthoSurg [address]  ZAC VELEZ [facility] ; 01/24/2022 ; DARRYN In[facility]  ZAC VELEZ [facility] ; 03/07/2022 ; NPANNETTE Ophthal [address]

## 2022-01-03 NOTE — PROGRESS NOTE ADULT - PROBLEM SELECTOR PROBLEM 3
Chronic obstructive pulmonary disease (COPD)

## 2022-01-03 NOTE — PROGRESS NOTE ADULT - PROBLEM SELECTOR PLAN 7
F: None   E: Replete as necessary K>4 Mg>2  N: DASH/TLC     DVT Prophylaxis: Lovenox 40mg daily   GI prophylaxis: Famotidine 40mg Qd  CODE STATUS: FULL  Dispo: [facility], will consider d/c to [facility]
F: None   E: Replete as necessary K>4 Mg>2  N: DASH/TLC     DVT Prophylaxis: Lovenox 40mg daily   GI prophylaxis: Famotidine 40mg Qd  CODE STATUS: FULL  Dispo: [facility], pending further dispo planning
F: None   E: Replete as necessary K>4 Mg>2  N: DASH/TLC     DVT Prophylaxis: Lovenox 40mg daily   GI prophylaxis: Famotidine 40mg Qd  CODE STATUS: FULL
F: None   E: Replete as necessary K>4 Mg>2  N: DASH/TLC     DVT Prophylaxis: Lovenox 40mg daily   GI prophylaxis: Famotidine 40mg Qd  CODE STATUS: FULL  Dispo: [facility], will consider d/c to [facility]

## 2022-01-03 NOTE — DISCHARGE NOTE PROVIDER - NSDCCPCAREPLAN_GEN_ALL_CORE_FT
PRINCIPAL DISCHARGE DIAGNOSIS  Diagnosis: 2019 novel coronavirus disease (COVID-19)  Assessment and Plan of Treatment: During your admission, you tested positive for covid-19. This test resulted from a nasal and oral (mouth) swab that was done earlier in your admission to [facility]. Your symptoms improved during your hospital stay. The treatment is supportive care, which means: rest, hydration, and maintaining a good healthy diet. You may take tylenol if you experience any fevers and Robitussin for cough. If you start experiencing extreme shortness of breath, difficulty breathing resulting in the inability to even walk, please visit an urgent care. Please maintain a 10 day quarantine. The department of health will followup with you via phone, please do not go to your PCP while in self quarantine. Wear a mask at all times should you have to be outdoors briefly - and avoid crowds, public spaces, and mass transit at all times during this quarantine. Continue to wash your hands frequently. Please see the supplemented written instructions for further use.         PRINCIPAL DISCHARGE DIAGNOSIS  Diagnosis: 2019 novel coronavirus disease (COVID-19)  Assessment and Plan of Treatment: During your admission, you tested positive for covid-19. This test resulted from a nasal and oral (mouth) swab that was done earlier in your admission to [facility]. Your symptoms improved during your hospital stay. The treatment is supportive care, which means: rest, hydration, and maintaining a good healthy diet. You may take tylenol if you experience any fevers and Robitussin for cough. If you start experiencing extreme shortness of breath, difficulty breathing resulting in the inability to even walk, please visit an urgent care. You completed your 10 day quarantine from first onset of your symptoms, while in the hospital. Wear a mask at all times should you have to be outdoors briefly - and avoid crowds, public spaces, and mass transit. Continue to wash your hands frequently. Please see the supplemented written instructions for further use.        SECONDARY DISCHARGE DIAGNOSES  Diagnosis: Obstructive sleep apnea  Assessment and Plan of Treatment: On discharge, we recommend you follow up with your PCP in order to have a sleep study conducted to determine if you need cpap while sleeping. Obstructive sleep apnea is due to episodes of complete or partial obstruction of the upper airway, which leads to reduced breathing during sleep. CPAP will help deliver air and reduce these episodes of "apnea."

## 2022-01-03 NOTE — PROGRESS NOTE ADULT - PROBLEM SELECTOR PLAN 2
Pt tested COVID+, received dose of Decadron in the ED  -Not currently hypoxic therefore not a candidate for Decadron/Remdesivir  -continue to monitor oxygen saturation  -as pt with COPD, goal O2 >88%  -symptomatic management. patient complaining of a headache today and requesting toradol, given x1  -patient reports she is homeless but has been living with her sister. Pt's sister said she would be unable to quarantine at her home  -pt unable to be discharged to [facility] due to cpap use. pending further dispo planning

## 2022-01-03 NOTE — DISCHARGE NOTE PROVIDER - CARE PROVIDER_API CALL
Bety Isaacs)  Critical Care Medicine; Pulmonary Disease  [address] [place] [address]  Phone: [phone]  Fax: [phone]  Established Patient  Follow Up Time:

## 2022-01-03 NOTE — PROGRESS NOTE ADULT - PROBLEM SELECTOR PLAN 1
SIRS 3/4 (T102.7F, , RR 22), likely 2/2 to COVID PNA, less likely bacterial infection given procalcitonin 0.04  -will hold off on further antibiotics for now  -f/u urine legionella, urine strep  -f/u RVP
SIRS 3/4 (T102.7F, , RR 22), likely 2/2 to COVID PNA, less likely bacterial infection given procalcitonin 0.04  -will hold off on further antibiotics for now  -urine legionella and strep negative  -covid management as listed below

## 2022-01-03 NOTE — PROGRESS NOTE ADULT - SUBJECTIVE AND OBJECTIVE BOX
OVERNIGHT EVENTS: Patient reports using cpap at home and started on it o/n.    SUBJECTIVE:  Patient seen and examined at bedside. States her symptoms are now improving, but still persist. She continues to complain of body aches and cough. Says she does not feel short of breath.     Vital Signs Last 12 Hrs  T(F): 97.6 (01-03-22 @ 06:46), Max: 97.6 (01-03-22 @ 06:46)  HR: 61 (01-03-22 @ 08:30) (56 - 61)  BP: 117/70 (01-03-22 @ 06:46) (117/70 - 117/70)  BP(mean): --  RR: 18 (01-03-22 @ 08:30) (18 - 19)  SpO2: 98% (01-03-22 @ 08:30) (98% - 100%)  I&O's Summary      PHYSICAL EXAM:  Constitutional: laying comfortably in bed, no acute respiratory distress  Head: NC/AT  Eyes: PERRL, EOMI, clear conjunctiva  ENT: no nasal discharge;  MMM  Neck: supple; no JVD  Respiratory: CTA B/L; no W/R/R, no retractions  Cardiac: +S1/S2; RRR; no M/R/G;  Gastrointestinal: soft, NT/ND; no rebound or guarding; +BSx4  Extremities: WWP; no peripheral edema  Musculoskeletal: NROM x4;  Vascular: 2+ radial, DP/PT pulses B/L  Dermatologic: skin warm, dry and intact  Neurologic: AAOx3; CNII-XII grossly intact; no focal deficits  Psychiatric: affect and characteristics of appearance, verbalizations, behaviors are appropriate      LABS:                  RADIOLOGY & ADDITIONAL TESTS:    MEDICATIONS  (STANDING):  amLODIPine   Tablet 10 milliGRAM(s) Oral daily  artificial tears (preservative free) Ophthalmic Solution 1 Drop(s) Both EYES every 3 hours  atorvastatin 40 milliGRAM(s) Oral at bedtime  benzocaine 15 mG/menthol 3.6 mG Lozenge 1 Lozenge Oral three times a day  brimonidine 0.2% Ophthalmic Solution 1 Drop(s) Both EYES two times a day  dorzolamide 2%/timolol 0.5% Ophthalmic Solution 1 Drop(s) Both EYES two times a day  enoxaparin Injectable 40 milliGRAM(s) SubCutaneous every 12 hours  famotidine    Tablet 40 milliGRAM(s) Oral daily  gabapentin 300 milliGRAM(s) Oral two times a day  latanoprost 0.005% Ophthalmic Solution 1 Drop(s) Both EYES at bedtime  levothyroxine 175 MICROGram(s) Oral daily  losartan 100 milliGRAM(s) Oral daily  polyethylene glycol 3350 17 Gram(s) Oral daily  senna 2 Tablet(s) Oral at bedtime  tiotropium 18 MICROgram(s) Capsule 1 Capsule(s) Inhalation daily    MEDICATIONS  (PRN):  acetaminophen     Tablet .. 650 milliGRAM(s) Oral every 6 hours PRN Temp greater or equal to 38C (100.4F), Mild Pain (1 - 3)  ALBUTerol    90 MICROgram(s) HFA Inhaler 2 Puff(s) Inhalation every 6 hours PRN Shortness of Breath and/or Wheezing  
Interval Events: Reviewed  Patient seen and examined at bedside.    Patient is a 61y old  Female who presents with a chief complaint of SOB (2022 11:09)  no acute event overnight, has body ache, no chills, no fever, no sob, RA 97%      PAST MEDICAL & SURGICAL HISTORY:  Graves disease    HTN (hypertension)    COPD exacerbation    Epilepsy    Breast cancer    H/O bilateral mastectomy        MEDICATIONS:  Pulmonary:  ALBUTerol    90 MICROgram(s) HFA Inhaler 2 Puff(s) Inhalation every 6 hours PRN  tiotropium 18 MICROgram(s) Capsule 1 Capsule(s) Inhalation daily    Antimicrobials:    Anticoagulants:  enoxaparin Injectable 40 milliGRAM(s) SubCutaneous every 12 hours    Cardiac:  amLODIPine   Tablet 10 milliGRAM(s) Oral daily  losartan 100 milliGRAM(s) Oral daily      Allergies    iodine (Anaphylaxis)  IV Contrast (Short breath)  latex (Anaphylaxis)  lisinopril (Short breath)  shellfish (Anaphylaxis)    Intolerances        Vital Signs Last 24 Hrs  T(C): 36.7 (2022 06:50), Max: 36.7 (2022 13:20)  T(F): 98.1 (2022 06:50), Max: 98.1 (2022 06:50)  HR: 61 (2022 06:50) (61 - 67)  BP: 133/76 (2022 06:50) (118/75 - 144/84)  BP(mean): --  RR: 16 (2022 06:50) (16 - 18)  SpO2: 95% (2022 06:50) (95% - 97%)        Review of Systems:   •	General: negative  •	Skin/Breast: negative  •	Ophthalmologic: negative  •	ENMT: negative  •	Respiratory and Thorax: negative  •	Cardiovascular: negative  •	Gastrointestinal: negative  •	Genitourinary: negative  •	Musculoskeletal: negative  •	Neurological: negative  •	Psychiatric: negative  •	Hematology/Lymphatics: negative  •	Endocrine: negative  •	Allergic/Immunologic: negative    Physical Exam:   • Constitutional:	Well-developed, well nourished  • Eyes:	EOMI; PERRL; no drainage or redness  • ENMT:	No oral lesions; no gross abnormalities  • Neck	no thyromegaly or nodules  • Breasts:	not examined  • Back:	No deformity or limitation of movement  • Respiratory:	Breath Sounds equal & clear to auscultation, no accessory muscle use  • Cardiovascular:	Regular rate & rhythm, normal S1, S2; no murmurs, gallops or rubs; no S3, S4  • Gastrointestinal:	Soft, non-tender, no hepatosplenomegaly, normal bowel sounds  • Genitourinary:	not examined  • Rectal: not examined  • Extremities:	No cyanosis, clubbing or edema  • Vascular:	Equal and normal pulses (dorsalis pedis)  • Neurologica:l	not examined  • Skin:	No lesions; no rash  • Lymph Nodes:	No lymphadedenopathy  • Musculoskeletal:	No joint pain, swelling or deformity; no limitation of movement        LABS:      CBC Full  -  ( 2022 11:39 )  WBC Count : 3.05 K/uL  RBC Count : 4.05 M/uL  Hemoglobin : 11.2 g/dL  Hematocrit : 34.6 %  Platelet Count - Automated : 273 K/uL  Mean Cell Volume : 85.4 fl  Mean Cell Hemoglobin : 27.7 pg  Mean Cell Hemoglobin Concentration : 32.4 gm/dL  Auto Neutrophil # : 2.02 K/uL  Auto Lymphocyte # : 0.71 K/uL  Auto Monocyte # : 0.31 K/uL  Auto Eosinophil # : 0.00 K/uL  Auto Basophil # : 0.00 K/uL  Auto Neutrophil % : 66.2 %  Auto Lymphocyte % : 23.3 %  Auto Monocyte % : 10.2 %  Auto Eosinophil % : 0.0 %  Auto Basophil % : 0.0 %        138  |  100  |  27<H>  ----------------------------<  173<H>  3.3<L>   |  26  |  0.78    Ca    9.1      2022 11:39  Phos  4.6     -  Mg     2.4         TPro  7.7  /  Alb  4.2  /  TBili  0.2  /  DBili  x   /  AST  30  /  ALT  28  /  AlkPhos  129<H>  12-31    PT/INR - ( 31 Dec 2021 10:28 )   PT: 12.6 sec;   INR: 1.05          PTT - ( 31 Dec 2021 10:28 )  PTT:79.3 sec      Urinalysis Basic - ( 31 Dec 2021 09:43 )    Color: Yellow / Appearance: Clear / S.015 / pH: x  Gluc: x / Ketone: NEGATIVE  / Bili: Negative / Urobili: 0.2 E.U./dL   Blood: x / Protein: NEGATIVE mg/dL / Nitrite: NEGATIVE   Leuk Esterase: NEGATIVE / RBC: x / WBC x   Sq Epi: x / Non Sq Epi: x / Bacteria: x              Culture Results:   No growth at 1 day. ( @ 13:34)  Culture Results:   No growth at 1 day. ( @ 13:34)  Culture Results:   30,000 CFU/ml Staphylococcus haemolyticus ( @ 11:24)      RADIOLOGY & ADDITIONAL STUDIES (The following images were personally reviewed):  Gunter:                                     No  Urine output:                       adequate  DVT prophylaxis:                 Yes  Flattus:                                  Yes  Bowel movement:              No  
OVERNIGHT EVENTS: DARINEL    SUBJECTIVE:  Patient seen and examined at bedside. States she feels fatigued and continues to have a cough but is unable to produce sputum. Pt denies feeling febrile overnight or having chills. Denies chest pain or shortness of breath, leg pain or leg swelling.     ROS negative unless stated above.     Vital Signs Last 12 Hrs  T(F): 98 (22 @ 05:08), Max: 98 (22 @ 05:08)  HR: 74 (22 @ 05:08) (74 - 74)  BP: 130/79 (22 @ 05:08) (130/79 - 130/79)  BP(mean): --  RR: 19 (22 @ 05:08) (19 - 19)  SpO2: 99% (22 @ 05:08) (99% - 99%)  I&O's Summary      PHYSICAL EXAM:  Constitutional: sitting up in bed, no acute respiratory distress  Head: NC/AT  Eyes: PERRL, EOMI, clear conjunctiva  ENT: no nasal discharge;  MMM  Neck: supple; no JVD  Respiratory: CTA B/L; no W/R/R, no retractions  Cardiac: +S1/S2; RRR; no M/R/G;  Gastrointestinal: soft, NT/ND; no rebound or guarding; +BSx4  Extremities: WWP; no peripheral edema  Musculoskeletal: NROM x4;  Vascular: 2+ radial, DP/PT pulses B/L  Dermatologic: skin warm, dry and intact  Neurologic: AAOx3; CNII-XII grossly intact; no focal deficits  Psychiatric: affect and characteristics of appearance, verbalizations, behaviors are appropriate        LABS:                        11.2   3.05  )-----------( 273      ( 2022 11:39 )             34.6     -    138  |  100  |  27<H>  ----------------------------<  173<H>  3.3<L>   |  26  |  0.78    Ca    9.1      2022 11:39  Phos  4.6     -  Mg     2.4         TPro  7.7  /  Alb  4.2  /  TBili  0.2  /  DBili  x   /  AST  30  /  ALT  28  /  AlkPhos  129<H>  12-31    PT/INR - ( 31 Dec 2021 10:28 )   PT: 12.6 sec;   INR: 1.05          PTT - ( 31 Dec 2021 10:28 )  PTT:79.3 sec  Urinalysis Basic - ( 31 Dec 2021 09:43 )    Color: Yellow / Appearance: Clear / S.015 / pH: x  Gluc: x / Ketone: NEGATIVE  / Bili: Negative / Urobili: 0.2 E.U./dL   Blood: x / Protein: NEGATIVE mg/dL / Nitrite: NEGATIVE   Leuk Esterase: NEGATIVE / RBC: x / WBC x   Sq Epi: x / Non Sq Epi: x / Bacteria: x          RADIOLOGY & ADDITIONAL TESTS:    MEDICATIONS  (STANDING):  amLODIPine   Tablet 10 milliGRAM(s) Oral daily  atorvastatin 40 milliGRAM(s) Oral at bedtime  benzocaine 15 mG/menthol 3.6 mG Lozenge 1 Lozenge Oral three times a day  brimonidine 0.2% Ophthalmic Solution 1 Drop(s) Both EYES two times a day  dorzolamide 2%/timolol 0.5% Ophthalmic Solution 1 Drop(s) Both EYES two times a day  enoxaparin Injectable 40 milliGRAM(s) SubCutaneous every 12 hours  famotidine    Tablet 40 milliGRAM(s) Oral daily  gabapentin 300 milliGRAM(s) Oral two times a day  latanoprost 0.005% Ophthalmic Solution 1 Drop(s) Both EYES at bedtime  levothyroxine 175 MICROGram(s) Oral daily  losartan 100 milliGRAM(s) Oral daily  polyethylene glycol 3350 17 Gram(s) Oral daily  senna 2 Tablet(s) Oral at bedtime  tiotropium 18 MICROgram(s) Capsule 1 Capsule(s) Inhalation daily    MEDICATIONS  (PRN):  acetaminophen     Tablet .. 650 milliGRAM(s) Oral every 6 hours PRN Temp greater or equal to 38C (100.4F), Mild Pain (1 - 3)  ALBUTerol    90 MICROgram(s) HFA Inhaler 2 Puff(s) Inhalation every 6 hours PRN Shortness of Breath and/or Wheezing  
Interval Events: Reviewed  Patient seen and examined at bedside.    Patient is a 61y old  Female who presents with a chief complaint of SOB (31 Dec 2021 16:39)  RA 95%, complain of body ache, sore throat, denies n/v/d, sob      PAST MEDICAL & SURGICAL HISTORY:  Graves disease    HTN (hypertension)    COPD exacerbation    Epilepsy    Breast cancer    H/O bilateral mastectomy        MEDICATIONS:  Pulmonary:  ALBUTerol    90 MICROgram(s) HFA Inhaler 2 Puff(s) Inhalation every 6 hours PRN  albuterol/ipratropium for Nebulization 3 milliLiter(s) Nebulizer every 6 hours  tiotropium 18 MICROgram(s) Capsule 1 Capsule(s) Inhalation daily    Antimicrobials:    Anticoagulants:  enoxaparin Injectable 40 milliGRAM(s) SubCutaneous every 12 hours    Cardiac:  amLODIPine   Tablet 10 milliGRAM(s) Oral daily  losartan 100 milliGRAM(s) Oral daily      Allergies    iodine (Anaphylaxis)  IV Contrast (Short breath)  latex (Anaphylaxis)  lisinopril (Short breath)  shellfish (Anaphylaxis)    Intolerances        Vital Signs Last 24 Hrs  T(C): 36.7 (2022 05:08), Max: 39.3 (31 Dec 2021 08:47)  T(F): 98 (2022 05:08), Max: 102.7 (31 Dec 2021 08:47)  HR: 74 (2022 05:08) (68 - 130)  BP: 130/79 (2022 05:08) (126/76 - 146/85)  BP(mean): --  RR: 19 (2022 05:08) (18 - 22)  SpO2: 99% (2022 05:08) (95% - 99%)        Review of Systems:   •	General: negative  •	Skin/Breast: negative  •	Ophthalmologic: negative  •	ENMT: negative  •	Respiratory and Thorax: negative  •	Cardiovascular: negative  •	Gastrointestinal: negative  •	Genitourinary: negative  •	Musculoskeletal: negative  •	Neurological: negative  •	Psychiatric: negative  •	Hematology/Lymphatics: negative  •	Endocrine: negative  •	Allergic/Immunologic: negative    Physical Exam:   • Constitutional:	Well-developed, well nourished  • Eyes:	EOMI; PERRL; no drainage or redness  • ENMT:	No oral lesions; no gross abnormalities  • Neck	no thyromegaly or nodules  • Breasts:	not examined  • Back:	No deformity or limitation of movement  • Respiratory:	Breath Sounds equal & clear to auscultation, no accessory muscle use  • Cardiovascular:	Regular rate & rhythm, normal S1, S2; no murmurs, gallops or rubs; no S3, S4  • Gastrointestinal:	Soft, non-tender, no hepatosplenomegaly, normal bowel sounds  • Genitourinary:	not examined  • Rectal: not examined  • Extremities:	No cyanosis, clubbing or edema  • Vascular:	Equal and normal pulses (dorsalis pedis)  • Neurologica:l	not examined  • Skin:	No lesions; no rash  • Lymph Nodes:	No lymphadedenopathy  • Musculoskeletal:	No joint pain, swelling or deformity; no limitation of movement        LABS:      CBC Full  -  ( 31 Dec 2021 10:28 )  WBC Count : 7.21 K/uL  RBC Count : 4.60 M/uL  Hemoglobin : 12.1 g/dL  Hematocrit : 38.4 %  Platelet Count - Automated : 282 K/uL  Mean Cell Volume : 83.5 fl  Mean Cell Hemoglobin : 26.3 pg  Mean Cell Hemoglobin Concentration : 31.5 gm/dL  Auto Neutrophil # : 5.54 K/uL  Auto Lymphocyte # : 0.73 K/uL  Auto Monocyte # : 0.85 K/uL  Auto Eosinophil # : 0.05 K/uL  Auto Basophil # : 0.02 K/uL  Auto Neutrophil % : 76.8 %  Auto Lymphocyte % : 10.1 %  Auto Monocyte % : 11.8 %  Auto Eosinophil % : 0.7 %  Auto Basophil % : 0.3 %        138  |  101  |  20  ----------------------------<  138<H>  4.0   |  26  |  0.72    Ca    9.4      31 Dec 2021 10:28  Phos  3.8     12  Mg     1.9         TPro  7.7  /  Alb  4.2  /  TBili  0.2  /  DBili  x   /  AST  30  /  ALT  28  /  AlkPhos  129<H>  12    PT/INR - ( 31 Dec 2021 10:28 )   PT: 12.6 sec;   INR: 1.05          PTT - ( 31 Dec 2021 10:28 )  PTT:79.3 sec      Urinalysis Basic - ( 31 Dec 2021 09:43 )    Color: Yellow / Appearance: Clear / S.015 / pH: x  Gluc: x / Ketone: NEGATIVE  / Bili: Negative / Urobili: 0.2 E.U./dL   Blood: x / Protein: NEGATIVE mg/dL / Nitrite: NEGATIVE   Leuk Esterase: NEGATIVE / RBC: x / WBC x   Sq Epi: x / Non Sq Epi: x / Bacteria: x              Culture Results:   No growth at 12 hours (12-31 @ 13:34)  Culture Results:   No growth at 12 hours (12-31 @ 13:34)      RADIOLOGY & ADDITIONAL STUDIES (The following images were personally reviewed):  Gunter:                                     No  Urine output:                       adequate  DVT prophylaxis:                 Yes  Flattus:                                  Yes  Bowel movement:              No

## 2022-01-03 NOTE — PROGRESS NOTE ADULT - TIME BILLING
Patient seen and examined with house-staff during bedside rounds.  Resident note read, including vitals, physical findings, laboratory data, and radiological reports.   Revisions included below.  Direct personal management at bed side and extensive interpretation of the data.  Plan was outlined and discussed in details with the housestaff.  Decision making of high complexity  Action taken for acute disease activity to reflect the level of care provided:  - medication reconciliation  - review laboratory data  Patient is clinically stable.  The oxygen saturation is normal on room air.  The patient was not a candidate for remdesivir and Decadron.  The COPD is stable.  Blood pressures controlled.  Out of bed in chair.  The patient is to be discharged

## 2022-01-04 ENCOUNTER — APPOINTMENT (OUTPATIENT)
Dept: INTERNAL MEDICINE | Facility: CLINIC | Age: 62
End: 2022-01-04

## 2022-01-04 ENCOUNTER — TRANSCRIPTION ENCOUNTER (OUTPATIENT)
Age: 62
End: 2022-01-04

## 2022-01-04 VITALS
RESPIRATION RATE: 18 BRPM | DIASTOLIC BLOOD PRESSURE: 74 MMHG | TEMPERATURE: 99 F | SYSTOLIC BLOOD PRESSURE: 156 MMHG | HEART RATE: 64 BPM | OXYGEN SATURATION: 100 %

## 2022-01-04 PROCEDURE — 0225U NFCT DS DNA&RNA 21 SARSCOV2: CPT

## 2022-01-04 PROCEDURE — 84484 ASSAY OF TROPONIN QUANT: CPT

## 2022-01-04 PROCEDURE — 36415 COLL VENOUS BLD VENIPUNCTURE: CPT

## 2022-01-04 PROCEDURE — 83605 ASSAY OF LACTIC ACID: CPT

## 2022-01-04 PROCEDURE — 80053 COMPREHEN METABOLIC PANEL: CPT

## 2022-01-04 PROCEDURE — 84295 ASSAY OF SERUM SODIUM: CPT

## 2022-01-04 PROCEDURE — 94660 CPAP INITIATION&MGMT: CPT

## 2022-01-04 PROCEDURE — 81003 URINALYSIS AUTO W/O SCOPE: CPT

## 2022-01-04 PROCEDURE — 71045 X-RAY EXAM CHEST 1 VIEW: CPT

## 2022-01-04 PROCEDURE — 93005 ELECTROCARDIOGRAM TRACING: CPT

## 2022-01-04 PROCEDURE — 85025 COMPLETE CBC W/AUTO DIFF WBC: CPT

## 2022-01-04 PROCEDURE — 87086 URINE CULTURE/COLONY COUNT: CPT

## 2022-01-04 PROCEDURE — 87449 NOS EACH ORGANISM AG IA: CPT

## 2022-01-04 PROCEDURE — 96374 THER/PROPH/DIAG INJ IV PUSH: CPT

## 2022-01-04 PROCEDURE — 85610 PROTHROMBIN TIME: CPT

## 2022-01-04 PROCEDURE — 99285 EMERGENCY DEPT VISIT HI MDM: CPT | Mod: 25

## 2022-01-04 PROCEDURE — 83735 ASSAY OF MAGNESIUM: CPT

## 2022-01-04 PROCEDURE — 87899 AGENT NOS ASSAY W/OPTIC: CPT

## 2022-01-04 PROCEDURE — 85730 THROMBOPLASTIN TIME PARTIAL: CPT

## 2022-01-04 PROCEDURE — 97161 PT EVAL LOW COMPLEX 20 MIN: CPT

## 2022-01-04 PROCEDURE — 82330 ASSAY OF CALCIUM: CPT

## 2022-01-04 PROCEDURE — 80048 BASIC METABOLIC PNL TOTAL CA: CPT

## 2022-01-04 PROCEDURE — 84132 ASSAY OF SERUM POTASSIUM: CPT

## 2022-01-04 PROCEDURE — 84100 ASSAY OF PHOSPHORUS: CPT

## 2022-01-04 PROCEDURE — 99239 HOSP IP/OBS DSCHRG MGMT >30: CPT | Mod: GC

## 2022-01-04 PROCEDURE — 87040 BLOOD CULTURE FOR BACTERIA: CPT

## 2022-01-04 PROCEDURE — 82803 BLOOD GASES ANY COMBINATION: CPT

## 2022-01-04 PROCEDURE — 94640 AIRWAY INHALATION TREATMENT: CPT

## 2022-01-04 PROCEDURE — 83880 ASSAY OF NATRIURETIC PEPTIDE: CPT

## 2022-01-04 PROCEDURE — 96375 TX/PRO/DX INJ NEW DRUG ADDON: CPT

## 2022-01-04 RX ORDER — OXYCODONE AND ACETAMINOPHEN 5; 325 MG/1; MG/1
1 TABLET ORAL ONCE
Refills: 0 | Status: DISCONTINUED | OUTPATIENT
Start: 2022-01-04 | End: 2022-01-04

## 2022-01-04 RX ADMIN — Medication 1 DROP(S): at 16:28

## 2022-01-04 RX ADMIN — GABAPENTIN 300 MILLIGRAM(S): 400 CAPSULE ORAL at 05:44

## 2022-01-04 RX ADMIN — BENZOCAINE AND MENTHOL 1 LOZENGE: 5; 1 LIQUID ORAL at 16:28

## 2022-01-04 RX ADMIN — OXYCODONE AND ACETAMINOPHEN 1 TABLET(S): 5; 325 TABLET ORAL at 04:25

## 2022-01-04 RX ADMIN — ENOXAPARIN SODIUM 40 MILLIGRAM(S): 100 INJECTION SUBCUTANEOUS at 09:49

## 2022-01-04 RX ADMIN — BRIMONIDINE TARTRATE 1 DROP(S): 2 SOLUTION/ DROPS OPHTHALMIC at 05:45

## 2022-01-04 RX ADMIN — AMLODIPINE BESYLATE 10 MILLIGRAM(S): 2.5 TABLET ORAL at 05:44

## 2022-01-04 RX ADMIN — POLYETHYLENE GLYCOL 3350 17 GRAM(S): 17 POWDER, FOR SOLUTION ORAL at 12:32

## 2022-01-04 RX ADMIN — Medication 1 DROP(S): at 18:18

## 2022-01-04 RX ADMIN — LOSARTAN POTASSIUM 100 MILLIGRAM(S): 100 TABLET, FILM COATED ORAL at 05:44

## 2022-01-04 RX ADMIN — Medication 1 DROP(S): at 05:47

## 2022-01-04 RX ADMIN — TIOTROPIUM BROMIDE 1 CAPSULE(S): 18 CAPSULE ORAL; RESPIRATORY (INHALATION) at 12:33

## 2022-01-04 RX ADMIN — BENZOCAINE AND MENTHOL 1 LOZENGE: 5; 1 LIQUID ORAL at 05:43

## 2022-01-04 RX ADMIN — Medication 1 DROP(S): at 09:50

## 2022-01-04 RX ADMIN — GABAPENTIN 300 MILLIGRAM(S): 400 CAPSULE ORAL at 18:18

## 2022-01-04 RX ADMIN — Medication 1 DROP(S): at 12:33

## 2022-01-04 RX ADMIN — Medication 175 MICROGRAM(S): at 05:47

## 2022-01-04 RX ADMIN — FAMOTIDINE 40 MILLIGRAM(S): 10 INJECTION INTRAVENOUS at 12:32

## 2022-01-04 RX ADMIN — OXYCODONE AND ACETAMINOPHEN 1 TABLET(S): 5; 325 TABLET ORAL at 05:25

## 2022-01-04 RX ADMIN — DORZOLAMIDE HYDROCHLORIDE TIMOLOL MALEATE 1 DROP(S): 20; 5 SOLUTION/ DROPS OPHTHALMIC at 05:46

## 2022-01-04 NOTE — CONSULT NOTE ADULT - ASSESSMENT
per Internal Medicine    60 yo F PMHx COPD (remote smoking Hx), HTN, preDM, Diverticulitis, Hypothyroidism, GERD, Hernia repair with recent admission for CAP. P/w SOB x1d, Fever Tmax 103F, with worsening expectorate cough. Found to be COVID +. Admittted for SOB, stable on RA, pending dispo.    Problem/Plan - 1:  ·  Problem: SIRS (systemic inflammatory response syndrome).   ·  Plan: SIRS 3/4 (T102.7F, , RR 22), likely 2/2 to COVID PNA, less likely bacterial infection given procalcitonin 0.04  -will hold off on further antibiotics for now  -urine legionella and strep negative  -covid management as listed below.    Problem/Plan - 2:  ·  Problem: Pneumonia due to COVID-19 virus.   ·  Plan: Pt tested COVID+, received dose of Decadron in the ED  -Not currently hypoxic therefore not a candidate for Decadron/Remdesivir  -continue to monitor oxygen saturation  -as pt with COPD, goal O2 >88%  -symptomatic management. patient complaining of a headache today and requesting toradol, given x1  -patient reports she is homeless but has been living with her sister. Pt's sister said she would be unable to quarantine at her home  -pt unable to be discharged to [facility] due to cpap use. pending further dispo planning.    Problem/Plan - 3:  ·  Problem: Chronic obstructive pulmonary disease (COPD).   ·  Plan: On anoro ellipta and albuterol at home  -started spiriva (therapeutic interchange)  -discontinued standing duoneb.    Problem/Plan - 4:  ·  Problem: HTN (hypertension).   ·  Plan: - c/w home amlodipine 10mg and losartan 100mg.    Problem/Plan - 5:  ·  Problem: Hypothyroidism.   ·  Plan: - c/w home levothyroxine 175mcg    #Graves dz  - c/w Brimonidine, Timolol, Latanoprost.    Problem/Plan - 6:  ·  Problem: GERD (gastroesophageal reflux disease).   ·  Plan: - c/w home Famotidine 40mg Qd.    Problem/Plan - 7:  ·  Problem: Healthcare maintenance.   ·  Plan: F: None   E: Replete as necessary K>4 Mg>2  N: DASH/TLC     DVT Prophylaxis: Lovenox 40mg daily   GI prophylaxis: Famotidine 40mg Qd  CODE STATUS: FULL  Dispo: [facility], pending further dispo planning.

## 2022-01-04 NOTE — DISCHARGE NOTE NURSING/CASE MANAGEMENT/SOCIAL WORK - PATIENT PORTAL LINK FT
You can access the FollowMyHealth Patient Portal offered by [facility] by registering at the following website: [email or web address]. By joining [facility]’s FollowMyHealth portal, you will also be able to view your health information using other applications (apps) compatible with our system.

## 2022-01-04 NOTE — DISCHARGE NOTE NURSING/CASE MANAGEMENT/SOCIAL WORK - NSDCPEFALRISK_GEN_ALL_CORE
For information on Fall & Injury Prevention, visit: [email or web address] OR  [email or web address] OR  https://www.cdc.gov/steadi/patient.html

## 2022-01-04 NOTE — CONSULT NOTE ADULT - SUBJECTIVE AND OBJECTIVE BOX
Patient is a 61y old  Female who presents with a chief complaint of SOB (03 Jan 2022 13:02)       HPI:  HPI: 61F PMHx COPD, former smoker (stopped 20 years ago), HTN, DM, remote h/o breast ca 20 years in remission (radiation with b/l mastectomy and reconstruction), diverticulitis, hypothyroidism, GERD, SHx hernia repair. Recent admission to [facility] for community acquired PNA at the end of last month. Presents with 5-6 days of "not feeling well" with SOB and states "it is hard to breathe". States for the past week she has been experiencing fevers, cough that is sometimes productive of yellow sputum, nausea with some episodes of "acid-like" emesis, body aches, myalgias, throat pain, ear pain, fatigue, and generalized malaise.  Pt was seen 2 days ago for similar symptoms and had a complete sepsis workup which was all negative, including SARS-CoV-2, influenza, and RSV. She was then discharge home. Reports that since then, her symptoms have worsened. She has been taking Tylenol for fevers with no real relief, last intake last night. Denies urinary symptoms and diarrhea. Pt also c/o mild headache. No neck stiffness. Pt is fully vaccinated for Covid 19 X 2 doses (did not get the booster) and had the flu vaccine this season.    In the ED,  VS: T102.7F, , /68, RR 22, SpO2 96%RA  Abnormal Labs: MCHC 31.5, aPTT 79.3, , VBG 7.44/41/45  Urine WNL  RVP: SARS CoV 2  Orders: Acetaminophen 650mg, Dexamethasone 6mg, Ketorolac 15mg, Zosyn 3.375g, Vancomycin 1g, Duoneb 3mlx2   (31 Dec 2021 16:39)      PAST MEDICAL & SURGICAL HISTORY:  Graves disease    HTN (hypertension)    COPD exacerbation    Epilepsy    Breast cancer    H/O bilateral mastectomy        MEDICATIONS  (STANDING):  amLODIPine   Tablet 10 milliGRAM(s) Oral daily  artificial tears (preservative free) Ophthalmic Solution 1 Drop(s) Both EYES every 3 hours  atorvastatin 40 milliGRAM(s) Oral at bedtime  benzocaine 15 mG/menthol 3.6 mG Lozenge 1 Lozenge Oral three times a day  brimonidine 0.2% Ophthalmic Solution 1 Drop(s) Both EYES two times a day  dorzolamide 2%/timolol 0.5% Ophthalmic Solution 1 Drop(s) Both EYES two times a day  enoxaparin Injectable 40 milliGRAM(s) SubCutaneous every 12 hours  famotidine    Tablet 40 milliGRAM(s) Oral daily  gabapentin 300 milliGRAM(s) Oral two times a day  latanoprost 0.005% Ophthalmic Solution 1 Drop(s) Both EYES at bedtime  levothyroxine 175 MICROGram(s) Oral daily  losartan 100 milliGRAM(s) Oral daily  polyethylene glycol 3350 17 Gram(s) Oral daily  senna 2 Tablet(s) Oral at bedtime  tiotropium 18 MICROgram(s) Capsule 1 Capsule(s) Inhalation daily    MEDICATIONS  (PRN):  acetaminophen     Tablet .. 650 milliGRAM(s) Oral every 6 hours PRN Temp greater or equal to 38C (100.4F), Mild Pain (1 - 3)  ALBUTerol    90 MICROgram(s) HFA Inhaler 2 Puff(s) Inhalation every 6 hours PRN Shortness of Breath and/or Wheezing    FAMILY HISTORY:          01-03    141  |  107  |  32<H>  ----------------------------<  146<H>  4.2   |  24  |  0.97    Ca    8.4      03 Jan 2022 22:13    TPro  6.8  /  Alb  4.0  /  TBili  <0.2  /  DBili  x   /  AST  18  /  ALT  20  /  AlkPhos  96  01-03            Radiology:    < from: Xray Chest 1 View-PORTABLE IMMEDIATE (12.31.21 @ 09:48) >  ACC: [id number] EXAM:  XR CHEST PORTABLE IMMED 1V                          PROCEDURE DATE:  12/31/2021          INTERPRETATION:  INDICATIONS: 61-year-old female with sepsis    Prior examination for comparison: 11/20/2021    Findings:    Right sided port with the tip in the low SVC. The lungs are clear. There   are no pleural effusions. The cardiomediastinal silhouette is normal.   Bones are grossly normal. Bilateral axillary surgical clips.    IMPRESSION: No evidence of acute cardiopulmonary disease.                  Vital Signs Last 24 Hrs  T(C): 36.4 (04 Jan 2022 06:10), Max: 36.8 (03 Jan 2022 20:16)  T(F): 97.6 (04 Jan 2022 06:10), Max: 98.2 (03 Jan 2022 20:16)  HR: 62 (04 Jan 2022 06:10) (62 - 77)  BP: 121/81 (04 Jan 2022 06:10) (110/72 - 124/76)  BP(mean): --  RR: 18 (04 Jan 2022 06:10) (16 - 18)  SpO2: 100% (04 Jan 2022 06:10) (96% - 100%)        REVIEW OF SYSTEMS:    CONSTITUTIONAL: No fever, weight loss, or fatigue  EYES: No eye pain, visual disturbances, or discharge  ENMT:  No difficulty hearing, tinnitus, vertigo; No sinus or throat pain  NECK: No pain or stiffness  BREASTS: No pain, masses, or nipple discharge  RESPIRATORY:   per HPI  CARDIOVASCULAR: No chest pain, palpitations, dizziness, or leg swelling  GASTROINTESTINAL: No abdominal or epigastric pain. No nausea, vomiting, or hematemesis; No diarrhea or constipation. No melena or hematochezia.  GENITOURINARY: No dysuria, frequency, hematuria, or incontinence  NEUROLOGICAL: No headaches, memory loss, loss of strength, numbness, or tremors  SKIN: No itching, burning, rashes, or lesions   LYMPH NODES: No enlarged glands  ENDOCRINE: No heat or cold intolerance; No hair loss  MUSCULOSKELETAL: No joint pain or swelling; No muscle, back, or extremity pain  PSYCHIATRIC: No depression, anxiety, mood swings, or difficulty sleeping  HEME/LYMPH: No easy bruising, or bleeding gums  ALLERGY AND IMMUNOLOGIC: No hives or eczema  VASCULAR: no swelling, erythema,           Physical Exam:  on COVID isolation, in accordance with current standards limiting patient contact, please refer to exam performed on 1/4/2022    Constitutional: laying comfortably in bed, no acute respiratory distress  Head: NC/AT  Eyes: PERRL, EOMI, clear conjunctiva  ENT: no nasal discharge;  MMM  Neck: supple; no JVD  Respiratory: CTA B/L; no W/R/R, no retractions  Cardiac: +S1/S2; RRR; no M/R/G;  Gastrointestinal: soft, NT/ND; no rebound or guarding; +BSx4  Extremities: WWP; no peripheral edema  Musculoskeletal: NROM x4;  Vascular: 2+ radial, DP/PT pulses B/L  Dermatologic: skin warm, dry and intact  Neurologic: AAOx3; CNII-XII grossly intact; no focal deficits  Psychiatric: affect and characteristics of appearance, verbalizations, behaviors are appropriate    PM&R Impression:    1) PNA/ COVID 19 +  2) no focal weakness    Recommendations/ Plan :    1) Physical therapy focusing on therapeutic exercises, bed mobility/transfer out of bed evaluation, progressive ambulation with assistive devices prn.    2) Current Disposition Plan:   COVID hotel

## 2022-01-05 ENCOUNTER — TRANSCRIPTION ENCOUNTER (OUTPATIENT)
Age: 62
End: 2022-01-05

## 2022-01-05 DIAGNOSIS — Z98.890 OTHER SPECIFIED POSTPROCEDURAL STATES: Chronic | ICD-10-CM

## 2022-01-05 LAB
CULTURE RESULTS: SIGNIFICANT CHANGE UP
CULTURE RESULTS: SIGNIFICANT CHANGE UP
SPECIMEN SOURCE: SIGNIFICANT CHANGE UP
SPECIMEN SOURCE: SIGNIFICANT CHANGE UP

## 2022-01-06 ENCOUNTER — APPOINTMENT (OUTPATIENT)
Dept: PULMONOLOGY | Facility: CLINIC | Age: 62
End: 2022-01-06
Payer: MEDICAID

## 2022-01-06 PROCEDURE — 99443: CPT

## 2022-01-06 NOTE — ASSESSMENT
[FreeTextEntry1] : COPD\par \par Is stable and she was admitted with Covid infection the COPD did not exacerbate.  Continue her current bronchodilator.\par \par Covid infection\par \par The patient did not require oxygen and all his symptoms were more upper respiratory with postnasal drip.  Observe at this point.  I advised the patient to get a pulse oximeter to monitor her  Sat O2.  \par The patient did not require any systemic steroids nor antiviral.  Patient was treated with oxygen supplementation and antipyretic.\par Chronic sinusitis\par \par Stable\par \par JESÚS\par I discussed the short and long term health effect of the obstructive seep apnea with the patient. These effects include, but not limited to, uncontrolled hypertension, CAD, arrhythmias, sudden death, CVA, and pulmonary hypertension. I advised the patient to avoid sedatives, narcotics, driving, and sleeping pills in the meantime. I discussed the therapeutic options including but not limited to CPAP, surgery, and oral appliance. Further recommendations will follow after sleep study.\par

## 2022-01-06 NOTE — HISTORY OF PRESENT ILLNESS
[Former] : former [Never] : never [TextBox_4] : She is doing very well after she was discharged from the hospital.  The cough is less she is not wheezing.  She does not have oxygen pulse oximeter.  The nose is less congested.  She is still complaining of snoring she wakes up with choking the daughter so hard that sometimes she stopped breathing.  She toss and turn most of the night.  She is very tired in the morning and get worse as the day goes along.  She is easily dozing off during the day.

## 2022-01-06 NOTE — REASON FOR VISIT
[Home] : at home, [unfilled] , at the time of the visit. [Medical Office: ([facility])___] : at the medical office located in  [Follow-Up] : a follow-up visit

## 2022-01-07 PROBLEM — K21.9 GASTRO-ESOPHAGEAL REFLUX DISEASE WITHOUT ESOPHAGITIS: Chronic | Status: ACTIVE | Noted: 2022-01-05

## 2022-01-07 PROBLEM — K57.92 DIVERTICULITIS OF INTESTINE, PART UNSPECIFIED, WITHOUT PERFORATION OR ABSCESS WITHOUT BLEEDING: Chronic | Status: ACTIVE | Noted: 2022-01-05

## 2022-01-07 PROBLEM — E03.9 HYPOTHYROIDISM, UNSPECIFIED: Chronic | Status: ACTIVE | Noted: 2022-01-05

## 2022-01-08 ENCOUNTER — TRANSCRIPTION ENCOUNTER (OUTPATIENT)
Age: 62
End: 2022-01-08

## 2022-01-10 DIAGNOSIS — J44.0 CHRONIC OBSTRUCTIVE PULMONARY DISEASE WITH (ACUTE) LOWER RESPIRATORY INFECTION: ICD-10-CM

## 2022-01-10 DIAGNOSIS — K21.9 GASTRO-ESOPHAGEAL REFLUX DISEASE WITHOUT ESOPHAGITIS: ICD-10-CM

## 2022-01-10 DIAGNOSIS — Z91.041 RADIOGRAPHIC DYE ALLERGY STATUS: ICD-10-CM

## 2022-01-10 DIAGNOSIS — E05.00 THYROTOXICOSIS WITH DIFFUSE GOITER WITHOUT THYROTOXIC CRISIS OR STORM: ICD-10-CM

## 2022-01-10 DIAGNOSIS — G47.33 OBSTRUCTIVE SLEEP APNEA (ADULT) (PEDIATRIC): ICD-10-CM

## 2022-01-10 DIAGNOSIS — Z90.13 ACQUIRED ABSENCE OF BILATERAL BREASTS AND NIPPLES: ICD-10-CM

## 2022-01-10 DIAGNOSIS — G40.909 EPILEPSY, UNSPECIFIED, NOT INTRACTABLE, WITHOUT STATUS EPILEPTICUS: ICD-10-CM

## 2022-01-10 DIAGNOSIS — Z87.891 PERSONAL HISTORY OF NICOTINE DEPENDENCE: ICD-10-CM

## 2022-01-10 DIAGNOSIS — E66.3 OVERWEIGHT: ICD-10-CM

## 2022-01-10 DIAGNOSIS — Z85.3 PERSONAL HISTORY OF MALIGNANT NEOPLASM OF BREAST: ICD-10-CM

## 2022-01-10 DIAGNOSIS — Z91.040 LATEX ALLERGY STATUS: ICD-10-CM

## 2022-01-10 DIAGNOSIS — E03.9 HYPOTHYROIDISM, UNSPECIFIED: ICD-10-CM

## 2022-01-10 DIAGNOSIS — Z91.013 ALLERGY TO SEAFOOD: ICD-10-CM

## 2022-01-10 DIAGNOSIS — I10 ESSENTIAL (PRIMARY) HYPERTENSION: ICD-10-CM

## 2022-01-10 DIAGNOSIS — R06.02 SHORTNESS OF BREATH: ICD-10-CM

## 2022-01-10 DIAGNOSIS — R00.0 TACHYCARDIA, UNSPECIFIED: ICD-10-CM

## 2022-01-10 DIAGNOSIS — R73.03 PREDIABETES: ICD-10-CM

## 2022-01-10 DIAGNOSIS — J12.82 PNEUMONIA DUE TO CORONAVIRUS DISEASE 2019: ICD-10-CM

## 2022-01-10 DIAGNOSIS — Z66 DO NOT RESUSCITATE: ICD-10-CM

## 2022-01-10 DIAGNOSIS — U07.1 COVID-19: ICD-10-CM

## 2022-01-14 ENCOUNTER — OUTPATIENT (OUTPATIENT)
Dept: OUTPATIENT SERVICES | Facility: HOSPITAL | Age: 62
LOS: 1 days | End: 2022-01-14
Payer: COMMERCIAL

## 2022-01-14 ENCOUNTER — APPOINTMENT (OUTPATIENT)
Dept: SLEEP CENTER | Facility: HOME HEALTH | Age: 62
End: 2022-01-14
Payer: MEDICAID

## 2022-01-14 DIAGNOSIS — Z90.13 ACQUIRED ABSENCE OF BILATERAL BREASTS AND NIPPLES: Chronic | ICD-10-CM

## 2022-01-14 DIAGNOSIS — Z98.890 OTHER SPECIFIED POSTPROCEDURAL STATES: Chronic | ICD-10-CM

## 2022-01-14 DIAGNOSIS — G47.33 OBSTRUCTIVE SLEEP APNEA (ADULT) (PEDIATRIC): ICD-10-CM

## 2022-01-14 PROCEDURE — 95800 SLP STDY UNATTENDED: CPT

## 2022-01-14 PROCEDURE — 95800 SLP STDY UNATTENDED: CPT | Mod: 26

## 2022-01-18 ENCOUNTER — APPOINTMENT (OUTPATIENT)
Dept: INTERNAL MEDICINE | Facility: CLINIC | Age: 62
End: 2022-01-18
Payer: MEDICAID

## 2022-01-18 VITALS
TEMPERATURE: 98.2 F | HEIGHT: 59 IN | SYSTOLIC BLOOD PRESSURE: 139 MMHG | DIASTOLIC BLOOD PRESSURE: 68 MMHG | WEIGHT: 219 LBS | OXYGEN SATURATION: 98 % | BODY MASS INDEX: 44.15 KG/M2 | HEART RATE: 81 BPM | RESPIRATION RATE: 16 BRPM

## 2022-01-18 DIAGNOSIS — M79.10 MYALGIA, UNSPECIFIED SITE: ICD-10-CM

## 2022-01-18 DIAGNOSIS — U07.1 COVID-19: ICD-10-CM

## 2022-01-18 DIAGNOSIS — N28.89 OTHER SPECIFIED DISORDERS OF KIDNEY AND URETER: ICD-10-CM

## 2022-01-18 PROCEDURE — 99214 OFFICE O/P EST MOD 30 MIN: CPT | Mod: GC

## 2022-01-18 RX ORDER — VALACYCLOVIR HYDROCHLORIDE 1 G/1
1 TABLET, FILM COATED ORAL
Refills: 0 | Status: DISCONTINUED | COMMUNITY
Start: 2021-12-21 | End: 2022-01-18

## 2022-01-18 RX ORDER — CELECOXIB 200 MG/1
200 CAPSULE ORAL DAILY
Refills: 0 | Status: DISCONTINUED | COMMUNITY
Start: 2021-12-21 | End: 2022-01-18

## 2022-01-18 RX ORDER — CEFDINIR 300 MG/1
300 CAPSULE ORAL
Qty: 14 | Refills: 0 | Status: DISCONTINUED | COMMUNITY
Start: 2021-12-23 | End: 2022-01-18

## 2022-01-21 ENCOUNTER — OUTPATIENT (OUTPATIENT)
Dept: OUTPATIENT SERVICES | Facility: HOSPITAL | Age: 62
LOS: 1 days | End: 2022-01-21
Payer: COMMERCIAL

## 2022-01-21 ENCOUNTER — TRANSCRIPTION ENCOUNTER (OUTPATIENT)
Age: 62
End: 2022-01-21

## 2022-01-21 ENCOUNTER — APPOINTMENT (OUTPATIENT)
Age: 62
End: 2022-01-21

## 2022-01-21 VITALS
RESPIRATION RATE: 16 BRPM | SYSTOLIC BLOOD PRESSURE: 118 MMHG | HEIGHT: 59 IN | TEMPERATURE: 98 F | WEIGHT: 225.09 LBS | HEART RATE: 72 BPM | OXYGEN SATURATION: 98 % | DIASTOLIC BLOOD PRESSURE: 78 MMHG

## 2022-01-21 DIAGNOSIS — Z11.3 ENCOUNTER FOR SCREENING FOR INFECTIONS WITH A PREDOMINANTLY SEXUAL MODE OF TRANSMISSION: ICD-10-CM

## 2022-01-21 DIAGNOSIS — L98.9 DISORDER OF THE SKIN AND SUBCUTANEOUS TISSUE, UNSPECIFIED: ICD-10-CM

## 2022-01-21 DIAGNOSIS — E11.9 TYPE 2 DIABETES MELLITUS WITHOUT COMPLICATIONS: ICD-10-CM

## 2022-01-21 DIAGNOSIS — Z98.890 OTHER SPECIFIED POSTPROCEDURAL STATES: Chronic | ICD-10-CM

## 2022-01-21 DIAGNOSIS — Z90.13 ACQUIRED ABSENCE OF BILATERAL BREASTS AND NIPPLES: Chronic | ICD-10-CM

## 2022-01-21 LAB
ALBUMIN SERPL ELPH-MCNC: 4 G/DL — SIGNIFICANT CHANGE UP (ref 3.3–5)
ALP SERPL-CCNC: 106 U/L — SIGNIFICANT CHANGE UP (ref 40–120)
ALT FLD-CCNC: 20 U/L — SIGNIFICANT CHANGE UP (ref 10–45)
ANION GAP SERPL CALC-SCNC: 10 MMOL/L — SIGNIFICANT CHANGE UP (ref 5–17)
AST SERPL-CCNC: 20 U/L — SIGNIFICANT CHANGE UP (ref 10–40)
BILIRUB SERPL-MCNC: 0.3 MG/DL — SIGNIFICANT CHANGE UP (ref 0.2–1.2)
BUN SERPL-MCNC: 18 MG/DL — SIGNIFICANT CHANGE UP (ref 7–23)
CALCIUM SERPL-MCNC: 9.5 MG/DL — SIGNIFICANT CHANGE UP (ref 8.4–10.5)
CHLORIDE SERPL-SCNC: 103 MMOL/L — SIGNIFICANT CHANGE UP (ref 96–108)
CHOLEST SERPL-MCNC: 149 MG/DL — SIGNIFICANT CHANGE UP
CO2 SERPL-SCNC: 27 MMOL/L — SIGNIFICANT CHANGE UP (ref 22–31)
CREAT SERPL-MCNC: 0.59 MG/DL — SIGNIFICANT CHANGE UP (ref 0.5–1.3)
GLUCOSE SERPL-MCNC: 132 MG/DL — HIGH (ref 70–99)
HBV CORE IGM SER-ACNC: SIGNIFICANT CHANGE UP
HBV SURFACE AG SER-ACNC: SIGNIFICANT CHANGE UP
HCV AB S/CO SERPL IA: 0.04 S/CO — SIGNIFICANT CHANGE UP
HCV AB SERPL-IMP: SIGNIFICANT CHANGE UP
HDLC SERPL-MCNC: 38 MG/DL — LOW
HIV 1+2 AB+HIV1 P24 AG SERPL QL IA: SIGNIFICANT CHANGE UP
LIPID PNL WITH DIRECT LDL SERPL: 80 MG/DL — SIGNIFICANT CHANGE UP
NON HDL CHOLESTEROL: 111 MG/DL — SIGNIFICANT CHANGE UP
POTASSIUM SERPL-MCNC: 3.6 MMOL/L — SIGNIFICANT CHANGE UP (ref 3.5–5.3)
POTASSIUM SERPL-SCNC: 3.6 MMOL/L — SIGNIFICANT CHANGE UP (ref 3.5–5.3)
PROT SERPL-MCNC: 7.1 G/DL — SIGNIFICANT CHANGE UP (ref 6–8.3)
SODIUM SERPL-SCNC: 140 MMOL/L — SIGNIFICANT CHANGE UP (ref 135–145)
TRIGL SERPL-MCNC: 154 MG/DL — HIGH

## 2022-01-21 PROCEDURE — 86780 TREPONEMA PALLIDUM: CPT

## 2022-01-21 PROCEDURE — 36415 COLL VENOUS BLD VENIPUNCTURE: CPT

## 2022-01-21 PROCEDURE — 86803 HEPATITIS C AB TEST: CPT

## 2022-01-21 PROCEDURE — 86695 HERPES SIMPLEX TYPE 1 TEST: CPT

## 2022-01-21 PROCEDURE — 80053 COMPREHEN METABOLIC PANEL: CPT

## 2022-01-21 PROCEDURE — 86705 HEP B CORE ANTIBODY IGM: CPT

## 2022-01-21 PROCEDURE — 87340 HEPATITIS B SURFACE AG IA: CPT

## 2022-01-21 PROCEDURE — 86769 SARS-COV-2 COVID-19 ANTIBODY: CPT

## 2022-01-21 PROCEDURE — 87389 HIV-1 AG W/HIV-1&-2 AB AG IA: CPT

## 2022-01-21 PROCEDURE — 82088 ASSAY OF ALDOSTERONE: CPT

## 2022-01-21 PROCEDURE — 80061 LIPID PANEL: CPT

## 2022-01-21 PROCEDURE — 84244 ASSAY OF RENIN: CPT

## 2022-01-21 PROCEDURE — 96523 IRRIG DRUG DELIVERY DEVICE: CPT

## 2022-01-21 PROCEDURE — 86696 HERPES SIMPLEX TYPE 2 TEST: CPT

## 2022-01-21 RX ADMIN — Medication 300 UNIT(S): at 10:15

## 2022-01-22 LAB
COVID-19 SPIKE DOMAIN AB INTERP: POSITIVE
COVID-19 SPIKE DOMAIN ANTIBODY RESULT: >250 U/ML — HIGH
HSV1 IGG SER-ACNC: 16.7 INDEX — HIGH
HSV1 IGG SERPL QL IA: POSITIVE
HSV2 IGG FLD-ACNC: 8.87 INDEX — HIGH
HSV2 IGG SERPL QL IA: POSITIVE
SARS-COV-2 IGG+IGM SERPL QL IA: >250 U/ML — HIGH
SARS-COV-2 IGG+IGM SERPL QL IA: POSITIVE
T PALLIDUM AB TITR SER: NEGATIVE — SIGNIFICANT CHANGE UP

## 2022-01-24 ENCOUNTER — APPOINTMENT (OUTPATIENT)
Dept: ORTHOPEDIC SURGERY | Facility: CLINIC | Age: 62
End: 2022-01-24
Payer: MEDICAID

## 2022-01-24 ENCOUNTER — OUTPATIENT (OUTPATIENT)
Dept: OUTPATIENT SERVICES | Facility: HOSPITAL | Age: 62
LOS: 1 days | End: 2022-01-24
Payer: MEDICAID

## 2022-01-24 ENCOUNTER — RESULT REVIEW (OUTPATIENT)
Age: 62
End: 2022-01-24

## 2022-01-24 VITALS — WEIGHT: 215 LBS | BODY MASS INDEX: 43.34 KG/M2 | HEIGHT: 59 IN

## 2022-01-24 DIAGNOSIS — Z98.890 OTHER SPECIFIED POSTPROCEDURAL STATES: Chronic | ICD-10-CM

## 2022-01-24 DIAGNOSIS — Z90.13 ACQUIRED ABSENCE OF BILATERAL BREASTS AND NIPPLES: Chronic | ICD-10-CM

## 2022-01-24 DIAGNOSIS — M25.512 PAIN IN LEFT SHOULDER: ICD-10-CM

## 2022-01-24 LAB — ALDOST SERPL-MCNC: 5.8 NG/DL — SIGNIFICANT CHANGE UP

## 2022-01-24 PROCEDURE — 99204 OFFICE O/P NEW MOD 45 MIN: CPT

## 2022-01-24 PROCEDURE — 73030 X-RAY EXAM OF SHOULDER: CPT | Mod: 26,LT

## 2022-01-28 LAB — RENIN PLAS-CCNC: 0.5 NG/ML/HR — SIGNIFICANT CHANGE UP (ref 0.17–5.38)

## 2022-02-08 ENCOUNTER — APPOINTMENT (OUTPATIENT)
Dept: INTERNAL MEDICINE | Facility: CLINIC | Age: 62
End: 2022-02-08
Payer: MEDICAID

## 2022-02-08 VITALS — HEART RATE: 74 BPM | SYSTOLIC BLOOD PRESSURE: 157 MMHG | DIASTOLIC BLOOD PRESSURE: 96 MMHG

## 2022-02-08 DIAGNOSIS — K57.92 DIVERTICULITIS OF INTESTINE, PART UNSPECIFIED, W/OUT PERFORATION OR ABSCESS W/OUT BLEEDING: ICD-10-CM

## 2022-02-08 DIAGNOSIS — K57.90 DIVERTICULOSIS OF INTESTINE, PART UNSPECIFIED, W/OUT PERFORATION OR ABSCESS W/OUT BLEEDING: ICD-10-CM

## 2022-02-08 DIAGNOSIS — L30.9 DERMATITIS, UNSPECIFIED: ICD-10-CM

## 2022-02-08 PROCEDURE — 99215 OFFICE O/P EST HI 40 MIN: CPT | Mod: GC

## 2022-02-08 NOTE — HISTORY OF PRESENT ILLNESS
[FreeTextEntry1] : B/L leg pain [de-identified] : 61 yo F with past medical Hx of HTN, hyperlipidemia, GERD, COPD, JESÚS, Hx of epilepsy (no treatment), hypothyroidism, Hx breast ca (in remission), kidney mass (f/u [place]), Hx of diverticulitis (f/u [place]), Rotator cuff tear (f/u Orthopedic surgery), B/L LE pain (f/u pain management), presents with continued B/L leg pain.\par \par B/L leg pain: patient was unable to  her pain medication, due to miscommunication regarding the pharmacy. Patient was recently prescribed Tramadol. Patient unable to take gabapentin or NSAIDs due to poor renal function caused by renal mass, Patient was recently seen by pain management. They are recommending MRI of the spine, which has been scheduled.\par \par Hx of diverticulitis: patient also requesting referral to [facility]. She had a colonoscopy 2 years ago. Per patient, requires repeat colonoscopy in 5 years. \par

## 2022-02-08 NOTE — END OF VISIT
[] : Resident [FreeTextEntry3] : Here for follow up\par Tramadol was not at pharmacy, resent and verified it was at pharmacy, will  today \par COPD- has pulm f/u \par LE pain- trial tramadol, follows with pain and pending MRI, if with perisstent pain, can trial cymbalta at next visit \par Hypothyroid- cw synthroid , nephrology DC gabapentin likely in setting of LEXII/CKD, hold at this time\par HTN- slightly elevated did not take medications yet, discussed taking medications every morning regardless of breakfast, cw home regimen \par DIverticulosis- followed with [facility], wants to switch to [facility] [place], referral made  [Time Spent: ___ minutes] : I have spent [unfilled] minutes of time on the encounter.

## 2022-02-08 NOTE — PHYSICAL EXAM
[Normal] : affect was normal and insight and judgment were intact [de-identified] : B/L LE neuropathy

## 2022-02-08 NOTE — ASSESSMENT
[FreeTextEntry1] : 59 yo F with past medical Hx of HTN, hyperlipidemia, GERD, COPD, JESÚS, Hx of epilepsy (no treatment), hypothyroidism, Hx breast ca (in remission), kidney mass (f/u [place]), Hx of diverticulitis (f/u [place]), Rotator cuff tear (f/u Orthopedic surgery), B/L LE pain (f/u pain management), presents with continued B/L leg pain.\par \par #COPD exacerbation\par Recent admission in late Dec 2021 for suspected COPD exacerbation w/ coinciding COVID positive result. \par - c/w home Anora Ellipta QD\par - c/w home albuterol HFA PRN\par - follow-up w/ Dr. Isaacs in early February \par \par #Chronic b/l LE pain\par - HOLD gabapentin 600 mg TID i/s/o poor renal function\par - Tramadol 50 mg BID PRN\par - pending MRI spine\par - Consider switch to Cymbalta for chronic pain management if pain persists\par \par #Hypothyroidism\par Patient w/ Hx Graves s/p radioactive iodine ablation and since w/ hypothyroidism on Synthroid at home.\par - c/w home Synthroid 175 mcg QD\par \par #HTN\par - c/w home amlodipine 10 mg QD\par - c/w losartan 100 mg QD\par \par #hyperlipidemia\par - c/w home atorvastatin 40 mg QHS\par \par #GERD\par - c/w home Famotidine\par \par #Health care maintenance\par - Cervical ca screening - UTD\par - colon cancer screening - UTD, last colonoscopy in 2019, next in 2024. Referral made to [facility], per patient request\par - flu vaccine - UTD\par - COVID vaccine - Pfizer x2, plans to get booster this month\par - shingles - UTD\par \par Return to clinic in 3 weeks to evaluate B/L LE pain after short course of Tramadol 50 mg BID PRN. Consider switching to Cymbalta for chronic pain management. Consider PT referral. If kidney function improved, may also consider reinstating Gabapentin.\par

## 2022-03-04 ENCOUNTER — APPOINTMENT (OUTPATIENT)
Dept: INTERNAL MEDICINE | Facility: CLINIC | Age: 62
End: 2022-03-04

## 2022-03-07 ENCOUNTER — APPOINTMENT (OUTPATIENT)
Dept: OPHTHALMOLOGY | Facility: CLINIC | Age: 62
End: 2022-03-07

## 2022-03-14 ENCOUNTER — APPOINTMENT (OUTPATIENT)
Dept: INTERNAL MEDICINE | Facility: CLINIC | Age: 62
End: 2022-03-14
Payer: MEDICAID

## 2022-03-14 PROBLEM — Z71.9 HEALTH EDUCATION/COUNSELING: Status: ACTIVE | Noted: 2022-03-14

## 2022-03-14 PROCEDURE — 99214 OFFICE O/P EST MOD 30 MIN: CPT | Mod: 95

## 2022-03-17 ENCOUNTER — APPOINTMENT (OUTPATIENT)
Dept: INTERNAL MEDICINE | Facility: CLINIC | Age: 62
End: 2022-03-17
Payer: MEDICAID

## 2022-03-17 VITALS
TEMPERATURE: 98 F | HEART RATE: 75 BPM | SYSTOLIC BLOOD PRESSURE: 140 MMHG | DIASTOLIC BLOOD PRESSURE: 80 MMHG | OXYGEN SATURATION: 100 %

## 2022-03-17 DIAGNOSIS — Z11.3 ENCOUNTER FOR SCREENING FOR INFECTIONS WITH A PREDOMINANTLY SEXUAL MODE OF TRANSMISSION: ICD-10-CM

## 2022-03-17 DIAGNOSIS — Z85.3 PERSONAL HISTORY OF MALIGNANT NEOPLASM OF BREAST: ICD-10-CM

## 2022-03-17 PROCEDURE — 99215 OFFICE O/P EST HI 40 MIN: CPT | Mod: GC

## 2022-03-18 ENCOUNTER — APPOINTMENT (OUTPATIENT)
Age: 62
End: 2022-03-18

## 2022-03-18 ENCOUNTER — OUTPATIENT (OUTPATIENT)
Dept: OUTPATIENT SERVICES | Facility: HOSPITAL | Age: 62
LOS: 1 days | End: 2022-03-18
Payer: COMMERCIAL

## 2022-03-18 DIAGNOSIS — E11.9 TYPE 2 DIABETES MELLITUS WITHOUT COMPLICATIONS: ICD-10-CM

## 2022-03-18 DIAGNOSIS — Z98.890 OTHER SPECIFIED POSTPROCEDURAL STATES: Chronic | ICD-10-CM

## 2022-03-18 DIAGNOSIS — Z90.13 ACQUIRED ABSENCE OF BILATERAL BREASTS AND NIPPLES: Chronic | ICD-10-CM

## 2022-03-18 PROBLEM — Z11.3 SCREEN FOR STD (SEXUALLY TRANSMITTED DISEASE): Status: ACTIVE | Noted: 2021-08-12

## 2022-03-18 LAB
HBV CORE AB SER-ACNC: SIGNIFICANT CHANGE UP
HBV SURFACE AB SER-ACNC: SIGNIFICANT CHANGE UP
HBV SURFACE AG SER-ACNC: SIGNIFICANT CHANGE UP
HCV AB S/CO SERPL IA: 0.04 S/CO — SIGNIFICANT CHANGE UP
HCV AB SERPL-IMP: SIGNIFICANT CHANGE UP
TSH SERPL-MCNC: 7.23 UIU/ML — HIGH (ref 0.27–4.2)

## 2022-03-18 PROCEDURE — 84443 ASSAY THYROID STIM HORMONE: CPT

## 2022-03-18 PROCEDURE — 96523 IRRIG DRUG DELIVERY DEVICE: CPT

## 2022-03-18 PROCEDURE — 86803 HEPATITIS C AB TEST: CPT

## 2022-03-18 PROCEDURE — 86706 HEP B SURFACE ANTIBODY: CPT

## 2022-03-18 PROCEDURE — 86696 HERPES SIMPLEX TYPE 2 TEST: CPT

## 2022-03-18 PROCEDURE — 86704 HEP B CORE ANTIBODY TOTAL: CPT

## 2022-03-18 PROCEDURE — 36415 COLL VENOUS BLD VENIPUNCTURE: CPT

## 2022-03-18 PROCEDURE — 87340 HEPATITIS B SURFACE AG IA: CPT

## 2022-03-18 PROCEDURE — 86780 TREPONEMA PALLIDUM: CPT

## 2022-03-18 PROCEDURE — 86695 HERPES SIMPLEX TYPE 1 TEST: CPT

## 2022-03-18 RX ADMIN — Medication 300 UNIT(S): at 15:13

## 2022-03-19 ENCOUNTER — TRANSCRIPTION ENCOUNTER (OUTPATIENT)
Age: 62
End: 2022-03-19

## 2022-03-19 LAB
HSV1 IGG SER-ACNC: 16.3 INDEX — HIGH
HSV1 IGG SERPL QL IA: POSITIVE
HSV2 IGG FLD-ACNC: 9.53 INDEX — HIGH
HSV2 IGG SERPL QL IA: POSITIVE
N GONORRHOEA RRNA SPEC QL NAA+PROBE: NOT DETECTED
SOURCE AMPLIFICATION: NORMAL
T PALLIDUM AB TITR SER: NEGATIVE — SIGNIFICANT CHANGE UP

## 2022-03-24 LAB
HSV1 AB FLD QL: NEGATIVE — SIGNIFICANT CHANGE UP
HSV2 AB FLD-ACNC: NEGATIVE — SIGNIFICANT CHANGE UP

## 2022-04-04 ENCOUNTER — APPOINTMENT (OUTPATIENT)
Dept: HEMATOLOGY ONCOLOGY | Facility: CLINIC | Age: 62
End: 2022-04-04
Payer: MEDICAID

## 2022-04-04 VITALS
BODY MASS INDEX: 44.55 KG/M2 | WEIGHT: 221 LBS | TEMPERATURE: 96 F | HEIGHT: 59 IN | SYSTOLIC BLOOD PRESSURE: 153 MMHG | HEART RATE: 71 BPM | RESPIRATION RATE: 18 BRPM | OXYGEN SATURATION: 96 % | DIASTOLIC BLOOD PRESSURE: 87 MMHG

## 2022-04-04 DIAGNOSIS — Z01.812 ENCOUNTER FOR PREPROCEDURAL LABORATORY EXAMINATION: ICD-10-CM

## 2022-04-04 DIAGNOSIS — Z20.822 ENCOUNTER FOR PREPROCEDURAL LABORATORY EXAMINATION: ICD-10-CM

## 2022-04-04 PROCEDURE — 99204 OFFICE O/P NEW MOD 45 MIN: CPT

## 2022-04-04 NOTE — REVIEW OF SYSTEMS
[Negative] : Allergic/Immunologic [FreeTextEntry6] : COPD [de-identified] : pruritis mid-chest [de-identified] : hypothyroid

## 2022-04-04 NOTE — PHYSICAL EXAM
[Normal] : bilateral breasts without nipple retraction, skin dimpling or palpable masses; the bilateral axillae are without adenopathy [de-identified] : Mediport in place in central chest. No tenderness.  [de-identified] : s/p bilateral mastectomy and reconstruction. No mass palpated.

## 2022-04-04 NOTE — HISTORY OF PRESENT ILLNESS
[Disease: _____________________] : Disease: [unfilled] [T: ___] : T[unfilled] [N: ___] : N[unfilled] [de-identified] : 62F with a remote history of left breast cancer is referred by her PCP for breast cancer surveillance and mediport care. \par \par OncHx:\par Pt received 5 sessions of RT and 1 cycle of chemotherapy and due to radiation burn and chemotherapy adverse effects, decided to proceed with L mastectomy and R prophylactic mastectomy with TRAM flap reconstruction on 12/14/2000 (Adria Alberto/Nicholas Salas).  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  She underwent revision of bilateral breasts and reduction mammoplasty in 2015.  She did not receive any adjuvant RT, systemic therapy, or endocrine therapy.  She was told that her upper extremity veins could not be accessed due to hx. b/l axillary lymphadenectomy.  Due to poor peripheral vein access in the legs for blood draw and surgery, she had a mediport placed in 2005 and replacement in 2007.  She was lost to follow up for 5 years and her doctors kept changing.  She decided to transfer care from [facility] to [facility].  PCP recommended removal of mediport but patient has concerns due to fears of lymphedema. \par \par 10/22/1999 left breast upper outer stereotactic biopsy: Intraductal hyperplasia, mild, focal. Fibrocystic change, non proliferative. Chronic inflammatory infiltrate. Microcalcifications.\par \par 8/4/2000 left nipple secretion: rare clusters of ductal epithelial cells present in a background of macrophages, inflammatory cells and rare red blood cells.\par \par 11/2/2000 left breast stereotactic biopsy: \par left upper outer: intraductal comedocarcinoma, ductal hyperplasia, adenosis, microcalcifications\par \par 12/14/2000 b/l mastectomy and b/l ALD:\par 1. 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  DCIS, high nuclear grade, solid, cribriform and micropapillary types with focal central necrosis, comprises approximately 10% of the tumor area.  No PNI or LVI.  Surrounding breast parenchyma shows proliferative fibrocystic changes. Microcalcifications present in association with invasive and in situ carcinoma as well as the benign epithelial elements. Nipple without significant changes. Seven axillary lymph nodes negative for metastatic carcinoma.\par 2. Right breast: fibrocystic changes including stromal fibrosis, mild duct cell hyperplasia, adenosis,apocrine metaplasia, cysts, sclerosing adenosis; small hyalinized from adenoma and microcalcifications. Nipple without significant changes. Five benign axillary lymph nodes.\par ER (invasive 50-75%, in-situ 75-90%), NE (invasive 25-50%, in situ 50-75%), HER-2 (in situ 3+, invasive 2+)\par \par 5/25/21 CT chest: no evidence of intrathoracic metastasis. [de-identified] : invasive ductal carcinoma [de-identified] : ER+, OK+

## 2022-04-14 ENCOUNTER — APPOINTMENT (OUTPATIENT)
Dept: INTERNAL MEDICINE | Facility: CLINIC | Age: 62
End: 2022-04-14
Payer: MEDICAID

## 2022-04-14 ENCOUNTER — LABORATORY RESULT (OUTPATIENT)
Age: 62
End: 2022-04-14

## 2022-04-14 VITALS
DIASTOLIC BLOOD PRESSURE: 80 MMHG | OXYGEN SATURATION: 98 % | TEMPERATURE: 97.8 F | WEIGHT: 220 LBS | SYSTOLIC BLOOD PRESSURE: 133 MMHG | BODY MASS INDEX: 44.43 KG/M2 | RESPIRATION RATE: 14 BRPM | HEART RATE: 75 BPM

## 2022-04-14 DIAGNOSIS — Z87.898 PERSONAL HISTORY OF OTHER SPECIFIED CONDITIONS: ICD-10-CM

## 2022-04-14 DIAGNOSIS — T73.3XXD: ICD-10-CM

## 2022-04-14 DIAGNOSIS — Z95.828 PRESENCE OF OTHER VASCULAR IMPLANTS AND GRAFTS: ICD-10-CM

## 2022-04-14 PROCEDURE — 99214 OFFICE O/P EST MOD 30 MIN: CPT | Mod: 25,GC

## 2022-04-14 RX ORDER — GABAPENTIN 300 MG/1
300 CAPSULE ORAL 3 TIMES DAILY
Qty: 180 | Refills: 2 | Status: DISCONTINUED | COMMUNITY
Start: 2021-12-21 | End: 2022-04-14

## 2022-04-14 RX ORDER — TRAMADOL HYDROCHLORIDE 50 MG/1
50 TABLET, COATED ORAL
Qty: 20 | Refills: 0 | Status: DISCONTINUED | OUTPATIENT
Start: 2022-01-18 | End: 2022-04-14

## 2022-04-15 ENCOUNTER — NON-APPOINTMENT (OUTPATIENT)
Age: 62
End: 2022-04-15

## 2022-04-15 LAB
BASOPHILS # BLD AUTO: 0.03 K/UL
BASOPHILS NFR BLD AUTO: 0.6 %
EOSINOPHIL # BLD AUTO: 0.17 K/UL
EOSINOPHIL NFR BLD AUTO: 3.6 %
HCT VFR BLD CALC: 43.2 %
HGB BLD-MCNC: 12.8 G/DL
IMM GRANULOCYTES NFR BLD AUTO: 0.2 %
LYMPHOCYTES # BLD AUTO: 1.17 K/UL
LYMPHOCYTES NFR BLD AUTO: 24.5 %
MAN DIFF?: NORMAL
MCHC RBC-ENTMCNC: 26.3 PG
MCHC RBC-ENTMCNC: 29.6 GM/DL
MCV RBC AUTO: 88.9 FL
MONOCYTES # BLD AUTO: 0.45 K/UL
MONOCYTES NFR BLD AUTO: 9.4 %
NEUTROPHILS # BLD AUTO: 2.94 K/UL
NEUTROPHILS NFR BLD AUTO: 61.7 %
PLATELET # BLD AUTO: 330 K/UL
RBC # BLD: 4.86 M/UL
RBC # FLD: 16.3 %
TSH SERPL-ACNC: 18 UIU/ML
WBC # FLD AUTO: 4.77 K/UL

## 2022-04-15 RX ORDER — LEVOTHYROXINE SODIUM 0.17 MG/1
175 TABLET ORAL DAILY
Qty: 30 | Refills: 1 | Status: DISCONTINUED | COMMUNITY
Start: 2021-01-26 | End: 2022-04-15

## 2022-04-18 LAB
ALBUMIN SERPL ELPH-MCNC: 4.3 G/DL
ALP BLD-CCNC: 107 U/L
ALT SERPL-CCNC: 19 U/L
ANION GAP SERPL CALC-SCNC: 11 MMOL/L
APTT BLD: 31.9 SEC
AST SERPL-CCNC: 17 U/L
BASOPHILS # BLD AUTO: 0.02 K/UL
BASOPHILS NFR BLD AUTO: 0.4 %
BILIRUB SERPL-MCNC: 0.3 MG/DL
BUN SERPL-MCNC: 19 MG/DL
CALCIUM SERPL-MCNC: 9.6 MG/DL
CHLORIDE SERPL-SCNC: 104 MMOL/L
CO2 SERPL-SCNC: 26 MMOL/L
CREAT SERPL-MCNC: 0.56 MG/DL
EGFR: 104 ML/MIN/1.73M2
EOSINOPHIL # BLD AUTO: 0.14 K/UL
EOSINOPHIL NFR BLD AUTO: 2.7 %
GLUCOSE SERPL-MCNC: 125 MG/DL
HCT VFR BLD CALC: 39.9 %
HGB BLD-MCNC: 12.6 G/DL
IMM GRANULOCYTES NFR BLD AUTO: 0.2 %
INR PPP: 0.93
LYMPHOCYTES # BLD AUTO: 1.32 K/UL
LYMPHOCYTES NFR BLD AUTO: 25.9 %
MAN DIFF?: NORMAL
MCHC RBC-ENTMCNC: 26.3 PG
MCHC RBC-ENTMCNC: 31.6 GM/DL
MCV RBC AUTO: 83.3 FL
MONOCYTES # BLD AUTO: 0.36 K/UL
MONOCYTES NFR BLD AUTO: 7.1 %
NEUTROPHILS # BLD AUTO: 3.25 K/UL
NEUTROPHILS NFR BLD AUTO: 63.7 %
PLATELET # BLD AUTO: 311 K/UL
POTASSIUM SERPL-SCNC: 3.8 MMOL/L
PROT SERPL-MCNC: 7.8 G/DL
PT BLD: 11 SEC
RBC # BLD: 4.79 M/UL
RBC # FLD: 15.2 %
SODIUM SERPL-SCNC: 141 MMOL/L
WBC # FLD AUTO: 5.1 K/UL

## 2022-04-20 LAB — BACTERIA BLD CULT: NORMAL

## 2022-04-26 ENCOUNTER — APPOINTMENT (OUTPATIENT)
Dept: PULMONOLOGY | Facility: CLINIC | Age: 62
End: 2022-04-26
Payer: MEDICAID

## 2022-04-26 VITALS
DIASTOLIC BLOOD PRESSURE: 69 MMHG | SYSTOLIC BLOOD PRESSURE: 182 MMHG | OXYGEN SATURATION: 97 % | HEIGHT: 59 IN | WEIGHT: 225 LBS | HEART RATE: 86 BPM | TEMPERATURE: 98.7 F | BODY MASS INDEX: 45.36 KG/M2

## 2022-04-26 DIAGNOSIS — R09.89 OTHER SPECIFIED SYMPTOMS AND SIGNS INVOLVING THE CIRCULATORY AND RESPIRATORY SYSTEMS: ICD-10-CM

## 2022-04-26 PROCEDURE — 99213 OFFICE O/P EST LOW 20 MIN: CPT

## 2022-04-26 NOTE — REVIEW OF SYSTEMS
[Fatigue] : fatigue [Nasal Congestion] : nasal congestion [Postnasal Drip] : postnasal drip [Sinus Problems] : sinus problems [Cough] : cough [Sputum] : sputum [Negative] : Endocrine [Wheezing] : no wheezing

## 2022-04-26 NOTE — ASSESSMENT
[FreeTextEntry1] : #COPD/chronic sinusitis/cough\par doing well with anoro and prn albuterol. Has increased cough and sputum currently but likely in setting of chronic sinuitis as reports upper respiratory sx more than lower but cough sounds productive. She denies fever, chills. Will obtain CT to check for any residual covid inflammation and given current sx. she is clear on exam. refilled albuterol inhaler. \par -continue anoro and albuterol\par -obtain CT chest \par -repeat PFTs (will need covid swab prior)\par \par #prior Covid infection\par Admitted in 12/21; fatigue ongoing but likely in the setting of other comorbidities. cough as above. \par \par #JESÚS\par hx of moderate JESÚS and nocturnal hypoxemia noted on sleep study--has not yet picked  up CPAP machine but reports is scheduled to  this week. \par

## 2022-04-26 NOTE — HISTORY OF PRESENT ILLNESS
[TextBox_4] : Reports recent visit to pcps office for feeling unwell and had blood cultures sent which were negative and increased her synthroid. reports prior bacteremia and due to have her port removed the beginning of May. She has had a productive cough, sinus congestion and runny nose. She thinks the cough is somewhat improving but ongoing. She is taking anoro daily but has run out of albuterol and needs refill. using medical marijuana precribed via smoking, cream, and oil but is trying to avoid smoke given hx of COPD. Denies wheezing, fever, chills, sob. \par

## 2022-04-28 ENCOUNTER — APPOINTMENT (OUTPATIENT)
Dept: GASTROENTEROLOGY | Facility: CLINIC | Age: 62
End: 2022-04-28
Payer: MEDICAID

## 2022-04-28 VITALS
WEIGHT: 227 LBS | TEMPERATURE: 98 F | DIASTOLIC BLOOD PRESSURE: 64 MMHG | HEIGHT: 59 IN | BODY MASS INDEX: 45.76 KG/M2 | RESPIRATION RATE: 15 BRPM | OXYGEN SATURATION: 98 % | HEART RATE: 77 BPM | SYSTOLIC BLOOD PRESSURE: 121 MMHG

## 2022-04-28 DIAGNOSIS — Z80.0 ENCOUNTER FOR SCREENING FOR MALIGNANT NEOPLASM OF COLON: ICD-10-CM

## 2022-04-28 DIAGNOSIS — Z87.19 PERSONAL HISTORY OF OTHER DISEASES OF THE DIGESTIVE SYSTEM: ICD-10-CM

## 2022-04-28 DIAGNOSIS — K21.9 GASTRO-ESOPHAGEAL REFLUX DISEASE W/OUT ESOPHAGITIS: ICD-10-CM

## 2022-04-28 DIAGNOSIS — Z12.11 ENCOUNTER FOR SCREENING FOR MALIGNANT NEOPLASM OF COLON: ICD-10-CM

## 2022-04-28 PROCEDURE — 99204 OFFICE O/P NEW MOD 45 MIN: CPT

## 2022-04-28 RX ORDER — TOBRAMYCIN AND DEXAMETHASONE 3; 1 MG/G; MG/G
0.3-0.1 OINTMENT OPHTHALMIC
Qty: 4 | Refills: 0 | Status: ACTIVE | COMMUNITY
Start: 2022-01-21

## 2022-04-28 RX ORDER — MUPIROCIN 20 MG/G
2 OINTMENT TOPICAL
Qty: 22 | Refills: 0 | Status: ACTIVE | COMMUNITY
Start: 2021-11-11

## 2022-04-28 RX ORDER — BRIMONIDINE TARTRATE 2 MG/MG
0.2 SOLUTION/ DROPS OPHTHALMIC
Qty: 5 | Refills: 0 | Status: ACTIVE | COMMUNITY
Start: 2021-11-08

## 2022-04-28 RX ORDER — LATANOPROST/PF 0.005 %
0.01 DROPS OPHTHALMIC (EYE)
Qty: 2 | Refills: 0 | Status: ACTIVE | COMMUNITY
Start: 2021-11-29

## 2022-04-28 RX ORDER — DORZOLAMIDE HYDROCHLORIDE TIMOLOL MALEATE 20; 5 MG/ML; MG/ML
22.3-6.8 SOLUTION/ DROPS OPHTHALMIC
Qty: 10 | Refills: 0 | Status: ACTIVE | COMMUNITY
Start: 2021-11-10

## 2022-04-29 ENCOUNTER — TRANSCRIPTION ENCOUNTER (OUTPATIENT)
Age: 62
End: 2022-04-29

## 2022-04-29 PROBLEM — Z87.19 HISTORY OF DIVERTICULITIS: Status: ACTIVE | Noted: 2022-04-28

## 2022-04-29 PROBLEM — K21.9 GASTROESOPHAGEAL REFLUX DISEASE: Status: ACTIVE | Noted: 2021-01-26

## 2022-04-29 PROBLEM — Z12.11 ENCOUNTER FOR COLONOSCOPY IN PATIENT WITH FAMILY HISTORY OF COLON CANCER: Status: RESOLVED | Noted: 2022-04-29 | Resolved: 2022-05-13

## 2022-04-29 LAB
25(OH)D3 SERPL-MCNC: 16.8 NG/ML
CRP SERPL-MCNC: 13 MG/L
FOLATE SERPL-MCNC: 12.7 NG/ML
IGA SER QL IEP: 234 MG/DL
VIT B12 SERPL-MCNC: 322 PG/ML

## 2022-04-29 NOTE — PHYSICAL EXAM
[General Appearance - Alert] : alert [General Appearance - In No Acute Distress] : in no acute distress [General Appearance - Well Nourished] : well nourished [Sclera] : the sclera and conjunctiva were normal [Outer Ear] : the ears and nose were normal in appearance [Neck Appearance] : the appearance of the neck was normal [Neck Cervical Mass (___cm)] : no neck mass was observed [Heart Rate And Rhythm] : heart rate was normal and rhythm regular [Edema] : there was no peripheral edema [Bowel Sounds] : normal bowel sounds [Abdomen Soft] : soft [Abdomen Tenderness] : non-tender [Abnormal Walk] : normal gait [Skin Color & Pigmentation] : normal skin color and pigmentation [Skin Turgor] : normal skin turgor [] : no rash [No Focal Deficits] : no focal deficits [Oriented To Time, Place, And Person] : oriented to person, place, and time [Impaired Insight] : insight and judgment were intact [Affect] : the affect was normal [FreeTextEntry1] : abdominal adiposity

## 2022-04-29 NOTE — HISTORY OF PRESENT ILLNESS
[de-identified] : 62F with PMH of HTN, hyperlipidemia, GERD, COPD (on albuterol and Ellipta inhalers), JESÚS, history of epilepsy (no treatment), hypothyroidism, history breast cancer (s/p radiation, chemo, and b/l mastectomy in 2000, still with chemo port in place), kidney mass (follows at [place], benign in origin), history of diverticulitis, prior rotator cuff tear, LE pain b/l (managed with cannabis) here for follow up on history of diverticulitis. \par \par Patient reports that once and a while will get symptoms of pain/discomfort on left side of abdomen with constipation and bloating. goes on liquid diet for 5 days and then symptoms resolve \par - BM every and other day, sometimes pieces and sometimes completely normal. \par - diverticulitis acts up when has black stool  \par - xray in November 2021, ?enteritis \par - no other pain in stomach \par - mass on left kidney- did biopsy and not sure what it is but knows its not cancer\par - October 2021 -> December 2021, PNA then COVID then stomach infection \par - struggles with what to eat due to hx of diverticulitis and wants to see nutritionist \par - last colonoscopy 2018 @ [facility], has not seen blood \par - FHX: father colon cancer and prostate cancer

## 2022-04-29 NOTE — ASSESSMENT
[FreeTextEntry1] : 62F with PMH of HTN, hyperlipidemia, GERD, COPD (on albuterol and Ellipta inhalers), JESÚS, history of epilepsy (no treatment), hypothyroidism, history breast cancer (s/p radiation, chemo, and b/l mastectomy in 2000, still with chemo port in place), kidney mass (follows at [place], benign in origin), history of diverticulitis, prior rotator cuff tear, LE pain b/l (managed with cannabis) here for follow up on history of diverticulitis. \par \par Hx of diverticulitis \par - current symptoms of left sided abdominal pain with constipation and bloating therefore collect blood tests/stool tests today, evaluate for infection with finding of enteritis on xray\par - recommend CT abdomen and pelvis with hx of diverticulitis, check with Dr. Isaacs if can be done at same time as CT chest\par - take famotidine prn with report of occasional heartburn \par - referral to nutritionist \par \par Family history of colon cancer\par - due for cscope 2023 but may consider repeat sooner depending on lab results\par \par f/u after above

## 2022-05-02 ENCOUNTER — LABORATORY RESULT (OUTPATIENT)
Age: 62
End: 2022-05-02

## 2022-05-02 LAB
GLIADIN IGA SER QL: <5 UNITS
GLIADIN IGG SER QL: <5 UNITS
GLIADIN PEPTIDE IGA SER-ACNC: NEGATIVE
GLIADIN PEPTIDE IGG SER-ACNC: NEGATIVE
TTG IGA SER IA-ACNC: <1.2 U/ML
TTG IGA SER-ACNC: NEGATIVE
TTG IGG SER IA-ACNC: <1.2 U/ML
TTG IGG SER IA-ACNC: NEGATIVE

## 2022-05-03 ENCOUNTER — NON-APPOINTMENT (OUTPATIENT)
Age: 62
End: 2022-05-03

## 2022-05-03 ENCOUNTER — TRANSCRIPTION ENCOUNTER (OUTPATIENT)
Age: 62
End: 2022-05-03

## 2022-05-03 DIAGNOSIS — E55.9 VITAMIN D DEFICIENCY, UNSPECIFIED: ICD-10-CM

## 2022-05-05 LAB — GI PCR PANEL, STOOL: NORMAL

## 2022-05-06 ENCOUNTER — OUTPATIENT (OUTPATIENT)
Dept: OUTPATIENT SERVICES | Facility: HOSPITAL | Age: 62
LOS: 1 days | End: 2022-05-06
Payer: COMMERCIAL

## 2022-05-06 ENCOUNTER — APPOINTMENT (OUTPATIENT)
Dept: INTERVENTIONAL RADIOLOGY/VASCULAR | Facility: HOSPITAL | Age: 62
End: 2022-05-06

## 2022-05-06 ENCOUNTER — RESULT REVIEW (OUTPATIENT)
Age: 62
End: 2022-05-06

## 2022-05-06 DIAGNOSIS — Z98.890 OTHER SPECIFIED POSTPROCEDURAL STATES: Chronic | ICD-10-CM

## 2022-05-06 DIAGNOSIS — Z90.13 ACQUIRED ABSENCE OF BILATERAL BREASTS AND NIPPLES: Chronic | ICD-10-CM

## 2022-05-06 LAB — DEPRECATED O AND P PREP STL: NORMAL

## 2022-05-06 PROCEDURE — 36590 REMOVAL TUNNELED CV CATH: CPT

## 2022-05-06 PROCEDURE — 77001 FLUOROGUIDE FOR VEIN DEVICE: CPT

## 2022-05-06 PROCEDURE — 99152 MOD SED SAME PHYS/QHP 5/>YRS: CPT

## 2022-05-06 PROCEDURE — 99153 MOD SED SAME PHYS/QHP EA: CPT

## 2022-05-06 PROCEDURE — C1894: CPT

## 2022-05-06 PROCEDURE — 77001 FLUOROGUIDE FOR VEIN DEVICE: CPT | Mod: 26

## 2022-05-06 PROCEDURE — C1769: CPT

## 2022-05-09 LAB — CALPROTECTIN FECAL: 24 UG/G

## 2022-05-12 ENCOUNTER — APPOINTMENT (OUTPATIENT)
Dept: INTERNAL MEDICINE | Facility: CLINIC | Age: 62
End: 2022-05-12
Payer: MEDICAID

## 2022-05-12 VITALS
SYSTOLIC BLOOD PRESSURE: 130 MMHG | OXYGEN SATURATION: 98 % | DIASTOLIC BLOOD PRESSURE: 78 MMHG | WEIGHT: 226 LBS | BODY MASS INDEX: 45.56 KG/M2 | TEMPERATURE: 97.7 F | HEIGHT: 59 IN | HEART RATE: 83 BPM

## 2022-05-12 DIAGNOSIS — Z98.890 OTHER SPECIFIED POSTPROCEDURAL STATES: ICD-10-CM

## 2022-05-12 PROCEDURE — 99213 OFFICE O/P EST LOW 20 MIN: CPT | Mod: GC

## 2022-05-13 PROBLEM — Z98.890 HISTORY OF REMOVAL OF PORT-A-CATH: Status: ACTIVE | Noted: 2022-05-13

## 2022-05-13 NOTE — PHYSICAL EXAM
[No Acute Distress] : no acute distress [Well Nourished] : well nourished [Well Developed] : well developed [No JVD] : no jugular venous distention [No Lymphadenopathy] : no lymphadenopathy [No Respiratory Distress] : no respiratory distress  [No Accessory Muscle Use] : no accessory muscle use [Normal Rate] : normal rate  [Regular Rhythm] : with a regular rhythm [Normal S1, S2] : normal S1 and S2 [de-identified] : scar after port removal. No tenderness, swelling, bleeding

## 2022-05-13 NOTE — ASSESSMENT
[FreeTextEntry1] : 60F with PMH of HTN, hyperlipidemia, GERD, COPD (on albuterol and Ellipta inhalers), JESÚS, history of epilepsy (no treatment), hypothyroidism (S/p ROBERTS for [place]ve's) , history breast cancer (s/p radiation, chemo, and b/l mastectomy in 2000, recent chemo port removal), kidney mass (follows at [place], benign in origin), history of diverticulitis, prior rotator cuff tear, LE pain b/l (managed with cannabis), presenting for a follow-up after a chemoport removal 1 week ago\par \par #S/P Chemoport removal. Pain improving, no swelling, no tenderness, no bleeding.\par \par #Fatigue and malaise. Somewhat better this week after she increased Thyroid for TSH of 18 on last appointment. \par # Hypothyroidism. S/P ROBERTS for Grave's.\par - for now will continue with Synthroid 200mcg PO daily, she ll come for blood draw in 7 weeks for repeat TSH\par \par #HTN: \par History of HTN, takes Losartan and amlodipine at home. BP today well controlled.\par - continue Losartan 100 mg PO daily (refills sent) \par - continue amlodipine 10 mg PO daily (refills sent) \par \par #Breast cancer s/p radiation, chemo, and b/l mastectomy in 2000\par - s/p port removal 05/06/2022 as no longer needed and currently in remission \par - regular follow-up with heme onc (Dr. Garcia), next appointment scheduled\par \par #COPD: \par Takes albuterol and Anoro Ellipta inhalers at home. \par - continue with inhalers; no recent exacerbations \par \par #GERD: \par Takes famotidine at home. \par - continue famotidine 40 mg PO daily PRN\par \par #HLD: \par Takes atorvastatin at home. \par - continue atorvastatin 40 mg PO daily \par \par #Health care maintenance: \par - due for colonoscopy in 2024 \par - vaccines up to date (received Pfizer COVID x3) \par - per patient, no longer under going mammogram in setting of mastectomy b/l \par - pap smear and pelvic exam normal per patient Sept 2021 \par - RTC in 6M for routine follow-up\par

## 2022-05-13 NOTE — HISTORY OF PRESENT ILLNESS
[de-identified] : 60F with PMH of HTN, hyperlipidemia, GERD, COPD (on albuterol and Ellipta inhalers), JESÚS, history of epilepsy (no treatment), hypothyroidism (S/p ROBERTS for Grave's) , history breast cancer (s/p radiation, chemo, and b/l mastectomy in 2000, recent chemo port removal), kidney mass (follows at [place], benign in origin), history of diverticulitis, prior rotator cuff tear, LE pain b/l (managed with cannabis), presenting for a follow-up after a chemoport removal 1 week ago. She still has some pain around the site but it is getting better. No redness, swelling. Otherwise, she still feels somewhat fatigued, but better after she increased synthroid few days ago.

## 2022-05-13 NOTE — END OF VISIT
[] : Resident [FreeTextEntry3] : Here for follow up after chemoport removal \par Feels well overall, some pain at the site however no drainage or erythema present \par Still with fatigue and malaise- started increased dose of synthroid last week, continue at this time\par RTC in 7 weeks for lab work- repeat TSH [Time Spent: ___ minutes] : I have spent [unfilled] minutes of time on the encounter.

## 2022-05-21 ENCOUNTER — APPOINTMENT (OUTPATIENT)
Dept: CT IMAGING | Facility: HOSPITAL | Age: 62
End: 2022-05-21

## 2022-05-21 ENCOUNTER — OUTPATIENT (OUTPATIENT)
Dept: OUTPATIENT SERVICES | Facility: HOSPITAL | Age: 62
LOS: 1 days | End: 2022-05-21
Payer: COMMERCIAL

## 2022-05-21 ENCOUNTER — RESULT REVIEW (OUTPATIENT)
Age: 62
End: 2022-05-21

## 2022-05-21 DIAGNOSIS — Z90.13 ACQUIRED ABSENCE OF BILATERAL BREASTS AND NIPPLES: Chronic | ICD-10-CM

## 2022-05-21 DIAGNOSIS — Z98.890 OTHER SPECIFIED POSTPROCEDURAL STATES: Chronic | ICD-10-CM

## 2022-05-21 LAB — POCT ISTAT CREATININE: 0.6 MG/DL — SIGNIFICANT CHANGE UP (ref 0.5–1.3)

## 2022-05-21 PROCEDURE — 82565 ASSAY OF CREATININE: CPT

## 2022-05-21 PROCEDURE — 74177 CT ABD & PELVIS W/CONTRAST: CPT

## 2022-05-21 PROCEDURE — 74177 CT ABD & PELVIS W/CONTRAST: CPT | Mod: 26

## 2022-05-27 ENCOUNTER — APPOINTMENT (OUTPATIENT)
Dept: INFUSION THERAPY | Facility: CLINIC | Age: 62
End: 2022-05-27

## 2022-06-03 ENCOUNTER — NON-APPOINTMENT (OUTPATIENT)
Age: 62
End: 2022-06-03

## 2022-06-07 ENCOUNTER — APPOINTMENT (OUTPATIENT)
Dept: PULMONOLOGY | Facility: CLINIC | Age: 62
End: 2022-06-07
Payer: MEDICAID

## 2022-06-07 VITALS
DIASTOLIC BLOOD PRESSURE: 73 MMHG | HEIGHT: 59 IN | SYSTOLIC BLOOD PRESSURE: 156 MMHG | WEIGHT: 228 LBS | BODY MASS INDEX: 45.96 KG/M2 | TEMPERATURE: 98.1 F | HEART RATE: 89 BPM | OXYGEN SATURATION: 99 %

## 2022-06-07 PROCEDURE — 94060 EVALUATION OF WHEEZING: CPT

## 2022-06-07 PROCEDURE — ZZZZZ: CPT

## 2022-06-07 PROCEDURE — 94729 DIFFUSING CAPACITY: CPT

## 2022-06-07 PROCEDURE — 99214 OFFICE O/P EST MOD 30 MIN: CPT | Mod: 25

## 2022-06-07 NOTE — HISTORY OF PRESENT ILLNESS
[Obstructive Sleep Apnea] : obstructive sleep apnea [Daytime Somnolence] : daytime somnolence [Fatigue] : fatigue [Unintentional Sleep while Active] : unintentional sleep while active [Unintentional Sleep while Inactive] : unintentional sleep while inactive [Unusual Movements] : unusual movements [Former] : former [Never] : never [TextBox_4] : she is doing well but she is still coughing and has productive cough.  the color of the sputum is white and sometimes in the morning is yellow.   [Awakes Unrefreshed] : does not awaken unrefreshed [Awakes with Dry Mouth] : does not awaken with dry mouth [Awakes with Headache] : does not awaken with headache [Cataplexy] : denies cataplexy [Difficulty Initiating Sleep] : does not have difficulty initiating sleep [Hypnagogic Hallucinations] : denies hypnagogic hallucinations [Snoring] : no snoring [Tired while Driving] : not tired while driving [Vivid dreams] : no vivid dreams [Witnessed Apneas] : no witnessed apneas

## 2022-06-07 NOTE — ASSESSMENT
[FreeTextEntry1] : Sleep apnea\par \par The patient received the CPAP about a month ago and she is using it.  The patient uses a full facemask.  The patient is still symptomatic with the ESS scale of 19.  She indicated that she improved initially but she is still not optimized.  Her condition with the day fatigue and somnolence could be related to the hyperthyroidism which has been addressed and the adjusted the dose of thyroid replacement but also could be related to the either improper fit or improper pressure settings that is applied.  We will follow-up with the titration study.  In the meantime the patient will follow with her endocrinologist regarding the thyroid function test.\par \par COPD\par \par The patient was admitted 3 times in the last year with acute exacerbation of COPD.  The patient CAT score is 26.  Patient is Gold group D.  The patient is on Anoro and she is compliant with it.  The patient is still used the short acting beta agonist on a regular basis.  I discussed with the patient the gold guidelines for treatment of of group D which include either triple therapy and a or azithromycin versus Roflumilast.  The PFT was consistent with mild COPD with decrease in the diffusion capacity which she was not able to do total lung capacity.  At this point the patient is indication for de-escalation therapy.  Discussed with the patient the reliance study and the benefit of being involved in the study and also the prolonged cons of each drug.  The patient consented and will optimize her medical therapy regarding his COPD and will follow clinically.\par \par Patient is having a cough and she has history of breast cancer.  Will order CT scan to rule out metastatic disease

## 2022-06-20 ENCOUNTER — APPOINTMENT (OUTPATIENT)
Dept: INTERNAL MEDICINE | Facility: CLINIC | Age: 62
End: 2022-06-20
Payer: MEDICAID

## 2022-06-20 VITALS
WEIGHT: 218 LBS | OXYGEN SATURATION: 95 % | TEMPERATURE: 98 F | DIASTOLIC BLOOD PRESSURE: 93 MMHG | HEART RATE: 83 BPM | SYSTOLIC BLOOD PRESSURE: 162 MMHG | BODY MASS INDEX: 44.03 KG/M2

## 2022-06-20 VITALS
OXYGEN SATURATION: 98 % | DIASTOLIC BLOOD PRESSURE: 87 MMHG | HEART RATE: 87 BPM | TEMPERATURE: 98.1 F | SYSTOLIC BLOOD PRESSURE: 142 MMHG

## 2022-06-20 VITALS — SYSTOLIC BLOOD PRESSURE: 118 MMHG | DIASTOLIC BLOOD PRESSURE: 85 MMHG

## 2022-06-20 PROCEDURE — 93000 ELECTROCARDIOGRAM COMPLETE: CPT

## 2022-06-20 PROCEDURE — 99214 OFFICE O/P EST MOD 30 MIN: CPT | Mod: 25,GC

## 2022-06-21 ENCOUNTER — APPOINTMENT (OUTPATIENT)
Dept: CT IMAGING | Facility: HOSPITAL | Age: 62
End: 2022-06-21

## 2022-06-21 ENCOUNTER — OUTPATIENT (OUTPATIENT)
Dept: OUTPATIENT SERVICES | Facility: HOSPITAL | Age: 62
LOS: 1 days | End: 2022-06-21
Payer: COMMERCIAL

## 2022-06-21 ENCOUNTER — RESULT REVIEW (OUTPATIENT)
Age: 62
End: 2022-06-21

## 2022-06-21 DIAGNOSIS — Z98.890 OTHER SPECIFIED POSTPROCEDURAL STATES: Chronic | ICD-10-CM

## 2022-06-21 DIAGNOSIS — Z90.13 ACQUIRED ABSENCE OF BILATERAL BREASTS AND NIPPLES: Chronic | ICD-10-CM

## 2022-06-21 LAB
25(OH)D3 SERPL-MCNC: 33.8 NG/ML
ALBUMIN SERPL ELPH-MCNC: 4.5 G/DL
ALP BLD-CCNC: 93 U/L
ALT SERPL-CCNC: 25 U/L
ANION GAP SERPL CALC-SCNC: 13 MMOL/L
APPEARANCE: CLEAR
AST SERPL-CCNC: 24 U/L
BASOPHILS # BLD AUTO: 0.02 K/UL
BASOPHILS NFR BLD AUTO: 0.3 %
BILIRUB SERPL-MCNC: <0.2 MG/DL
BILIRUBIN URINE: NEGATIVE
BLOOD URINE: NEGATIVE
BUN SERPL-MCNC: 28 MG/DL
CALCIUM SERPL-MCNC: 9.8 MG/DL
CHLORIDE SERPL-SCNC: 103 MMOL/L
CHOLEST SERPL-MCNC: 132 MG/DL
CO2 SERPL-SCNC: 23 MMOL/L
COLOR: NORMAL
CREAT SERPL-MCNC: 0.61 MG/DL
CREAT SPEC-SCNC: 88 MG/DL
CRP SERPL-MCNC: 8 MG/L
EGFR: 102 ML/MIN/1.73M2
EOSINOPHIL # BLD AUTO: 0.18 K/UL
EOSINOPHIL NFR BLD AUTO: 2.7 %
ERYTHROCYTE [SEDIMENTATION RATE] IN BLOOD BY WESTERGREN METHOD: 42 MM/HR
ESTIMATED AVERAGE GLUCOSE: 148 MG/DL
FERRITIN SERPL-MCNC: 46 NG/ML
GLUCOSE QUALITATIVE U: NEGATIVE
GLUCOSE SERPL-MCNC: 105 MG/DL
HBA1C MFR BLD HPLC: 6.8 %
HCT VFR BLD CALC: 41.2 %
HDLC SERPL-MCNC: 36 MG/DL
HGB BLD-MCNC: 12.3 G/DL
IMM GRANULOCYTES NFR BLD AUTO: 0.1 %
IRON SATN MFR SERPL: 15 %
IRON SERPL-MCNC: 54 UG/DL
KETONES URINE: NEGATIVE
LDLC SERPL CALC-MCNC: 58 MG/DL
LEUKOCYTE ESTERASE URINE: NEGATIVE
LYMPHOCYTES # BLD AUTO: 1.38 K/UL
LYMPHOCYTES NFR BLD AUTO: 20.6 %
MAN DIFF?: NORMAL
MCHC RBC-ENTMCNC: 26 PG
MCHC RBC-ENTMCNC: 29.9 GM/DL
MCV RBC AUTO: 87.1 FL
MICROALBUMIN 24H UR DL<=1MG/L-MCNC: 1.2 MG/DL
MICROALBUMIN/CREAT 24H UR-RTO: 14 MG/G
MONOCYTES # BLD AUTO: 0.49 K/UL
MONOCYTES NFR BLD AUTO: 7.3 %
NEUTROPHILS # BLD AUTO: 4.61 K/UL
NEUTROPHILS NFR BLD AUTO: 69 %
NITRITE URINE: NEGATIVE
NONHDLC SERPL-MCNC: 95 MG/DL
PH URINE: 5.5
PLATELET # BLD AUTO: 341 K/UL
POTASSIUM SERPL-SCNC: 3.9 MMOL/L
PROT SERPL-MCNC: 7.6 G/DL
PROTEIN URINE: NEGATIVE
RBC # BLD: 4.73 M/UL
RBC # FLD: 15.2 %
SODIUM SERPL-SCNC: 139 MMOL/L
SPECIFIC GRAVITY URINE: 1.02
TIBC SERPL-MCNC: 349 UG/DL
TRIGL SERPL-MCNC: 187 MG/DL
TSH SERPL-ACNC: 0.81 UIU/ML
UIBC SERPL-MCNC: 295 UG/DL
UROBILINOGEN URINE: NORMAL
VIT B12 SERPL-MCNC: 397 PG/ML
WBC # FLD AUTO: 6.69 K/UL

## 2022-06-21 PROCEDURE — 71250 CT THORAX DX C-: CPT

## 2022-06-21 PROCEDURE — 71250 CT THORAX DX C-: CPT | Mod: 26

## 2022-07-04 LAB — GLUCOSE BLDC GLUCOMTR-MCNC: 197

## 2022-07-11 ENCOUNTER — OUTPATIENT (OUTPATIENT)
Dept: OUTPATIENT SERVICES | Facility: HOSPITAL | Age: 62
LOS: 1 days | End: 2022-07-11
Payer: COMMERCIAL

## 2022-07-11 ENCOUNTER — APPOINTMENT (OUTPATIENT)
Dept: INTERNAL MEDICINE | Facility: CLINIC | Age: 62
End: 2022-07-11

## 2022-07-11 ENCOUNTER — APPOINTMENT (OUTPATIENT)
Dept: SLEEP CENTER | Facility: HOSPITAL | Age: 62
End: 2022-07-11

## 2022-07-11 VITALS
WEIGHT: 222 LBS | TEMPERATURE: 97.7 F | HEIGHT: 59 IN | HEART RATE: 78 BPM | SYSTOLIC BLOOD PRESSURE: 154 MMHG | DIASTOLIC BLOOD PRESSURE: 92 MMHG | BODY MASS INDEX: 44.76 KG/M2 | OXYGEN SATURATION: 98 %

## 2022-07-11 DIAGNOSIS — Z98.890 OTHER SPECIFIED POSTPROCEDURAL STATES: Chronic | ICD-10-CM

## 2022-07-11 DIAGNOSIS — G47.00 INSOMNIA, UNSPECIFIED: ICD-10-CM

## 2022-07-11 DIAGNOSIS — Z90.13 ACQUIRED ABSENCE OF BILATERAL BREASTS AND NIPPLES: Chronic | ICD-10-CM

## 2022-07-11 PROCEDURE — 95811 POLYSOM 6/>YRS CPAP 4/> PARM: CPT

## 2022-07-11 PROCEDURE — 95811 POLYSOM 6/>YRS CPAP 4/> PARM: CPT | Mod: 26

## 2022-07-11 PROCEDURE — 99214 OFFICE O/P EST MOD 30 MIN: CPT | Mod: GC

## 2022-07-24 ENCOUNTER — TRANSCRIPTION ENCOUNTER (OUTPATIENT)
Age: 62
End: 2022-07-24

## 2022-07-28 ENCOUNTER — RESULT REVIEW (OUTPATIENT)
Age: 62
End: 2022-07-28

## 2022-07-28 ENCOUNTER — APPOINTMENT (OUTPATIENT)
Dept: RHEUMATOLOGY | Facility: CLINIC | Age: 62
End: 2022-07-28

## 2022-07-28 ENCOUNTER — OUTPATIENT (OUTPATIENT)
Dept: OUTPATIENT SERVICES | Facility: HOSPITAL | Age: 62
LOS: 1 days | End: 2022-07-28
Payer: COMMERCIAL

## 2022-07-28 VITALS
DIASTOLIC BLOOD PRESSURE: 83 MMHG | SYSTOLIC BLOOD PRESSURE: 157 MMHG | WEIGHT: 221 LBS | HEART RATE: 77 BPM | HEIGHT: 59 IN | OXYGEN SATURATION: 98 % | BODY MASS INDEX: 44.55 KG/M2 | TEMPERATURE: 97.8 F

## 2022-07-28 DIAGNOSIS — Z11.59 ENCOUNTER FOR SCREENING FOR OTHER VIRAL DISEASES: ICD-10-CM

## 2022-07-28 DIAGNOSIS — Z98.890 OTHER SPECIFIED POSTPROCEDURAL STATES: Chronic | ICD-10-CM

## 2022-07-28 DIAGNOSIS — Z11.1 ENCOUNTER FOR SCREENING FOR RESPIRATORY TUBERCULOSIS: ICD-10-CM

## 2022-07-28 DIAGNOSIS — R76.8 OTHER SPECIFIED ABNORMAL IMMUNOLOGICAL FINDINGS IN SERUM: ICD-10-CM

## 2022-07-28 DIAGNOSIS — Z90.13 ACQUIRED ABSENCE OF BILATERAL BREASTS AND NIPPLES: Chronic | ICD-10-CM

## 2022-07-28 DIAGNOSIS — Z82.69 FAMILY HISTORY OF OTHER DISEASES OF THE MUSCULOSKELETAL SYSTEM AND CONNECTIVE TISSUE: ICD-10-CM

## 2022-07-28 PROCEDURE — 73564 X-RAY EXAM KNEE 4 OR MORE: CPT | Mod: 26,50

## 2022-07-28 PROCEDURE — 99205 OFFICE O/P NEW HI 60 MIN: CPT | Mod: 25

## 2022-07-28 PROCEDURE — 73130 X-RAY EXAM OF HAND: CPT

## 2022-07-28 PROCEDURE — 73564 X-RAY EXAM KNEE 4 OR MORE: CPT

## 2022-07-28 PROCEDURE — 73110 X-RAY EXAM OF WRIST: CPT | Mod: 26,50

## 2022-07-28 PROCEDURE — 73130 X-RAY EXAM OF HAND: CPT | Mod: 26,50

## 2022-07-28 PROCEDURE — 73110 X-RAY EXAM OF WRIST: CPT

## 2022-07-28 PROCEDURE — 36415 COLL VENOUS BLD VENIPUNCTURE: CPT

## 2022-07-28 NOTE — PHYSICAL EXAM
[General Appearance - Alert] : alert [General Appearance - In No Acute Distress] : in no acute distress [General Appearance - Well Nourished] : well nourished [General Appearance - Well Developed] : well developed [General Appearance - Well-Appearing] : healthy appearing [Sclera] : the sclera and conjunctiva were normal [Outer Ear] : the ears and nose were normal in appearance [Examination Of The Oral Cavity] : the lips and gums were normal [Oropharynx] : the oropharynx was normal [Neck Appearance] : the appearance of the neck was normal [Neck Cervical Mass (___cm)] : no neck mass was observed [Respiration, Rhythm And Depth] : normal respiratory rhythm and effort [Exaggerated Use Of Accessory Muscles For Inspiration] : no accessory muscle use [Auscultation Breath Sounds / Voice Sounds] : lungs were clear to auscultation bilaterally [Heart Rate And Rhythm] : heart rate was normal and rhythm regular [Heart Sounds] : normal S1 and S2 [Murmurs] : no murmurs [Abdomen Soft] : soft [Abdomen Tenderness] : non-tender [Abnormal Walk] : normal gait [Nail Clubbing] : no clubbing  or cyanosis of the fingernails [Motor Tone] : muscle strength and tone were normal [] : no rash [Skin Lesions] : no skin lesions [No Focal Deficits] : no focal deficits [Impaired Insight] : insight and judgment were intact [Affect] : the affect was normal [FreeTextEntry1] : NO active psoriasis

## 2022-07-28 NOTE — HISTORY OF PRESENT ILLNESS
[Weight Loss] : weight loss [Fatigue] : fatigue [Cough] : cough [Shortness of Breath] : shortness of breath [Arthralgias] : arthralgias [Joint Swelling] : joint swelling [Morning Stiffness] : morning stiffness [Muscle Spasms] : muscle spasms [Muscle Cramping] : muscle cramping [Visual Changes] : visual changes [Dry Eyes] : dry eyes [FreeTextEntry1] : Referred by  for Rheumatology consultation \par \par 61 y/o female  with PMH of HTN, hyperlipidemia, GERD, COPD (on albuterol, Ellipta, azithromycin), JESÚS, history of epilepsy (no treatment), hypothyroidism (S/p ROBERTS for Grave's) , history breast cancer (s/p radiation, chemo, and b/l mastectomy in 2000), kidney mass (follows at [place], benign in origin), history of diverticulitis, prior rotator cuff tear, LE pain b/l (managed with cannabis). \par hx of psoriasis- scalp and face, back of the ear- diagnosed by Derm at [facility] and she has been using topicals.\par Generalized body pain, follows with pain management. \par has elevated ESR and CRP, ? CT abd/pelvis with infiltrative finding surrounding both ureters -which may suggest retroperitoneal fibrosis,  IgG4 disease or, lymphoma. \par ROS: positive for diffuse pain, worse pain in legs below the knee, low back pain, buttock pain, shoulder, neck and hands. \par AM stiffness 15-20 minutes, massage and hot water helps. \par Fingers swell up- arthralgia about 10 months \par Raynaud's - started recently \par getting bruises also new \par Non intentional weight loss 10 lbs a week\par \par Prednisone tried for Graves disease but she has more bone pain. \par \par No h/o morning stiffness, memory loss, patchy hair loss, sicca symptoms, photosensitivity, HA,  Raynaud's, oral ulcers, nasal ulcers. \par No history of pleuropericarditis \par No history of protein/hematuria \par No history of cytopenias. \par No Hx of VTE/DVT/PE\par Sister- SLE \par No hx of plantar fasciitis or tendinitis. \par Personal  hx of autoimmune disease,\par  [Anorexia] : no anorexia [Malaise] : no malaise [Fever] : no fever [Chills] : no chills [Depression] : no depression [Malar Facial Rash] : no malar facial rash [Skin Nodules] : no skin nodules [Oral Ulcers] : no oral ulcers [Dry Mouth] : no dry mouth [Dysphonia] : no dysphonia [Chest Pain] : no chest pain [Joint Warmth] : no joint warmth [Joint Deformity] : no joint deformity [Decreased ROM] : no decreased range of motion [Falls] : no falls [Difficulty Standing] : no difficulty standing [Difficulty Walking] : no difficulty walking [Dyspnea] : no dyspnea [Myalgias] : no myalgias [Muscle Weakness] : no muscle weakness [Eye Pain] : no eye pain [Eye Redness] : no eye redness

## 2022-07-28 NOTE — ASSESSMENT
[FreeTextEntry1] : 63 y/o female  with PMH of HTN, hyperlipidemia, GERD, COPD JESÚS,  hypothyroidism (S/p ROBERTS for Grave's), breast cancer (s/p radiation, chemo, and b/l mastectomy in 2000), kidney mass (follows at [place], benign in origin), \par hx of psoriasis- scalp and face, well controlled  topicals.\par has elevated ESR and CRP, ? CT abd/pelvis with infiltrative finding surrounding both ureters -which may suggest retroperitoneal fibrosis,  IgG4 disease or, lymphoma. \par Pt has polyarthralgia, arthritis, non intentional weight loss, easy bruising and possible Raynauds. \par On examination multiple tender joints and minor synovitis of small joints of hands ( MCP/PIP joints) \par We talked abut possible underlying AI process vs underlying malignancy. \par We will rule out underlying psoriatic arthritis given hx of psoriasis. \par Will refer to Hem/Onc to rule out lymphoma, likely she will need PET scan. \par Check IgG4 level\par AVISE. \par May consider steroids depends on labs/images \par Obtain xray hands//wrist , knee and SI joint \par \par Blood drawn during the visit today.\par \par \par The patient had the opportunity to ask questions and all were answered to her satisfaction.  We will coordinate treatment with the other members of her treatment team.\par patient verbalized understanding for presented data and agreed with my recommendations.\par \par

## 2022-07-28 NOTE — REVIEW OF SYSTEMS
[Feeling Tired] : feeling tired [Recent Weight Loss (___ Lbs)] : recent [unfilled] ~Ulb weight loss [SOB on Exertion] : shortness of breath during exertion [Arthralgias] : arthralgias [Joint Pain] : joint pain [Joint Swelling] : joint swelling [Joint Stiffness] : joint stiffness [Easy Bruising] : a tendency for easy bruising [Negative] : Integumentary [Fever] : no fever [Chills] : no chills [Feeling Poorly] : not feeling poorly [Muscle Weakness] : no muscle weakness [Easy Bleeding] : no tendency for easy bleeding [Swollen Glands] : no swollen glands [Swollen Glands In The Neck] : no swollen glands in the neck

## 2022-07-29 LAB
B BURGDOR AB SER-IMP: NEGATIVE
B BURGDOR IGG+IGM SER QL: 0.57 INDEX
C3 SERPL-MCNC: 151 MG/DL
C4 SERPL-MCNC: 41 MG/DL
CCP AB SER IA-ACNC: <8 UNITS
CK SERPL-CCNC: 35 U/L
CONFIRM: 28.6 SEC
DRVVT IMM 1:2 NP PPP: NORMAL
DRVVT SCREEN TO CONFIRM RATIO: 0.99 RATIO
HBV CORE IGG+IGM SER QL: NONREACTIVE
HBV SURFACE AB SER QL: NONREACTIVE
HBV SURFACE AG SER QL: NONREACTIVE
HCV AB SER QL: NONREACTIVE
HCV S/CO RATIO: 0.07 S/CO
HIV1+2 AB SPEC QL IA.RAPID: NONREACTIVE
RF+CCP IGG SER-IMP: NEGATIVE
RHEUMATOID FACT SER QL: <10 IU/ML
SCREEN DRVVT: 33.4 SEC
SILICA CLOTTING TIME INTERPRETATION: NORMAL
SILICA CLOTTING TIME S/C: 0.74 RATIO

## 2022-08-01 LAB
B2 GLYCOPROT1 IGA SERPL IA-ACNC: <5 SAU
DEPRECATED CARDIOLIPIN IGA SER: <5 APL

## 2022-08-04 NOTE — PATIENT PROFILE ADULT - FUNCTIONAL ASSESSMENT - DAILY ACTIVITY 4.
Weakness, SOB Weakness, SOB Weakness, SOB Weakness, SOB Weakness, SOB Weakness, SOB Weakness, SOB Weakness, SOB Weakness, SOB Weakness, SOB Weakness, SOB Weakness, SOB Weakness, SOB Weakness, SOB Weakness, SOB Weakness, SOB Weakness, SOB Weakness, SOB Weakness, SOB Weakness, SOB Weakness, SOB Weakness, SOB Weakness, SOB Weakness, SOB Weakness, SOB Weakness, SOB Weakness, SOB Weakness, SOB 4 = No assist / stand by assistance

## 2022-08-05 ENCOUNTER — APPOINTMENT (OUTPATIENT)
Dept: HEMATOLOGY ONCOLOGY | Facility: CLINIC | Age: 62
End: 2022-08-05

## 2022-08-05 VITALS
BODY MASS INDEX: 44.55 KG/M2 | HEART RATE: 79 BPM | DIASTOLIC BLOOD PRESSURE: 96 MMHG | WEIGHT: 221 LBS | OXYGEN SATURATION: 95 % | HEIGHT: 59 IN | TEMPERATURE: 96.9 F | RESPIRATION RATE: 18 BRPM | SYSTOLIC BLOOD PRESSURE: 163 MMHG

## 2022-08-05 PROCEDURE — 99215 OFFICE O/P EST HI 40 MIN: CPT

## 2022-08-05 NOTE — PHYSICAL EXAM
[Restricted in physically strenuous activity but ambulatory and able to carry out work of a light or sedentary nature] : Status 1- Restricted in physically strenuous activity but ambulatory and able to carry out work of a light or sedentary nature, e.g., light house work, office work [Normal] : affect appropriate [de-identified] : 1+ b/l LE edema [de-identified] : tenderness to palpation in L-spine [de-identified] : swelling and pain in b/l DIP, PIP

## 2022-08-05 NOTE — HISTORY OF PRESENT ILLNESS
[de-identified] : 62F with PMH of HTN, hyperlipidemia, GERD, COPD (on albuterol, Ellipta, azithromycin), JESÚS, history of epilepsy (no treatment), hypothyroidism (S/p ROBERTS for Grave's) , history breast cancer (s/p radiation, chemo, and b/l mastectomy in 2000, recent chemo port removal), chronic kidney mass (follows at [place], benign in origin), history of diverticulitis, prior rotator cuff tear, LE pain b/l (managed with cannabis), referred to Hem/Onc due to CT A/P showing infiltrative findings surrounding both ureters.\par \par OncHx:\par Prior breast cancer, invasive ductal carcinoma, T1b, N0, ER+UT+ as below:\par Pt received 5 sessions of RT and 1 cycle of chemotherapy and due to radiation burn and chemotherapy adverse effects, decided to proceed with L mastectomy and R prophylactic mastectomy with TRAM flap reconstruction on 12/14/2000 (Adria Alberto/Nicholas Salas). 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 She underwent revision of bilateral breasts and reduction mammoplasty in 2015. She did not receive any adjuvant RT, systemic therapy, or endocrine therapy. She was told that her upper extremity veins could not be accessed due to hx. b/l axillary lymphadenectomy. Due to poor peripheral vein access in the legs for blood draw and surgery, she had a mediport placed in 2005 and replacement in 2007. She was lost to follow up for 5 years and her doctors kept changing. She decided to transfer care from [facility] to [facility]. PCP recommended removal of mediport but patient has concerns due to fears of lymphedema. \par \par 10/22/1999 left breast upper outer stereotactic biopsy: Intraductal hyperplasia, mild, focal. Fibrocystic change, non proliferative. Chronic inflammatory infiltrate. Microcalcifications.\par \par 8/4/2000 left nipple secretion: rare clusters of ductal epithelial cells present in a background of macrophages, inflammatory cells and rare red blood cells.\par \par 11/2/2000 left breast stereotactic biopsy: \par left upper outer: intraductal comedocarcinoma, ductal hyperplasia, adenosis, microcalcifications\par \par 12/14/2000 b/l mastectomy and b/l ALD:\par 1. 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 DCIS, high nuclear grade, solid, cribriform and micropapillary types with focal central necrosis, comprises approximately 10% of the tumor area. No PNI or LVI. Surrounding breast parenchyma shows proliferative fibrocystic changes. Microcalcifications present in association with invasive and in situ carcinoma as well as the benign epithelial elements. Nipple without significant changes. Seven axillary lymph nodes negative for metastatic carcinoma.\par 2. Right breast: fibrocystic changes including stromal fibrosis, mild duct cell hyperplasia, adenosis,apocrine metaplasia, cysts, sclerosing adenosis; small hyalinized from adenoma and microcalcifications. Nipple without significant changes. Five benign axillary lymph nodes.\par ER (invasive 50-75%, in-situ 75-90%), UT (invasive 25-50%, in situ 50-75%), HER-2 (in situ 3+, invasive 2+)\par \par 5/25/21 CT chest: no evidence of intrathoracic metastasis. \par \par 6/21/2022 CT Chest: no pathology\par 6/21/2022 CT Abd/Pelvis \par FINDINGS:\par LOWER CHEST: Bilateral mastectomy and flap reconstruction.\par LIVER: Within normal limits.\par BILE DUCTS: Normal caliber.\par GALLBLADDER: Within normal limits.\par SPLEEN: Within normal limits.\par PANCREAS: Within normal limits.\par ADRENALS: Within normal limits.\par KIDNEYS/URETERS: Heterogeneous renal enhancement bilaterally without a measurable mass, hydronephrosis or nephrolithiasis. Ill-defined rounded infiltration of the fat surrounding the proximal ureters in both sides, right greater than left.\par BLADDER: Within normal limits.\par REPRODUCTIVE ORGANS: 2.6 cm anterior fundal uterine fibroid. Small coarse calcification in the right ovary. No suspicious adnexal mass.\par BOWEL: Colonic diverticulosis without evidence of acute diverticulitis. Normal appendix. No bowel obstruction.\par PERITONEUM: No ascites.\par VESSELS: Calcific atherosclerotic changes of the abdominal aorta. No aneurysm.\par RETROPERITONEUM/LYMPH NODES: No pathologically enlarged retroperitoneal lymph nodes. Borderline size right external iliac lymph node measuring 2.2 x 1.1 cm. No inguinal lymphadenopathy.\par ABDOMINAL WALL: Postoperative changes in the anterior abdominal wall.\par BONES: Degenerative changes of spine.\par IMPRESSION:\par Colonic diverticulosis without evidence of acute diverticulitis.\par Infiltration of the fat surrounding both ureters. No hydronephrosis. Differential diagnosis includes inflammatory processes such as retroperitoneal fibrosis, IgG4 related disease and less likely lymphoma. Recommend clinical correlation and comparison with prior imaging. If not available, recommend follow-up CT or PET/CT in 3 months.\par

## 2022-08-05 NOTE — ASSESSMENT
[FreeTextEntry1] : 62F with PMH of HTN, hyperlipidemia, GERD, COPD (on albuterol, Ellipta, azithromycin), JESÚS, history of epilepsy (no treatment), hypothyroidism (S/p ROBERTS for Grave's) , history breast cancer (s/p radiation, chemo, and b/l mastectomy in 2000, recent chemo port removal), chronic kidney mass (follows at [place], benign in origin), history of diverticulitis, prior rotator cuff tear, LE pain b/l (managed with cannabis), referred to Hem/Onc due to CT A/P showing a retroperitoneal mass and  infiltrative findings surrounding both ureters.\par \par 1.) Retroperitoneal mass, R/O lymphoma\par This patient was referred to Oncology to help rule out lymphoma, although suspicion for this is low. She has no evidence of B-symptoms, no LAD on imaging and workup so far makes lymphoma lower on the differential. Furthermore, pt reports frequent surveillance of the mass and prior biopsy. We would need to obtain those records from her prior provider, Dr. Guerra to determine the need for further tissue sampling. A PET-CT will give us a better idea of the metabolic activity of the mass in question.\par \par Plan:\par -PET-CT\par -UPEP\par -peripheral flow cytometry\par -Obtain records from Dr. Guerra\par -RTC in 4 weeks to review results

## 2022-08-05 NOTE — REVIEW OF SYSTEMS
[Lower Ext Edema] : lower extremity edema [SOB on Exertion] : shortness of breath during exertion [Joint Pain] : joint pain [Joint Stiffness] : joint stiffness [Hot Flashes] : hot flashes [Easy Bruising] : a tendency for easy bruising [Negative] : Psychiatric [Fever] : no fever [Chills] : no chills [Chest Pain] : no chest pain [Palpitations] : no palpitations [Leg Claudication] : no intermittent leg claudication [Wheezing] : no wheezing [Cough] : no cough

## 2022-08-08 LAB
ALBUMIN SERPL ELPH-MCNC: 4.4 G/DL
ALP BLD-CCNC: 107 U/L
ALT SERPL-CCNC: 18 U/L
ANION GAP SERPL CALC-SCNC: 19 MMOL/L
APTT BLD: 33.2 SEC
AST SERPL-CCNC: 21 U/L
BASOPHILS # BLD AUTO: 0.02 K/UL
BASOPHILS NFR BLD AUTO: 0.4 %
BILIRUB SERPL-MCNC: 0.4 MG/DL
BUN SERPL-MCNC: 21 MG/DL
CALCIUM SERPL-MCNC: 9.8 MG/DL
CHLORIDE SERPL-SCNC: 102 MMOL/L
CO2 SERPL-SCNC: 21 MMOL/L
CREAT SERPL-MCNC: 0.55 MG/DL
EGFR: 105 ML/MIN/1.73M2
EOSINOPHIL # BLD AUTO: 0.11 K/UL
EOSINOPHIL NFR BLD AUTO: 2.1 %
GLUCOSE SERPL-MCNC: 52 MG/DL
HCT VFR BLD CALC: 41.3 %
HGB BLD-MCNC: 12.3 G/DL
IMM GRANULOCYTES NFR BLD AUTO: 0.2 %
INR PPP: 1.03 RATIO
LDH SERPL-CCNC: 254 U/L
LYMPHOCYTES # BLD AUTO: 1.48 K/UL
LYMPHOCYTES NFR BLD AUTO: 28.4 %
MAN DIFF?: NORMAL
MCHC RBC-ENTMCNC: 25.9 PG
MCHC RBC-ENTMCNC: 29.8 GM/DL
MCV RBC AUTO: 87.1 FL
MONOCYTES # BLD AUTO: 0.3 K/UL
MONOCYTES NFR BLD AUTO: 5.8 %
NEUTROPHILS # BLD AUTO: 3.29 K/UL
NEUTROPHILS NFR BLD AUTO: 63.1 %
PLATELET # BLD AUTO: 344 K/UL
POTASSIUM SERPL-SCNC: 4.1 MMOL/L
PROT SERPL-MCNC: 7.9 G/DL
PT BLD: 11.9 SEC
RBC # BLD: 4.74 M/UL
RBC # FLD: 15.8 %
SODIUM SERPL-SCNC: 142 MMOL/L
WBC # FLD AUTO: 5.21 K/UL

## 2022-08-22 ENCOUNTER — APPOINTMENT (OUTPATIENT)
Dept: PULMONOLOGY | Facility: CLINIC | Age: 62
End: 2022-08-22

## 2022-08-22 VITALS
DIASTOLIC BLOOD PRESSURE: 81 MMHG | HEIGHT: 59 IN | WEIGHT: 221 LBS | BODY MASS INDEX: 44.55 KG/M2 | SYSTOLIC BLOOD PRESSURE: 173 MMHG | HEART RATE: 76 BPM | OXYGEN SATURATION: 97 %

## 2022-08-22 LAB
ALBUPE: 21.9 %
ALPHA1UPE: 35.3 %
ALPHA2UPE: 21.1 %
BETAUPE: 14.4 %
CREAT 24H UR-MCNC: NORMAL G/24 H
CREATININE UR (MAYO): 56 MG/DL
GAMMAUPE: 7.3 %
IGA 24H UR QL IFE: NORMAL
KAPPA LC 24H UR QL: NORMAL
PROT PATTERN 24H UR ELPH-IMP: NORMAL
PROT UR-MCNC: 7 MG/DL
PROT UR-MCNC: 7 MG/DL
SPECIMEN VOL 24H UR: NORMAL ML

## 2022-08-22 PROCEDURE — 99213 OFFICE O/P EST LOW 20 MIN: CPT

## 2022-08-22 NOTE — HISTORY OF PRESENT ILLNESS
[Former] : former [Never] : never [TextBox_4] : She is doing very well.  She has been using the albuterol lately about twice a day with the hot weather.  She did not require urgent care.  She had breathing is much better.  She is not smoking.  She is using the CPAP every day.  She still feels tired in the morning sleepy during the day sometimes have morning headaches [ESS] : 2

## 2022-08-22 NOTE — ASSESSMENT
[FreeTextEntry1] : Active sleep apnea\par \par The patient is compliant with the CPAP mask.  The patient had a titration study and requires a pressure of 12.  She still is symptomatic from the obstructive sleep apnea.  I instructed the patient to use the mask for 1 month and will have a telemetry visit to discuss any improvement with the adjusting the pressure on the mask.\par \par COPD\par \par She is clinically stable.  She is compliant with the Anoro.  She is still requiring the albuterol at least twice a day with the hot weather.  She is compliant with azithromycin Monday Wednesday and FridayI reviewed the PFT and scheduled for PFT next year.  Patient is to continue current regimen.  There is no limitation of exercise status.  Baseline oxygen saturation normal.  The patient did not require neither urgent care no systemic steroids since last time I saw her\par \par Chronic cough\par \par Improved\par \par The patient is seen by rheumatology and was referred to oncology she is scheduled for the PET scan.  Informed the patient to inform me after the PET scan

## 2022-08-24 ENCOUNTER — APPOINTMENT (OUTPATIENT)
Dept: RHEUMATOLOGY | Facility: CLINIC | Age: 62
End: 2022-08-24

## 2022-08-24 VITALS
DIASTOLIC BLOOD PRESSURE: 92 MMHG | HEIGHT: 59 IN | BODY MASS INDEX: 44.55 KG/M2 | OXYGEN SATURATION: 99 % | WEIGHT: 221 LBS | TEMPERATURE: 97.6 F | HEART RATE: 67 BPM | SYSTOLIC BLOOD PRESSURE: 180 MMHG

## 2022-08-24 LAB
ALBUMIN MFR SERPL ELPH: 53 %
ALBUMIN SERPL-MCNC: 3.9 G/DL
ALBUMIN/GLOB SERPL: 1.1 RATIO
ALPHA1 GLOB MFR SERPL ELPH: 4.6 %
ALPHA1 GLOB SERPL ELPH-MCNC: 0.3 G/DL
ALPHA2 GLOB MFR SERPL ELPH: 10.9 %
ALPHA2 GLOB SERPL ELPH-MCNC: 0.8 G/DL
ANACR T: NEGATIVE
ANCA AB SER-IMP: NEGATIVE
AVISE CTD: NORMAL
B-GLOBULIN MFR SERPL ELPH: 21 %
B-GLOBULIN SERPL ELPH-MCNC: 1.6 G/DL
C-ANCA SER-ACNC: NEGATIVE
DEPRECATED KAPPA LC FREE/LAMBDA SER: 1.71 RATIO
GAMMA GLOB FLD ELPH-MCNC: 0.8 G/DL
GAMMA GLOB MFR SERPL ELPH: 10.5 %
IGA SER QL IEP: 204 MG/DL
IGG SER QL IEP: 1304 MG/DL
IGG4 SER-MCNC: 431 MG/DL
IGM SER QL IEP: 39 MG/DL
INTERPRETATION SERPL IEP-IMP: NORMAL
KAPPA LC CSF-MCNC: 2.41 MG/DL
KAPPA LC SERPL-MCNC: 4.11 MG/DL
M PROTEIN MFR SERPL ELPH: 4.6 %
M PROTEIN SPEC IFE-MCNC: NORMAL
MONOCLON BAND OBS SERPL: 0.3 G/DL
P-ANCA TITR SER IF: NEGATIVE
PROT SERPL-MCNC: 7.4 G/DL
PROT SERPL-MCNC: 7.4 G/DL

## 2022-08-24 PROCEDURE — 99214 OFFICE O/P EST MOD 30 MIN: CPT

## 2022-08-24 NOTE — PHYSICAL EXAM
[General Appearance - Alert] : alert [General Appearance - In No Acute Distress] : in no acute distress [General Appearance - Well Nourished] : well nourished [General Appearance - Well Developed] : well developed [General Appearance - Well-Appearing] : healthy appearing [Sclera] : the sclera and conjunctiva were normal [Outer Ear] : the ears and nose were normal in appearance [Examination Of The Oral Cavity] : the lips and gums were normal [Oropharynx] : the oropharynx was normal [Respiration, Rhythm And Depth] : normal respiratory rhythm and effort [Exaggerated Use Of Accessory Muscles For Inspiration] : no accessory muscle use [Heart Rate And Rhythm] : heart rate was normal and rhythm regular [Abnormal Walk] : normal gait [Nail Clubbing] : no clubbing  or cyanosis of the fingernails [Motor Tone] : muscle strength and tone were normal [] : no rash [Skin Lesions] : no skin lesions [No Focal Deficits] : no focal deficits [Impaired Insight] : insight and judgment were intact [Affect] : the affect was normal [FreeTextEntry1] : NO active psoriasis

## 2022-08-24 NOTE — HISTORY OF PRESENT ILLNESS
[Weight Loss] : weight loss [Fatigue] : fatigue [Cough] : cough [Shortness of Breath] : shortness of breath [Arthralgias] : arthralgias [Joint Swelling] : joint swelling [Morning Stiffness] : morning stiffness [Muscle Spasms] : muscle spasms [Muscle Cramping] : muscle cramping [Visual Changes] : visual changes [Dry Eyes] : dry eyes [FreeTextEntry1] : Follow up: 8/24/22 \par \par 61 y/o F here to follow up recent labs \par Has hydronephrosis with ureteral stricture  and follows at [facility] \par She was originally referred for  elevated ESR and CRP, ? 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 \par Referred to Dr. Bernard to rule out lymphoma and now pending PET-CT scan. \par States received letter about PET denied, she will clarify with Onc scheduled for 8/29. \par States she will be away for few weeks. \par Reports low back pain and polyarthralgia, knee, hands. \par \par Referred by  for Rheumatology consultation \par \par 61 y/o female  with PMH of HTN, hyperlipidemia, GERD, COPD (on albuterol, Ellipta, azithromycin), JESÚS, history of epilepsy (no treatment), hypothyroidism (S/p ROBERTS for Grave's) , history breast cancer (s/p radiation, chemo, and b/l mastectomy in 2000), kidney mass (follows at [place], benign in origin), history of diverticulitis, prior rotator cuff tear, LE pain b/l (managed with cannabis). \par hx of psoriasis- scalp and face, back of the ear- diagnosed by Derm at [facility] and she has been using topicals.\par Generalized body pain, follows with pain management. \par has elevated ESR and CRP, ? CT abd/pelvis with infiltrative finding surrounding both ureters -which may suggest retroperitoneal fibrosis,  IgG4 disease or, lymphoma. \par ROS: positive for diffuse pain, worse pain in legs below the knee, low back pain, buttock pain, shoulder, neck and hands. \par AM stiffness 15-20 minutes, massage and hot water helps. \par Fingers swell up- arthralgia about 10 months \par Raynaud's - started recently \par getting bruises also new \par Non intentional weight loss 10 lbs a week\par \par Prednisone tried for Graves disease but she has more bone pain. \par \par No h/o morning stiffness, memory loss, patchy hair loss, sicca symptoms, photosensitivity, HA,  Raynaud's, oral ulcers, nasal ulcers. \par No history of pleuropericarditis \par No history of protein/hematuria \par No history of cytopenias. \par No Hx of VTE/DVT/PE\par Sister- SLE \par No hx of plantar fasciitis or tendinitis. \par Personal  hx of autoimmune disease,\par  [Anorexia] : no anorexia [Malaise] : no malaise [Fever] : no fever [Chills] : no chills [Depression] : no depression [Malar Facial Rash] : no malar facial rash [Skin Nodules] : no skin nodules [Oral Ulcers] : no oral ulcers [Dry Mouth] : no dry mouth [Dysphonia] : no dysphonia [Chest Pain] : no chest pain [Joint Warmth] : no joint warmth [Joint Deformity] : no joint deformity [Decreased ROM] : no decreased range of motion [Falls] : no falls [Difficulty Standing] : no difficulty standing [Difficulty Walking] : no difficulty walking [Dyspnea] : no dyspnea [Myalgias] : no myalgias [Muscle Weakness] : no muscle weakness [Eye Pain] : no eye pain [Eye Redness] : no eye redness

## 2022-08-24 NOTE — ASSESSMENT
[FreeTextEntry1] : 61 y/o female  with PMH of HTN, hyperlipidemia, GERD, COPD JESÚS,  hypothyroidism (S/p ROBERTS for Grave's), breast cancer (s/p radiation, chemo, and b/l mastectomy in 2000), kidney mass ,hydronephrosis with ureteral stricture  and follows at [facility]. \par hx of psoriasis- scalp and face, well controlled on  topicals.\par has elevated ESR and CRP, ? CT abd/pelvis with infiltrative finding surrounding both ureters -which may suggest retroperitoneal fibrosis,  IgG4 disease or, lymphoma. \par Pt has low back pain,  polyarthralgia, arthritis, non intentional weight loss, easy bruising and possible Raynaud's. \par On examination multiple tender joints and minor synovitis of small joints of hands ( MCP/PIP joints) \par We discussed differential diagnoses of her retroperitoneal infiltration such as  IgG4 disease vs  underlying malignancy, pending PET-CT scan \par If negative for lymphoma will start treatment with steroids 40mg po prednisone and likely Rituximab. \par negative AVISE ( noted TG Ab+)\par \par I have discussed side effects of steroids. \par \par Corticosteroid side effect discussed: predisposition to infection, resulting in a dose-dependent increase in the risk of infection, especially with common bacterial, viral, and fungal pathogens\par Cushingoid features, weight gain \par Increased intraocular pressure and cataract \par Cardiovascular effects: hypertension and fluid retention, premature atherosclerosis.\par Gastrointestinal effects, such as gastritis, ulcer formation, and gastrointestinal bleeding. \par Bone and muscle effects: Osteoporosis, myopathy, osteonecrosis. \par Mood and sleep problems \par \par \par \par Knee OA: \par For all patients with knee OA, I recommended ongoing exercise for pain relief and joint protection.Combination of low-impact aerobic fitness training (eg, walking, cycling, ) and lower-limb strengthening exercises\par For patients with knee OA who are overweight, suggested a calorie-restricted diet and weight loss. \par \par \par The patient had the opportunity to ask questions and all were answered to her satisfaction.  \par patient verbalized understanding for presented data and agreed with my recommendations.\par \par f/u in 1 month after PET and vacation with Dr. Chad Grady \par \par

## 2022-08-29 ENCOUNTER — OUTPATIENT (OUTPATIENT)
Dept: OUTPATIENT SERVICES | Facility: HOSPITAL | Age: 62
LOS: 1 days | End: 2022-08-29
Payer: COMMERCIAL

## 2022-08-29 ENCOUNTER — APPOINTMENT (OUTPATIENT)
Dept: NUCLEAR MEDICINE | Facility: HOSPITAL | Age: 62
End: 2022-08-29

## 2022-08-29 ENCOUNTER — RESULT REVIEW (OUTPATIENT)
Age: 62
End: 2022-08-29

## 2022-08-29 DIAGNOSIS — Z98.890 OTHER SPECIFIED POSTPROCEDURAL STATES: Chronic | ICD-10-CM

## 2022-08-29 DIAGNOSIS — Z90.13 ACQUIRED ABSENCE OF BILATERAL BREASTS AND NIPPLES: Chronic | ICD-10-CM

## 2022-08-29 LAB — GLUCOSE BLDC GLUCOMTR-MCNC: 140 MG/DL — HIGH (ref 70–99)

## 2022-08-29 PROCEDURE — A9552: CPT

## 2022-08-29 PROCEDURE — 78815 PET IMAGE W/CT SKULL-THIGH: CPT

## 2022-08-29 PROCEDURE — 78815 PET IMAGE W/CT SKULL-THIGH: CPT | Mod: 26,PI

## 2022-08-29 PROCEDURE — 82962 GLUCOSE BLOOD TEST: CPT

## 2022-08-30 ENCOUNTER — FORM ENCOUNTER (OUTPATIENT)
Age: 62
End: 2022-08-30

## 2022-08-31 ENCOUNTER — OUTPATIENT (OUTPATIENT)
Dept: OUTPATIENT SERVICES | Facility: HOSPITAL | Age: 62
LOS: 1 days | End: 2022-08-31
Payer: COMMERCIAL

## 2022-08-31 DIAGNOSIS — Z90.13 ACQUIRED ABSENCE OF BILATERAL BREASTS AND NIPPLES: Chronic | ICD-10-CM

## 2022-08-31 DIAGNOSIS — R53.83 OTHER FATIGUE: ICD-10-CM

## 2022-08-31 DIAGNOSIS — Z98.890 OTHER SPECIFIED POSTPROCEDURAL STATES: Chronic | ICD-10-CM

## 2022-08-31 DIAGNOSIS — N13.5 CROSSING VESSEL AND STRICTURE OF URETER WITHOUT HYDRONEPHROSIS: ICD-10-CM

## 2022-08-31 PROCEDURE — 93306 TTE W/DOPPLER COMPLETE: CPT | Mod: 26

## 2022-08-31 PROCEDURE — 93306 TTE W/DOPPLER COMPLETE: CPT

## 2022-09-01 RX ORDER — BLOOD PRESSURE TEST KIT-MEDIUM
KIT MISCELLANEOUS
Qty: 1 | Refills: 0 | Status: ACTIVE | COMMUNITY
Start: 2022-09-01 | End: 1900-01-01

## 2022-09-02 ENCOUNTER — APPOINTMENT (OUTPATIENT)
Dept: HEMATOLOGY ONCOLOGY | Facility: CLINIC | Age: 62
End: 2022-09-02

## 2022-09-02 VITALS
WEIGHT: 217 LBS | SYSTOLIC BLOOD PRESSURE: 167 MMHG | DIASTOLIC BLOOD PRESSURE: 83 MMHG | OXYGEN SATURATION: 98 % | TEMPERATURE: 97.4 F | HEART RATE: 75 BPM | BODY MASS INDEX: 43.75 KG/M2 | HEIGHT: 59 IN

## 2022-09-02 DIAGNOSIS — R19.00 INTRA-ABDOMINAL AND PELVIC SWELLING, MASS AND LUMP, UNSPECIFIED SITE: ICD-10-CM

## 2022-09-02 PROCEDURE — 99214 OFFICE O/P EST MOD 30 MIN: CPT

## 2022-09-02 NOTE — HISTORY OF PRESENT ILLNESS
[de-identified] : 62F with PMH of HTN, hyperlipidemia, GERD, COPD (on albuterol, Ellipta, azithromycin), JESÚS, history of epilepsy (no treatment), hypothyroidism (S/p ROBERTS for Grave's) , history breast cancer (s/p radiation, chemo, and b/l mastectomy in 2000, recent chemo port removal), chronic kidney mass (follows at [place], benign in origin), history of diverticulitis, prior rotator cuff tear, LE pain b/l (managed with cannabis), referred to Hem/Onc due to CT A/P showing infiltrative findings surrounding both ureters. Here today for f/u.\par \par OncHx:\par Prior breast cancer, invasive ductal carcinoma, T1b, N0, ER+LA+ as below:\par Pt received 5 sessions of RT and 1 cycle of chemotherapy and due to radiation burn and chemotherapy adverse effects, decided to proceed with L mastectomy and R prophylactic mastectomy with TRAM flap reconstruction on 12/14/2000 (Adria Alberto/Nicholas Salas). 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 She underwent revision of bilateral breasts and reduction mammoplasty in 2015. She did not receive any adjuvant RT, systemic therapy, or endocrine therapy. She was told that her upper extremity veins could not be accessed due to hx. b/l axillary lymphadenectomy. Due to poor peripheral vein access in the legs for blood draw and surgery, she had a mediport placed in 2005 and replacement in 2007. She was lost to follow up for 5 years and her doctors kept changing. She decided to transfer care from [facility] to [facility]. PCP recommended removal of mediport but patient has concerns due to fears of lymphedema. \par \par \par \par 8/29/22 PETCT SKUL-THI ONC FDG INIT\par 1. Masslike periureteral fat stranding and soft tissue density about the proximal ureters, unchanged, demonstrating minimal FDG uptake. Limited differential diagnosis is the same as described on prior CT, including retroperitoneal fibrosis and malignancy. Focal uptake within the lesion most likely represents misregistered physiologic urinary uptake within the ureter. Tissue typing of one of the lesions may be of benefit.  [de-identified] : No complaints. Saw rheumatology, plan for prednisone/rituximab for IgG4 disease.

## 2022-09-02 NOTE — ASSESSMENT
[FreeTextEntry1] : 62F with PMH of HTN, hyperlipidemia, GERD, COPD (on albuterol, Ellipta, azithromycin), JESÚS, history of epilepsy (no treatment), hypothyroidism (S/p ROBERTS for Grave's) , history breast cancer (s/p radiation, chemo, and b/l mastectomy in 2000, recent chemo port removal), chronic kidney mass (follows at [place], benign in origin), history of diverticulitis, prior rotator cuff tear, LE pain b/l (managed with cannabis), referred to Hem/Onc due to CT A/P showing a retroperitoneal mass and  infiltrative findings surrounding both ureters. PET scan from Aug 2022 showing infiltrative mass like tissue between ureters c/f RP fibrosis or malignancy. \par \par 1.) Retroperitoneal mass, R/O lymphoma\par This patient was referred to Oncology to help rule out lymphoma, although suspicion for this is low. She has no evidence of B-symptoms, no LAD on imaging and workup so far makes lymphoma lower on the differential. Furthermore, pt reports frequent surveillance of the mass and prior biopsy, 20 yrs ago that was negative. Waiting for old path report. PET scan c/f RP fibrosis vs malignancy, however given she has had this for 20+ yrs, doubt lymphoma, also her labs are wnl. Will obtain old path, if anything suspicious will send to IR for re-biopsy, o/w can proceed with therapy for IgG4 disease per rheumatology.\par \par RTC in 3 months or sooner if any issues.

## 2022-09-02 NOTE — PHYSICAL EXAM
[Restricted in physically strenuous activity but ambulatory and able to carry out work of a light or sedentary nature] : Status 1- Restricted in physically strenuous activity but ambulatory and able to carry out work of a light or sedentary nature, e.g., light house work, office work [Normal] : affect appropriate [de-identified] : 1+ b/l LE edema [de-identified] : tenderness to palpation in L-spine [de-identified] : swelling and pain in b/l DIP, PIP

## 2022-09-02 NOTE — REVIEW OF SYSTEMS
[Lower Ext Edema] : lower extremity edema [Joint Stiffness] : joint stiffness [Negative] : Allergic/Immunologic [Fever] : no fever [Chills] : no chills [Chest Pain] : no chest pain [Palpitations] : no palpitations [Leg Claudication] : no intermittent leg claudication [Wheezing] : no wheezing [Cough] : no cough [Joint Pain] : no joint pain [Hot Flashes] : no hot flashes [Easy Bruising] : no tendency for easy bruising

## 2022-09-13 ENCOUNTER — APPOINTMENT (OUTPATIENT)
Dept: INTERNAL MEDICINE | Facility: CLINIC | Age: 62
End: 2022-09-13

## 2022-09-13 PROCEDURE — 99443: CPT | Mod: GC

## 2022-09-13 NOTE — PLAN
[FreeTextEntry1] : #IgG4 related disease\par Labwork performed by rheum showing elevated IgG4 levels.\par - f/u scheduled for 9/22--to discuss treatment likely with steroids\par \par #Chronic pain\par C/o chronic, episodic pain.  Follows with pain medicine Dr. Bam Crespo who prescribes medical marijuana and pills (patient unsure name).\par - f/u appointment scheduled "later in the month"\par - patient declined duloxetine in interim, as she endorses being out of the marijuana and pills\par - called Cherie's office who stated that they will reach out to the patient to discuss alternatives\par \par F/u scheduled on 9/20 and 10/18

## 2022-09-13 NOTE — HISTORY OF PRESENT ILLNESS
[FreeTextEntry1] : f/u for rheumatologic w/u [de-identified] : ZAC VELEZ,an established pt at this office, reached out to this provider for f/u. No recent visit within the last 7 days. A visit is not scheduled within the next 7 days at this time. \par \par Discussed with patient: You have chosen to receive care through the use of tele-media. Tele-media enables health care providers at different locations to provide safe, effective, and convenient care through the use of technology. Please note this is a billable encounter. As with any health care service, there are risks associated with the use of tele-media, including equipment failure, poor image and/or resolution, and  issues. You understand that I cannot physically examine you and that you may need to come to the clinic to complete the assessment. Patient agreed verbally to understanding the risks and benefits of tele-media as explained. All questions regarding tele-media encounters were answered. \par \par Returned phone call to patient to review complaint of f/u.\par \par \par Total time spent with patient on the phone is 45 min.\par \par 62F with PMH of HTN, hyperlipidemia, GERD, COPD (on albuterol, Ellipta, azithromycin), JESÚS, history of epilepsy (no treatment), hypothyroidism (S/p ROBERTS for Grave's) , history breast cancer (s/p radiation, chemo, and b/l mastectomy in 2000, recent chemo port removal), kidney mass (follows at [place], benign in origin), history of diverticulitis, prior rotator cuff tear, LE pain b/l (managed with cannabis), presenting to inform us of updates with her rheumatologic w/u.  she states that her rheumatologist has diagnosed her with an IGg4 disease.  patient is now wondering who should prescribe her steroids.  she continues to have significant pain (7-8/10) in her legs, back, and arms that is episodic and associated with numbness, tingling, weakness, and fatigue.  she has not fallen.  this has been a chronic issue that is progressively worsening.  she says that massages and cold compresses help the pain along with the medical marijuana cream and pills (does not know name) prescribed by her pain medicine specialist (Dr. Bam Crespo) but she is currently out of the medications and has been unable to get in touch with his office.  she does have appointments scheduled with Rheum on 9/22 and pain medicine "later in the month".\par \par ROS+ headaches, which she thinks is medicine related (gets then while taking new medicine prescribed by Dr. Isaacs for her COPD, and easy bruising.

## 2022-09-13 NOTE — ASSESSMENT
[FreeTextEntry1] : 62F with PMH of HTN, hyperlipidemia, GERD, COPD (on albuterol, Ellipta, azithromycin), JESÚS, history of epilepsy (no treatment), hypothyroidism (S/p ROBERTS for Grave's) , history breast cancer (s/p radiation, chemo, and b/l mastectomy in 2000, recent chemo port removal), kidney mass (follows at [place], benign in origin), history of diverticulitis, prior rotator cuff tear, LE pain b/l (managed with cannabis), presenting to inform us of updates with her rheumatologic w/u.

## 2022-09-18 ENCOUNTER — EMERGENCY (EMERGENCY)
Facility: HOSPITAL | Age: 62
LOS: 1 days | Discharge: HOME | End: 2022-09-18
Attending: STUDENT IN AN ORGANIZED HEALTH CARE EDUCATION/TRAINING PROGRAM | Admitting: STUDENT IN AN ORGANIZED HEALTH CARE EDUCATION/TRAINING PROGRAM
Payer: COMMERCIAL

## 2022-09-18 VITALS
RESPIRATION RATE: 18 BRPM | SYSTOLIC BLOOD PRESSURE: 166 MMHG | HEART RATE: 84 BPM | WEIGHT: 227.08 LBS | HEIGHT: 59 IN | TEMPERATURE: 98 F | OXYGEN SATURATION: 98 % | DIASTOLIC BLOOD PRESSURE: 107 MMHG

## 2022-09-18 DIAGNOSIS — E03.9 HYPOTHYROIDISM, UNSPECIFIED: ICD-10-CM

## 2022-09-18 DIAGNOSIS — Z87.19 PERSONAL HISTORY OF OTHER DISEASES OF THE DIGESTIVE SYSTEM: ICD-10-CM

## 2022-09-18 DIAGNOSIS — Z90.13 ACQUIRED ABSENCE OF BILATERAL BREASTS AND NIPPLES: Chronic | ICD-10-CM

## 2022-09-18 DIAGNOSIS — J44.9 CHRONIC OBSTRUCTIVE PULMONARY DISEASE, UNSPECIFIED: ICD-10-CM

## 2022-09-18 DIAGNOSIS — Z87.891 PERSONAL HISTORY OF NICOTINE DEPENDENCE: ICD-10-CM

## 2022-09-18 DIAGNOSIS — K21.9 GASTRO-ESOPHAGEAL REFLUX DISEASE WITHOUT ESOPHAGITIS: ICD-10-CM

## 2022-09-18 DIAGNOSIS — Z91.013 ALLERGY TO SEAFOOD: ICD-10-CM

## 2022-09-18 DIAGNOSIS — Z91.041 RADIOGRAPHIC DYE ALLERGY STATUS: ICD-10-CM

## 2022-09-18 DIAGNOSIS — R73.03 PREDIABETES: ICD-10-CM

## 2022-09-18 DIAGNOSIS — I10 ESSENTIAL (PRIMARY) HYPERTENSION: ICD-10-CM

## 2022-09-18 DIAGNOSIS — E05.00 THYROTOXICOSIS WITH DIFFUSE GOITER WITHOUT THYROTOXIC CRISIS OR STORM: ICD-10-CM

## 2022-09-18 DIAGNOSIS — Z88.8 ALLERGY STATUS TO OTHER DRUGS, MEDICAMENTS AND BIOLOGICAL SUBSTANCES STATUS: ICD-10-CM

## 2022-09-18 DIAGNOSIS — R42 DIZZINESS AND GIDDINESS: ICD-10-CM

## 2022-09-18 DIAGNOSIS — R07.89 OTHER CHEST PAIN: ICD-10-CM

## 2022-09-18 DIAGNOSIS — Z90.13 ACQUIRED ABSENCE OF BILATERAL BREASTS AND NIPPLES: ICD-10-CM

## 2022-09-18 DIAGNOSIS — Z98.890 OTHER SPECIFIED POSTPROCEDURAL STATES: Chronic | ICD-10-CM

## 2022-09-18 DIAGNOSIS — R11.0 NAUSEA: ICD-10-CM

## 2022-09-18 PROCEDURE — 93010 ELECTROCARDIOGRAM REPORT: CPT

## 2022-09-18 PROCEDURE — 99285 EMERGENCY DEPT VISIT HI MDM: CPT

## 2022-09-18 NOTE — ED ADULT TRIAGE NOTE - CHIEF COMPLAINT QUOTE
Patient c/o of headache and pain on back of neck X 1 1/2 week, states BP has been high the past week, 1/2 hour ago was 182/105.  No facial asymmetry , slurred speech, arm drift or weakness, ambulates with steady gait.  BP in /107, no chest pain or SOB.

## 2022-09-18 NOTE — ED ADULT NURSE NOTE - NSIMPLEMENTINTERV_GEN_ALL_ED
Implemented All Fall Risk Interventions:  [place] to call system. Call bell, personal items and telephone within reach. Instruct patient to call for assistance. Room bathroom lighting operational. Non-slip footwear when patient is off stretcher. Physically safe environment: no spills, clutter or unnecessary equipment. Stretcher in lowest position, wheels locked, appropriate side rails in place. Provide visual cue, wrist band, yellow gown, etc. Monitor gait and stability. Monitor for mental status changes and reorient to person, place, and time. Review medications for side effects contributing to fall risk. Reinforce activity limits and safety measures with patient and family.

## 2022-09-18 NOTE — ED ADULT NURSE NOTE - OBJECTIVE STATEMENT
patient reports for 1 1/2 week she has had intermittent neck pain radiating to the back of her head, "cramping" CP, chills, nausea, headache (5/10) and light-headedness. pt reports she takes her bp at home 5x a day and it has been elevated. she has used OTC medications for the neck pain without relief. patient speaking in clear, complete sentences. RR easy, even, un-labored on room air. pt with b/l upper extremity limb alert for a double mastectomy. EKG in progress

## 2022-09-19 VITALS
DIASTOLIC BLOOD PRESSURE: 66 MMHG | SYSTOLIC BLOOD PRESSURE: 126 MMHG | TEMPERATURE: 98 F | HEART RATE: 81 BPM | RESPIRATION RATE: 17 BRPM | OXYGEN SATURATION: 94 %

## 2022-09-19 LAB
ALBUMIN SERPL ELPH-MCNC: 4.3 G/DL — SIGNIFICANT CHANGE UP (ref 3.3–5)
ALP SERPL-CCNC: 114 U/L — SIGNIFICANT CHANGE UP (ref 40–120)
ALT FLD-CCNC: 19 U/L — SIGNIFICANT CHANGE UP (ref 10–45)
ANION GAP SERPL CALC-SCNC: 12 MMOL/L — SIGNIFICANT CHANGE UP (ref 5–17)
APPEARANCE UR: CLEAR — SIGNIFICANT CHANGE UP
AST SERPL-CCNC: 20 U/L — SIGNIFICANT CHANGE UP (ref 10–40)
BILIRUB SERPL-MCNC: 0.3 MG/DL — SIGNIFICANT CHANGE UP (ref 0.2–1.2)
BILIRUB UR-MCNC: NEGATIVE — SIGNIFICANT CHANGE UP
BUN SERPL-MCNC: 26 MG/DL — HIGH (ref 7–23)
CALCIUM SERPL-MCNC: 9.4 MG/DL — SIGNIFICANT CHANGE UP (ref 8.4–10.5)
CHLORIDE SERPL-SCNC: 103 MMOL/L — SIGNIFICANT CHANGE UP (ref 96–108)
CO2 SERPL-SCNC: 25 MMOL/L — SIGNIFICANT CHANGE UP (ref 22–31)
COLOR SPEC: YELLOW — SIGNIFICANT CHANGE UP
CREAT SERPL-MCNC: 0.69 MG/DL — SIGNIFICANT CHANGE UP (ref 0.5–1.3)
DIFF PNL FLD: NEGATIVE — SIGNIFICANT CHANGE UP
EGFR: 99 ML/MIN/1.73M2 — SIGNIFICANT CHANGE UP
GLUCOSE SERPL-MCNC: 129 MG/DL — HIGH (ref 70–99)
GLUCOSE UR QL: NEGATIVE — SIGNIFICANT CHANGE UP
HCT VFR BLD CALC: 40 % — SIGNIFICANT CHANGE UP (ref 34.5–45)
HGB BLD-MCNC: 12.5 G/DL — SIGNIFICANT CHANGE UP (ref 11.5–15.5)
KETONES UR-MCNC: NEGATIVE — SIGNIFICANT CHANGE UP
LEUKOCYTE ESTERASE UR-ACNC: NEGATIVE — SIGNIFICANT CHANGE UP
MAGNESIUM SERPL-MCNC: 2.4 MG/DL — SIGNIFICANT CHANGE UP (ref 1.6–2.6)
MCHC RBC-ENTMCNC: 26.3 PG — LOW (ref 27–34)
MCHC RBC-ENTMCNC: 31.3 GM/DL — LOW (ref 32–36)
MCV RBC AUTO: 84.2 FL — SIGNIFICANT CHANGE UP (ref 80–100)
NITRITE UR-MCNC: NEGATIVE — SIGNIFICANT CHANGE UP
NRBC # BLD: 0 /100 WBCS — SIGNIFICANT CHANGE UP (ref 0–0)
NT-PROBNP SERPL-SCNC: 26 PG/ML — SIGNIFICANT CHANGE UP (ref 0–300)
PH UR: 6 — SIGNIFICANT CHANGE UP (ref 5–8)
PLATELET # BLD AUTO: 351 K/UL — SIGNIFICANT CHANGE UP (ref 150–400)
POTASSIUM SERPL-MCNC: 3.9 MMOL/L — SIGNIFICANT CHANGE UP (ref 3.5–5.3)
POTASSIUM SERPL-SCNC: 3.9 MMOL/L — SIGNIFICANT CHANGE UP (ref 3.5–5.3)
PROT SERPL-MCNC: 7.9 G/DL — SIGNIFICANT CHANGE UP (ref 6–8.3)
PROT UR-MCNC: NEGATIVE MG/DL — SIGNIFICANT CHANGE UP
RBC # BLD: 4.75 M/UL — SIGNIFICANT CHANGE UP (ref 3.8–5.2)
RBC # FLD: 14.5 % — SIGNIFICANT CHANGE UP (ref 10.3–14.5)
SODIUM SERPL-SCNC: 140 MMOL/L — SIGNIFICANT CHANGE UP (ref 135–145)
SP GR SPEC: 1.02 — SIGNIFICANT CHANGE UP (ref 1–1.03)
T3 SERPL-MCNC: 106 NG/DL — SIGNIFICANT CHANGE UP (ref 80–200)
T4 AB SER-ACNC: 8.44 UG/DL — SIGNIFICANT CHANGE UP (ref 4.5–11.7)
TROPONIN T SERPL-MCNC: <0.01 NG/ML — SIGNIFICANT CHANGE UP (ref 0–0.01)
TROPONIN T SERPL-MCNC: <0.01 NG/ML — SIGNIFICANT CHANGE UP (ref 0–0.01)
TSH SERPL-MCNC: 0.98 UIU/ML — SIGNIFICANT CHANGE UP (ref 0.27–4.2)
UROBILINOGEN FLD QL: 0.2 E.U./DL — SIGNIFICANT CHANGE UP
WBC # BLD: 6.81 K/UL — SIGNIFICANT CHANGE UP (ref 3.8–10.5)
WBC # FLD AUTO: 6.81 K/UL — SIGNIFICANT CHANGE UP (ref 3.8–10.5)

## 2022-09-19 PROCEDURE — 93005 ELECTROCARDIOGRAM TRACING: CPT

## 2022-09-19 PROCEDURE — 36415 COLL VENOUS BLD VENIPUNCTURE: CPT

## 2022-09-19 PROCEDURE — 85027 COMPLETE CBC AUTOMATED: CPT

## 2022-09-19 PROCEDURE — 84443 ASSAY THYROID STIM HORMONE: CPT

## 2022-09-19 PROCEDURE — 99285 EMERGENCY DEPT VISIT HI MDM: CPT | Mod: 25

## 2022-09-19 PROCEDURE — 71045 X-RAY EXAM CHEST 1 VIEW: CPT

## 2022-09-19 PROCEDURE — 84480 ASSAY TRIIODOTHYRONINE (T3): CPT

## 2022-09-19 PROCEDURE — 70450 CT HEAD/BRAIN W/O DYE: CPT | Mod: 26,MA

## 2022-09-19 PROCEDURE — 87086 URINE CULTURE/COLONY COUNT: CPT

## 2022-09-19 PROCEDURE — 83735 ASSAY OF MAGNESIUM: CPT

## 2022-09-19 PROCEDURE — 70450 CT HEAD/BRAIN W/O DYE: CPT | Mod: MA

## 2022-09-19 PROCEDURE — 83880 ASSAY OF NATRIURETIC PEPTIDE: CPT

## 2022-09-19 PROCEDURE — 84484 ASSAY OF TROPONIN QUANT: CPT

## 2022-09-19 PROCEDURE — 81003 URINALYSIS AUTO W/O SCOPE: CPT

## 2022-09-19 PROCEDURE — 96374 THER/PROPH/DIAG INJ IV PUSH: CPT

## 2022-09-19 PROCEDURE — 71045 X-RAY EXAM CHEST 1 VIEW: CPT | Mod: 26

## 2022-09-19 PROCEDURE — 84436 ASSAY OF TOTAL THYROXINE: CPT

## 2022-09-19 PROCEDURE — 80053 COMPREHEN METABOLIC PANEL: CPT

## 2022-09-19 RX ORDER — ACETAMINOPHEN 500 MG
650 TABLET ORAL ONCE
Refills: 0 | Status: COMPLETED | OUTPATIENT
Start: 2022-09-19 | End: 2022-09-19

## 2022-09-19 RX ORDER — SODIUM CHLORIDE 9 MG/ML
1000 INJECTION INTRAMUSCULAR; INTRAVENOUS; SUBCUTANEOUS ONCE
Refills: 0 | Status: COMPLETED | OUTPATIENT
Start: 2022-09-19 | End: 2022-09-19

## 2022-09-19 RX ORDER — HYDRALAZINE HCL 50 MG
25 TABLET ORAL ONCE
Refills: 0 | Status: COMPLETED | OUTPATIENT
Start: 2022-09-19 | End: 2022-09-19

## 2022-09-19 RX ORDER — KETOROLAC TROMETHAMINE 30 MG/ML
30 SYRINGE (ML) INJECTION ONCE
Refills: 0 | Status: DISCONTINUED | OUTPATIENT
Start: 2022-09-19 | End: 2022-09-19

## 2022-09-19 RX ADMIN — Medication 25 MILLIGRAM(S): at 01:03

## 2022-09-19 RX ADMIN — Medication 650 MILLIGRAM(S): at 01:58

## 2022-09-19 RX ADMIN — Medication 30 MILLIGRAM(S): at 03:07

## 2022-09-19 RX ADMIN — SODIUM CHLORIDE 1000 MILLILITER(S): 9 INJECTION INTRAMUSCULAR; INTRAVENOUS; SUBCUTANEOUS at 01:58

## 2022-09-19 NOTE — ED PROVIDER NOTE - PHYSICAL EXAMINATION
General: Awake, alert and oriented. No acute distress. Well developed, hydrated and nourished. Appears stated age.  Skin: Skin in warm, dry and intact without rashes or lesions. Appropriate color for ethnicity  HENMT: head normocephalic and atraumatic; bilateral external ears without swelling. no nasal discharge. moist oral mucosa. supple neck, trachea midline  EYES: Conjunctiva clear. nonicteric sclera. EOM intact, Eyelids are normal in appearance without swelling or lesions. EOMI  Cardiac: well perfused, s1, s2, rrr  Respiratory: breathing comfortably on room air. no audible wheezing or stridor, lungs ctab, no chest wall tenderness to palpation  Abdominal: nondistended  MSK: Neck and back are without deformity, visible external skin changes, or signs of trauma. Curvature of the cervical, thoracic, and lumbar spine are within normal limits. no external signs of trauma. no apparent deficits in ROM of any extremity. no leg tenderness, minimal anterior tibial edema  Neurological: The patient is awake, alert and oriented to person, place, and time with normal speech. CN 2-12 intact. no apparent deficits. Memory is normal and thought process is intact. ftn intact, 5/5 strength and equal sensation in all 4 extremities  Psychiatric: Appropriate mood and affect. Good judgement and insight.

## 2022-09-19 NOTE — ED PROVIDER NOTE - OBJECTIVE STATEMENT
61F PMHx COPD (remote smoking Hx), HTN, preDM, Diverticulitis, Hypothyroidism, GERD, graves disease presenting wit ~2 weeks of elevated BP. noticed systolic BP to be ranging >180. adherent with home losartan and amlodipine. last 2 days feeling lightheaded, mild headache and mild nausea, some small chest discomfort today. urinating more than normal. no dysuria.

## 2022-09-19 NOTE — ED PROVIDER NOTE - PATIENT PORTAL LINK FT
You can access the FollowMyHealth Patient Portal offered by [facility] by registering at the following website: [email or web address]. By joining [employer]’s FollowMyHealth portal, you will also be able to view your health information using other applications (apps) compatible with our system.

## 2022-09-19 NOTE — ED PROVIDER NOTE - CLINICAL SUMMARY MEDICAL DECISION MAKING FREE TEXT BOX
hypertensive in ED, neurologically intact, ekg nonconcerning, bilateral BPS - recent echo (august 2022 with normal aortic root, low risk aortic dissection, does not clinically correlate - no cp radiation to back, equal pulses in bilateral upper extremities) hypertensive in ED, neurologically intact, ekg nonconcerning, patinet declining bilateral BP measurments to risk stratify for dissection as she had bilaterla mastectomy keya, however low concern for dissection as symptoms started prior to chest discomfort, chest discomfort is minimal and nonradiating, patient is neurolgoically itnact - recent echo (august 2022 with normal aortic root, low risk aortic dissection, does not clinically correlate - no cp radiation to back, equal pulses in bilateral upper extremities on palpation. mildly prerenal on labs, patient has been having increased urination, no laboratory signs of UTI. BP downtrending in ED, has pmd follow up scheduled for tuesday. stable for dc and outpatinet follow up

## 2022-09-20 ENCOUNTER — RX RENEWAL (OUTPATIENT)
Age: 62
End: 2022-09-20

## 2022-09-20 ENCOUNTER — APPOINTMENT (OUTPATIENT)
Dept: INTERNAL MEDICINE | Facility: CLINIC | Age: 62
End: 2022-09-20

## 2022-09-20 VITALS
TEMPERATURE: 98.1 F | HEIGHT: 59 IN | DIASTOLIC BLOOD PRESSURE: 76 MMHG | BODY MASS INDEX: 43.95 KG/M2 | OXYGEN SATURATION: 98 % | HEART RATE: 83 BPM | SYSTOLIC BLOOD PRESSURE: 188 MMHG | WEIGHT: 218 LBS

## 2022-09-20 DIAGNOSIS — E89.0 POSTPROCEDURAL HYPOTHYROIDISM: ICD-10-CM

## 2022-09-20 DIAGNOSIS — G89.29 LOW BACK PAIN, UNSPECIFIED: ICD-10-CM

## 2022-09-20 DIAGNOSIS — M54.50 LOW BACK PAIN, UNSPECIFIED: ICD-10-CM

## 2022-09-20 LAB
CULTURE RESULTS: NO GROWTH — SIGNIFICANT CHANGE UP
SPECIMEN SOURCE: SIGNIFICANT CHANGE UP

## 2022-09-20 PROCEDURE — 99214 OFFICE O/P EST MOD 30 MIN: CPT | Mod: GC

## 2022-09-20 RX ORDER — HYDROCHLOROTHIAZIDE 12.5 MG/1
12.5 CAPSULE ORAL DAILY
Qty: 30 | Refills: 1 | Status: DISCONTINUED | COMMUNITY
Start: 2022-09-20 | End: 2022-09-20

## 2022-09-20 RX ORDER — DICLOFENAC SODIUM 1% 10 MG/G
1 GEL TOPICAL DAILY
Qty: 1 | Refills: 0 | Status: ACTIVE | COMMUNITY
Start: 2022-09-20 | End: 1900-01-01

## 2022-09-27 ENCOUNTER — RESULT REVIEW (OUTPATIENT)
Age: 62
End: 2022-09-27

## 2022-09-27 ENCOUNTER — APPOINTMENT (OUTPATIENT)
Dept: INTERNAL MEDICINE | Facility: CLINIC | Age: 62
End: 2022-09-27

## 2022-09-27 VITALS
TEMPERATURE: 97.7 F | WEIGHT: 218 LBS | SYSTOLIC BLOOD PRESSURE: 148 MMHG | HEART RATE: 72 BPM | HEIGHT: 59 IN | BODY MASS INDEX: 43.95 KG/M2 | OXYGEN SATURATION: 96 % | DIASTOLIC BLOOD PRESSURE: 90 MMHG

## 2022-09-27 PROCEDURE — 99214 OFFICE O/P EST MOD 30 MIN: CPT | Mod: GC,25

## 2022-09-27 PROCEDURE — 36415 COLL VENOUS BLD VENIPUNCTURE: CPT

## 2022-09-28 LAB — ALDOSTERONE SERUM: 5.3 NG/DL

## 2022-09-30 ENCOUNTER — APPOINTMENT (OUTPATIENT)
Dept: INTERNAL MEDICINE | Facility: CLINIC | Age: 62
End: 2022-09-30

## 2022-09-30 ENCOUNTER — NON-APPOINTMENT (OUTPATIENT)
Age: 62
End: 2022-09-30

## 2022-10-03 ENCOUNTER — OUTPATIENT (OUTPATIENT)
Dept: OUTPATIENT SERVICES | Facility: HOSPITAL | Age: 62
LOS: 1 days | End: 2022-10-03
Payer: COMMERCIAL

## 2022-10-03 ENCOUNTER — APPOINTMENT (OUTPATIENT)
Dept: ULTRASOUND IMAGING | Facility: HOSPITAL | Age: 62
End: 2022-10-03

## 2022-10-03 DIAGNOSIS — Z98.890 OTHER SPECIFIED POSTPROCEDURAL STATES: Chronic | ICD-10-CM

## 2022-10-03 DIAGNOSIS — Z90.13 ACQUIRED ABSENCE OF BILATERAL BREASTS AND NIPPLES: Chronic | ICD-10-CM

## 2022-10-03 PROCEDURE — 76770 US EXAM ABDO BACK WALL COMP: CPT | Mod: 26

## 2022-10-03 PROCEDURE — 76770 US EXAM ABDO BACK WALL COMP: CPT

## 2022-10-04 ENCOUNTER — TRANSCRIPTION ENCOUNTER (OUTPATIENT)
Age: 62
End: 2022-10-04

## 2022-10-04 ENCOUNTER — APPOINTMENT (OUTPATIENT)
Dept: OPHTHALMOLOGY | Facility: CLINIC | Age: 62
End: 2022-10-04

## 2022-10-04 ENCOUNTER — NON-APPOINTMENT (OUTPATIENT)
Age: 62
End: 2022-10-04

## 2022-10-04 LAB
CREAT UR-MCNC: 68.3 MG/DL
METANEPHS 24H UR-MCNC: 70 UG/L
METANEPHS/CREAT UR: 0.8
NORMETANEPHRINE 24H UR-MCNC: 440 UG/L
RENIN ACTIVITY, PLASMA: 1.77 NG/ML/HR

## 2022-10-04 PROCEDURE — 92012 INTRM OPH EXAM EST PATIENT: CPT

## 2022-10-10 ENCOUNTER — APPOINTMENT (OUTPATIENT)
Dept: RHEUMATOLOGY | Facility: CLINIC | Age: 62
End: 2022-10-10

## 2022-10-10 VITALS
HEIGHT: 59 IN | TEMPERATURE: 98.4 F | WEIGHT: 222 LBS | DIASTOLIC BLOOD PRESSURE: 84 MMHG | BODY MASS INDEX: 44.76 KG/M2 | OXYGEN SATURATION: 97 % | SYSTOLIC BLOOD PRESSURE: 168 MMHG | HEART RATE: 84 BPM

## 2022-10-10 PROCEDURE — 99215 OFFICE O/P EST HI 40 MIN: CPT

## 2022-10-10 NOTE — DATA REVIEWED
[FreeTextEntry1] : Final diagnosis of pathology from January 9, 2008 was retroperitoneal mass benign appearing it was a myxoid background of adipose vascular lymphoid tissue the flow cytometry revealed some reactive lymphoid cells no evidence of a lymph oh proliferative process the subsequent review from the [facility] woman's [facility] raise the possibility of a well differentiated inflammatory liposarcoma however they felt the final diagnosis would be adipose Citic lesion with predominantly lymphoplasmacytic infiltrates unusual fibroadipose Citic lesion with predominant lymphoplasmacytic infiltrates they recommended clinical follow-up in addition to the studies of serum IgG4's level from July 28, 2022 was 431

## 2022-10-10 NOTE — ASSESSMENT
[FreeTextEntry1] : This is a 62-year-old woman she has had a retroperitoneal mass since 1998 it was biopsied in 2008 which was nondiagnostic however this was at least 5 or even 6 years before the first descriptions of IgG4 related retroperitoneal fibrosis was described and they would not routinely looking for this process or staining for IgG4 at that time she does have an elevated serum IgG4 which certainly raises the suspicion that her retroperitoneal process is IgG4 related it does appear to be stable as per PET/CT and CT renal function is normal her blood pressure is well controlled she continues to follow with urology-while we did discuss the use of steroids and even Rituxan I do not feel that there is a clinical indication at this point there is also been discussion of rebiopsy however this will likely require an open biopsy which is not without risk this appears to be clinically stable over 20 years and biopsy in 2008 was essentially nondiagnostic but I suspect probably did represent an IgG4 related retroperitoneal fibrosis I did discuss with her risks of steroids risks of Rituxan however feel it is most appropriate at this point to continue monitoring her with follow-up CTs or PET CTs monitoring her blood pressure as well as renal function-she does have a chronic widespread pain syndrome I suggest that she continue following up with pain management I do not feel that this pain is related to her 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

## 2022-10-10 NOTE — REASON FOR VISIT
[Follow-Up: _____] : a [unfilled] follow-up visit [FreeTextEntry1] : Retroperitoneal mass elevated IgG4/ chronic widespread pain

## 2022-10-10 NOTE — PHYSICAL EXAM
[General Appearance - Alert] : alert [General Appearance - In No Acute Distress] : in no acute distress [General Appearance - Well Nourished] : well nourished [General Appearance - Well Developed] : well developed [Edema] : there was no peripheral edema [Abnormal Walk] : normal gait [Nail Clubbing] : no clubbing  or cyanosis of the fingernails [Musculoskeletal - Swelling] : no joint swelling seen [Motor Tone] : muscle strength and tone were normal [FreeTextEntry1] : no synovitis

## 2022-10-10 NOTE — HISTORY OF PRESENT ILLNESS
[FreeTextEntry1] : This is a 62-year-old woman she presents for rheumatology follow-up is concerned that she might have IgG4 related disease as manifested by retroperitoneal fibrosis she has been followed by urology at [facility] Dr. MCKEON -she has had a mid retroperitoneal mass which was first identified in 1998 she apparently had presented with obstructive uropathy this was biopsied on January 9, 2008 it was an open biopsy and pathology review at that time suggested a benign fibrotic retroperitoneal process there was also review at the [facility]-again was unable to identify an etiology this was an open biopsy done she does have a history of breast cancer she has a history of Graves' disease treated with ROBERTS she has hypertension which is controlled she has normal renal function and she has borderline diabetes her major issue does appear to be a chronic widespread pain syndrome she is currently being seen by pain management

## 2022-10-11 ENCOUNTER — APPOINTMENT (OUTPATIENT)
Dept: INTERNAL MEDICINE | Facility: CLINIC | Age: 62
End: 2022-10-11

## 2022-10-11 ENCOUNTER — MED ADMIN CHARGE (OUTPATIENT)
Age: 62
End: 2022-10-11

## 2022-10-11 VITALS
HEIGHT: 59 IN | DIASTOLIC BLOOD PRESSURE: 76 MMHG | TEMPERATURE: 98 F | HEART RATE: 83 BPM | OXYGEN SATURATION: 98 % | SYSTOLIC BLOOD PRESSURE: 174 MMHG | BODY MASS INDEX: 44.76 KG/M2 | WEIGHT: 222 LBS

## 2022-10-11 DIAGNOSIS — M71.331 OTHER BURSAL CYST, RIGHT WRIST: ICD-10-CM

## 2022-10-11 LAB
METANEPHRINE, PL: 10.3 PG/ML
NORMETANEPHRINE, PL: 179.1 PG/ML

## 2022-10-11 PROCEDURE — 90686 IIV4 VACC NO PRSV 0.5 ML IM: CPT

## 2022-10-11 PROCEDURE — 99214 OFFICE O/P EST MOD 30 MIN: CPT | Mod: GC,25

## 2022-10-11 PROCEDURE — G0008: CPT

## 2022-10-20 ENCOUNTER — APPOINTMENT (OUTPATIENT)
Dept: ULTRASOUND IMAGING | Facility: CLINIC | Age: 62
End: 2022-10-20

## 2022-10-20 ENCOUNTER — RESULT REVIEW (OUTPATIENT)
Age: 62
End: 2022-10-20

## 2022-10-20 ENCOUNTER — OUTPATIENT (OUTPATIENT)
Dept: OUTPATIENT SERVICES | Facility: HOSPITAL | Age: 62
LOS: 1 days | End: 2022-10-20

## 2022-10-20 DIAGNOSIS — Z98.890 OTHER SPECIFIED POSTPROCEDURAL STATES: Chronic | ICD-10-CM

## 2022-10-20 DIAGNOSIS — Z90.13 ACQUIRED ABSENCE OF BILATERAL BREASTS AND NIPPLES: Chronic | ICD-10-CM

## 2022-10-20 PROCEDURE — 76881 US COMPL JOINT R-T W/IMG: CPT | Mod: 26,RT

## 2022-10-21 ENCOUNTER — TRANSCRIPTION ENCOUNTER (OUTPATIENT)
Age: 62
End: 2022-10-21

## 2022-10-25 ENCOUNTER — APPOINTMENT (OUTPATIENT)
Dept: PULMONOLOGY | Facility: CLINIC | Age: 62
End: 2022-10-25

## 2022-10-25 VITALS
TEMPERATURE: 98.6 F | HEART RATE: 94 BPM | DIASTOLIC BLOOD PRESSURE: 89 MMHG | SYSTOLIC BLOOD PRESSURE: 161 MMHG | WEIGHT: 223 LBS | OXYGEN SATURATION: 97 % | HEIGHT: 59 IN | BODY MASS INDEX: 44.96 KG/M2

## 2022-10-25 DIAGNOSIS — D49.7 NEOPLASM OF UNSPECIFIED BEHAVIOR OF ENDOCRINE GLANDS AND OTHER PARTS OF NERVOUS SYSTEM: ICD-10-CM

## 2022-10-25 DIAGNOSIS — D17.39 BENIGN LIPOMATOUS NEOPLASM OF SKIN AND SUBCUTANEOUS TISSUE OF OTHER SITES: ICD-10-CM

## 2022-10-25 DIAGNOSIS — R05.3 CHRONIC COUGH: ICD-10-CM

## 2022-10-25 PROCEDURE — 99213 OFFICE O/P EST LOW 20 MIN: CPT

## 2022-10-25 NOTE — HISTORY OF PRESENT ILLNESS
[Former] : former [Never] : never [TextBox_4] : She was diagnosed with IgG4 disease she is seeing a rheumatologist to get a lot of information she has a lot of pain.  She had vision on the right eye is decreasing and she saw the ophthalmologist and today told her that she needs to go to Dr. Colindres who requested to repeat MRI of the brain.  Her breathing is okay she is much better after the change to pressure is on the CPAP which she is compliant with it and tolerating it well.  Lately in the last few days with the change of weather she is using the Anoro every day but she had to use the albuterol maybe once or twice a day for the last week

## 2022-10-25 NOTE — ASSESSMENT
[FreeTextEntry1] : Obstructive sleep apnea\par \par The patient is compliant with the CPAP.  The patient improved symptomatically with the change of the CPAP pressure.  There is no complication related to his CPAP. \par \par  Chronic cough\par \par  most likely related to the nasal sinus condition which is stable at this point and is following with ENT\par \par COPD\par \par Patient is clinically stable and she is compliant with the bronchodilator.  The patient in the last few days required extra short acting beta agonist.  The patient is off the azithromycin for the last 2 weeks.  We will evaluate whether she needs to be continued on azithromycin or not.  Patient did not require neither systemic steroid nor neither urgent care visits since the last time I saw her.  Her activity is limited due to the joint pain\par \par Reported neoplasm\par \par Patient is complaining of decreased vision in the right eye and we will follow-up on the MRI

## 2022-11-01 ENCOUNTER — APPOINTMENT (OUTPATIENT)
Dept: ORTHOPEDIC SURGERY | Facility: CLINIC | Age: 62
End: 2022-11-01

## 2022-11-01 VITALS — BODY MASS INDEX: 44.96 KG/M2 | HEIGHT: 59 IN | RESPIRATION RATE: 16 BRPM | WEIGHT: 223 LBS

## 2022-11-01 PROCEDURE — 73110 X-RAY EXAM OF WRIST: CPT | Mod: 50

## 2022-11-01 PROCEDURE — 99214 OFFICE O/P EST MOD 30 MIN: CPT

## 2022-11-01 PROCEDURE — 73090 X-RAY EXAM OF FOREARM: CPT | Mod: RT

## 2022-11-01 RX ORDER — ERGOCALCIFEROL 1.25 MG/1
1.25 MG CAPSULE, LIQUID FILLED ORAL
Qty: 8 | Refills: 0 | Status: DISCONTINUED | COMMUNITY
Start: 2022-05-03 | End: 2022-11-01

## 2022-11-03 ENCOUNTER — APPOINTMENT (OUTPATIENT)
Dept: OPHTHALMOLOGY | Facility: CLINIC | Age: 62
End: 2022-11-03

## 2022-11-03 ENCOUNTER — NON-APPOINTMENT (OUTPATIENT)
Age: 62
End: 2022-11-03

## 2022-11-03 PROCEDURE — 92133 CPTRZD OPH DX IMG PST SGM ON: CPT

## 2022-11-03 PROCEDURE — 92014 COMPRE OPH EXAM EST PT 1/>: CPT

## 2022-11-06 ENCOUNTER — OUTPATIENT (OUTPATIENT)
Dept: OUTPATIENT SERVICES | Facility: HOSPITAL | Age: 62
LOS: 1 days | End: 2022-11-06
Payer: COMMERCIAL

## 2022-11-06 ENCOUNTER — APPOINTMENT (OUTPATIENT)
Dept: MRI IMAGING | Facility: HOSPITAL | Age: 62
End: 2022-11-06

## 2022-11-06 DIAGNOSIS — Z98.890 OTHER SPECIFIED POSTPROCEDURAL STATES: Chronic | ICD-10-CM

## 2022-11-06 DIAGNOSIS — Z90.13 ACQUIRED ABSENCE OF BILATERAL BREASTS AND NIPPLES: Chronic | ICD-10-CM

## 2022-11-06 PROCEDURE — 70552 MRI BRAIN STEM W/DYE: CPT

## 2022-11-06 PROCEDURE — A9585: CPT

## 2022-11-06 PROCEDURE — 70552 MRI BRAIN STEM W/DYE: CPT | Mod: 26

## 2022-11-09 ENCOUNTER — APPOINTMENT (OUTPATIENT)
Dept: PHYSICAL MEDICINE AND REHAB | Facility: CLINIC | Age: 62
End: 2022-11-09

## 2022-11-09 VITALS — WEIGHT: 223 LBS | BODY MASS INDEX: 44.96 KG/M2 | HEIGHT: 59 IN

## 2022-11-09 DIAGNOSIS — G89.29 PAIN IN RIGHT WRIST: ICD-10-CM

## 2022-11-09 DIAGNOSIS — R27.8 OTHER LACK OF COORDINATION: ICD-10-CM

## 2022-11-09 DIAGNOSIS — M25.531 PAIN IN RIGHT WRIST: ICD-10-CM

## 2022-11-09 DIAGNOSIS — R52 PAIN, UNSPECIFIED: ICD-10-CM

## 2022-11-09 PROCEDURE — 99204 OFFICE O/P NEW MOD 45 MIN: CPT | Mod: 95

## 2022-11-09 NOTE — CONSULT LETTER
[FreeTextEntry1] : Dear Dr. KRYSTLE DE LA CRUZ  , \par \par I had the pleasure of evaluating your patient, ZAC VELEZ .\par \par Thank you very much for allowing me to participate in the care of this patient. If you have any questions, please do not hesitate to contact me. \par \par Sincerely, \par Rebel Borges MD \par \par ABPMR Board Certified in Physical Medicine and Rehabilitation\par Certified Fellow of AANEM (Neuromuscular and Electrodiagnostic Medicine)\par Subspecialty certified in Sports Medicine (ABPMR)\par \par  of Physical Medicine and Rehabilitation\par [employer] [facility]\par [facility]\par \par

## 2022-11-09 NOTE — PHYSICAL EXAM
[Normal] : Oriented to person, place, and time, insight and judgement were intact and the affect was normal [de-identified] : no wasting + phalens and  tinels saul  [de-identified] : very nice

## 2022-11-09 NOTE — REVIEW OF SYSTEMS
[Fever] : no fever [Chills] : no chills [Lower Ext Edema] : lower extremity edema [Joint Pain] : joint pain [Joint Stiffness] : joint stiffness [Muscle Pain] : muscle pain [Muscle Weakness] : muscle weakness [Difficulty Walking] : no difficulty walking [Negative] : Heme/Lymph

## 2022-11-09 NOTE — ASSESSMENT
[FreeTextEntry1] : \par PLAN AND RECOMMENDATIONS :\par \par We discussed differential diagnosis and clinical impression\par agree with EMG rule out CTS \par \par Recommend\par .symptomatic care and support splints \par  medications NSAIDS as needed -  OTC fine (-personal preference )-(once or twice a day), -warned of  possible GI side effects -advised to take with meals or add over the counter Nexium, if sensitive\par \par  imaging done ok \par \par  hydrotherapy /heat / cold for pain\par  continue  ergonomic precautions including pacing ,posture and frequent breaks while working .\par \par  relative rest and avoidance of painful activity where possible \par  increasing activity as discussed \par  return for EMG \par Information given to patient about EMG and Nerve Conduction Study Examination including  planning, differential diagnosis to rule in /rule out ,duration of the test ,precautions (if patient on blood thinner.has bleeding disorder or  pace maker device etc -still possible to undergo with care), side effects(benign-limited to short term bruising and discomfort/pain)  \par The protocol of temp checks upon arrival ,disinfection procedure of waiting room and the lab explained- reassured. \par All questions answered. \par Patient instructed to book appointment upon conclusion of appointment\par \par Information sheet ' Answers to your Questions on EMG " forwarded to patient to read prior to testing, with further information about training,background and the procedure itself .\par \par The patient had the opportunity to ask questions and all were answered to their satisfaction. We will coordinate treatment with the other members of the treatment team.\par The patient verbalized understanding of the management/plan rationale and agreed with my recommendations.\par Total clinician time on the date of this encounter was at least 45 minutes- including\par \par 1 preparing to see the patient and review of prior notes, tests and other records \par 2 obtaining and reviewing and/ or confirming the history\par 3 performing a medically necessary, pertinent  and appropriate examination \par 4 ordering medications and supplements explaining side effects to look for ,tests,procedures,therapy and or specialty referrals\par 5 explaining the diagnosis or differential to the patient\par 6 communicating with other physicians or providers before during or after the visit.will send note to PCP \par \par

## 2022-11-09 NOTE — HISTORY OF PRESENT ILLNESS
[Home] : at home, [unfilled] , at the time of the visit. [Medical Office: ([facility])___] : at the medical office located in  [Verbal consent obtained from patient] : the patient, [unfilled] [FreeTextEntry1] : Ms. ZAC VELEZ is a very pleasant 62 year LHD female who seen for evaluation of hand numbness and pain L>R   that has been ongoing for 3 years   without any specific injury or inciting event. The pain is located primarily wrists  intermittent in nature and described as discomfort/ numbness/ tingling both  hands  . The pain is rated as 7/10 during today's visit, and ranges from 0-9/10. The patient's symptoms are aggravated by night time wakes her up   and alleviated by nothing  . The patient denies any other night pain, feet  numbness/tingling, no weakness, or bowel/bladder dysfunction. The patient has no other complaints at this time.\par she has auto immune Dz- borderline diabetes and hyperthyroidism \par she is a HHA

## 2022-11-17 ENCOUNTER — APPOINTMENT (OUTPATIENT)
Dept: ORTHOPEDIC SURGERY | Facility: CLINIC | Age: 62
End: 2022-11-17

## 2022-11-17 ENCOUNTER — APPOINTMENT (OUTPATIENT)
Dept: OPHTHALMOLOGY | Facility: CLINIC | Age: 62
End: 2022-11-17

## 2022-11-17 ENCOUNTER — NON-APPOINTMENT (OUTPATIENT)
Age: 62
End: 2022-11-17

## 2022-11-17 DIAGNOSIS — R20.0 ANESTHESIA OF SKIN: ICD-10-CM

## 2022-11-17 PROCEDURE — ZZZZZ: CPT

## 2022-11-18 PROBLEM — R20.0 HAND NUMBNESS: Status: ACTIVE | Noted: 2022-11-01

## 2022-11-28 ENCOUNTER — APPOINTMENT (OUTPATIENT)
Dept: PHYSICAL MEDICINE AND REHAB | Facility: CLINIC | Age: 62
End: 2022-11-28

## 2022-11-28 DIAGNOSIS — G56.03 CARPAL TUNNEL SYNDROM,BILATERAL UPPER LIMBS: ICD-10-CM

## 2022-11-28 PROCEDURE — 95911 NRV CNDJ TEST 9-10 STUDIES: CPT

## 2022-11-28 PROCEDURE — 95886 MUSC TEST DONE W/N TEST COMP: CPT

## 2022-11-28 NOTE — PROCEDURE
[de-identified] : EMG /NERVE CONDUCTION STUDY performed today without complication\par \par Tabulated data, wave forms , conclusions and recommendations are attached and in the procedure report. \par Please refer to the scanned study attached to this encounter\par \par Full history and focused clinical exam performed prior to the examination and documented in report\par

## 2022-11-29 ENCOUNTER — APPOINTMENT (OUTPATIENT)
Dept: INTERNAL MEDICINE | Facility: CLINIC | Age: 62
End: 2022-11-29

## 2022-12-06 ENCOUNTER — LABORATORY RESULT (OUTPATIENT)
Age: 62
End: 2022-12-06

## 2022-12-06 ENCOUNTER — APPOINTMENT (OUTPATIENT)
Dept: NEUROSURGERY | Facility: CLINIC | Age: 62
End: 2022-12-06

## 2022-12-06 ENCOUNTER — APPOINTMENT (OUTPATIENT)
Dept: HEMATOLOGY ONCOLOGY | Facility: CLINIC | Age: 62
End: 2022-12-06

## 2022-12-06 ENCOUNTER — NON-APPOINTMENT (OUTPATIENT)
Age: 62
End: 2022-12-06

## 2022-12-06 VITALS
DIASTOLIC BLOOD PRESSURE: 82 MMHG | SYSTOLIC BLOOD PRESSURE: 135 MMHG | HEIGHT: 59 IN | RESPIRATION RATE: 18 BRPM | WEIGHT: 216 LBS | TEMPERATURE: 98.1 F | HEART RATE: 78 BPM | BODY MASS INDEX: 43.55 KG/M2 | OXYGEN SATURATION: 97 %

## 2022-12-06 LAB
ALBUMIN SERPL ELPH-MCNC: 3.7 G/DL
ALP BLD-CCNC: 80 U/L
ALT SERPL-CCNC: 20 U/L
ANION GAP SERPL CALC-SCNC: 7 MMOL/L
AST SERPL-CCNC: 24 U/L
BILIRUB SERPL-MCNC: 0.5 MG/DL
BUN SERPL-MCNC: 18 MG/DL
CALCIUM SERPL-MCNC: 10.2 MG/DL
CHLORIDE SERPL-SCNC: 110 MMOL/L
CO2 SERPL-SCNC: 26 MMOL/L
CREAT SERPL-MCNC: 0.4 MG/DL
EGFR: 113 ML/MIN/1.73M2
GLUCOSE SERPL-MCNC: 129 MG/DL
LDH SERPL-CCNC: 163 U/L
MAGNESIUM SERPL-MCNC: 2.3 MG/DL
POTASSIUM SERPL-SCNC: 4.5 MMOL/L
PROLACTIN SERPL-MCNC: 8.37 NG/ML
PROT SERPL-MCNC: 7.5 G/DL
SODIUM SERPL-SCNC: 143 MMOL/L

## 2022-12-06 PROCEDURE — 99204 OFFICE O/P NEW MOD 45 MIN: CPT | Mod: 95

## 2022-12-06 PROCEDURE — 99215 OFFICE O/P EST HI 40 MIN: CPT

## 2022-12-06 NOTE — PHYSICAL EXAM
[Restricted in physically strenuous activity but ambulatory and able to carry out work of a light or sedentary nature] : Status 1- Restricted in physically strenuous activity but ambulatory and able to carry out work of a light or sedentary nature, e.g., light house work, office work [Normal] : affect appropriate [de-identified] : 1+ b/l LE edema [de-identified] : tenderness to palpation in L-spine [de-identified] : swelling and pain in b/l DIP, PIP

## 2022-12-06 NOTE — PHYSICAL EXAM
Patient presents to urgent care with sore throat, body aches. She reports a slight cough. Symptoms started 2 days ago.     N95, face shield, gown and gloves worn by nurse - yes  Level 1 procedure mask worn by patient - yes     [General Appearance - Alert] : alert [General Appearance - In No Acute Distress] : in no acute distress [Oriented To Time, Place, And Person] : oriented to person, place, and time [Impaired Insight] : insight and judgment were intact [Affect] : the affect was normal [Memory Recent] : recent memory was not impaired [Person] : oriented to person [Place] : oriented to place [Time] : oriented to time [Short Term Intact] : short term memory intact [] : no respiratory distress

## 2022-12-06 NOTE — DATA REVIEWED
[de-identified] : \par \par \par ACC: [id number] EXAM: MR BRAIN IC\par \par PROCEDURE DATE: 11/06/2022\par \par \par \par INTERPRETATION: PROCEDURE: MRI Sella with intravenous contrast\par \par INDICATION: Pituitary tumor, decreased vision in the right\par \par TECHNIQUE: Sagittal and coronal T1 and coronal T2 weighted images of the sella and axial FLAIR images of the brain were obtained. Following the intravenous administration of 7 mL IV Gadavist, sagittal and coronal T1 weighted and coronal VIBE images through the sella and sagittal 3D T1 weighted images through the brain were obtained.\par \par COMPARISON: MRI sella 10/26/2021\par \par FINDINGS: Again demonstrated is diffuse prominence of the pituitary gland without discrete mass and is stable in size measuring 12 mm. The gland demonstrates homogeneous signal and contrast enhancement. No chiasmatic compression. The cavernous sinuses are normal..\par \par Evaluation of the brain demonstrates the ventricles, cisternal spaces and cortical sulci to be appropriate for the patient stated age. There is no midline shift or extra-axial collections. No abnormal signal is identified within the brain parenchyma. The postcontrast images demonstrate no abnormal enhancement.\par \par IMPRESSION:\par \par Since prior MRI sella 10/26/2021, stable diffuse prominence of the pituitary gland measuring 12 mm, most compatible with pituitary hyperplasia. No evidence of pituitary adenoma or chiasmatic compression.\par \par --- End of Report ---\par

## 2022-12-06 NOTE — ASSESSMENT
[FreeTextEntry1] : 62F with RP fibrosis x 20+ years here for f/u.\par \par Retroperitoneal fibrosis\par Managed by . 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 Had serial MRIs done Q 6 months.\par Refer to Dr.D'Amico\par \par RTC in 3 months or sooner if any issues.

## 2022-12-06 NOTE — HISTORY OF PRESENT ILLNESS
[Home] : at home, [unfilled] , at the time of the visit. [Medical Office: ([facility])___] : at the medical office located in  [Verbal consent obtained from patient] : the patient, [unfilled] [de-identified] : 61 y/o Female with PMHx of HTN, hyperlipidemia, GERD, COPD (on albuterol, Ellipta, azithromycin), JESÚS, history of epilepsy (no treatment), hypothyroidism (S/p ROBERTS for Grave's) , history breast cancer (invasive ductal carcinoma s/p radiation, chemo, and b/l mastectomy in 2000, recent chemo port removal), chronic kidney mass (follows at [place], benign in origin), history of diverticulitis, prior rotator cuff tear, LE pain b/l (managed with cannabis), referred to Hem/Onc due to CT A/P showing infiltrative findings surrounding both ureters and has been following with Dr. Bernard. \par \par 11/6/22 MRI brain with contrast- \par Since prior MRI sella 10/26/2021, stable diffuse prominence of the pituitary gland measuring 12 mm, most compatible with pituitary hyperplasia. No evidence of pituitary adenoma or chiasmatic compression.\par \par TODAY pt presents as referral from Dr. Bernard for evaluation. \par She endorses was dx with "pituitary tumor" in 1992 at [facility] (Dr. Noonan?) and was lost to f/u after he retired. Now f/u with Dr. D'Amico per Dr. Bernard to review recent MRI. \par Pt endorses she does not have endocrinologist and has not undergone endo lab workup in the past. She has Grave's disease and follows her PCP Dr. Isaacs and with Dr. Joel Schmitt for opth exams. Denies migraine headaches. \par She does note changes in ring size from 7 to 8. She also has weight fluctuations. \par  \par \par PCP: Dr. Bety Isaacs\par Onc: Dr. Meghan Bernard \par

## 2022-12-06 NOTE — ASSESSMENT
[FreeTextEntry1] : 62F referred for evaluation of enlarged pituitary identified on imaging > 1 year ago, stable now on MRI over 1 year. No vision symptoms attributable to tumor. Followed by Dr. Schmitt. Currently undergoing workup and treatment for rheumatologic and endocrinologic conditions. Favor benign pituitary hyperplasia in the setting of thyroid treatment. Will obtain endocrinologic labs and monitor with annual MRI. \par \par Dr. D' Amico independently reviewed all available images with patient. \par \par PLAN: \par - Pituitary lab panel\par - Repeat MRI 1 year\par - Will f/u with pituitary labs, endocrine referral prn\par - RTC after next MRI to review \par \par Patient verbalizes understanding of today’s discussion and next steps in treatment plan. \par \par  \par A total of 45 minutes was spent reviewing the labs, imaging and physical examination of the patient. We discussed the diagnosis, and the plan. The patient's questions were answered. The patient demonstrated an excellent understanding of the plan.

## 2022-12-06 NOTE — HISTORY OF PRESENT ILLNESS
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 She underwent revision of bilateral breasts and reduction mammoplasty in 2015. She did not receive any adjuvant RT, systemic therapy, or endocrine therapy. She was told that her upper extremity veins could not be accessed due to hx. b/l axillary lymphadenectomy. Due to poor peripheral vein access in the legs for blood draw and surgery, she had a mediport placed in 2005 and replacement in 2007. She was lost to follow up for 5 years and her doctors kept changing. She decided to transfer care from [facility] to [facility]. PCP recommended removal of mediport but patient has concerns due to fears of lymphedema. \par \par 11/6/22 MRI Head wIV Cont\par Since prior MRI sella 10/26/2021, stable diffuse prominence of the pituitary gland measuring 12 mm, most compatible with pituitary hyperplasia. No evidence of pituitary adenoma or chiasmatic compression.\par \par  [de-identified] : Pulm: \par Rheum:  [de-identified] : Chronic lower back.

## 2022-12-15 ENCOUNTER — APPOINTMENT (OUTPATIENT)
Dept: INTERNAL MEDICINE | Facility: CLINIC | Age: 62
End: 2022-12-15

## 2022-12-15 ENCOUNTER — APPOINTMENT (OUTPATIENT)
Dept: OPHTHALMOLOGY | Facility: CLINIC | Age: 62
End: 2022-12-15

## 2022-12-15 VITALS
WEIGHT: 218 LBS | TEMPERATURE: 96 F | OXYGEN SATURATION: 100 % | DIASTOLIC BLOOD PRESSURE: 96 MMHG | SYSTOLIC BLOOD PRESSURE: 153 MMHG | BODY MASS INDEX: 43.95 KG/M2 | HEIGHT: 59 IN | HEART RATE: 67 BPM

## 2022-12-15 PROCEDURE — 99214 OFFICE O/P EST MOD 30 MIN: CPT | Mod: GC

## 2022-12-20 ENCOUNTER — APPOINTMENT (OUTPATIENT)
Dept: OPHTHALMOLOGY | Facility: CLINIC | Age: 62
End: 2022-12-20

## 2022-12-22 ENCOUNTER — LABORATORY RESULT (OUTPATIENT)
Age: 62
End: 2022-12-22

## 2022-12-22 ENCOUNTER — APPOINTMENT (OUTPATIENT)
Dept: PULMONOLOGY | Facility: CLINIC | Age: 62
End: 2022-12-22

## 2022-12-22 VITALS
SYSTOLIC BLOOD PRESSURE: 155 MMHG | WEIGHT: 216 LBS | TEMPERATURE: 97.5 F | HEIGHT: 59 IN | OXYGEN SATURATION: 97 % | DIASTOLIC BLOOD PRESSURE: 82 MMHG | BODY MASS INDEX: 43.55 KG/M2 | HEART RATE: 86 BPM

## 2022-12-22 DIAGNOSIS — Z01.818 ENCOUNTER FOR OTHER PREPROCEDURAL EXAMINATION: ICD-10-CM

## 2022-12-22 DIAGNOSIS — D17.20 BENIGN LIPOMATOUS NEOPLASM OF SKIN AND SUBCUTANEOUS TISSUE OF UNSPECIFIED LIMB: ICD-10-CM

## 2022-12-22 PROCEDURE — 99214 OFFICE O/P EST MOD 30 MIN: CPT | Mod: 25

## 2022-12-22 RX ORDER — GABAPENTIN 100 MG/1
100 CAPSULE ORAL 3 TIMES DAILY
Qty: 180 | Refills: 1 | Status: DISCONTINUED | COMMUNITY
Start: 2022-12-15 | End: 2022-12-22

## 2022-12-22 RX ORDER — ROFLUMILAST 500 UG/1
500 TABLET ORAL DAILY
Qty: 30 | Refills: 11 | Status: DISCONTINUED | COMMUNITY
Start: 2022-06-07 | End: 2022-12-22

## 2022-12-22 NOTE — RESULTS/DATA
[] : results reviewed [ECG Reviewed] : reviewed [Normal] : The 12 - lead ECG is normal [NSR] : normal sinus rhythm [de-identified] : drawn today  [de-identified] : drawn today  [de-identified] : drawn today  [de-identified] : 9/2022 [de-identified] : 6/20/22 [de-identified] : \par INTERPRETATION: XR CHEST URGENT dated 9/19/2022 12:52 AM\par \par CLINICAL INFORMATION: Chest discomfort\par \par COMPARISON: Chest x-ray 12/31/2021.\par \par FINDINGS:\par \par Interval removal of right-sided Mediport.\par \par Unremarkable cardiomediastinal and hilar silhouettes.\par \par The lungs are clear. No pleural effusions. No pneumothorax.\par \par Unchanged surgical clips overlying the bilateral chest wall/axilla. No acute abnormalities of the soft tissues and osseous structures.\par \par \par IMPRESSION:\par No acute pathology.

## 2022-12-22 NOTE — END OF VISIT
[Time Spent: ___ minutes] : I have spent [unfilled] minutes of time on the encounter. [>50% of the face to face encounter time was spent on counseling and/or coordination of care for ___] : Greater than 50% of the face to face encounter time was spent on counseling and/or coordination of care for [unfilled] [FreeTextEntry3] : Pt seen with BRIANNA Cheney and agree on  the above plan of care.

## 2022-12-22 NOTE — ASSESSMENT
[FreeTextEntry1] : Preop\par \par No contraindication for surgery. The medical condition is optimized for surgery. There is no evidence neither of angina, CHF, arrhythmias, nor valvular disease. There is no limitation of exercise capacity. ZAC VELEZ is to be extubated once fully awake and able to protect airway. she is to be monitored in the recovery room.  They might benefit for high flow oxygen or noninvasive ventilation to prevent or reverse atelectasis.  Avoid oversedation.  I instructed the patient to notify me if there is any change in the clinical status prior the surgery including any skin manifestations. No aspirin or NSAIDs, Naproxen, OVERTON inhibitors, herbs, green tea and vitamins prior to surgery. NPO after midnight prior to surg. Follow labs reviewed CXR and EKG.\par \par lipoma of the hand\par \par Scheduled for surgery. \par \par Obstructive sleep apnea\par \par The patient is compliant with the CPAP.  The patient improved symptomatically with the change of the CPAP pressure.  There is no complication related to his CPAP. \par \par  Chronic cough\par \par  most likely related to the nasal sinus condition which is stable at this point and is following with ENT\par \par COPD\par \par Patient is clinically stable and she is compliant with the bronchodilator.  The patient in the last few days required extra short acting beta agonist.  The patient is off the azithromycin for the last 2 weeks.  We will evaluate whether she needs to be continued on azithromycin or not.  Patient did not require neither systemic steroid nor neither urgent care visits since the last time I saw her.  Her activity is limited due to the joint pain\par \par Reported neoplasm\par \par Patient is complaining of decreased vision in the right eye and we will follow-up on the MRI

## 2022-12-22 NOTE — HISTORY OF PRESENT ILLNESS
[Former] : former [Never] : never [No Pertinent Cardiac History] : no history of aortic stenosis, atrial fibrillation, coronary artery disease, recent myocardial infarction, or implantable device/pacemaker [COPD] : COPD [Sleep Apnea] : sleep apnea [No Adverse Anesthesia Reaction] : no adverse anesthesia reaction in self or family member [Asthma] : no asthma [Smoker] : not a smoker [Family Member] : no family member with adverse anesthesia reaction/sudden death [Self] : no previous adverse anesthesia reaction [Chronic Anticoagulation] : no chronic anticoagulation [Chronic Kidney Disease] : no chronic kidney disease [Diabetes] : no diabetes [FreeTextEntry1] : Right wrist surgery  [FreeTextEntry2] : 1/9/23 [FreeTextEntry3] : Dr. Michi Douglas [TextBox_4] : She was diagnosed with IgG4 disease and following rheumatologist. She followed by ophthalmologist and  Dr. Colindres who requested to repeat MRI of the brain.  Her breathing is okay she is much better after the change to pressure is on the CPAP which she is compliant with it and tolerating it well.  She follows with pain management given spinal injection and recently put on  Gabapentin 200 mg.  Walks 2 blocks has pain.  She has a lot nasal congestion and post nasal gtt. loosing weight watching her diet. Compliant with anoro and rarely using albuterol

## 2022-12-26 LAB
ALBUMIN SERPL ELPH-MCNC: 4.1 G/DL
ALP BLD-CCNC: 100 U/L
ALT SERPL-CCNC: 17 U/L
ANION GAP SERPL CALC-SCNC: 13 MMOL/L
APPEARANCE: ABNORMAL
APTT BLD: 29.9 SEC
AST SERPL-CCNC: 17 U/L
BASOPHILS # BLD AUTO: 0.01 K/UL
BASOPHILS NFR BLD AUTO: 0.2 %
BILIRUB SERPL-MCNC: 0.2 MG/DL
BILIRUBIN URINE: NEGATIVE
BLOOD URINE: NEGATIVE
BUN SERPL-MCNC: 22 MG/DL
CALCIUM SERPL-MCNC: 9.5 MG/DL
CHLORIDE SERPL-SCNC: 103 MMOL/L
CO2 SERPL-SCNC: 24 MMOL/L
COLOR: NORMAL
CREAT SERPL-MCNC: 0.63 MG/DL
EGFR: 101 ML/MIN/1.73M2
EOSINOPHIL # BLD AUTO: 0.11 K/UL
EOSINOPHIL NFR BLD AUTO: 1.9 %
GLUCOSE QUALITATIVE U: NEGATIVE
GLUCOSE SERPL-MCNC: 119 MG/DL
HCT VFR BLD CALC: 38.1 %
HGB BLD-MCNC: 11.7 G/DL
IMM GRANULOCYTES NFR BLD AUTO: 0.4 %
INR PPP: 0.9 RATIO
KETONES URINE: NEGATIVE
LEUKOCYTE ESTERASE URINE: NEGATIVE
LYMPHOCYTES # BLD AUTO: 1.35 K/UL
LYMPHOCYTES NFR BLD AUTO: 23.9 %
MAN DIFF?: NORMAL
MCHC RBC-ENTMCNC: 26.8 PG
MCHC RBC-ENTMCNC: 30.7 GM/DL
MCV RBC AUTO: 87.4 FL
MONOCYTES # BLD AUTO: 0.41 K/UL
MONOCYTES NFR BLD AUTO: 7.3 %
NEUTROPHILS # BLD AUTO: 3.75 K/UL
NEUTROPHILS NFR BLD AUTO: 66.3 %
NITRITE URINE: NEGATIVE
PH URINE: 6
PLATELET # BLD AUTO: 322 K/UL
POTASSIUM SERPL-SCNC: 4.4 MMOL/L
PROT SERPL-MCNC: 6.8 G/DL
PROTEIN URINE: NORMAL
PT BLD: 10.4 SEC
RBC # BLD: 4.36 M/UL
RBC # FLD: 15.4 %
SODIUM SERPL-SCNC: 139 MMOL/L
SPECIFIC GRAVITY URINE: 1.02
UROBILINOGEN URINE: NORMAL
WBC # FLD AUTO: 5.65 K/UL

## 2023-01-06 ENCOUNTER — LABORATORY RESULT (OUTPATIENT)
Age: 63
End: 2023-01-06

## 2023-01-06 NOTE — ASU PATIENT PROFILE, ADULT - FALL HARM RISK - HARM RISK INTERVENTIONS

## 2023-01-06 NOTE — ASU PATIENT PROFILE, ADULT - NS PREOP UNDERSTANDS INFO
Spoke to patient to be NPO after 2200 on S unday nigh t , ALLow to   have water till 4-5 am , dress comfortable, leave all valuable at home, Bring ID photo, insurance and vaccination cards, escort arranged, address and telephone given to patient,/yes

## 2023-01-06 NOTE — ASU PATIENT PROFILE, ADULT - PATIENT'S PREFERRED PRONOUN
Her/She Eucrisa Pregnancy And Lactation Text: This medication has not been assigned a Pregnancy Risk Category but animal studies failed to show danger with the topical medication. It is unknown if the medication is excreted in breast milk.

## 2023-01-06 NOTE — ASU PATIENT PROFILE, ADULT - NSICDXPASTMEDICALHX_GEN_ALL_CORE_FT
PAST MEDICAL HISTORY:  Breast cancer     COPD exacerbation     Diverticulitis     DM (diabetes mellitus)     Epilepsy     GERD (gastroesophageal reflux disease)     Graves disease     HTN (hypertension)     Hypothyroidism      PAST MEDICAL HISTORY:  Breast cancer     COPD exacerbation     Diverticulitis     Epilepsy     GERD (gastroesophageal reflux disease)     Graves disease     HTN (hypertension)     Hypothyroidism     Pre-diabetes

## 2023-01-06 NOTE — ASU PATIENT PROFILE, ADULT - NSICDXPASTSURGICALHX_GEN_ALL_CORE_FT
PAST SURGICAL HISTORY:  H/O bilateral mastectomy     History of hernia repair      PAST SURGICAL HISTORY:  H/O bilateral mastectomy     History of hernia repair     S/P eye surgery multiple, bilateral

## 2023-01-08 ENCOUNTER — TRANSCRIPTION ENCOUNTER (OUTPATIENT)
Age: 63
End: 2023-01-08

## 2023-01-09 ENCOUNTER — RESULT REVIEW (OUTPATIENT)
Age: 63
End: 2023-01-09

## 2023-01-09 ENCOUNTER — APPOINTMENT (OUTPATIENT)
Dept: ORTHOPEDIC SURGERY | Facility: AMBULATORY SURGERY CENTER | Age: 63
End: 2023-01-09

## 2023-01-09 ENCOUNTER — TRANSCRIPTION ENCOUNTER (OUTPATIENT)
Age: 63
End: 2023-01-09

## 2023-01-09 ENCOUNTER — OUTPATIENT (OUTPATIENT)
Dept: OUTPATIENT SERVICES | Facility: HOSPITAL | Age: 63
LOS: 1 days | Discharge: HOME | End: 2023-01-09
Payer: MEDICAID

## 2023-01-09 VITALS
HEART RATE: 72 BPM | SYSTOLIC BLOOD PRESSURE: 123 MMHG | DIASTOLIC BLOOD PRESSURE: 61 MMHG | OXYGEN SATURATION: 96 % | RESPIRATION RATE: 16 BRPM | TEMPERATURE: 98 F

## 2023-01-09 VITALS
SYSTOLIC BLOOD PRESSURE: 138 MMHG | RESPIRATION RATE: 14 BRPM | HEIGHT: 60 IN | TEMPERATURE: 98 F | DIASTOLIC BLOOD PRESSURE: 74 MMHG | HEART RATE: 74 BPM | OXYGEN SATURATION: 98 % | WEIGHT: 217.16 LBS

## 2023-01-09 DIAGNOSIS — Z98.890 OTHER SPECIFIED POSTPROCEDURAL STATES: Chronic | ICD-10-CM

## 2023-01-09 DIAGNOSIS — Z90.13 ACQUIRED ABSENCE OF BILATERAL BREASTS AND NIPPLES: Chronic | ICD-10-CM

## 2023-01-09 PROCEDURE — 25075 EXC FOREARM LES SC < 3 CM: CPT | Mod: RT

## 2023-01-09 PROCEDURE — 88304 TISSUE EXAM BY PATHOLOGIST: CPT | Mod: 26

## 2023-01-09 PROCEDURE — 29848 WRIST ENDOSCOPY/SURGERY: CPT | Mod: RT

## 2023-01-09 RX ORDER — OXYCODONE AND ACETAMINOPHEN 5; 325 MG/1; MG/1
2 TABLET ORAL EVERY 6 HOURS
Refills: 0 | Status: DISCONTINUED | OUTPATIENT
Start: 2023-01-09 | End: 2023-01-09

## 2023-01-09 RX ORDER — OXYCODONE AND ACETAMINOPHEN 5; 325 MG/1; MG/1
1 TABLET ORAL EVERY 4 HOURS
Refills: 0 | Status: DISCONTINUED | OUTPATIENT
Start: 2023-01-09 | End: 2023-01-09

## 2023-01-09 RX ORDER — ACETAMINOPHEN 500 MG
650 TABLET ORAL ONCE
Refills: 0 | Status: DISCONTINUED | OUTPATIENT
Start: 2023-01-09 | End: 2023-01-09

## 2023-01-09 RX ORDER — ALBUTEROL 90 UG/1
2 AEROSOL, METERED ORAL
Qty: 0 | Refills: 0 | DISCHARGE

## 2023-01-09 RX ORDER — ONDANSETRON 8 MG/1
4 TABLET, FILM COATED ORAL ONCE
Refills: 0 | Status: DISCONTINUED | OUTPATIENT
Start: 2023-01-09 | End: 2023-01-09

## 2023-01-09 RX ADMIN — OXYCODONE AND ACETAMINOPHEN 1 TABLET(S): 5; 325 TABLET ORAL at 14:40

## 2023-01-09 RX ADMIN — OXYCODONE AND ACETAMINOPHEN 1 TABLET(S): 5; 325 TABLET ORAL at 15:10

## 2023-01-09 NOTE — ASU DISCHARGE PLAN (ADULT/PEDIATRIC) - NS MD DC FALL RISK RISK
For information on Fall & Injury Prevention, visit: [email or web address] OR  [email or web address] OR  https://www.cdc.gov/steadi/patient.html

## 2023-01-09 NOTE — ASU DISCHARGE PLAN (ADULT/PEDIATRIC) - CARE PROVIDER_API CALL
Michi Herrera)  [facility]  [address], [address] [place] [address]  Phone: [phone]  Fax: [phone])-  Follow Up Time:

## 2023-01-09 NOTE — PRE-ANESTHESIA EVALUATION ADULT - NSANTHOSAYNRD_GEN_A_CORE
No. JESÚS screening performed.  STOP BANG Legend: 0-2 = LOW Risk; 3-4 = INTERMEDIATE Risk; 5-8 = HIGH Risk Yes

## 2023-01-09 NOTE — ASU DISCHARGE PLAN (ADULT/PEDIATRIC) - C. MAKE IMPORTANT PERSONAL OR BUSINESS DECISIONS
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  He is due for Cycle 7, Day 15.    HPI: Geo's dad reports that on Wednesday night he was woke up around 1:30 by a bunch of banging around in Geo's room. He went in to find his covers across the room he was on the floor and other things were disrupted.  Geo appeared awake and when dad asked what happened, he said \"we can talk about it tomorrow\".  Dad got him back in bed and there were no more issues.  The following day, when dad asked him about it, he didn't remember.  When the situation was described to him, he said that he remembered me being \"all excited\" in his room, but nothing else. Geo's episodes have improved some with methylphenidate.  Geo has been eating fairly well. He's sleeping well at night with support of his BiPAP. They continue with his usual skin regimen which includes bleach baths, bactroban to sore on his bottom, and clindamycin gel to his abdomen/chest. Dad thinks its much better with bleach baths more often. Geo thinks they smell like \"salt\" and he doesn't like the smell.  Geo is passing stool daily. No associated dizziness, shortness of breath, epistaxis, nor any other concerns.     Fam/Soc: His dad and their family have booked their travel to [place] the week prior to Thanksgiving. Dad has a clotting disorder which requires him to take Warfarin daily.  Geo reports school is going well. Mom is doing a bathroom remodel so that Geo has a shower on the main floor.     History was obtained from Geo and his dad.      Allergies   Allergen Reactions     Blood Transfusion Related (Informational Only) Swelling     Periorbital swelling post platelet transfusion     No Known Drug Allergies  "       Current Outpatient Prescriptions   Medication     mupirocin (BACTROBAN) 2 % ointment     fluticasone (FLONASE) 50 MCG/ACT spray     Clindamycin Phos-Benzoyl Perox 1.2-3.75 % GEL     dexamethasone (DECADRON) 0.5 MG tablet     pentoxifylline (TRENTAL) 400 MG CR tablet     sulfamethoxazole-trimethoprim (BACTRIM/SEPTRA) 400-80 MG per tablet     ketoconazole (NIZORAL) 2 % shampoo     omeprazole (PRILOSEC) 20 MG CR capsule     potassium phosphate, monobasic, (K-PHOS) 500 MG tablet     calcium carbonate-vitamin D 600-400 MG-UNIT CHEW     vitamin E (GNP VITAMIN E) 400 UNIT capsule     sodium chloride (OCEAN NASAL SPRAY) 0.65 % nasal spray     dexamethasone (DECADRON) 1 MG tablet     docusate sodium (COLACE) 100 MG tablet     Cholecalciferol 400 UNITS CHEW     Glycerin, Laxative, (GLYCERIN, ADULT,) 2.1 G SUPP     polyethylene glycol (MIRALAX/GLYCOLAX) packet     No current facility-administered medications for this visit.        Past Medical History:   Diagnosis Date     Cranial nerve dysfunction      Dyspepsia      Ependymoma (H)      Gastro-oesophageal reflux disease      Hearing loss      Intracranial hemorrhage (H)      Migraine      Pilonidal cyst     7-2015     Reduced vision      Refractory obstruction of nasal airway     2nd to nasal valve prolapse     Sleep apnea      Strabismus     gaze palsy        Past Surgical History:   Procedure Laterality Date     GRAFT CARTILAGE FROM POSTERIOR AURICLE Left 10/6/2016    Procedure: GRAFT CARTILAGE FROM POSTERIOR AURICLE;  Surgeon: Tyler Richards MD;  Location: [facility]     INCISION AND DRAINAGE PERINEAL, COMBINED Bilateral 7/18/2015    Procedure: COMBINED INCISION AND DRAINAGE PERINEAL;  Surgeon: Dequan Timmons MD;  [facility]     OPTICAL TRACKING SYSTEM CRANIOTOMY, EXCISE TUMOR, COMBINED N/A 4/13/2015    Procedure: COMBINED OPTICAL TRACKING SYSTEM CRANIOTOMY, EXCISE TUMOR;  Surgeon: Francis Velazquez MD;  [facility]     OPTICAL TRACKING  SYSTEM CRANIOTOMY, EXCISE TUMOR, COMBINED N/A 4/16/2015    Procedure: COMBINED OPTICAL TRACKING SYSTEM CRANIOTOMY, EXCISE TUMOR;  Surgeon: Francis Velazquez MD;  [facility]     OPTICAL TRACKING SYSTEM CRANIOTOMY, EXCISE TUMOR, COMBINED Bilateral 5/28/2015    Procedure: COMBINED OPTICAL TRACKING SYSTEM CRANIOTOMY, EXCISE TUMOR;  Surgeon: Francis Velazquez MD;  [facility]     OPTICAL TRACKING SYSTEM CRANIOTOMY, EXCISE TUMOR, COMBINED Bilateral 1/14/2016    Procedure: COMBINED OPTICAL TRACKING SYSTEM CRANIOTOMY, EXCISE TUMOR;  Surgeon: Francis Velazquez MD;  [facility]     OPTICAL TRACKING SYSTEM VENTRICULOSTOMY  4/16/2015    Procedure: OPTICAL TRACKING SYSTEM VENTRICULOSTOMY;  Surgeon: Francis Velazquez MD;  [facility]     REMOVE PORT VASCULAR ACCESS N/A 10/6/2016    Procedure: REMOVE PORT VASCULAR ACCESS;  Surgeon: Bruno Perea MD;  [facility]     RHINOPLASTY N/A 10/6/2016    Procedure: RHINOPLASTY;  Surgeon: Tyler Richards MD;  [facility]     VASCULAR SURGERY  5-2015    single lumen power port       Family History   Problem Relation Age of Onset     Circulatory Father      PE/DVT     Hypothyroidism Father 30     DIABETES Maternal Grandmother      DIABETES Paternal Grandmother      DIABETES Paternal Grandfather      C.A.D. Paternal Grandfather      Hypertension Maternal Grandfather      Thyroid Disease Paternal Aunt      unknown whether hypo or hyper       Review of Systems   Constitutional: Negative.    HENT: Negative.    Eyes: Negative.    Respiratory: Negative.    Cardiovascular: Negative.    Gastrointestinal: Negative.    Endocrine:        Follows with Dr. Martin. They are watching his thyroid function.   Genitourinary: Negative.    Musculoskeletal: Negative.    Neurological: Negative.    All other systems reviewed and are negative.    Labs:  Results for orders placed or performed in visit on 10/18/17 (from the past 48 hour(s))   CBC with  "platelets differential   Result Value Ref Range    WBC 5.8 4.0 - 11.0 10e9/L    RBC Count 3.88 3.7 - 5.3 10e12/L    Hemoglobin 13.1 11.7 - 15.7 g/dL    Hematocrit 38.4 35.0 - 47.0 %    MCV 99 77 - 100 fl    MCH 33.8 (H) 26.5 - 33.0 pg    MCHC 34.1 31.5 - 36.5 g/dL    RDW 11.9 10.0 - 15.0 %    Platelet Count 74 (L) 150 - 450 10e9/L    Diff Method Automated Method     % Neutrophils 66.9 %    % Lymphocytes 11.9 %    % Monocytes 14.1 %    % Eosinophils 6.4 %    % Basophils 0.5 %    % Immature Granulocytes 0.2 %    Nucleated RBCs 0 0 /100    Absolute Neutrophil 3.9 1.3 - 7.0 10e9/L    Absolute Lymphocytes 0.7 (L) 1.0 - 5.8 10e9/L    Absolute Monocytes 0.8 0.0 - 1.3 10e9/L    Absolute Eosinophils 0.4 0.0 - 0.7 10e9/L    Absolute Basophils 0.0 0.0 - 0.2 10e9/L    Abs Immature Granulocytes 0.0 0 - 0.4 10e9/L    Absolute Nucleated RBC 0.0      *Note: Due to a large number of results and/or encounters for the requested time period, some results have not been displayed. A complete set of results can be found in Results Review.     Vital signs:  height is 1.794 m (5' 10.63\") and weight is 74.7 kg (164 lb 10.9 oz). His axillary temperature is 96.8  F (36  C). His blood pressure is 108/75 and his pulse is 99. His respiration is 28 and oxygen saturation is 99%.      Physical Exam   Constitutional: He is oriented to person, place, and time.   In wheel chair - alert, NAD.   HENT:   Head: Atraumatic.   Right Ear: External ear normal.   Left Ear: External ear normal.   Mouth/Throat: Oropharynx is clear and moist.   Eyes: Conjunctivae are normal.   Neck: Normal range of motion. Neck supple. No tracheal deviation present. No thyromegaly present.   Cardiovascular: Normal rate and regular rhythm.    Pulmonary/Chest: Effort normal. No respiratory distress.   Abdominal: Soft. He exhibits no distension. There is no tenderness.   Musculoskeletal: Normal range of motion.   Lymphadenopathy:     He has no cervical adenopathy.   Neurological: He " is alert and oriented to person, place, and time. A cranial nerve deficit is present. Coordination abnormal.   Skin: Skin is warm and dry.   Striae throughout.  Bruising is various stages of healing.    Psychiatric: Mood and affect normal.     Impression:  1. Labs look good to proceed as planned. TSH added based on protocol.   2. History of Stye; resolved.   3. 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     Plan:  1. Continue with Entonostat   2. Continue skin cares: bleach baths, bactroban to pustule(s) on bottom, clindamycin gel, vitamin E oil  3. After discussion with Neurology, we have arranged for admission for video monitoring. He will stop his methylphenidate after Friday's dose so that he is free from stimulants during the video admission.   4. Next imaging is scheduled for 10/31/17, he will follow-up with Dr. Rousseau the following day for results.   5. RTC in 1 week.    ALAN Justin CNP   Statement Selected

## 2023-01-10 PROBLEM — R73.03 PREDIABETES: Chronic | Status: ACTIVE | Noted: 2023-01-09

## 2023-01-12 NOTE — BRIEF OPERATIVE NOTE - NSICDXBRIEFPROCEDURE_GEN_ALL_CORE_FT
PROCEDURES:  Carpal tunnel release 12-Jan-2023 06:56:41  Mason Chau  Excision of mass of right hand 12-Jan-2023 06:57:08  Mason Chau

## 2023-01-17 LAB — SURGICAL PATHOLOGY STUDY: SIGNIFICANT CHANGE UP

## 2023-01-19 ENCOUNTER — APPOINTMENT (OUTPATIENT)
Dept: ORTHOPEDIC SURGERY | Facility: CLINIC | Age: 63
End: 2023-01-19
Payer: MEDICAID

## 2023-01-19 DIAGNOSIS — M25.831 OTHER SPECIFIED JOINT DISORDERS, RIGHT WRIST: ICD-10-CM

## 2023-01-19 PROBLEM — I10 ESSENTIAL (PRIMARY) HYPERTENSION: Chronic | Status: ACTIVE | Noted: 2021-03-13

## 2023-01-19 PROCEDURE — 99024 POSTOP FOLLOW-UP VISIT: CPT

## 2023-02-02 ENCOUNTER — APPOINTMENT (OUTPATIENT)
Dept: PULMONOLOGY | Facility: CLINIC | Age: 63
End: 2023-02-02
Payer: MEDICAID

## 2023-02-02 VITALS
BODY MASS INDEX: 43.34 KG/M2 | HEART RATE: 73 BPM | WEIGHT: 215 LBS | HEIGHT: 59 IN | OXYGEN SATURATION: 97 % | TEMPERATURE: 98.1 F | DIASTOLIC BLOOD PRESSURE: 80 MMHG | SYSTOLIC BLOOD PRESSURE: 145 MMHG

## 2023-02-02 DIAGNOSIS — J32.9 CHRONIC SINUSITIS, UNSPECIFIED: ICD-10-CM

## 2023-02-02 PROCEDURE — 99213 OFFICE O/P EST LOW 20 MIN: CPT

## 2023-02-02 NOTE — ASSESSMENT
[FreeTextEntry1] : Obstructive sleep apnea\par \par The patient is compliant with the CPAP mask.  She wears it at least 4 hours a day.  She wakes up fresh.  Sometimes she gets tired in the middle of the day.  She is tolerating the CPAP with no problem\par \par  COPD\par \par The patient is clinically stable.  Baseline oxygen saturation is stable.  Patient is increasing his exercise capacity.  Patient exercises on a regular basis.  The patient rarely requires a short acting beta agonist.  Patient is compliant with the Anoro.  Continue current management.  She is scheduled for PFT in June.  And the CT scan of the chest and July of this year.\par \par Chronic sinusitis\par \par Is contributing to the morning cough which is productive at this point no evidence of infection and observe

## 2023-02-02 NOTE — HISTORY OF PRESENT ILLNESS
[Former] : former [Never] : never [TextBox_4] : She is doing very well.  She had the surgery.  The biopsy was benign tumor.  She is started to walk around.  She has no shortness of breath.  She is doing all her home exercises.  She a lot of exercises either standing or sitting at in the chair.  She rarely uses the albuterol.  She compliant with Anoro.  She wears the mask every night.  She has this productive cough every time she wakes up in the morning with some green-brown mucus but she is good for the rest of the day.  The secretions seems to be coming from her nose [ESS] : 0

## 2023-02-09 ENCOUNTER — APPOINTMENT (OUTPATIENT)
Dept: INTERNAL MEDICINE | Facility: CLINIC | Age: 63
End: 2023-02-09
Payer: MEDICAID

## 2023-02-09 VITALS
OXYGEN SATURATION: 99 % | WEIGHT: 219 LBS | DIASTOLIC BLOOD PRESSURE: 76 MMHG | RESPIRATION RATE: 16 BRPM | HEIGHT: 59 IN | SYSTOLIC BLOOD PRESSURE: 157 MMHG | TEMPERATURE: 98.3 F | HEART RATE: 75 BPM | BODY MASS INDEX: 44.15 KG/M2

## 2023-02-09 DIAGNOSIS — Z85.3 PERSONAL HISTORY OF MALIGNANT NEOPLASM OF BREAST: ICD-10-CM

## 2023-02-09 DIAGNOSIS — M79.605 PAIN IN LEFT LEG: ICD-10-CM

## 2023-02-09 PROCEDURE — 99214 OFFICE O/P EST MOD 30 MIN: CPT | Mod: GC

## 2023-02-22 RX ORDER — LIDOCAINE 5% 700 MG/1
5 PATCH TOPICAL
Qty: 1 | Refills: 0 | Status: ACTIVE | COMMUNITY
Start: 2022-09-20 | End: 1900-01-01

## 2023-03-02 ENCOUNTER — APPOINTMENT (OUTPATIENT)
Dept: ORTHOPEDIC SURGERY | Facility: CLINIC | Age: 63
End: 2023-03-02
Payer: MEDICAID

## 2023-03-02 DIAGNOSIS — G56.01 CARPAL TUNNEL SYNDROME, RIGHT UPPER LIMB: ICD-10-CM

## 2023-03-02 PROCEDURE — 99024 POSTOP FOLLOW-UP VISIT: CPT

## 2023-03-07 ENCOUNTER — APPOINTMENT (OUTPATIENT)
Dept: HEMATOLOGY ONCOLOGY | Facility: CLINIC | Age: 63
End: 2023-03-07
Payer: MEDICAID

## 2023-03-10 ENCOUNTER — NON-APPOINTMENT (OUTPATIENT)
Age: 63
End: 2023-03-10

## 2023-03-16 ENCOUNTER — NON-APPOINTMENT (OUTPATIENT)
Age: 63
End: 2023-03-16

## 2023-03-16 ENCOUNTER — LABORATORY RESULT (OUTPATIENT)
Age: 63
End: 2023-03-16

## 2023-03-16 ENCOUNTER — APPOINTMENT (OUTPATIENT)
Dept: HEMATOLOGY ONCOLOGY | Facility: CLINIC | Age: 63
End: 2023-03-16
Payer: MEDICAID

## 2023-03-16 VITALS
HEART RATE: 75 BPM | BODY MASS INDEX: 44.55 KG/M2 | WEIGHT: 221 LBS | OXYGEN SATURATION: 97 % | DIASTOLIC BLOOD PRESSURE: 92 MMHG | HEIGHT: 59 IN | SYSTOLIC BLOOD PRESSURE: 144 MMHG | RESPIRATION RATE: 18 BRPM | TEMPERATURE: 96.1 F

## 2023-03-16 PROCEDURE — 99215 OFFICE O/P EST HI 40 MIN: CPT

## 2023-03-16 NOTE — HISTORY OF PRESENT ILLNESS
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 She underwent revision of bilateral breasts and reduction mammoplasty in 2015. She did not receive any adjuvant RT, systemic therapy, or endocrine therapy. She was told that her upper extremity veins could not be accessed due to hx. b/l axillary lymphadenectomy. Due to poor peripheral vein access in the legs for blood draw and surgery, she had a mediport placed in 2005 and replacement in 2007. She was lost to follow up for 5 years and her doctors kept changing. She decided to transfer care from [facility] to [facility]. PCP recommended removal of mediport but patient has concerns due to fears of lymphedema. \par  [de-identified] : Chronic lower back. [de-identified] : Pulm: \par Rheum:

## 2023-03-16 NOTE — ASSESSMENT
[FreeTextEntry1] : 62F with RP fibrosis x 20+ years here for f/u.\par \par Retroperitoneal fibrosis\par Managed by . No indication for therapy at this time.\par MGUS likely related to RP fibrosis.\par Monitor IGNACIA Q 6 mo - labs today\par \par Pituitary hyperplasia - follow with Dr.D'Amico\par \par RTC in 6 months.

## 2023-03-16 NOTE — REVIEW OF SYSTEMS
[Fever] : no fever [Chills] : no chills [Chest Pain] : no chest pain [Palpitations] : no palpitations [Leg Claudication] : no intermittent leg claudication [Wheezing] : no wheezing [Cough] : no cough [Joint Pain] : no joint pain [Hot Flashes] : no hot flashes [Easy Bruising] : no tendency for easy bruising

## 2023-03-16 NOTE — PHYSICAL EXAM
[de-identified] : 1+ b/l LE edema [de-identified] : swelling and pain in b/l DIP, PIP [de-identified] : tenderness to palpation in L-spine

## 2023-03-17 ENCOUNTER — APPOINTMENT (OUTPATIENT)
Dept: OPHTHALMOLOGY | Facility: CLINIC | Age: 63
End: 2023-03-17

## 2023-03-17 LAB
B2 MICROGLOB SERPL-MCNC: 2.3 MG/L
LDH SERPL-CCNC: 176 U/L

## 2023-03-20 LAB
ALBUMIN MFR SERPL ELPH: 54.5 %
ALBUMIN SERPL-MCNC: 3.8 G/DL
ALBUMIN/GLOB SERPL: 1.2 RATIO
ALPHA1 GLOB MFR SERPL ELPH: 4.6 %
ALPHA1 GLOB SERPL ELPH-MCNC: 0.3 G/DL
ALPHA2 GLOB MFR SERPL ELPH: 11.9 %
ALPHA2 GLOB SERPL ELPH-MCNC: 0.8 G/DL
B-GLOBULIN MFR SERPL ELPH: 19.5 %
B-GLOBULIN SERPL ELPH-MCNC: 1.3 G/DL
DEPRECATED KAPPA LC FREE/LAMBDA SER: 1.48 RATIO
GAMMA GLOB FLD ELPH-MCNC: 0.7 G/DL
GAMMA GLOB MFR SERPL ELPH: 9.5 %
IGA SER QL IEP: 197 MG/DL
IGG SER QL IEP: 1050 MG/DL
IGM SER QL IEP: 50 MG/DL
INTERPRETATION SERPL IEP-IMP: NORMAL
KAPPA LC CSF-MCNC: 2.46 MG/DL
KAPPA LC SERPL-MCNC: 3.63 MG/DL
M PROTEIN MFR SERPL ELPH: 4.6 %
M PROTEIN SPEC IFE-MCNC: NORMAL
MONOCLON BAND OBS SERPL: 0.3 G/DL
PROT SERPL-MCNC: 6.9 G/DL
PROT SERPL-MCNC: 6.9 G/DL

## 2023-04-10 ENCOUNTER — APPOINTMENT (OUTPATIENT)
Dept: HEMATOLOGY ONCOLOGY | Facility: CLINIC | Age: 63
End: 2023-04-10
Payer: MEDICAID

## 2023-04-10 ENCOUNTER — APPOINTMENT (OUTPATIENT)
Dept: RHEUMATOLOGY | Facility: CLINIC | Age: 63
End: 2023-04-10

## 2023-04-25 NOTE — PROGRESS NOTE ADULT - PROBLEM SELECTOR PLAN 4
[Follow-Up Visit] : a follow-up [FreeTextEntry2] : right breast IDC BMI over 40 w HTN.  - DASH diet  - treat HTN per below

## 2023-05-08 ENCOUNTER — APPOINTMENT (OUTPATIENT)
Dept: HEMATOLOGY ONCOLOGY | Facility: CLINIC | Age: 63
End: 2023-05-08
Payer: MEDICAID

## 2023-05-23 ENCOUNTER — APPOINTMENT (OUTPATIENT)
Dept: HEMATOLOGY ONCOLOGY | Facility: CLINIC | Age: 63
End: 2023-05-23
Payer: MEDICAID

## 2023-06-02 ENCOUNTER — INPATIENT (INPATIENT)
Facility: HOSPITAL | Age: 63
LOS: 3 days | Discharge: HOME | DRG: 394 | End: 2023-06-06
Attending: INTERNAL MEDICINE | Admitting: INTERNAL MEDICINE
Payer: COMMERCIAL

## 2023-06-02 VITALS
HEART RATE: 83 BPM | RESPIRATION RATE: 19 BRPM | HEIGHT: 59 IN | OXYGEN SATURATION: 97 % | TEMPERATURE: 98 F | WEIGHT: 229.06 LBS | DIASTOLIC BLOOD PRESSURE: 104 MMHG | SYSTOLIC BLOOD PRESSURE: 161 MMHG

## 2023-06-02 DIAGNOSIS — Z90.13 ACQUIRED ABSENCE OF BILATERAL BREASTS AND NIPPLES: Chronic | ICD-10-CM

## 2023-06-02 DIAGNOSIS — N13.5 CROSSING VESSEL AND STRICTURE OF URETER WITHOUT HYDRONEPHROSIS: ICD-10-CM

## 2023-06-02 DIAGNOSIS — Z87.19 PERSONAL HISTORY OF OTHER DISEASES OF THE DIGESTIVE SYSTEM: ICD-10-CM

## 2023-06-02 DIAGNOSIS — K63.89 OTHER SPECIFIED DISEASES OF INTESTINE: ICD-10-CM

## 2023-06-02 DIAGNOSIS — Z98.890 OTHER SPECIFIED POSTPROCEDURAL STATES: Chronic | ICD-10-CM

## 2023-06-02 DIAGNOSIS — Z29.9 ENCOUNTER FOR PROPHYLACTIC MEASURES, UNSPECIFIED: ICD-10-CM

## 2023-06-02 DIAGNOSIS — E03.9 HYPOTHYROIDISM, UNSPECIFIED: ICD-10-CM

## 2023-06-02 DIAGNOSIS — Z87.09 PERSONAL HISTORY OF OTHER DISEASES OF THE RESPIRATORY SYSTEM: ICD-10-CM

## 2023-06-02 DIAGNOSIS — Z85.3 PERSONAL HISTORY OF MALIGNANT NEOPLASM OF BREAST: ICD-10-CM

## 2023-06-02 DIAGNOSIS — Q43.8 OTHER SPECIFIED CONGENITAL MALFORMATIONS OF INTESTINE: ICD-10-CM

## 2023-06-02 DIAGNOSIS — I10 ESSENTIAL (PRIMARY) HYPERTENSION: ICD-10-CM

## 2023-06-02 LAB
ALBUMIN SERPL ELPH-MCNC: 4 G/DL — SIGNIFICANT CHANGE UP (ref 3.3–5)
ALP SERPL-CCNC: 109 U/L — SIGNIFICANT CHANGE UP (ref 40–120)
ALT FLD-CCNC: 18 U/L — SIGNIFICANT CHANGE UP (ref 10–45)
ANION GAP SERPL CALC-SCNC: 11 MMOL/L — SIGNIFICANT CHANGE UP (ref 5–17)
APPEARANCE UR: CLEAR — SIGNIFICANT CHANGE UP
AST SERPL-CCNC: 16 U/L — SIGNIFICANT CHANGE UP (ref 10–40)
BASOPHILS # BLD AUTO: 0.02 K/UL — SIGNIFICANT CHANGE UP (ref 0–0.2)
BASOPHILS NFR BLD AUTO: 0.3 % — SIGNIFICANT CHANGE UP (ref 0–2)
BILIRUB SERPL-MCNC: 0.2 MG/DL — SIGNIFICANT CHANGE UP (ref 0.2–1.2)
BILIRUB UR-MCNC: NEGATIVE — SIGNIFICANT CHANGE UP
BUN SERPL-MCNC: 20 MG/DL — SIGNIFICANT CHANGE UP (ref 7–23)
CALCIUM SERPL-MCNC: 9.4 MG/DL — SIGNIFICANT CHANGE UP (ref 8.4–10.5)
CHLORIDE SERPL-SCNC: 105 MMOL/L — SIGNIFICANT CHANGE UP (ref 96–108)
CO2 SERPL-SCNC: 24 MMOL/L — SIGNIFICANT CHANGE UP (ref 22–31)
COLOR SPEC: YELLOW — SIGNIFICANT CHANGE UP
CREAT SERPL-MCNC: 0.68 MG/DL — SIGNIFICANT CHANGE UP (ref 0.5–1.3)
DIFF PNL FLD: NEGATIVE — SIGNIFICANT CHANGE UP
EGFR: 99 ML/MIN/1.73M2 — SIGNIFICANT CHANGE UP
EOSINOPHIL # BLD AUTO: 0.11 K/UL — SIGNIFICANT CHANGE UP (ref 0–0.5)
EOSINOPHIL NFR BLD AUTO: 1.5 % — SIGNIFICANT CHANGE UP (ref 0–6)
GLUCOSE SERPL-MCNC: 110 MG/DL — HIGH (ref 70–99)
GLUCOSE UR QL: NEGATIVE — SIGNIFICANT CHANGE UP
HCT VFR BLD CALC: 38.4 % — SIGNIFICANT CHANGE UP (ref 34.5–45)
HGB BLD-MCNC: 12.3 G/DL — SIGNIFICANT CHANGE UP (ref 11.5–15.5)
IMM GRANULOCYTES NFR BLD AUTO: 0.3 % — SIGNIFICANT CHANGE UP (ref 0–0.9)
KETONES UR-MCNC: NEGATIVE — SIGNIFICANT CHANGE UP
LACTATE SERPL-SCNC: 0.8 MMOL/L — SIGNIFICANT CHANGE UP (ref 0.5–2)
LEUKOCYTE ESTERASE UR-ACNC: NEGATIVE — SIGNIFICANT CHANGE UP
LIDOCAIN IGE QN: 24 U/L — SIGNIFICANT CHANGE UP (ref 7–60)
LYMPHOCYTES # BLD AUTO: 1.64 K/UL — SIGNIFICANT CHANGE UP (ref 1–3.3)
LYMPHOCYTES # BLD AUTO: 21.6 % — SIGNIFICANT CHANGE UP (ref 13–44)
MCHC RBC-ENTMCNC: 26.9 PG — LOW (ref 27–34)
MCHC RBC-ENTMCNC: 32 GM/DL — SIGNIFICANT CHANGE UP (ref 32–36)
MCV RBC AUTO: 83.8 FL — SIGNIFICANT CHANGE UP (ref 80–100)
MONOCYTES # BLD AUTO: 0.55 K/UL — SIGNIFICANT CHANGE UP (ref 0–0.9)
MONOCYTES NFR BLD AUTO: 7.3 % — SIGNIFICANT CHANGE UP (ref 2–14)
NEUTROPHILS # BLD AUTO: 5.24 K/UL — SIGNIFICANT CHANGE UP (ref 1.8–7.4)
NEUTROPHILS NFR BLD AUTO: 69 % — SIGNIFICANT CHANGE UP (ref 43–77)
NITRITE UR-MCNC: NEGATIVE — SIGNIFICANT CHANGE UP
NRBC # BLD: 0 /100 WBCS — SIGNIFICANT CHANGE UP (ref 0–0)
PH UR: 5.5 — SIGNIFICANT CHANGE UP (ref 5–8)
PLATELET # BLD AUTO: 344 K/UL — SIGNIFICANT CHANGE UP (ref 150–400)
POTASSIUM SERPL-MCNC: 4.5 MMOL/L — SIGNIFICANT CHANGE UP (ref 3.5–5.3)
POTASSIUM SERPL-SCNC: 4.5 MMOL/L — SIGNIFICANT CHANGE UP (ref 3.5–5.3)
PROT SERPL-MCNC: 7.1 G/DL — SIGNIFICANT CHANGE UP (ref 6–8.3)
PROT UR-MCNC: NEGATIVE MG/DL — SIGNIFICANT CHANGE UP
RBC # BLD: 4.58 M/UL — SIGNIFICANT CHANGE UP (ref 3.8–5.2)
RBC # FLD: 14.5 % — SIGNIFICANT CHANGE UP (ref 10.3–14.5)
SODIUM SERPL-SCNC: 140 MMOL/L — SIGNIFICANT CHANGE UP (ref 135–145)
SP GR SPEC: 1.02 — SIGNIFICANT CHANGE UP (ref 1–1.03)
UROBILINOGEN FLD QL: 0.2 E.U./DL — SIGNIFICANT CHANGE UP
WBC # BLD: 7.58 K/UL — SIGNIFICANT CHANGE UP (ref 3.8–10.5)
WBC # FLD AUTO: 7.58 K/UL — SIGNIFICANT CHANGE UP (ref 3.8–10.5)

## 2023-06-02 PROCEDURE — 74177 CT ABD & PELVIS W/CONTRAST: CPT | Mod: 26,MA

## 2023-06-02 PROCEDURE — 99285 EMERGENCY DEPT VISIT HI MDM: CPT

## 2023-06-02 PROCEDURE — 99222 1ST HOSP IP/OBS MODERATE 55: CPT | Mod: GC

## 2023-06-02 RX ORDER — TIOTROPIUM BROMIDE 18 UG/1
2 CAPSULE ORAL; RESPIRATORY (INHALATION) DAILY
Refills: 0 | Status: DISCONTINUED | OUTPATIENT
Start: 2023-06-02 | End: 2023-06-06

## 2023-06-02 RX ORDER — IOHEXOL 300 MG/ML
30 INJECTION, SOLUTION INTRAVENOUS ONCE
Refills: 0 | Status: COMPLETED | OUTPATIENT
Start: 2023-06-02 | End: 2023-06-02

## 2023-06-02 RX ORDER — ACETAMINOPHEN 500 MG
650 TABLET ORAL EVERY 6 HOURS
Refills: 0 | Status: COMPLETED | OUTPATIENT
Start: 2023-06-02 | End: 2023-06-05

## 2023-06-02 RX ORDER — SODIUM CHLORIDE 9 MG/ML
1000 INJECTION INTRAMUSCULAR; INTRAVENOUS; SUBCUTANEOUS ONCE
Refills: 0 | Status: COMPLETED | OUTPATIENT
Start: 2023-06-02 | End: 2023-06-02

## 2023-06-02 RX ORDER — LOSARTAN POTASSIUM 100 MG/1
25 TABLET, FILM COATED ORAL DAILY
Refills: 0 | Status: DISCONTINUED | OUTPATIENT
Start: 2023-06-02 | End: 2023-06-06

## 2023-06-02 RX ORDER — DIPHENHYDRAMINE HCL 50 MG
50 CAPSULE ORAL ONCE
Refills: 0 | Status: COMPLETED | OUTPATIENT
Start: 2023-06-02 | End: 2023-06-02

## 2023-06-02 RX ORDER — ONDANSETRON 8 MG/1
4 TABLET, FILM COATED ORAL ONCE
Refills: 0 | Status: COMPLETED | OUTPATIENT
Start: 2023-06-02 | End: 2023-06-02

## 2023-06-02 RX ORDER — MORPHINE SULFATE 50 MG/1
4 CAPSULE, EXTENDED RELEASE ORAL ONCE
Refills: 0 | Status: DISCONTINUED | OUTPATIENT
Start: 2023-06-02 | End: 2023-06-02

## 2023-06-02 RX ORDER — KETOROLAC TROMETHAMINE 30 MG/ML
15 SYRINGE (ML) INJECTION ONCE
Refills: 0 | Status: DISCONTINUED | OUTPATIENT
Start: 2023-06-02 | End: 2023-06-02

## 2023-06-02 RX ORDER — ACETAMINOPHEN 500 MG
650 TABLET ORAL EVERY 6 HOURS
Refills: 0 | Status: DISCONTINUED | OUTPATIENT
Start: 2023-06-02 | End: 2023-06-02

## 2023-06-02 RX ORDER — LEVOTHYROXINE SODIUM 125 MCG
200 TABLET ORAL DAILY
Refills: 0 | Status: DISCONTINUED | OUTPATIENT
Start: 2023-06-02 | End: 2023-06-06

## 2023-06-02 RX ORDER — KETOROLAC TROMETHAMINE 30 MG/ML
30 SYRINGE (ML) INJECTION ONCE
Refills: 0 | Status: DISCONTINUED | OUTPATIENT
Start: 2023-06-02 | End: 2023-06-02

## 2023-06-02 RX ORDER — ENOXAPARIN SODIUM 100 MG/ML
40 INJECTION SUBCUTANEOUS EVERY 12 HOURS
Refills: 0 | Status: DISCONTINUED | OUTPATIENT
Start: 2023-06-02 | End: 2023-06-06

## 2023-06-02 RX ORDER — HYDROMORPHONE HYDROCHLORIDE 2 MG/ML
1 INJECTION INTRAMUSCULAR; INTRAVENOUS; SUBCUTANEOUS ONCE
Refills: 0 | Status: DISCONTINUED | OUTPATIENT
Start: 2023-06-02 | End: 2023-06-02

## 2023-06-02 RX ADMIN — MORPHINE SULFATE 4 MILLIGRAM(S): 50 CAPSULE, EXTENDED RELEASE ORAL at 16:45

## 2023-06-02 RX ADMIN — HYDROMORPHONE HYDROCHLORIDE 1 MILLIGRAM(S): 2 INJECTION INTRAMUSCULAR; INTRAVENOUS; SUBCUTANEOUS at 19:35

## 2023-06-02 RX ADMIN — Medication 15 MILLIGRAM(S): at 19:32

## 2023-06-02 RX ADMIN — ONDANSETRON 4 MILLIGRAM(S): 8 TABLET, FILM COATED ORAL at 13:40

## 2023-06-02 RX ADMIN — SODIUM CHLORIDE 1000 MILLILITER(S): 9 INJECTION INTRAMUSCULAR; INTRAVENOUS; SUBCUTANEOUS at 14:10

## 2023-06-02 RX ADMIN — IOHEXOL 30 MILLILITER(S): 300 INJECTION, SOLUTION INTRAVENOUS at 13:40

## 2023-06-02 RX ADMIN — Medication 50 MILLIGRAM(S): at 16:43

## 2023-06-02 RX ADMIN — MORPHINE SULFATE 4 MILLIGRAM(S): 50 CAPSULE, EXTENDED RELEASE ORAL at 13:55

## 2023-06-02 RX ADMIN — HYDROMORPHONE HYDROCHLORIDE 1 MILLIGRAM(S): 2 INJECTION INTRAMUSCULAR; INTRAVENOUS; SUBCUTANEOUS at 19:32

## 2023-06-02 RX ADMIN — MORPHINE SULFATE 4 MILLIGRAM(S): 50 CAPSULE, EXTENDED RELEASE ORAL at 13:40

## 2023-06-02 RX ADMIN — Medication 15 MILLIGRAM(S): at 19:35

## 2023-06-02 RX ADMIN — Medication 40 MILLIGRAM(S): at 14:09

## 2023-06-02 RX ADMIN — MORPHINE SULFATE 4 MILLIGRAM(S): 50 CAPSULE, EXTENDED RELEASE ORAL at 17:00

## 2023-06-02 RX ADMIN — Medication 30 MILLIGRAM(S): at 22:56

## 2023-06-02 NOTE — H&P ADULT - NSHPPHYSICALEXAM_GEN_ALL_CORE
PHYSICAL EXAM:  Constitutional: NAD, comfortable in bed.  HEENT: NC/AT, PERRLA, EOMI, no conjunctival pallor or scleral icterus, mucus membranes moist.  Neck: Supple, no JVD. My thyromegaly or masses.  Respiratory: Clear to auscultation B/L. No wheezing, rales, rhonchi.  Cardiovascular: RRR, normal S1 and S2, no murmurs, rubs, gallops.  Gastrointestinal: +BS, soft NTND, no guarding or rebound tenderness, no palpable masses.  Extremities: Warm well perfused. No cyanosis, clubbing or edema.   Vascular: +2 pulses equal and strong throughout.   Neurological: AAOx3, no CN deficits, strength and sensation intact throughout.   Skin: No gross skin abnormalities or rashes. PHYSICAL EXAM:  Constitutional: NAD, comfortable in bed.  HEENT: NC/AT, PERRLA, EOMI, no conjunctival pallor or scleral icterus, mucus membranes moist.  Neck: Supple, no JVD. My thyromegaly or masses.  Respiratory: Clear to auscultation B/L. No wheezing, rales, rhonchi.  Cardiovascular: RRR, normal S1 and S2, no murmurs, rubs, gallops.  Gastrointestinal: +tenderness to deep palpation LLQ and LUQ. +BS, soft NTND, NO guarding or rebound tenderness, no palpable masses.  Extremities: Warm well perfused. No cyanosis, clubbing or edema.   Vascular: +2 pulses equal and strong throughout.   Neurological: AAOx3, no CN deficits, strength and sensation intact throughout.   Skin: No gross skin abnormalities or rashes.

## 2023-06-02 NOTE — H&P ADULT - NSICDXPASTMEDICALHX_GEN_ALL_CORE_FT
PAST MEDICAL HISTORY:  Breast cancer     COPD exacerbation     Diverticulitis     Epilepsy     GERD (gastroesophageal reflux disease)     Graves disease     HTN (hypertension)     Hypothyroidism     Pre-diabetes

## 2023-06-02 NOTE — ED PROVIDER NOTE - ATTENDING APP SHARED VISIT CONTRIBUTION OF CARE
63-year-old female with past medical history of hypertension, hyperlipidemia, diverticulitis, COPD, breast cancer status post bilateral mastectomy complaining of left lower quadrant pain x2 days. TTP in LLQ, no r/g. Pending Labs, UA, CT for ro diverticulitis.

## 2023-06-02 NOTE — ED PROVIDER NOTE - OBJECTIVE STATEMENT
63-year-old female with past medical history of hypertension, hyperlipidemia, diverticulitis, COPD, breast cancer status post bilateral mastectomy complaining of left lower quadrant pain x2 days.  Associated with nausea.  Pain progressively getting worse, and feels like previous diverticulitis.  Patient tried to start a self on a clear liquid diet with no relief.  Denies fever, chills, cp, sob, dysuria, vomiting, diarrhea, blood in the stool.  Patient states she had a bowel movement this morning and the stool was dark.

## 2023-06-02 NOTE — H&P ADULT - ATTENDING COMMENTS
LLQ abd pain w/nausea for several days found to have epiploic appendagitis on CT a/p  Conservative mgmt for epiploic appendagitis which includes pain control and advance diet per above

## 2023-06-02 NOTE — H&P ADULT - HISTORY OF PRESENT ILLNESS
PCP:   Pharmacy:   - - - - -    HPI: This is a 63-year-old F with past medical history of hypertension, hyperlipidemia, diverticulitis, COPD, breast cancer status post bilateral mastectomy complaining of left lower quadrant pain x2 days.  Associated with nausea.  Pain progressively getting worse, and feels like previous diverticulitis.  Patient tried to start a self on a clear liquid diet with no relief.  Denies fever, chills, cp, sob, dysuria, vomiting, diarrhea, blood in the stool.  Patient states she had a bowel movement this morning and the stool was dark.    In the ED:  Initial vital signs: T: 98F, HR: 83, BP: 161/104->127/63, R: 14, SpO2: 97% on RA  ED course:   Labs: significant for CBC WNL, CMP WNL, UA WNL  Imaging: CTAP showed 1.4cm epiploic appendagitis (necrotic fat), colonic diverticulosis, bilateral periureteral stranidng  CXR:   EKG: NSR HR 83  Medications: s/p toradol 15mg IVP x1, Benadryl 50mg IVP x1, dilaudid 1mg x1, morphine 4mg x2, solumedrol 40mg IVP x1, NS 1L.   Consults: none      PCP: Dr Isaacs and Dr. Aleman  Pharmacy:  [facility] ([facility])  - - - - -    HPI: This is a 63-year-old F with past medical history of hypertension, hyperlipidemia, diverticulitis, COPD, hx of breast cancer status post bilateral mastectomy complaining of left lower quadrant pain and nausea x2 days and associated with nausea. Describes pain as sharp, shooting pain down to L leg region. The pain worsened to the point where she could not walk so she took a cab to the ED. The pain does not fluctuate with eating, patient notes its painful throughout meals and she has not eating anything today. She has a history of diverticulitis twice, so she tried to start herself on a clear liquid diet with no relief.  She tried ibuprofen and motrin with no symptom resolve. Has not had a BM in 2 days as a result of the pain, however per ED provider she had a dark stool episode this AM. Denies fever, chills, CP, SOB, dysuria, vomiting, diarrhea, blood in the stool, changes to stool color, back pain, or other related symptoms.    In the ED:  Initial vital signs: T: 98F, HR: 83, BP: 161/104->127/63, R: 14, SpO2: 97% on RA  ED course:   Labs: significant for CBC WNL, CMP WNL, UA WNL  Imaging: CTAP showed 1.4cm epiploic appendagitis (necrotic fat), colonic diverticulosis, bilateral periureteral stranidng  CXR: NI  EKG: NSR HR 83  Medications: s/p toradol 15mg IVP x1, Benadryl 50mg IVP x1, dilaudid 1mg x1, morphine 4mg x2, solumedrol 40mg IVP x1, NS 1L.   Consults: none      PCP: Dr Isaacs and Dr. Aleman  Pharmacy:  [facility] ([facility])  - - - - -    HPI: This is a 63-year-old F with past medical history of hypertension, hyperlipidemia, diverticulitis, COPD, hx of breast cancer status post bilateral mastectomy complaining of left lower quadrant pain and nausea x2 days and associated with nausea. Describes pain as sharp, shooting pain down to L leg region. The pain worsened to the point where she could not walk so she took a cab to the ED. The pain does not fluctuate with eating, patient notes its painful throughout meals and she has not eating anything today. She has a history of diverticulitis twice, so she tried to start herself on a clear liquid diet with no relief.  She tried ibuprofen and motrin with no symptom resolve. Has not had a BM in 2 days as a result of the pain, however per ED provider she had a dark stool episode this AM. Denies fever, chills, CP, SOB, dysuria, vomiting, diarrhea, blood in the stool, changes to stool color, back pain, or other related symptoms.    In the ED:  Initial vital signs: T: 98F, HR: 83, BP: 161/104->127/63, R: 14, SpO2: 97% on RA  ED course:   Labs: significant for CBC WNL, CMP WNL, UA WNL  Imaging: CTAP showed 1.4cm epiploic appendagitis (necrotic fat), colonic diverticulosis, bilateral periureteral stranidng  CXR: NI  EKG: NSR HR 83, no ST changes  Medications: s/p toradol 15mg IVP x1, Benadryl 50mg IVP x1, dilaudid 1mg x1, morphine 4mg x2, solumedrol 40mg IVP x1, NS 1L.   Consults: none

## 2023-06-02 NOTE — H&P ADULT - NSHPLABSRESULTS_GEN_ALL_CORE
LABS:                         12.3   7.58  )-----------( 344      ( 2023 13:11 )             38.4     06-02    140  |  105  |  20  ----------------------------<  110<H>  4.5   |  24  |  0.68    Ca    9.4      2023 13:11    TPro  7.1  /  Alb  4.0  /  TBili  0.2  /  DBili  x   /  AST  16  /  ALT  18  /  AlkPhos  109  06-02      Urinalysis Basic - ( 2023 13:11 )    Color: Yellow / Appearance: Clear / S.025 / pH: x  Gluc: x / Ketone: NEGATIVE  / Bili: Negative / Urobili: 0.2 E.U./dL   Blood: x / Protein: NEGATIVE mg/dL / Nitrite: NEGATIVE   Leuk Esterase: NEGATIVE / RBC: x / WBC x   Sq Epi: x / Non Sq Epi: x / Bacteria: x                RADIOLOGY, EKG & ADDITIONAL TESTS:

## 2023-06-02 NOTE — ED PROVIDER NOTE - CLINICAL SUMMARY MEDICAL DECISION MAKING FREE TEXT BOX
63-year-old female with past medical history of hypertension, hyperlipidemia, diverticulitis, COPD, breast cancer status post bilateral mastectomy complaining of left lower quadrant pain x2 days.  Associated with nausea.  Pain progressively getting worse, and feels like previous diverticulitis.  Patient tried to start a self on a clear liquid diet with no relief.  Denies fever, chills, cp, sob, dysuria, vomiting, diarrhea, blood in the stool. BP elevated. Afebrile. +LLQ ttp. Labs, ct r/o diverticulitis - premedication for allergy [id number]: || ||00\01||False;[id number]: || ||00\01||False;

## 2023-06-02 NOTE — H&P ADULT - NSICDXPASTSURGICALHX_GEN_ALL_CORE_FT
PAST SURGICAL HISTORY:  H/O bilateral mastectomy     History of hernia repair     S/P eye surgery multiple, bilateral

## 2023-06-02 NOTE — H&P ADULT - NSHPSOCIALHISTORY_GEN_ALL_CORE
Lives alone, but currently temporarily living with sister  Walks independently  Social drinker  Former smoker, 40 pack year smoking hx  Denies drug use

## 2023-06-02 NOTE — H&P ADULT - PROBLEM SELECTOR PLAN 2
Reports having hx of diverticulitis twice, normally restricts herself to clear liquid diets when it occurs.  CTAP on admission negative for diverticulitis. Reports having hx of diverticulitis twice, normally restricts herself to clear liquid diets when it occurs.  CTAP on admission negative for diverticulitis.  - See above mgmt

## 2023-06-02 NOTE — ED PROVIDER NOTE - PHYSICAL EXAMINATION
CONSTITUTIONAL: Well-appearing;  in no apparent distress.   HEAD: Normocephalic; atraumatic.   EYES: PERRL; EOM intact; conjunctiva and sclera clear  ENT: normal nose; no rhinorrhea; normal pharynx with no erythema or lesions.   NECK: Supple; non-tender; no LAD  CARDIOVASCULAR: Normal S1, S2; No audible murmurs. Regular rate and rhythm.   RESPIRATORY: Breathing easily; breath sounds clear and equal bilaterally; no wheezes, rhonchi, or rales.  GI: Soft; non-distended;  +LLQ ttp  rectal: scant brown stool   MSK: FROM at all extremities, normal tone   EXT: No cyanosis or edema; N/V intact  SKIN: Normal for age and race; warm; dry; good turgor; no apparent lesions or rash.   NEURO: A & O x 3; face symmetric; grossly unremarkable.   PSYCHOLOGICAL: The patient’s mood and manner are appropriate.

## 2023-06-02 NOTE — ED PROVIDER NOTE - PROGRESS NOTE DETAILS
Received signout: 63F with hx diverticulitis, now with abdominal pain, pending premedication and CT for disposition. brown: CT results pending, pt still in pain Brown: Ct prelim shows epiploic epiploic appendagitis less likely diverticulitis. Pt still in pain, will admit for pain control

## 2023-06-02 NOTE — H&P ADULT - PROBLEM SELECTOR PLAN 3
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 Grady.  Not currently undergoing therapy at this time History of RP for 20 years, managed by Dr. Grady.  Not currently undergoing therapy at this time per HIE documentation  Plan:  - Ctm History of RP for 20 years, managed by Dr. Grady.  Not currently undergoing therapy at this time per HIE documentation  Plan:  - C/w outpatient mgmt

## 2023-06-02 NOTE — H&P ADULT - ASSESSMENT
This is a 63F with PMH of HTN, HLD, DM, GERD, COPD, JESÚS, history of epilepsy (no treatment), hypothyroidism (2/2 ROBERTS for Graves with multiple eye surgeries) , history breast cancer (s/p radiation, chemo, and b/l mastectomy in 2000 s/p chemo port removal), kidney mass (follows at [place], benign in origin), history of diverticulitis, prior rotator cuff tear, chronic b/l LE pain (neuropathy and arthritis), carpal tunnel syndrome s/p release surgery, pituitary hyperplasia, retroperitoneal fibrosis presenting for abdominal pain, found to have an epiploic appendagitis, now admitted to [facility] for further management.

## 2023-06-02 NOTE — H&P ADULT - NSHPREVIEWOFSYSTEMS_GEN_ALL_CORE
---------------INCOMPLETE--------------------------    CONSTITUTIONAL: No fevers, chills, sweats, weakness, fatigue, or weight changes  HEENT: No visual or hearing changes;  No vertigo or throat pain   NECK: No pain or stiffness  RESPIRATORY: No cough, wheezing, hemoptysis; No shortness of breath  CARDIOVASCULAR: No chest pain or palpitations  GASTROINTESTINAL: No abdominal pain. No nausea, vomiting, diarrhea, or constipation. No melena.  GENITOURINARY: No dysuria, frequency or hematuria  NEUROLOGICAL: No numbness or weakness  SKIN: No itching, burning, rashes, or lesions   HEME: No bruising or bleeding  ENDO: No hypo-/hyper- thermia  All other review of systems as per HPI. CONSTITUTIONAL: No fevers, chills, sweats, weakness, fatigue, or weight changes  HEENT: No visual or hearing changes;  No vertigo or throat pain   NECK: No pain or stiffness  RESPIRATORY: No cough, wheezing, hemoptysis; No shortness of breath  CARDIOVASCULAR: No chest pain or palpitations  GASTROINTESTINAL: +LLQ abdominal pain and nausea. Denies vomiting, diarrhea, or constipation. No melena.  GENITOURINARY: No dysuria, frequency or hematuria  NEUROLOGICAL: No numbness or weakness  SKIN: No itching, burning, rashes, or lesions   HEME: No bruising or bleeding  ENDO: No hypo-/hyper- thermia  All other review of systems as per HPI.

## 2023-06-02 NOTE — H&P ADULT - PROBLEM SELECTOR PLAN 1
CTAP showed 1.4cm epiploic appendagitis (necrotic fat), colonic diverticulosis, bilateral periureteral stranidng Presenting w LLQ sharp, shooting pain x2 days with nausea. No fevers/chills/vomiting/diarrhea/constipation. Has not had BM in 2 days due to pain. 10/10 when patient moves/strains, improved with dilaudid 1mg x1, morphine 4mg x2, benadryl 50mg IVP x1, toradol 15mg IVP x1.   Hx of diverticulitis,   CTAP showed 1.4cm epiploic appendagitis (necrotic fat), colonic diverticulosis, bilateral periureteral stranding. Negative for appendicitis.    Plan:  - Pain mgmt control;   - Serial abdominal exams Presenting w LLQ sharp, shooting pain x2 days with nausea. No fevers/chills/vomiting/diarrhea/constipation. Has not had BM in 2 days due to pain. 10/10 when patient moves/strains, improved with dilaudid 1mg x1, morphine 4mg x2, benadryl 50mg IVP x1, toradol 15mg IVP x1.   CTAP showed 1.4cm epiploic appendagitis (necrotic fat), colonic diverticulosis, bilateral periureteral stranding.  Less likely ddx for abdominal pain: Negative for obstruction, appendicitis, pancreatitis. Lipase negative, no transaminitis, no elevated alk phos. No s/s anemia/bleed.    Plan:  - Conservative mgmt with pain control; NSAIDs  - F/u lactate  - Serial abdominal exams  - Bowel regimen Presenting w LLQ sharp, shooting pain x2 days with nausea. No fevers/chills/vomiting/diarrhea/constipation. Has not had BM in 2 days due to pain. 10/10 when patient moves/strains, improved with dilaudid 1mg x1, morphine 4mg x2, benadryl 50mg IVP x1, toradol 15mg IVP x1.   CTAP showed 1.4cm epiploic appendagitis (necrotic fat), colonic diverticulosis, bilateral periureteral stranding.  Less likely ddx for abdominal pain: Negative for obstruction, appendicitis, pancreatitis. Lipase negative, no transaminitis, no elevated alk phos. No s/s anemia/bleed. Unlikely fibroid pain as patient is currently post-menopausal.    Plan:  - Conservative mgmt with pain control; NSAIDs  - F/u lactate  - Serial abdominal exams  - Bowel regimen Presenting w LLQ sharp, shooting pain x2 days with nausea. No fevers/chills/vomiting/diarrhea/constipation. Has not had BM in 2 days due to pain. 10/10 when patient moves/strains, improved with dilaudid 1mg x1, morphine 4mg x2, benadryl 50mg IVP x1, toradol 15mg IVP x1.   CTAP showed 1.4cm epiploic appendagitis (necrotic fat), colonic diverticulosis, bilateral periureteral stranding.  Less likely ddx for abdominal pain: Negative for obstruction, appendicitis, pancreatitis. Lipase negative, no transaminitis, no elevated alk phos. No s/s anemia/bleed. Unlikely fibroid pain as patient is currently post-menopausal.    Plan:  - Conservative mgmt with pain control; Tylenol 650mg PO standing for mild, Toradol   - Avoid adding pain regimen for severe to monitor for acute abdomen  - F/u lactate  - Serial abdominal exams  - Bowel regimen Presenting w LLQ sharp, shooting pain x2 days with nausea. No fevers/chills/vomiting/diarrhea/constipation. Has not had BM in 2 days due to pain. 10/10 when patient moves/strains, improved with dilaudid 1mg x1, morphine 4mg x2, benadryl 50mg IVP x1, toradol 15mg IVP x1.   CTAP showed 1.4cm epiploic appendagitis (necrotic fat), colonic diverticulosis, bilateral periureteral stranding.  Less likely ddx for abdominal pain: Negative for obstruction, appendicitis, pancreatitis. Lipase negative, no transaminitis, no elevated alk phos. No s/s anemia/bleed. Unlikely fibroid pain as patient is currently post-menopausal.    Plan:  - Conservative mgmt with pain control; Tylenol 650mg PO standing for mild, Toradol for moderate pain  - Avoid adding pain regimen for severe to monitor for acute abdomen  - F/u lactate  - Serial abdominal exams  - Bowel regimen Presenting w LLQ sharp, shooting pain x2 days with nausea. No fevers/chills/vomiting/diarrhea/constipation. Has not had BM in 2 days due to pain. 10/10 when patient moves/strains, improved with dilaudid 1mg x1, morphine 4mg x2, benadryl 50mg IVP x1, toradol 15mg IVP x1.   CTAP showed 1.4cm epiploic appendagitis (necrotic fat), colonic diverticulosis, bilateral periureteral stranding.  Less likely ddx for abdominal pain: Negative for obstruction, diverticulitis, appendicitis, pancreatitis. Lipase negative, lactate negative, no transaminitis, no elevated alk phos. No s/s anemia/bleed. Unlikely fibroid pain as patient is currently post-menopausal.    Plan:  - Conservative mgmt with pain control; Tylenol 650mg PO standing for mild, Toradol for moderate pain  - Avoid adding pain regimen for severe to monitor for acute abdomen  - Serial abdominal exams  - Bowel regimen

## 2023-06-02 NOTE — H&P ADULT - PROBLEM SELECTOR PLAN 6
Hx of COPD. Follows with Dr Isaacs.    Plan:  - Ctm; on room air  - C/w home meds: anoro ellipta and albuterol Hx of COPD. Follows with Dr Isaacs.  Takes anoro ellipta and albuterol (rarely) at home    Plan:  - Ctm; on room air  - C/w shahzad Hx of COPD. Follows with Dr Isaacs.  Takes anoro ellipta and albuterol (rarely) at home    Plan:  - Ctm; on room air  - C/w therapeutic interchange spiriva

## 2023-06-02 NOTE — H&P ADULT - PROBLEM SELECTOR PLAN 5
Home meds Hydral 25mg TID, HCTZ 25mg qD, losartan 25mg qd, amlodipine 10mg qd.  BP inpatient  161/104-> 127/60s after pain mgmt    Plan:  - Restart home meds with holding parameters

## 2023-06-02 NOTE — H&P ADULT - PROBLEM SELECTOR PLAN 7
Hx of invasive ductal carcinoma, s/p RT and 1 cycle of chemotherapy, with L mastectomy and R prophylactic mastectomy with TRAM flap reconstruction in 2000 (Adria Alberto/Nicholas Salas).  - Ctm

## 2023-06-02 NOTE — H&P ADULT - PROBLEM/PLAN-7
Problem: Falls - Risk of  Goal: *Absence of Falls  Description: Document Stephania Fontana Fall Risk and appropriate interventions in the flowsheet. Outcome: Progressing Towards Goal  Note: Fall Risk Interventions:            Medication Interventions: Teach patient to arise slowly    67 y/o male pt received this evening awake in singletary of unit. Pt is A&Ox4 with depressed mood and affect. Pt denied SI/HI/AVH. No delusional statements spontaneously voiced by pt. Pt cooperative with assessment. 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     Spoke with on call NP Jorge Plummer and received verbal orders to put in Cortef 20 mg PO once in AM and Synthroid 175 mcg PO once in AM starting tomorrow October 1st. Informed pt of this and pt verbalized his appreciation as he states he has gone 4 days without Synthroid and is feeling \"terrible\" because of that. No medications scheduled during shift. PRN nicotine lozenge given (see MAR). No behavioral concerns. Will continue to monitor pt. 7 hours of sleep logged. DISPLAY PLAN FREE TEXT

## 2023-06-03 LAB
APPEARANCE UR: CLEAR — SIGNIFICANT CHANGE UP
BILIRUB UR-MCNC: NEGATIVE — SIGNIFICANT CHANGE UP
COLOR SPEC: YELLOW — SIGNIFICANT CHANGE UP
CULTURE RESULTS: SIGNIFICANT CHANGE UP
DIFF PNL FLD: NEGATIVE — SIGNIFICANT CHANGE UP
GLUCOSE UR QL: NEGATIVE — SIGNIFICANT CHANGE UP
KETONES UR-MCNC: NEGATIVE — SIGNIFICANT CHANGE UP
LEUKOCYTE ESTERASE UR-ACNC: NEGATIVE — SIGNIFICANT CHANGE UP
NITRITE UR-MCNC: NEGATIVE — SIGNIFICANT CHANGE UP
PH UR: 6 — SIGNIFICANT CHANGE UP (ref 5–8)
PROT UR-MCNC: NEGATIVE MG/DL — SIGNIFICANT CHANGE UP
SP GR SPEC: 1.02 — SIGNIFICANT CHANGE UP (ref 1–1.03)
SPECIMEN SOURCE: SIGNIFICANT CHANGE UP
UROBILINOGEN FLD QL: 0.2 E.U./DL — SIGNIFICANT CHANGE UP

## 2023-06-03 PROCEDURE — 99233 SBSQ HOSP IP/OBS HIGH 50: CPT | Mod: GC

## 2023-06-03 RX ORDER — BRIMONIDINE TARTRATE 2 MG/MG
1 SOLUTION/ DROPS OPHTHALMIC THREE TIMES A DAY
Refills: 0 | Status: DISCONTINUED | OUTPATIENT
Start: 2023-06-03 | End: 2023-06-06

## 2023-06-03 RX ORDER — UMECLIDINIUM BROMIDE AND VILANTEROL TRIFENATATE 62.5; 25 UG/1; UG/1
1 POWDER RESPIRATORY (INHALATION)
Qty: 0 | Refills: 0 | DISCHARGE

## 2023-06-03 RX ORDER — ACETAMINOPHEN 500 MG
1000 TABLET ORAL ONCE
Refills: 0 | Status: COMPLETED | OUTPATIENT
Start: 2023-06-03 | End: 2023-06-03

## 2023-06-03 RX ORDER — GABAPENTIN 400 MG/1
1 CAPSULE ORAL
Refills: 0 | DISCHARGE

## 2023-06-03 RX ORDER — FAMOTIDINE 10 MG/ML
1 INJECTION INTRAVENOUS
Qty: 0 | Refills: 0 | DISCHARGE

## 2023-06-03 RX ORDER — KETOROLAC TROMETHAMINE 30 MG/ML
15 SYRINGE (ML) INJECTION EVERY 6 HOURS
Refills: 0 | Status: DISCONTINUED | OUTPATIENT
Start: 2023-06-03 | End: 2023-06-06

## 2023-06-03 RX ORDER — GABAPENTIN 400 MG/1
300 CAPSULE ORAL DAILY
Refills: 0 | Status: DISCONTINUED | OUTPATIENT
Start: 2023-06-03 | End: 2023-06-06

## 2023-06-03 RX ORDER — LATANOPROST 0.05 MG/ML
1 SOLUTION/ DROPS OPHTHALMIC; TOPICAL AT BEDTIME
Refills: 0 | Status: DISCONTINUED | OUTPATIENT
Start: 2023-06-03 | End: 2023-06-06

## 2023-06-03 RX ORDER — LATANOPROST 0.05 MG/ML
1 SOLUTION/ DROPS OPHTHALMIC; TOPICAL
Qty: 0 | Refills: 0 | DISCHARGE

## 2023-06-03 RX ORDER — POLYETHYLENE GLYCOL 3350 17 G/17G
17 POWDER, FOR SOLUTION ORAL DAILY
Refills: 0 | Status: DISCONTINUED | OUTPATIENT
Start: 2023-06-03 | End: 2023-06-05

## 2023-06-03 RX ORDER — SENNA PLUS 8.6 MG/1
2 TABLET ORAL AT BEDTIME
Refills: 0 | Status: DISCONTINUED | OUTPATIENT
Start: 2023-06-03 | End: 2023-06-05

## 2023-06-03 RX ORDER — ATORVASTATIN CALCIUM 80 MG/1
1 TABLET, FILM COATED ORAL
Qty: 0 | Refills: 0 | DISCHARGE

## 2023-06-03 RX ORDER — AMLODIPINE BESYLATE 2.5 MG/1
1 TABLET ORAL
Qty: 0 | Refills: 0 | DISCHARGE

## 2023-06-03 RX ORDER — DORZOLAMIDE HYDROCHLORIDE TIMOLOL MALEATE 20; 5 MG/ML; MG/ML
1 SOLUTION/ DROPS OPHTHALMIC
Refills: 0 | Status: DISCONTINUED | OUTPATIENT
Start: 2023-06-03 | End: 2023-06-06

## 2023-06-03 RX ORDER — GABAPENTIN 400 MG/1
1 CAPSULE ORAL
Qty: 0 | Refills: 0 | DISCHARGE

## 2023-06-03 RX ORDER — LOSARTAN POTASSIUM 100 MG/1
1 TABLET, FILM COATED ORAL
Qty: 0 | Refills: 0 | DISCHARGE

## 2023-06-03 RX ORDER — BRIMONIDINE TARTRATE 2 MG/MG
1 SOLUTION/ DROPS OPHTHALMIC
Qty: 0 | Refills: 0 | DISCHARGE

## 2023-06-03 RX ORDER — DORZOLAMIDE HYDROCHLORIDE TIMOLOL MALEATE 20; 5 MG/ML; MG/ML
1 SOLUTION/ DROPS OPHTHALMIC
Qty: 0 | Refills: 0 | DISCHARGE

## 2023-06-03 RX ORDER — LANOLIN ALCOHOL/MO/W.PET/CERES
5 CREAM (GRAM) TOPICAL AT BEDTIME
Refills: 0 | Status: DISCONTINUED | OUTPATIENT
Start: 2023-06-03 | End: 2023-06-06

## 2023-06-03 RX ADMIN — Medication 5 MILLIGRAM(S): at 23:48

## 2023-06-03 RX ADMIN — BRIMONIDINE TARTRATE 1 DROP(S): 2 SOLUTION/ DROPS OPHTHALMIC at 22:43

## 2023-06-03 RX ADMIN — TIOTROPIUM BROMIDE 2 PUFF(S): 18 CAPSULE ORAL; RESPIRATORY (INHALATION) at 16:09

## 2023-06-03 RX ADMIN — Medication 15 MILLIGRAM(S): at 16:08

## 2023-06-03 RX ADMIN — LATANOPROST 1 DROP(S): 0.05 SOLUTION/ DROPS OPHTHALMIC; TOPICAL at 22:43

## 2023-06-03 RX ADMIN — Medication 15 MILLIGRAM(S): at 01:36

## 2023-06-03 RX ADMIN — GABAPENTIN 300 MILLIGRAM(S): 400 CAPSULE ORAL at 23:27

## 2023-06-03 RX ADMIN — Medication 15 MILLIGRAM(S): at 22:45

## 2023-06-03 RX ADMIN — Medication 15 MILLIGRAM(S): at 10:09

## 2023-06-03 RX ADMIN — Medication 650 MILLIGRAM(S): at 11:25

## 2023-06-03 RX ADMIN — Medication 200 MICROGRAM(S): at 06:28

## 2023-06-03 RX ADMIN — DORZOLAMIDE HYDROCHLORIDE TIMOLOL MALEATE 1 DROP(S): 20; 5 SOLUTION/ DROPS OPHTHALMIC at 22:43

## 2023-06-03 RX ADMIN — ENOXAPARIN SODIUM 40 MILLIGRAM(S): 100 INJECTION SUBCUTANEOUS at 06:29

## 2023-06-03 RX ADMIN — Medication 15 MILLIGRAM(S): at 23:15

## 2023-06-03 RX ADMIN — SENNA PLUS 2 TABLET(S): 8.6 TABLET ORAL at 22:02

## 2023-06-03 RX ADMIN — Medication 15 MILLIGRAM(S): at 09:39

## 2023-06-03 RX ADMIN — Medication 1000 MILLIGRAM(S): at 07:00

## 2023-06-03 RX ADMIN — Medication 650 MILLIGRAM(S): at 11:55

## 2023-06-03 RX ADMIN — Medication 15 MILLIGRAM(S): at 01:51

## 2023-06-03 RX ADMIN — Medication 15 MILLIGRAM(S): at 16:38

## 2023-06-03 RX ADMIN — LOSARTAN POTASSIUM 25 MILLIGRAM(S): 100 TABLET, FILM COATED ORAL at 06:28

## 2023-06-03 RX ADMIN — ENOXAPARIN SODIUM 40 MILLIGRAM(S): 100 INJECTION SUBCUTANEOUS at 17:49

## 2023-06-03 RX ADMIN — Medication 650 MILLIGRAM(S): at 17:48

## 2023-06-03 RX ADMIN — Medication 650 MILLIGRAM(S): at 18:18

## 2023-06-03 RX ADMIN — Medication 400 MILLIGRAM(S): at 06:27

## 2023-06-03 RX ADMIN — Medication 650 MILLIGRAM(S): at 23:48

## 2023-06-03 RX ADMIN — POLYETHYLENE GLYCOL 3350 17 GRAM(S): 17 POWDER, FOR SOLUTION ORAL at 11:25

## 2023-06-03 NOTE — PATIENT PROFILE ADULT - STATED REASON FOR ADMISSION
I have diverticulitis and I have been having diarrhea nausea and vomiting for the last couple of days

## 2023-06-03 NOTE — PATIENT PROFILE ADULT - FALL HARM RISK - RISK INTERVENTIONS

## 2023-06-04 PROCEDURE — 99233 SBSQ HOSP IP/OBS HIGH 50: CPT | Mod: GC

## 2023-06-04 RX ORDER — MORPHINE SULFATE 50 MG/1
1 CAPSULE, EXTENDED RELEASE ORAL ONCE
Refills: 0 | Status: DISCONTINUED | OUTPATIENT
Start: 2023-06-04 | End: 2023-06-04

## 2023-06-04 RX ORDER — IBUPROFEN 200 MG
400 TABLET ORAL THREE TIMES A DAY
Refills: 0 | Status: COMPLETED | OUTPATIENT
Start: 2023-06-04 | End: 2023-06-06

## 2023-06-04 RX ORDER — HYDRALAZINE HCL 50 MG
25 TABLET ORAL EVERY 8 HOURS
Refills: 0 | Status: DISCONTINUED | OUTPATIENT
Start: 2023-06-04 | End: 2023-06-06

## 2023-06-04 RX ORDER — LACTULOSE 10 G/15ML
10 SOLUTION ORAL DAILY
Refills: 0 | Status: DISCONTINUED | OUTPATIENT
Start: 2023-06-04 | End: 2023-06-05

## 2023-06-04 RX ORDER — ONDANSETRON 8 MG/1
4 TABLET, FILM COATED ORAL EVERY 6 HOURS
Refills: 0 | Status: DISCONTINUED | OUTPATIENT
Start: 2023-06-04 | End: 2023-06-05

## 2023-06-04 RX ADMIN — BRIMONIDINE TARTRATE 1 DROP(S): 2 SOLUTION/ DROPS OPHTHALMIC at 16:26

## 2023-06-04 RX ADMIN — Medication 650 MILLIGRAM(S): at 19:56

## 2023-06-04 RX ADMIN — Medication 15 MILLIGRAM(S): at 06:24

## 2023-06-04 RX ADMIN — MORPHINE SULFATE 1 MILLIGRAM(S): 50 CAPSULE, EXTENDED RELEASE ORAL at 22:41

## 2023-06-04 RX ADMIN — MORPHINE SULFATE 1 MILLIGRAM(S): 50 CAPSULE, EXTENDED RELEASE ORAL at 23:11

## 2023-06-04 RX ADMIN — Medication 400 MILLIGRAM(S): at 19:25

## 2023-06-04 RX ADMIN — LOSARTAN POTASSIUM 25 MILLIGRAM(S): 100 TABLET, FILM COATED ORAL at 06:07

## 2023-06-04 RX ADMIN — POLYETHYLENE GLYCOL 3350 17 GRAM(S): 17 POWDER, FOR SOLUTION ORAL at 13:15

## 2023-06-04 RX ADMIN — Medication 15 MILLIGRAM(S): at 13:15

## 2023-06-04 RX ADMIN — Medication 650 MILLIGRAM(S): at 13:45

## 2023-06-04 RX ADMIN — GABAPENTIN 300 MILLIGRAM(S): 400 CAPSULE ORAL at 13:15

## 2023-06-04 RX ADMIN — TIOTROPIUM BROMIDE 2 PUFF(S): 18 CAPSULE ORAL; RESPIRATORY (INHALATION) at 10:05

## 2023-06-04 RX ADMIN — Medication 650 MILLIGRAM(S): at 06:07

## 2023-06-04 RX ADMIN — Medication 400 MILLIGRAM(S): at 19:55

## 2023-06-04 RX ADMIN — Medication 25 MILLIGRAM(S): at 10:05

## 2023-06-04 RX ADMIN — ENOXAPARIN SODIUM 40 MILLIGRAM(S): 100 INJECTION SUBCUTANEOUS at 19:27

## 2023-06-04 RX ADMIN — ONDANSETRON 4 MILLIGRAM(S): 8 TABLET, FILM COATED ORAL at 16:39

## 2023-06-04 RX ADMIN — SENNA PLUS 2 TABLET(S): 8.6 TABLET ORAL at 22:41

## 2023-06-04 RX ADMIN — Medication 25 MILLIGRAM(S): at 20:00

## 2023-06-04 RX ADMIN — LATANOPROST 1 DROP(S): 0.05 SOLUTION/ DROPS OPHTHALMIC; TOPICAL at 22:42

## 2023-06-04 RX ADMIN — Medication 650 MILLIGRAM(S): at 13:15

## 2023-06-04 RX ADMIN — Medication 650 MILLIGRAM(S): at 00:48

## 2023-06-04 RX ADMIN — Medication 650 MILLIGRAM(S): at 07:07

## 2023-06-04 RX ADMIN — DORZOLAMIDE HYDROCHLORIDE TIMOLOL MALEATE 1 DROP(S): 20; 5 SOLUTION/ DROPS OPHTHALMIC at 06:08

## 2023-06-04 RX ADMIN — Medication 650 MILLIGRAM(S): at 19:26

## 2023-06-04 RX ADMIN — Medication 15 MILLIGRAM(S): at 20:02

## 2023-06-04 RX ADMIN — MORPHINE SULFATE 1 MILLIGRAM(S): 50 CAPSULE, EXTENDED RELEASE ORAL at 10:34

## 2023-06-04 RX ADMIN — Medication 15 MILLIGRAM(S): at 07:54

## 2023-06-04 RX ADMIN — DORZOLAMIDE HYDROCHLORIDE TIMOLOL MALEATE 1 DROP(S): 20; 5 SOLUTION/ DROPS OPHTHALMIC at 19:31

## 2023-06-04 RX ADMIN — Medication 15 MILLIGRAM(S): at 13:45

## 2023-06-04 RX ADMIN — BRIMONIDINE TARTRATE 1 DROP(S): 2 SOLUTION/ DROPS OPHTHALMIC at 22:42

## 2023-06-04 RX ADMIN — Medication 15 MILLIGRAM(S): at 20:32

## 2023-06-04 RX ADMIN — Medication 200 MICROGRAM(S): at 06:06

## 2023-06-04 RX ADMIN — Medication 5 MILLIGRAM(S): at 22:41

## 2023-06-04 RX ADMIN — BRIMONIDINE TARTRATE 1 DROP(S): 2 SOLUTION/ DROPS OPHTHALMIC at 06:08

## 2023-06-04 RX ADMIN — ENOXAPARIN SODIUM 40 MILLIGRAM(S): 100 INJECTION SUBCUTANEOUS at 06:06

## 2023-06-04 RX ADMIN — Medication 400 MILLIGRAM(S): at 09:32

## 2023-06-04 RX ADMIN — Medication 400 MILLIGRAM(S): at 10:02

## 2023-06-04 RX ADMIN — MORPHINE SULFATE 1 MILLIGRAM(S): 50 CAPSULE, EXTENDED RELEASE ORAL at 10:04

## 2023-06-04 RX ADMIN — LACTULOSE 10 GRAM(S): 10 SOLUTION ORAL at 10:05

## 2023-06-04 NOTE — PROGRESS NOTE ADULT - PROBLEM SELECTOR PLAN 2
Reports having hx of diverticulitis twice, normally restricts herself to clear liquid diets when it occurs.  CTAP on admission negative for diverticulitis.  - See above mgmt
Reports having hx of diverticulitis twice, normally restricts herself to clear liquid diets when it occurs.  CTAP on admission negative for diverticulitis.  - See above mgmt

## 2023-06-04 NOTE — PROGRESS NOTE ADULT - PROBLEM SELECTOR PLAN 5
Home meds Hydral 25mg TID, HCTZ 25mg qD, losartan 25mg qd, amlodipine 10mg qd.  BP inpatient  161/104-> 127/60s after pain mgmt    Plan:  - Restart home meds with holding parameters
Home meds Hydral 25mg TID, HCTZ 25mg qD, losartan 25mg qd, amlodipine 10mg qd.  BP inpatient  161/104-> 127/60s after pain mgmt    Plan:  - c/w hydral, HCTZ, losartan  - resume amlodipine as BP tolerates

## 2023-06-04 NOTE — PROGRESS NOTE ADULT - PROBLEM SELECTOR PLAN 4
Hx of hypothyroidism, S/P ROBERTS for Graves    Plan:  - F/u TSH in AM  - C/w levothyroxine 200mcg
Hx of hypothyroidism, S/P ROBERTS for Graves    Plan:  - F/u TSH in AM  - C/w levothyroxine 200mcg

## 2023-06-04 NOTE — PROGRESS NOTE ADULT - PROBLEM SELECTOR PLAN 1
Presenting w LLQ sharp, shooting pain x2 days with nausea. No fevers/chills/vomiting/diarrhea/constipation. Has not had BM in 2 days due to pain. 10/10 when patient moves/strains, improved with dilaudid 1mg x1, morphine 4mg x2, benadryl 50mg IVP x1, toradol 15mg IVP x1.   CTAP showed 1.4cm epiploic appendagitis (necrotic fat), colonic diverticulosis, bilateral periureteral stranding.  Less likely ddx for abdominal pain: Negative for obstruction, diverticulitis, appendicitis, pancreatitis. Lipase negative, lactate negative, no transaminitis, no elevated alk phos. No s/s anemia/bleed. Unlikely fibroid pain as patient is currently post-menopausal.    Plan:  - Conservative mgmt with pain control; Tylenol 650mg PO standing for mild, Toradol for moderate pain  - Avoid adding pain regimen for severe to monitor for acute abdomen  - Serial abdominal exams  - Bowel regimen
Presenting w LLQ sharp, shooting pain x2 days with nausea. No fevers/chills/vomiting/diarrhea/constipation. Has not had BM in 2 days due to pain. 10/10 when patient moves/strains, improved with dilaudid 1mg x1, morphine 4mg x2, benadryl 50mg IVP x1, toradol 15mg IVP x1.   CTAP showed 1.4cm epiploic appendagitis (necrotic fat), colonic diverticulosis, bilateral periureteral stranding.  Less likely ddx for abdominal pain: Negative for obstruction, diverticulitis, appendicitis, pancreatitis. Lipase negative, lactate negative, no transaminitis, no elevated alk phos. No s/s anemia/bleed. Unlikely fibroid pain as patient is currently post-menopausal.    Plan:  - Conservative mgmt with pain control; Tylenol 650mg PO standing for mild + ibuprofen 400mg q6h, Toradol for moderate pain  - Avoid adding pain regimen for severe to monitor for acute abdomen  - Serial abdominal exams  - Bowel regimen

## 2023-06-04 NOTE — PROGRESS NOTE ADULT - PROBLEM SELECTOR PLAN 8
F: NI  E: Replete as needed  N: Regular diet  Dvt ppx: Lovenox  GI ppx: PPI  Code status: FULL CODE
F: NI  E: Replete as needed  N: Regular diet  Dvt ppx: Lovenox  GI ppx: PPI  Code status: FULL CODE

## 2023-06-04 NOTE — PROGRESS NOTE ADULT - SUBJECTIVE AND OBJECTIVE BOX
OVERNIGHT EVENTS/INTERVAL HPI: Pt c/o pain overnight. This AM, pt still in significant amount of LLQ abdominal pain after receiving Toradol. Examined sitting up in bedside chair where pt was tearful and visibly in pain. Pt states lying down makes the pain worse so she has been sitting in the chair. Pain worsens about 1 hour after eating. Has had minimal po intake, mostly liquids, with severe abd pain afterwards. Admits to associated intermittent nausea, but no vomiting. Denies fever, chills, chest pain, dyspnea.     OBJECTIVE:  Vital Signs Last 24 Hrs  T(C): 36.4 (2023 13:12), Max: 36.7 (2023 20:30)  T(F): 97.6 (2023 13:12), Max: 98.1 (2023 20:30)  HR: 79 (2023 13:12) (60 - 79)  BP: 143/81 (2023 13:12) (128/67 - 152/82)  BP(mean): 101 (2023 13:12) (101 - 101)  RR: 17 (2023 13:12) (16 - 17)  SpO2: 95% (2023 13:12) (95% - 98%)    Parameters below as of 2023 13:12  Patient On (Oxygen Delivery Method): room air      I&O's Detail    2023 07:01  -  2023 07:00  --------------------------------------------------------  IN:  Total IN: 0 mL    OUT:    Intermittent Catheterization - Urethral (mL): 200 mL    Voided (mL): 402 mL  Total OUT: 602 mL    Total NET: -602 mL    PHYSICAL EXAM:  Constitutional: NAD, comfortable in bed.  HEENT: NC/AT, PERRLA, EOMI, no conjunctival pallor or scleral icterus, mucus membranes moist.  Neck: Supple, no JVD. My thyromegaly or masses.  Respiratory: Clear to auscultation B/L. No wheezing, rales, rhonchi.  Cardiovascular: RRR, normal S1 and S2, no murmurs, rubs, gallops.  Gastrointestinal: +tenderness to light palpation LLQ and LUQ. +BS, soft, NO guarding or rebound tenderness, no palpable masses.  Extremities: Warm well perfused. No cyanosis, clubbing or edema.   Vascular: +2 pulses equal and strong throughout.   Neurological: AAOx3, no CN deficits, strength and sensation intact throughout.   Skin: No gross skin abnormalities or rashes.      Medications:  MEDICATIONS  (STANDING):  acetaminophen     Tablet .. 650 milliGRAM(s) Oral every 6 hours  brimonidine 0.2% Ophthalmic Solution 1 Drop(s) Both EYES three times a day  dorzolamide 2%/timolol 0.5% Ophthalmic Solution 1 Drop(s) Both EYES two times a day  enoxaparin Injectable 40 milliGRAM(s) SubCutaneous every 12 hours  gabapentin 300 milliGRAM(s) Oral daily  hydrALAZINE 25 milliGRAM(s) Oral every 8 hours  hydrochlorothiazide 25 milliGRAM(s) Oral daily  ibuprofen  Tablet. 400 milliGRAM(s) Oral three times a day  lactulose Syrup 10 Gram(s) Oral daily  latanoprost 0.005% Ophthalmic Solution 1 Drop(s) Both EYES at bedtime  levothyroxine 200 MICROGram(s) Oral daily  losartan 25 milliGRAM(s) Oral daily  melatonin 5 milliGRAM(s) Oral at bedtime  polyethylene glycol 3350 17 Gram(s) Oral daily  senna 2 Tablet(s) Oral at bedtime  tiotropium 2.5 MICROgram(s) Inhaler 2 Puff(s) Inhalation daily    MEDICATIONS  (PRN):  ketorolac   Injectable 15 milliGRAM(s) IV Push every 6 hours PRN Moderate Pain (4 - 6)  ondansetron Injectable 4 milliGRAM(s) IV Push every 6 hours PRN Nausea      Labs:              Urinalysis Basic - ( 2023 11:11 )    Color: Yellow / Appearance: Clear / S.020 / pH: x  Gluc: x / Ketone: NEGATIVE  / Bili: Negative / Urobili: 0.2 E.U./dL   Blood: x / Protein: NEGATIVE mg/dL / Nitrite: NEGATIVE   Leuk Esterase: NEGATIVE / RBC: x / WBC x   Sq Epi: x / Non Sq Epi: x / Bacteria: x          Radiology: Reviewed
Subjective:  No acute events overnight.  Patient still with significant abdominal pain, mildly improved with NSAIDs. Also found to be retaining urine.  Denies HA, CP, SOB, nausea, vomiting, fever, chills or diarrhea.     Objective:   Vital Signs Last 24 Hrs  T(C): 36.6 (03 Jun 2023 12:33), Max: 36.9 (03 Jun 2023 06:14)  T(F): 97.8 (03 Jun 2023 12:33), Max: 98.5 (03 Jun 2023 06:14)  HR: 63 (03 Jun 2023 12:33) (61 - 77)  BP: 145/74 (03 Jun 2023 12:33) (124/63 - 157/72)  BP(mean): 97 (03 Jun 2023 12:33) (97 - 97)  RR: 18 (03 Jun 2023 12:33) (16 - 18)  SpO2: 97% (03 Jun 2023 12:33) (92% - 98%)    Parameters below as of 03 Jun 2023 12:33  Patient On (Oxygen Delivery Method): room air      Physical Exam:   -Constitutional: NAD, comfortable in bed.  -HEENT: NC/AT, PERRLA, EOMI, no conjunctival pallor or scleral icterus, mucus membranes moist.  -Neck: Supple, no JVD. My thyromegaly or masses.  -Respiratory: Clear to auscultation B/L. No wheezing, rales, rhonchi.  -Cardiovascular: RRR, normal S1 and S2, no murmurs, rubs, gallops.  -Gastrointestinal: +tenderness to deep palpation LLQ and LUQ. +BS, soft NTND, NO guarding or rebound tenderness, no palpable masses.  -Extremities: Warm well perfused. No cyanosis, clubbing or edema.   -Vascular: +2 pulses equal and strong throughout.   -Neurological: AAOx3, no CN deficits, strength and sensation intact throughout.   -Skin: No gross skin abnormalities or rashes.    Labs:                        12.3   7.58  )-----------( 344      ( 02 Jun 2023 13:11 )             38.4       06-02    140  |  105  |  20  ----------------------------<  110<H>  4.5   |  24  |  0.68    Ca    9.4      02 Jun 2023 13:11    TPro  7.1  /  Alb  4.0  /  TBili  0.2  /  DBili  x   /  AST  16  /  ALT  18  /  AlkPhos  109  06-02     Medications:  MEDICATIONS  (STANDING):  acetaminophen     Tablet .. 650 milliGRAM(s) Oral every 6 hours  enoxaparin Injectable 40 milliGRAM(s) SubCutaneous every 12 hours  hydrochlorothiazide 25 milliGRAM(s) Oral daily  levothyroxine 200 MICROGram(s) Oral daily  losartan 25 milliGRAM(s) Oral daily  polyethylene glycol 3350 17 Gram(s) Oral daily  senna 2 Tablet(s) Oral at bedtime  tiotropium 2.5 MICROgram(s) Inhaler 2 Puff(s) Inhalation daily    MEDICATIONS  (PRN):  ketorolac   Injectable 15 milliGRAM(s) IV Push every 6 hours PRN Moderate Pain (4 - 6)

## 2023-06-04 NOTE — PROGRESS NOTE ADULT - PROBLEM SELECTOR PLAN 7
Hx of invasive ductal carcinoma, s/p RT and 1 cycle of chemotherapy, with L mastectomy and R prophylactic mastectomy with TRAM flap reconstruction in 2000 (Adria Alberto/Nicholas Salas).  - Ctm
Hx of invasive ductal carcinoma, s/p RT and 1 cycle of chemotherapy, with L mastectomy and R prophylactic mastectomy with TRAM flap reconstruction in 2000 (Adria Alberto/Nicholas Salas).  - Ctm

## 2023-06-04 NOTE — PROGRESS NOTE ADULT - ASSESSMENT
This is a 63F with PMH of HTN, HLD, DM, GERD, COPD, JESÚS, history of epilepsy (no treatment), hypothyroidism (2/2 ROBERTS for Graves with multiple eye surgeries) , history breast cancer (s/p radiation, chemo, and b/l mastectomy in 2000 s/p chemo port removal), kidney mass (follows at [place], benign in origin), history of diverticulitis, prior rotator cuff tear, chronic b/l LE pain (neuropathy and arthritis), carpal tunnel syndrome s/p release surgery, pituitary hyperplasia, retroperitoneal fibrosis presenting for abdominal pain, found to have an epiploic appendagitis, now admitted to [facility] for further management.
This is a 63F with PMH of HTN, HLD, DM, GERD, COPD, JESÚS, history of epilepsy (no treatment), hypothyroidism (2/2 ROBERTS for Graves with multiple eye surgeries) , history breast cancer (s/p radiation, chemo, and b/l mastectomy in 2000 s/p chemo port removal), kidney mass (follows at [place], benign in origin), history of diverticulitis, prior rotator cuff tear, chronic b/l LE pain (neuropathy and arthritis), carpal tunnel syndrome s/p release surgery, pituitary hyperplasia, retroperitoneal fibrosis presenting for abdominal pain, found to have an epiploic appendagitis, now admitted to [facility] for further management.

## 2023-06-04 NOTE — PROGRESS NOTE ADULT - PROBLEM SELECTOR PLAN 3
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 Grady.  Not currently undergoing therapy at this time per HIE documentation  Plan:  - C/w outpatient mgmt
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 Grady.  Not currently undergoing therapy at this time per HIE documentation  Plan:  - C/w outpatient mgmt

## 2023-06-04 NOTE — PROGRESS NOTE ADULT - ATTENDING COMMENTS
63 year old female with a PMHx of HTN, HLD, DMII, GERD, COPD, JESÚS, epilepsy (not on AEDs), hypothyroidism, breast cancer (s/p chemo, radiation and mastectomy), kidney mass (benign), retroperitoneal fibrosis and diverticulitis who presented with abdominal pain, found to have epiploic appendagitis.      Plan:    -continue with conservative management with NSAIDs (ibuprofen and Toradol), cautious with opiates given constipation   -start aggressive bowel regimen  -no longer retaining urine, bladder scan negative   -ADAT     Rest of plan as per resident note.

## 2023-06-04 NOTE — PROGRESS NOTE ADULT - PROBLEM SELECTOR PLAN 6
Hx of COPD. Follows with Dr Isaacs.  Takes anoro ellipta and albuterol (rarely) at home    Plan:  - Ctm; on room air  - C/w therapeutic interchange spiriva
Hx of COPD. Follows with Dr Isaacs.  Takes anoro ellipta and albuterol (rarely) at home    Plan:  - Ctm; on room air  - C/w therapeutic interchange spiriva

## 2023-06-05 ENCOUNTER — APPOINTMENT (OUTPATIENT)
Dept: PULMONOLOGY | Facility: CLINIC | Age: 63
End: 2023-06-05

## 2023-06-05 ENCOUNTER — TRANSCRIPTION ENCOUNTER (OUTPATIENT)
Age: 63
End: 2023-06-05

## 2023-06-05 LAB
ALBUMIN SERPL ELPH-MCNC: 3.5 G/DL — SIGNIFICANT CHANGE UP (ref 3.3–5)
ALP SERPL-CCNC: 92 U/L — SIGNIFICANT CHANGE UP (ref 40–120)
ALT FLD-CCNC: 13 U/L — SIGNIFICANT CHANGE UP (ref 10–45)
ANION GAP SERPL CALC-SCNC: 10 MMOL/L — SIGNIFICANT CHANGE UP (ref 5–17)
AST SERPL-CCNC: 12 U/L — SIGNIFICANT CHANGE UP (ref 10–40)
BASOPHILS # BLD AUTO: 0.02 K/UL — SIGNIFICANT CHANGE UP (ref 0–0.2)
BASOPHILS NFR BLD AUTO: 0.3 % — SIGNIFICANT CHANGE UP (ref 0–2)
BILIRUB SERPL-MCNC: 0.2 MG/DL — SIGNIFICANT CHANGE UP (ref 0.2–1.2)
BUN SERPL-MCNC: 20 MG/DL — SIGNIFICANT CHANGE UP (ref 7–23)
CALCIUM SERPL-MCNC: 9.1 MG/DL — SIGNIFICANT CHANGE UP (ref 8.4–10.5)
CHLORIDE SERPL-SCNC: 100 MMOL/L — SIGNIFICANT CHANGE UP (ref 96–108)
CO2 SERPL-SCNC: 25 MMOL/L — SIGNIFICANT CHANGE UP (ref 22–31)
CREAT SERPL-MCNC: 0.89 MG/DL — SIGNIFICANT CHANGE UP (ref 0.5–1.3)
EGFR: 74 ML/MIN/1.73M2 — SIGNIFICANT CHANGE UP
EOSINOPHIL # BLD AUTO: 0.1 K/UL — SIGNIFICANT CHANGE UP (ref 0–0.5)
EOSINOPHIL NFR BLD AUTO: 1.7 % — SIGNIFICANT CHANGE UP (ref 0–6)
GLUCOSE SERPL-MCNC: 98 MG/DL — SIGNIFICANT CHANGE UP (ref 70–99)
HCT VFR BLD CALC: 38.6 % — SIGNIFICANT CHANGE UP (ref 34.5–45)
HGB BLD-MCNC: 11.9 G/DL — SIGNIFICANT CHANGE UP (ref 11.5–15.5)
IMM GRANULOCYTES NFR BLD AUTO: 0.2 % — SIGNIFICANT CHANGE UP (ref 0–0.9)
LYMPHOCYTES # BLD AUTO: 1.98 K/UL — SIGNIFICANT CHANGE UP (ref 1–3.3)
LYMPHOCYTES # BLD AUTO: 34 % — SIGNIFICANT CHANGE UP (ref 13–44)
MAGNESIUM SERPL-MCNC: 2.3 MG/DL — SIGNIFICANT CHANGE UP (ref 1.6–2.6)
MCHC RBC-ENTMCNC: 26.2 PG — LOW (ref 27–34)
MCHC RBC-ENTMCNC: 30.8 GM/DL — LOW (ref 32–36)
MCV RBC AUTO: 85 FL — SIGNIFICANT CHANGE UP (ref 80–100)
MONOCYTES # BLD AUTO: 0.4 K/UL — SIGNIFICANT CHANGE UP (ref 0–0.9)
MONOCYTES NFR BLD AUTO: 6.9 % — SIGNIFICANT CHANGE UP (ref 2–14)
NEUTROPHILS # BLD AUTO: 3.32 K/UL — SIGNIFICANT CHANGE UP (ref 1.8–7.4)
NEUTROPHILS NFR BLD AUTO: 56.9 % — SIGNIFICANT CHANGE UP (ref 43–77)
NRBC # BLD: 0 /100 WBCS — SIGNIFICANT CHANGE UP (ref 0–0)
PHOSPHATE SERPL-MCNC: 4.8 MG/DL — HIGH (ref 2.5–4.5)
PLATELET # BLD AUTO: 332 K/UL — SIGNIFICANT CHANGE UP (ref 150–400)
POTASSIUM SERPL-MCNC: 3.8 MMOL/L — SIGNIFICANT CHANGE UP (ref 3.5–5.3)
POTASSIUM SERPL-SCNC: 3.8 MMOL/L — SIGNIFICANT CHANGE UP (ref 3.5–5.3)
PROT SERPL-MCNC: 6.5 G/DL — SIGNIFICANT CHANGE UP (ref 6–8.3)
RBC # BLD: 4.54 M/UL — SIGNIFICANT CHANGE UP (ref 3.8–5.2)
RBC # FLD: 14.1 % — SIGNIFICANT CHANGE UP (ref 10.3–14.5)
SODIUM SERPL-SCNC: 135 MMOL/L — SIGNIFICANT CHANGE UP (ref 135–145)
WBC # BLD: 5.83 K/UL — SIGNIFICANT CHANGE UP (ref 3.8–10.5)
WBC # FLD AUTO: 5.83 K/UL — SIGNIFICANT CHANGE UP (ref 3.8–10.5)

## 2023-06-05 PROCEDURE — 99239 HOSP IP/OBS DSCHRG MGMT >30: CPT | Mod: GC

## 2023-06-05 RX ORDER — POLYETHYLENE GLYCOL 3350 17 G/17G
17 POWDER, FOR SOLUTION ORAL
Refills: 0 | Status: DISCONTINUED | OUTPATIENT
Start: 2023-06-05 | End: 2023-06-06

## 2023-06-05 RX ORDER — LEVOTHYROXINE SODIUM 125 MCG
1 TABLET ORAL
Qty: 0 | Refills: 0 | DISCHARGE

## 2023-06-05 RX ORDER — SENNA PLUS 8.6 MG/1
2 TABLET ORAL
Refills: 0 | Status: DISCONTINUED | OUTPATIENT
Start: 2023-06-05 | End: 2023-06-06

## 2023-06-05 RX ORDER — LEVOTHYROXINE SODIUM 125 MCG
1 TABLET ORAL
Refills: 0 | DISCHARGE

## 2023-06-05 RX ORDER — ONDANSETRON 8 MG/1
4 TABLET, FILM COATED ORAL EVERY 6 HOURS
Refills: 0 | Status: DISCONTINUED | OUTPATIENT
Start: 2023-06-05 | End: 2023-06-05

## 2023-06-05 RX ORDER — ONDANSETRON 8 MG/1
4 TABLET, FILM COATED ORAL EVERY 6 HOURS
Refills: 0 | Status: DISCONTINUED | OUTPATIENT
Start: 2023-06-05 | End: 2023-06-06

## 2023-06-05 RX ORDER — HYDROCHLOROTHIAZIDE 25 MG
1 TABLET ORAL
Qty: 0 | Refills: 0 | DISCHARGE

## 2023-06-05 RX ORDER — HYDRALAZINE HCL 50 MG
1 TABLET ORAL
Qty: 0 | Refills: 0 | DISCHARGE
Start: 2023-06-05

## 2023-06-05 RX ORDER — SENNA PLUS 8.6 MG/1
2 TABLET ORAL
Qty: 0 | Refills: 0 | DISCHARGE
Start: 2023-06-05

## 2023-06-05 RX ORDER — POLYETHYLENE GLYCOL 3350 17 G/17G
17 POWDER, FOR SOLUTION ORAL
Qty: 0 | Refills: 0 | DISCHARGE
Start: 2023-06-05

## 2023-06-05 RX ORDER — IBUPROFEN 200 MG
1 TABLET ORAL
Qty: 0 | Refills: 0 | DISCHARGE
Start: 2023-06-05

## 2023-06-05 RX ORDER — FAMOTIDINE 10 MG/ML
20 INJECTION INTRAVENOUS DAILY
Refills: 0 | Status: DISCONTINUED | OUTPATIENT
Start: 2023-06-05 | End: 2023-06-06

## 2023-06-05 RX ADMIN — LATANOPROST 1 DROP(S): 0.05 SOLUTION/ DROPS OPHTHALMIC; TOPICAL at 21:28

## 2023-06-05 RX ADMIN — Medication 25 MILLIGRAM(S): at 04:06

## 2023-06-05 RX ADMIN — Medication 15 MILLIGRAM(S): at 21:27

## 2023-06-05 RX ADMIN — BRIMONIDINE TARTRATE 1 DROP(S): 2 SOLUTION/ DROPS OPHTHALMIC at 13:05

## 2023-06-05 RX ADMIN — Medication 25 MILLIGRAM(S): at 09:13

## 2023-06-05 RX ADMIN — Medication 400 MILLIGRAM(S): at 00:13

## 2023-06-05 RX ADMIN — ONDANSETRON 4 MILLIGRAM(S): 8 TABLET, FILM COATED ORAL at 01:02

## 2023-06-05 RX ADMIN — Medication 15 MILLIGRAM(S): at 06:49

## 2023-06-05 RX ADMIN — Medication 650 MILLIGRAM(S): at 12:05

## 2023-06-05 RX ADMIN — Medication 200 MICROGRAM(S): at 06:19

## 2023-06-05 RX ADMIN — FAMOTIDINE 20 MILLIGRAM(S): 10 INJECTION INTRAVENOUS at 04:06

## 2023-06-05 RX ADMIN — Medication 5 MILLIGRAM(S): at 21:28

## 2023-06-05 RX ADMIN — Medication 650 MILLIGRAM(S): at 06:18

## 2023-06-05 RX ADMIN — Medication 400 MILLIGRAM(S): at 18:08

## 2023-06-05 RX ADMIN — Medication 400 MILLIGRAM(S): at 06:18

## 2023-06-05 RX ADMIN — LOSARTAN POTASSIUM 25 MILLIGRAM(S): 100 TABLET, FILM COATED ORAL at 09:13

## 2023-06-05 RX ADMIN — GABAPENTIN 300 MILLIGRAM(S): 400 CAPSULE ORAL at 11:28

## 2023-06-05 RX ADMIN — DORZOLAMIDE HYDROCHLORIDE TIMOLOL MALEATE 1 DROP(S): 20; 5 SOLUTION/ DROPS OPHTHALMIC at 06:20

## 2023-06-05 RX ADMIN — Medication 15 MILLIGRAM(S): at 14:05

## 2023-06-05 RX ADMIN — FAMOTIDINE 20 MILLIGRAM(S): 10 INJECTION INTRAVENOUS at 11:28

## 2023-06-05 RX ADMIN — Medication 25 MILLIGRAM(S): at 21:28

## 2023-06-05 RX ADMIN — Medication 25 MILLIGRAM(S): at 13:04

## 2023-06-05 RX ADMIN — Medication 400 MILLIGRAM(S): at 21:28

## 2023-06-05 RX ADMIN — POLYETHYLENE GLYCOL 3350 17 GRAM(S): 17 POWDER, FOR SOLUTION ORAL at 09:12

## 2023-06-05 RX ADMIN — Medication 15 MILLIGRAM(S): at 06:19

## 2023-06-05 RX ADMIN — Medication 650 MILLIGRAM(S): at 07:18

## 2023-06-05 RX ADMIN — POLYETHYLENE GLYCOL 3350 17 GRAM(S): 17 POWDER, FOR SOLUTION ORAL at 17:57

## 2023-06-05 RX ADMIN — TIOTROPIUM BROMIDE 2 PUFF(S): 18 CAPSULE ORAL; RESPIRATORY (INHALATION) at 09:13

## 2023-06-05 RX ADMIN — Medication 650 MILLIGRAM(S): at 11:28

## 2023-06-05 RX ADMIN — SENNA PLUS 2 TABLET(S): 8.6 TABLET ORAL at 09:12

## 2023-06-05 RX ADMIN — DORZOLAMIDE HYDROCHLORIDE TIMOLOL MALEATE 1 DROP(S): 20; 5 SOLUTION/ DROPS OPHTHALMIC at 17:16

## 2023-06-05 RX ADMIN — ONDANSETRON 4 MILLIGRAM(S): 8 TABLET, FILM COATED ORAL at 11:28

## 2023-06-05 RX ADMIN — Medication 650 MILLIGRAM(S): at 18:08

## 2023-06-05 RX ADMIN — BRIMONIDINE TARTRATE 1 DROP(S): 2 SOLUTION/ DROPS OPHTHALMIC at 21:28

## 2023-06-05 RX ADMIN — SENNA PLUS 2 TABLET(S): 8.6 TABLET ORAL at 17:57

## 2023-06-05 RX ADMIN — Medication 400 MILLIGRAM(S): at 07:18

## 2023-06-05 RX ADMIN — Medication 15 MILLIGRAM(S): at 21:42

## 2023-06-05 RX ADMIN — ENOXAPARIN SODIUM 40 MILLIGRAM(S): 100 INJECTION SUBCUTANEOUS at 06:19

## 2023-06-05 RX ADMIN — BRIMONIDINE TARTRATE 1 DROP(S): 2 SOLUTION/ DROPS OPHTHALMIC at 06:20

## 2023-06-05 RX ADMIN — Medication 650 MILLIGRAM(S): at 17:13

## 2023-06-05 RX ADMIN — Medication 400 MILLIGRAM(S): at 13:04

## 2023-06-05 RX ADMIN — Medication 650 MILLIGRAM(S): at 01:13

## 2023-06-05 RX ADMIN — Medication 650 MILLIGRAM(S): at 00:13

## 2023-06-05 RX ADMIN — Medication 15 MILLIGRAM(S): at 18:28

## 2023-06-05 RX ADMIN — ENOXAPARIN SODIUM 40 MILLIGRAM(S): 100 INJECTION SUBCUTANEOUS at 17:13

## 2023-06-05 RX ADMIN — Medication 400 MILLIGRAM(S): at 01:13

## 2023-06-05 RX ADMIN — Medication 400 MILLIGRAM(S): at 22:28

## 2023-06-05 RX ADMIN — ONDANSETRON 4 MILLIGRAM(S): 8 TABLET, FILM COATED ORAL at 20:43

## 2023-06-05 NOTE — DISCHARGE NOTE PROVIDER - NSDCFUSCHEDAPPT_GEN_ALL_CORE_FT
[facility] Physician [facility]  PULMMED [address]  Scheduled Appointment: 06/05/2023    Bety Isaacs  [facility] Physician [facility]  PULED [address]  Scheduled Appointment: 06/05/2023    [facility] Physician [facility]  INTMED [address]  Scheduled Appointment: 06/07/2023    Rich Garcia  [facility] Physician [facility]  HemOnc [address]  Scheduled Appointment: 06/13/2023    Joel Schmitt  [facility]ician [facility]  OPHTHALM [address]  Scheduled Appointment: 06/22/2023    Mckayla Berg  [facility]  RHEUM [address]  Scheduled Appointment: 07/26/2023     [facility] Physician [facility]  INTMED [address]  Scheduled Appointment: 06/07/2023    Rich Garcia  [facility] Physician [facility]  HemOnc [address]  Scheduled Appointment: 06/13/2023    Joel Schmitt  [facility] Physician [facility]  OPHTHALM [address]  Scheduled Appointment: 06/22/2023    Mckayla Berg  [facility] Physician [facility]  RHEUM [address]  Scheduled Appointment: 07/26/2023

## 2023-06-05 NOTE — DISCHARGE NOTE PROVIDER - CARE PROVIDER_API CALL
Samara Suero)  Internal Medicine  [address], [address] [place] [address]  Phone: [phone]  Fax: [phone]  Established Patient  Scheduled Appointment: 06/07/2023 01:45 PM   Hari Aleman.  Internal Medicine  [address]  [place] [address]  Phone: [phone]  Fax: [phone]  Established Patient  Scheduled Appointment: 06/07/2023 01:45 PM

## 2023-06-05 NOTE — DISCHARGE NOTE PROVIDER - HOSPITAL COURSE
#Discharge: do not delete    Patient is __ yo M/F with past medical history of _____, presented with _____, found to have _____      Problem List/Main Diagnoses:     New medications/therapies:   New lines/hardware:  Labs to be followed outpatient:   Exam to be followed outpatient:     Discharge plan: discharge to ______    Physical Exam Upon Discharge:     #Discharge: do not delete    63F with PMH of HTN, HLD, DM, GERD, COPD, JESÚS, history of epilepsy (no treatment), hypothyroidism (2/2 ROBERTS for Graves with multiple eye surgeries), history breast cancer (s/p radiation, chemo, and b/l mastectomy in 2000 s/p chemo port removal), kidney mass (follows at [place], benign in origin), history of diverticulitis, prior rotator cuff tear, chronic b/l LE pain (neuropathy and arthritis), carpal tunnel syndrome s/p release surgery, pituitary hyperplasia, retroperitoneal fibrosis presenting for abdominal pain, found to have an epiploic appendagitis, treated with NSAIDS and antiemetics, stable for discharge. Patient was medically optimized, stable and ready for discharge. Plan of care and return precautions were discussed with the patient who verbally stated understanding.     Problem List/Main Diagnoses:   #Epiploic appendagitis.   Presenting w LLQ sharp, shooting pain x2 days with nausea. No fevers/chills/vomiting/diarrhea/constipation. Has not had BM in 2 days due to pain. 10/10 when patient moves/strains, improved with dilaudid 1mg x1, morphine 4mg x2, benadryl 50mg IVP x1, toradol 15mg IVP x1.   CTAP showed 1.4cm epiploic appendagitis (necrotic fat), colonic diverticulosis, bilateral periureteral stranding.  Plan:  - Ibuprofen 600mg 4x daily for 5 days  - Miralax and senna daily    #History of diverticulitis.   Reports having hx of diverticulitis twice, normally restricts herself to clear liquid diets when it occurs.  CTAP on admission negative for diverticulitis.    #Retroperitoneal fibrosis.   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 Grady. 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   Not currently undergoing therapy. Has appt with Rheumatology Dr. Berg in July   Plan:  - f/u outpatient with Dr. Berg on 7/26    #Hypothyroidism.   Hx of hypothyroidism, S/P ROBERTS for Graves  Plan:  - C/w levothyroxine 200mcg qd    #Hypertension.   Home meds: Hydralazine 25mg TID, HCTZ 25mg qD, losartan 25mg qd, amlodipine 10mg qd.  Plan:  - c/w home meds as above    #History of COPD.   Hx of COPD. Follows with Dr Isaacs.  Takes Anoro Ellipta and albuterol (rarely) at home  Plan:  - C/w home Anoro Ellipta    #History of breast cancer.   Hx of invasive ductal carcinoma, s/p RT and 1 cycle of chemotherapy, with L mastectomy and R prophylactic mastectomy with TRAM flap reconstruction in 2000 (Adria Dolly/Nicholas Salas).  Plan:  - f/u outpatient for routine screenings     New medications/therapies: Ibuprofen  Exam to be followed outpatient: Primary Care, Rheumatology    Discharge plan: discharge to home    Physical Exam Upon Discharge:  T(C): 36.5 (06-05-23 @ 12:00), Max: 36.9 (06-04-23 @ 20:34)  HR: 71 (06-05-23 @ 12:00) (61 - 80)  BP: 151/91 (06-05-23 @ 12:00) (103/63 - 151/91)  RR: 18 (06-05-23 @ 12:00) (17 - 18)  SpO2: 97% (06-05-23 @ 12:00) (94% - 97%)    PHYSICAL EXAM:  Constitutional: NAD, comfortable in bed.  HEENT: NC/AT, PERRLA, EOMI, no conjunctival pallor or scleral icterus, mucus membranes moist.  Neck: Supple, no JVD  Respiratory: Clear to auscultation B/L. No wheezing, rales, rhonchi.  Cardiovascular: RRR, normal S1 and S2, no murmurs, rubs, gallops.  Gastrointestinal: +tenderness to light palpation LLQ and LUQ. +BS, soft, NO guarding or rebound tenderness  Extremities: Warm well perfused. No cyanosis, clubbing or edema.   Vascular: +2 pulses equal and strong throughout.   Neurological: AAOx3, no CN deficits, strength and sensation intact throughout.   Skin: No gross skin abnormalities or rashes.     #Discharge: do not delete    63F with PMH of HTN, HLD, DM, GERD, COPD, JESÚS, history of epilepsy (no treatment), hypothyroidism (2/2 ROBERTS for Graves with multiple eye surgeries), history breast cancer (s/p radiation, chemo, and b/l mastectomy in 2000 s/p chemo port removal), kidney mass (follows at [place], benign in origin), history of diverticulitis, prior rotator cuff tear, chronic b/l LE pain (neuropathy and arthritis), carpal tunnel syndrome s/p release surgery, pituitary hyperplasia, retroperitoneal fibrosis presenting for abdominal pain, found to have an epiploic appendagitis, treated with NSAIDS and antiemetics, stable for discharge. Patient was medically optimized, stable and ready for discharge. Plan of care and return precautions were discussed with the patient who verbally stated understanding.     Problem List/Main Diagnoses:   #Epiploic appendagitis.   Presenting w LLQ sharp, shooting pain x2 days with nausea. No fevers/chills/vomiting/diarrhea/constipation. Has not had BM in 2 days due to pain. 10/10 when patient moves/strains, improved with dilaudid 1mg x1, morphine 4mg x2, benadryl 50mg IVP x1, toradol 15mg IVP x1.   CTAP showed 1.4cm epiploic appendagitis (necrotic fat), colonic diverticulosis, bilateral periureteral stranding.  Plan:  - Ibuprofen 600mg 4x daily for 5 days  - Miralax and senna daily    #History of diverticulitis.   Reports having hx of diverticulitis twice, normally restricts herself to clear liquid diets when it occurs.  CTAP on admission negative for diverticulitis.    #Retroperitoneal fibrosis.   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 Grady. 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   Not currently undergoing therapy. Has appt with Rheumatology Dr. Berg in July   Plan:  - f/u outpatient with Dr. Berg on 7/26    #Hypothyroidism.   Hx of hypothyroidism, S/P ROBERTS for Graves  Plan:  - C/w levothyroxine 125mcg qd    #Hypertension.   Home meds: Hydralazine 25mg TID, losartan 100mg qd, amlodipine 10mg qd.  Plan:  - c/w home meds as above    #History of COPD.   Hx of COPD. Follows with Dr Isaacs.  Takes Anoro Ellipta and albuterol (rarely) at home  Plan:  - C/w home Anoro Ellipta    #History of breast cancer.   Hx of invasive ductal carcinoma, s/p RT and 1 cycle of chemotherapy, with L mastectomy and R prophylactic mastectomy with TRAM flap reconstruction in 2000 (Adria Alberto/Nicholas Salas).  Plan:  - f/u outpatient for routine screenings     New medications/therapies: Ibuprofen  Exam to be followed outpatient: Primary Care, Rheumatology    Discharge plan: discharge to home    Physical Exam Upon Discharge:  T(C): 36.5 (06-05-23 @ 12:00), Max: 36.9 (06-04-23 @ 20:34)  HR: 71 (06-05-23 @ 12:00) (61 - 80)  BP: 151/91 (06-05-23 @ 12:00) (103/63 - 151/91)  RR: 18 (06-05-23 @ 12:00) (17 - 18)  SpO2: 97% (06-05-23 @ 12:00) (94% - 97%)    PHYSICAL EXAM:  Constitutional: NAD, comfortable in bed.  HEENT: NC/AT, PERRLA, EOMI, no conjunctival pallor or scleral icterus, mucus membranes moist.  Neck: Supple, no JVD  Respiratory: Clear to auscultation B/L. No wheezing, rales, rhonchi.  Cardiovascular: RRR, normal S1 and S2, no murmurs, rubs, gallops.  Gastrointestinal: +tenderness to light palpation LLQ and LUQ. +BS, soft, NO guarding or rebound tenderness  Extremities: Warm well perfused. No cyanosis, clubbing or edema.   Vascular: +2 pulses equal and strong throughout.   Neurological: AAOx3, no CN deficits, strength and sensation intact throughout.   Skin: No gross skin abnormalities or rashes.

## 2023-06-05 NOTE — DISCHARGE NOTE PROVIDER - CARE PROVIDERS DIRECT ADDRESSES
[email or web address] and wrist pain ,[email or web address]

## 2023-06-05 NOTE — DISCHARGE NOTE PROVIDER - NSDCCPCAREPLAN_GEN_ALL_CORE_FT
PRINCIPAL DISCHARGE DIAGNOSIS  Diagnosis: Epiploic appendagitis  Assessment and Plan of Treatment: Epiploic appendagitis is a condition that can cause severe stomach and abdominal pain. Small sacks of fat sit above your colon and large intestine. Appendagitis happens when the blood flow to these sacks is cut off or restricted. This may happen if there is inflammation of the tissue around the sacks. It can also happen if the sacks get twisted around themselves.  These sacks, or epiploic appendages, are sensitive. When the blood that flows to them is restricted, they become inflamed. Without treatment, most cases go away within 1 to 2 weeks.   You were found to have epiploic appendagitis. The best treatment for this is NSAIDS, like Ibuprofen. You were given NSAIDS in the hospital.   INSTRUCTIONS:  - Take 600mg Ibuprofen 4x daily for 5 days AS NEEDED  - Decrease the amount of ibuprofen you are taking after this, as it increases your risk of bleeding in your stomach  - Follow-up with your primary care provider for a post-hospital discharge visit.

## 2023-06-05 NOTE — DISCHARGE NOTE PROVIDER - NSDCMRMEDTOKEN_GEN_ALL_CORE_FT
amLODIPine 10 mg oral tablet: 1 tab(s) orally once a day  Anoro Ellipta 62.5 mcg-25 mcg/inh inhalation powder: 1 puff(s) inhaled once a day  atorvastatin 10 mg oral tablet: 1 tab(s) orally once a day  brimonidine 0.2% ophthalmic solution: 1 drop(s) to each affected eye 3 times a day  dorzolamide-timolol 2.23%-0.68% ophthalmic solution: 1 drop(s) to each affected eye 2 times a day  famotidine 40 mg oral tablet: 1 tab(s) orally once a day (at bedtime)  gabapentin 300 mg oral capsule: 1 orally once a day  hydroCHLOROthiazide 25 mg oral tablet: 1 tab(s) orally once a day  latanoprost 0.005% ophthalmic solution: 1 drop(s) to each affected eye once a day (in the evening)  levothyroxine 175 mcg (0.175 mg) oral tablet: 1 tab(s) orally once a day  losartan 100 mg oral tablet: 1 tab(s) orally once a day   amLODIPine 10 mg oral tablet: 1 tab(s) orally once a day  Anoro Ellipta 62.5 mcg-25 mcg/inh inhalation powder: 1 puff(s) inhaled once a day  atorvastatin 10 mg oral tablet: 1 tab(s) orally once a day  brimonidine 0.2% ophthalmic solution: 1 drop(s) to each affected eye 3 times a day  dorzolamide-timolol 2.23%-0.68% ophthalmic solution: 1 drop(s) to each affected eye 2 times a day  famotidine 40 mg oral tablet: 1 tab(s) orally once a day (at bedtime)  gabapentin 300 mg oral capsule: 1 orally once a day  hydrALAZINE 25 mg oral tablet: 1 tab(s) orally every 8 hours  ibuprofen 400 mg oral tablet: 1 tab(s) orally 3 times a day  latanoprost 0.005% ophthalmic solution: 1 drop(s) to each affected eye once a day (in the evening)  levothyroxine 125 mcg (0.125 mg) oral tablet: 1 orally once a day  losartan 100 mg oral tablet: 1 tab(s) orally once a day  polyethylene glycol 3350 oral powder for reconstitution: 17 gram(s) orally 2 times a day  senna leaf extract oral tablet: 2 tab(s) orally 2 times a day

## 2023-06-05 NOTE — DISCHARGE NOTE PROVIDER - NSFOLLOWUPCLINICS_GEN_ALL_ED_FT
[facility]  Family Medicine  [address]  [place], [place] [address]  Phone: [phone]  Fax:   Follow Up Time: 1 week

## 2023-06-05 NOTE — DISCHARGE NOTE PROVIDER - PROVIDER TOKENS
PROVIDER:[TOKEN:[[id number]],SCHEDULEDAPPT:[06/07/2023],SCHEDULEDAPPTTIME:[01:45 PM],ESTABLISHEDPATIENT:[T]] PROVIDER:[TOKEN:[[id number]],SCHEDULEDAPPT:[06/07/2023],SCHEDULEDAPPTTIME:[01:45 PM],ESTABLISHEDPATIENT:[T]]

## 2023-06-06 ENCOUNTER — TRANSCRIPTION ENCOUNTER (OUTPATIENT)
Age: 63
End: 2023-06-06

## 2023-06-06 VITALS
SYSTOLIC BLOOD PRESSURE: 142 MMHG | TEMPERATURE: 98 F | HEART RATE: 65 BPM | OXYGEN SATURATION: 98 % | DIASTOLIC BLOOD PRESSURE: 79 MMHG | RESPIRATION RATE: 17 BRPM

## 2023-06-06 PROCEDURE — 99285 EMERGENCY DEPT VISIT HI MDM: CPT | Mod: 25

## 2023-06-06 PROCEDURE — 84100 ASSAY OF PHOSPHORUS: CPT

## 2023-06-06 PROCEDURE — 74177 CT ABD & PELVIS W/CONTRAST: CPT | Mod: MA

## 2023-06-06 PROCEDURE — 81003 URINALYSIS AUTO W/O SCOPE: CPT

## 2023-06-06 PROCEDURE — 85025 COMPLETE CBC W/AUTO DIFF WBC: CPT

## 2023-06-06 PROCEDURE — 94640 AIRWAY INHALATION TREATMENT: CPT

## 2023-06-06 PROCEDURE — 96375 TX/PRO/DX INJ NEW DRUG ADDON: CPT

## 2023-06-06 PROCEDURE — 36415 COLL VENOUS BLD VENIPUNCTURE: CPT

## 2023-06-06 PROCEDURE — 83735 ASSAY OF MAGNESIUM: CPT

## 2023-06-06 PROCEDURE — 96376 TX/PRO/DX INJ SAME DRUG ADON: CPT

## 2023-06-06 PROCEDURE — 83690 ASSAY OF LIPASE: CPT

## 2023-06-06 PROCEDURE — 87086 URINE CULTURE/COLONY COUNT: CPT

## 2023-06-06 PROCEDURE — 80053 COMPREHEN METABOLIC PANEL: CPT

## 2023-06-06 PROCEDURE — 83605 ASSAY OF LACTIC ACID: CPT

## 2023-06-06 PROCEDURE — 96374 THER/PROPH/DIAG INJ IV PUSH: CPT

## 2023-06-06 RX ORDER — ACETAMINOPHEN 500 MG
650 TABLET ORAL EVERY 6 HOURS
Refills: 0 | Status: DISCONTINUED | OUTPATIENT
Start: 2023-06-06 | End: 2023-06-06

## 2023-06-06 RX ADMIN — TIOTROPIUM BROMIDE 2 PUFF(S): 18 CAPSULE ORAL; RESPIRATORY (INHALATION) at 09:47

## 2023-06-06 RX ADMIN — FAMOTIDINE 20 MILLIGRAM(S): 10 INJECTION INTRAVENOUS at 11:20

## 2023-06-06 RX ADMIN — POLYETHYLENE GLYCOL 3350 17 GRAM(S): 17 POWDER, FOR SOLUTION ORAL at 05:58

## 2023-06-06 RX ADMIN — GABAPENTIN 300 MILLIGRAM(S): 400 CAPSULE ORAL at 11:20

## 2023-06-06 RX ADMIN — Medication 200 MICROGRAM(S): at 07:06

## 2023-06-06 RX ADMIN — Medication 25 MILLIGRAM(S): at 05:58

## 2023-06-06 RX ADMIN — Medication 15 MILLIGRAM(S): at 03:59

## 2023-06-06 RX ADMIN — ONDANSETRON 4 MILLIGRAM(S): 8 TABLET, FILM COATED ORAL at 10:00

## 2023-06-06 RX ADMIN — BRIMONIDINE TARTRATE 1 DROP(S): 2 SOLUTION/ DROPS OPHTHALMIC at 05:59

## 2023-06-06 RX ADMIN — Medication 15 MILLIGRAM(S): at 04:59

## 2023-06-06 RX ADMIN — ENOXAPARIN SODIUM 40 MILLIGRAM(S): 100 INJECTION SUBCUTANEOUS at 05:58

## 2023-06-06 RX ADMIN — Medication 400 MILLIGRAM(S): at 05:58

## 2023-06-06 RX ADMIN — SENNA PLUS 2 TABLET(S): 8.6 TABLET ORAL at 05:57

## 2023-06-06 RX ADMIN — LOSARTAN POTASSIUM 25 MILLIGRAM(S): 100 TABLET, FILM COATED ORAL at 05:58

## 2023-06-06 RX ADMIN — DORZOLAMIDE HYDROCHLORIDE TIMOLOL MALEATE 1 DROP(S): 20; 5 SOLUTION/ DROPS OPHTHALMIC at 06:00

## 2023-06-06 RX ADMIN — Medication 650 MILLIGRAM(S): at 05:27

## 2023-06-06 NOTE — DISCHARGE NOTE NURSING/CASE MANAGEMENT/SOCIAL WORK - NSDCPEFALRISK_GEN_ALL_CORE
For information on Fall & Injury Prevention, visit: [email or web address] OR  [email or web address] OR  https://www.cdc.gov/steadi/patient.html

## 2023-06-06 NOTE — DISCHARGE NOTE NURSING/CASE MANAGEMENT/SOCIAL WORK - PATIENT PORTAL LINK FT
You can access the FollowMyHealth Patient Portal offered by [facility] by registering at the following website: [email or web address]. By joining [employer]’s FollowMyHealth portal, you will also be able to view your health information using other applications (apps) compatible with our system.

## 2023-06-07 ENCOUNTER — APPOINTMENT (OUTPATIENT)
Dept: INTERNAL MEDICINE | Facility: CLINIC | Age: 63
End: 2023-06-07
Payer: MEDICAID

## 2023-06-07 VITALS
RESPIRATION RATE: 14 BRPM | SYSTOLIC BLOOD PRESSURE: 128 MMHG | HEART RATE: 88 BPM | OXYGEN SATURATION: 99 % | TEMPERATURE: 98.8 F | DIASTOLIC BLOOD PRESSURE: 82 MMHG

## 2023-06-07 DIAGNOSIS — E78.5 HYPERLIPIDEMIA, UNSPECIFIED: ICD-10-CM

## 2023-06-07 PROCEDURE — 99495 TRANSJ CARE MGMT MOD F2F 14D: CPT | Mod: GC

## 2023-06-07 RX ORDER — ACETAMINOPHEN AND CODEINE 300; 30 MG/1; MG/1
300-30 TABLET ORAL
Qty: 20 | Refills: 0 | Status: DISCONTINUED | COMMUNITY
Start: 2023-01-06 | End: 2023-06-07

## 2023-06-09 ENCOUNTER — TRANSCRIPTION ENCOUNTER (OUTPATIENT)
Age: 63
End: 2023-06-09

## 2023-06-09 DIAGNOSIS — J44.9 CHRONIC OBSTRUCTIVE PULMONARY DISEASE, UNSPECIFIED: ICD-10-CM

## 2023-06-09 DIAGNOSIS — K63.89 OTHER SPECIFIED DISEASES OF INTESTINE: ICD-10-CM

## 2023-06-09 DIAGNOSIS — I10 ESSENTIAL (PRIMARY) HYPERTENSION: ICD-10-CM

## 2023-06-09 DIAGNOSIS — N13.5 CROSSING VESSEL AND STRICTURE OF URETER WITHOUT HYDRONEPHROSIS: ICD-10-CM

## 2023-06-09 DIAGNOSIS — K21.9 GASTRO-ESOPHAGEAL REFLUX DISEASE WITHOUT ESOPHAGITIS: ICD-10-CM

## 2023-06-09 DIAGNOSIS — Z85.3 PERSONAL HISTORY OF MALIGNANT NEOPLASM OF BREAST: ICD-10-CM

## 2023-06-09 DIAGNOSIS — Z92.21 PERSONAL HISTORY OF ANTINEOPLASTIC CHEMOTHERAPY: ICD-10-CM

## 2023-06-09 DIAGNOSIS — E78.00 PURE HYPERCHOLESTEROLEMIA, UNSPECIFIED: ICD-10-CM

## 2023-06-09 DIAGNOSIS — G62.9 POLYNEUROPATHY, UNSPECIFIED: ICD-10-CM

## 2023-06-09 DIAGNOSIS — Z88.6 ALLERGY STATUS TO ANALGESIC AGENT: ICD-10-CM

## 2023-06-09 DIAGNOSIS — Z90.13 ACQUIRED ABSENCE OF BILATERAL BREASTS AND NIPPLES: ICD-10-CM

## 2023-06-09 DIAGNOSIS — E03.9 HYPOTHYROIDISM, UNSPECIFIED: ICD-10-CM

## 2023-06-09 DIAGNOSIS — E66.01 MORBID (SEVERE) OBESITY DUE TO EXCESS CALORIES: ICD-10-CM

## 2023-06-09 LAB
CHOLEST SERPL-MCNC: 197 MG/DL
ESTIMATED AVERAGE GLUCOSE: 154 MG/DL
HBA1C MFR BLD HPLC: 7 %
HDLC SERPL-MCNC: 39 MG/DL
LDLC SERPL CALC-MCNC: 98 MG/DL
NONHDLC SERPL-MCNC: 158 MG/DL
TRIGL SERPL-MCNC: 300 MG/DL
TSH SERPL-ACNC: 0.25 UIU/ML

## 2023-06-13 ENCOUNTER — APPOINTMENT (OUTPATIENT)
Dept: HEMATOLOGY ONCOLOGY | Facility: CLINIC | Age: 63
End: 2023-06-13
Payer: MEDICAID

## 2023-06-13 ENCOUNTER — APPOINTMENT (OUTPATIENT)
Dept: INTERNAL MEDICINE | Facility: CLINIC | Age: 63
End: 2023-06-13
Payer: MEDICAID

## 2023-06-13 VITALS
TEMPERATURE: 98.8 F | OXYGEN SATURATION: 94 % | HEIGHT: 59 IN | RESPIRATION RATE: 18 BRPM | DIASTOLIC BLOOD PRESSURE: 79 MMHG | WEIGHT: 225 LBS | HEART RATE: 74 BPM | SYSTOLIC BLOOD PRESSURE: 146 MMHG | BODY MASS INDEX: 45.36 KG/M2

## 2023-06-13 VITALS
OXYGEN SATURATION: 99 % | WEIGHT: 225 LBS | BODY MASS INDEX: 45.36 KG/M2 | HEIGHT: 59 IN | SYSTOLIC BLOOD PRESSURE: 168 MMHG | DIASTOLIC BLOOD PRESSURE: 83 MMHG | HEART RATE: 75 BPM | TEMPERATURE: 97.1 F

## 2023-06-13 PROCEDURE — 99214 OFFICE O/P EST MOD 30 MIN: CPT

## 2023-06-13 PROCEDURE — 99213 OFFICE O/P EST LOW 20 MIN: CPT | Mod: GC

## 2023-06-13 NOTE — HISTORY OF PRESENT ILLNESS
[de-identified] : 63F with a remote history of left breast cancer, here for 1 yr f/u. \par \par OncHx:\par Pt received 5 sessions of RT and 1 cycle of chemotherapy and due to radiation burn and chemotherapy adverse effects, decided to proceed with L mastectomy and R prophylactic mastectomy with TRAM flap reconstruction on 12/14/2000 (Adria Alberto/Nicholas Salas). 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 She underwent revision of bilateral breasts and reduction mammoplasty in 2015. She did not receive any adjuvant RT, systemic therapy, or endocrine therapy. She was told that her upper extremity veins could not be accessed due to hx. b/l axillary lymphadenectomy. Due to poor peripheral vein access in the legs for blood draw and surgery, she had a mediport placed in 2005 and replacement in 2007. She was lost to follow up for 5 years and her doctors kept changing. She decided to transfer care from [facility] to [facility]. PCP recommended removal of mediport but patient has concerns due to fears of lymphedema. She ultimately underwent port removal on 5/6/22.\par \par 10/22/1999 left breast upper outer stereotactic biopsy: Intraductal hyperplasia, mild, focal. Fibrocystic change, non proliferative. Chronic inflammatory infiltrate. Microcalcifications.\par \par 8/4/2000 left nipple secretion: rare clusters of ductal epithelial cells present in a background of macrophages, inflammatory cells and rare red blood cells.\par \par 11/2/2000 left breast stereotactic biopsy: \par left upper outer: intraductal comedocarcinoma, ductal hyperplasia, adenosis, microcalcifications\par \par 12/14/2000 b/l mastectomy and b/l ALD:\par 1. 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 DCIS, high nuclear grade, solid, cribriform and micropapillary types with focal central necrosis, comprises approximately 10% of the tumor area. No PNI or LVI. Surrounding breast parenchyma shows proliferative fibrocystic changes. Microcalcifications present in association with invasive and in situ carcinoma as well as the benign epithelial elements. Nipple without significant changes. Seven axillary lymph nodes negative for metastatic carcinoma.\par 2. Right breast: fibrocystic changes including stromal fibrosis, mild duct cell hyperplasia, adenosis,apocrine metaplasia, cysts, sclerosing adenosis; small hyalinized from adenoma and microcalcifications. Nipple without significant changes. Five benign axillary lymph nodes.\par ER (invasive 50-75%, in-situ 75-90%), MI (invasive 25-50%, in situ 50-75%), HER-2 (in situ 3+, invasive 2+)\par \par 5/25/21 CT chest: no evidence of intrathoracic metastasis. \par \par 5/6/22 port removal.\par \par 5/21/22 CT A/P: Colonic diverticulosis without evidence of acute diverticulitis.  Infiltration of the fat surrounding both ureters. No hydronephrosis. Differential diagnosis includes inflammatory processes such as retroperitoneal fibrosis, IgG4 related disease and less likely lymphoma.\par \par 6/21/22 CT chest: no evidence of thoracic metastasis. Mild cardiomegaly.\par \par 8/29/22 PETCT:\par 1.  Masslike periureteral fat stranding and soft tissue density about the proximal ureters, unchanged, demonstrating minimal FDG uptake. Limited differential diagnosis is the same as described on prior CT, including retroperitoneal fibrosis and malignancy. Focal uptake within the lesion most likely represents misregistered physiologic urinary uptake within the ureter. Tissue typing of one of the lesions may be of benefit.\par \par 11/6/22 MRI brain: Since prior MRI sella 10/26/2021, stable diffuse prominence of the pituitary gland measuring 12 mm, most compatible with pituitary hyperplasia. No evidence of pituitary adenoma or chiasmatic compression.\par \par 6/2/23 CT AP:\par 1. Acute epiploic appendagitis (focal fat infarct) in left mid abdomen.\par 2. Bilateral periureteral stranding most likely related to history of IgG for related disease/retroperitoneal fibrosis, improved on the right, stable on the left. [de-identified] : well differentiated invasive ductal carcinoma [de-identified] : ER+, NC+ [de-identified] : She presents for 1 yr f/u.  She sees Dr. Bernard for retroperitoneal fibrosis.  2022 CT C/A/P, PETCT and MR brain were negative for metastasis.  She was admitted to [facility] 6/2-6/623 for abdominal pain, diagnosed with epiploic appendagitis.  She is on Motrin x 8 days, feeling better.

## 2023-06-27 ENCOUNTER — APPOINTMENT (OUTPATIENT)
Dept: INTERNAL MEDICINE | Facility: CLINIC | Age: 63
End: 2023-06-27
Payer: MEDICAID

## 2023-06-27 VITALS
SYSTOLIC BLOOD PRESSURE: 142 MMHG | HEIGHT: 59 IN | BODY MASS INDEX: 44.55 KG/M2 | HEART RATE: 78 BPM | WEIGHT: 221 LBS | TEMPERATURE: 98.2 F | DIASTOLIC BLOOD PRESSURE: 80 MMHG

## 2023-06-27 DIAGNOSIS — K63.89 OTHER SPECIFIED DISEASES OF INTESTINE: ICD-10-CM

## 2023-06-27 PROCEDURE — 99213 OFFICE O/P EST LOW 20 MIN: CPT | Mod: GC

## 2023-07-26 ENCOUNTER — APPOINTMENT (OUTPATIENT)
Dept: RHEUMATOLOGY | Facility: CLINIC | Age: 63
End: 2023-07-26
Payer: MEDICAID

## 2023-07-26 VITALS
OXYGEN SATURATION: 96 % | TEMPERATURE: 97.2 F | SYSTOLIC BLOOD PRESSURE: 164 MMHG | BODY MASS INDEX: 43.75 KG/M2 | HEART RATE: 65 BPM | WEIGHT: 217 LBS | HEIGHT: 59 IN | DIASTOLIC BLOOD PRESSURE: 109 MMHG

## 2023-07-26 DIAGNOSIS — M25.50 PAIN IN UNSPECIFIED JOINT: ICD-10-CM

## 2023-07-26 DIAGNOSIS — Z87.39 PERSONAL HISTORY OF OTHER DISEASES OF THE MUSCULOSKELETAL SYSTEM AND CONNECTIVE TISSUE: ICD-10-CM

## 2023-07-26 PROCEDURE — 99215 OFFICE O/P EST HI 40 MIN: CPT

## 2023-07-27 PROBLEM — M25.50 POLYARTHRALGIA: Status: ACTIVE | Noted: 2022-07-28

## 2023-07-27 PROBLEM — Z87.39 HISTORY OF INFLAMMATORY ARTHRITIS: Status: RESOLVED | Noted: 2022-07-28 | Resolved: 2023-07-27

## 2023-07-27 NOTE — HISTORY OF PRESENT ILLNESS
[FreeTextEntry1] : July 26, 2023\par Patient returns for rheumatology follow-up, history of IgG4 related diseases\par Patient with longstanding history of retroperitoneal fibrosis, followed by urology at [place] about this trial with Dr. Blunt\par Patient with mild retroperitoneal mass which was first identified 1998, presented with obstructive uropathy, status post biopsy January 9, 2008.\par Pathology at that time suggested a benign fibrotic retroperitoneal process,\par Also history of breast cancer, invasive ductal carcinoma, status post radiation, chemo, and bilateral mastectomy in 2000\par History of Graves' disease status post radioactive iodine treatment continues Synthroid, multiple eye surgeries\par History of psoriasis, scalp face back of the ear treated with topical\par DJD of the knees as well as chronic back pain, follows with pain management\par PET scan completed August 29, 2022:\par Masslike periureteral fat stranding and soft tissue density about the proximal ureters unchanged demonstrating minimal uptake.  Limited differential diagnosis is the same as described on prior CT including retroperitoneal fibrosis and malignancy.  Focal uptake within the lesion most likely represents misregistered physiologic urinary uptake within the ureter tissue typing of one of the lesions may be of benefit\par Reviewed x-rays of the knees, moderate DJD, discussed possible injections, patient is not interested at this time\par Reviewed previous records\par Reviewed previous imaging and labs.\par IgG4 elevated\par There was discussion of treatment with steroids and possible rituximab for her underlying condition however given stability of lesions since 1998, will continue to monitor clinically as well as with imaging\par Patient evaluated by Heme-onc as well.\par Patient with recent hospitalization from June 2 to June 5 for abdominal pain, diagnosed with epiploic appendagitis

## 2023-07-27 NOTE — PHYSICAL EXAM
[General Appearance - Alert] : alert [General Appearance - In No Acute Distress] : in no acute distress [General Appearance - Well-Appearing] : healthy appearing [Sclera] : the sclera and conjunctiva were normal [Examination Of The Oral Cavity] : the lips and gums were normal [Oropharynx] : the oropharynx was normal [] : no respiratory distress [Respiration, Rhythm And Depth] : normal respiratory rhythm and effort [Exaggerated Use Of Accessory Muscles For Inspiration] : no accessory muscle use [Auscultation Breath Sounds / Voice Sounds] : lungs were clear to auscultation bilaterally [Edema] : there was no peripheral edema [Abnormal Walk] : normal gait [Nail Clubbing] : no clubbing  or cyanosis of the fingernails [Motor Tone] : muscle strength and tone were normal [Oriented To Time, Place, And Person] : oriented to person, place, and time [Impaired Insight] : insight and judgment were intact [Affect] : the affect was normal [FreeTextEntry1] : Crepitus of the bilateral knees left more than right with pain with flexion and extension, decreased flexion.  No effusion noted no joint line tenderness.  Negative straight leg raises bilaterally.  No active synovitis of the PIPs MCPs wrists.  Decreased range of motion of bilateral shoulders as well at extremes of rotation, decreased flexion of the lumbar spine.

## 2023-07-27 NOTE — ASU PREOP CHECKLIST - SPO2 (%)
98 Gabapentin Counseling: I discussed with the patient the risks of gabapentin including but not limited to dizziness, somnolence, fatigue and ataxia.

## 2023-07-27 NOTE — REVIEW OF SYSTEMS
[Arthralgias] : arthralgias [Joint Pain] : joint pain [Negative] : Heme/Lymph [Joint Swelling] : no joint swelling

## 2023-07-27 NOTE — ASSESSMENT
[FreeTextEntry1] : 63-year-old woman with history of retroperitoneal mass noted in 1998, initially biopsied in 2008 which was nondiagnostic although suspicious for IgG4 related disease, which was not yet described at that time.  Patient also with elevated serum IgG4 which raises the suspicion for IgG4 related disease as etiology of retroperitoneal fibrosis.  Patient follows closely with urologist, with monitoring with imaging, which appears to be stable as per PET and CT. patient with stable renal function, although blood pressure not well controlled today, repeat blood pressure with improvement in diastolic although systolic remains elevated.  Patient will repeat blood pressure when returns home, currently asymptomatic without headache, chest pain, or shortness of breath.  Previous discussion of treatment with steroids as well as rituximab, patient concerned about side effects of medications, and given stability of findings, will continue to monitor clinically at this time, however with low threshold to start treatment if develops symptoms or progression noted on follow-up imaging.  Patient also with DJD of the bilateral knees, follows with pain management, discussed possible injections either steroid or hyaluronic acid, patient is not interested at this time.  Patient will follow-up in 4 months or sooner as needed.

## 2023-07-31 ENCOUNTER — APPOINTMENT (OUTPATIENT)
Dept: CT IMAGING | Facility: HOSPITAL | Age: 63
End: 2023-07-31
Payer: MEDICAID

## 2023-07-31 ENCOUNTER — OUTPATIENT (OUTPATIENT)
Dept: OUTPATIENT SERVICES | Facility: HOSPITAL | Age: 63
LOS: 1 days | End: 2023-07-31
Payer: COMMERCIAL

## 2023-07-31 DIAGNOSIS — Z98.890 OTHER SPECIFIED POSTPROCEDURAL STATES: Chronic | ICD-10-CM

## 2023-07-31 DIAGNOSIS — Z90.13 ACQUIRED ABSENCE OF BILATERAL BREASTS AND NIPPLES: Chronic | ICD-10-CM

## 2023-07-31 PROCEDURE — 71250 CT THORAX DX C-: CPT

## 2023-07-31 PROCEDURE — 71250 CT THORAX DX C-: CPT | Mod: 26

## 2023-08-22 ENCOUNTER — APPOINTMENT (OUTPATIENT)
Dept: PULMONOLOGY | Facility: CLINIC | Age: 63
End: 2023-08-22

## 2023-08-25 ENCOUNTER — APPOINTMENT (OUTPATIENT)
Dept: OPHTHALMOLOGY | Facility: CLINIC | Age: 63
End: 2023-08-25

## 2023-08-25 ENCOUNTER — EMERGENCY (EMERGENCY)
Facility: HOSPITAL | Age: 63
LOS: 1 days | Discharge: HOME | End: 2023-08-25
Attending: EMERGENCY MEDICINE | Admitting: EMERGENCY MEDICINE
Payer: COMMERCIAL

## 2023-08-25 ENCOUNTER — NON-APPOINTMENT (OUTPATIENT)
Age: 63
End: 2023-08-25

## 2023-08-25 VITALS
HEART RATE: 81 BPM | RESPIRATION RATE: 18 BRPM | SYSTOLIC BLOOD PRESSURE: 117 MMHG | OXYGEN SATURATION: 100 % | DIASTOLIC BLOOD PRESSURE: 61 MMHG

## 2023-08-25 VITALS
SYSTOLIC BLOOD PRESSURE: 156 MMHG | RESPIRATION RATE: 18 BRPM | WEIGHT: 214.95 LBS | HEART RATE: 101 BPM | HEIGHT: 59 IN | DIASTOLIC BLOOD PRESSURE: 83 MMHG | OXYGEN SATURATION: 97 % | TEMPERATURE: 98 F

## 2023-08-25 DIAGNOSIS — Z98.890 OTHER SPECIFIED POSTPROCEDURAL STATES: Chronic | ICD-10-CM

## 2023-08-25 DIAGNOSIS — Z91.041 RADIOGRAPHIC DYE ALLERGY STATUS: ICD-10-CM

## 2023-08-25 DIAGNOSIS — J44.9 CHRONIC OBSTRUCTIVE PULMONARY DISEASE, UNSPECIFIED: ICD-10-CM

## 2023-08-25 DIAGNOSIS — Z92.3 PERSONAL HISTORY OF IRRADIATION: ICD-10-CM

## 2023-08-25 DIAGNOSIS — Z86.018 PERSONAL HISTORY OF OTHER BENIGN NEOPLASM: ICD-10-CM

## 2023-08-25 DIAGNOSIS — Z86.69 PERSONAL HISTORY OF OTHER DISEASES OF THE NERVOUS SYSTEM AND SENSE ORGANS: ICD-10-CM

## 2023-08-25 DIAGNOSIS — U07.1 COVID-19: ICD-10-CM

## 2023-08-25 DIAGNOSIS — E11.9 TYPE 2 DIABETES MELLITUS WITHOUT COMPLICATIONS: ICD-10-CM

## 2023-08-25 DIAGNOSIS — Z87.39 PERSONAL HISTORY OF OTHER DISEASES OF THE MUSCULOSKELETAL SYSTEM AND CONNECTIVE TISSUE: ICD-10-CM

## 2023-08-25 DIAGNOSIS — M19.09 PRIMARY OSTEOARTHRITIS, OTHER SPECIFIED SITE: ICD-10-CM

## 2023-08-25 DIAGNOSIS — E03.9 HYPOTHYROIDISM, UNSPECIFIED: ICD-10-CM

## 2023-08-25 DIAGNOSIS — Z90.13 ACQUIRED ABSENCE OF BILATERAL BREASTS AND NIPPLES: ICD-10-CM

## 2023-08-25 DIAGNOSIS — K21.9 GASTRO-ESOPHAGEAL REFLUX DISEASE WITHOUT ESOPHAGITIS: ICD-10-CM

## 2023-08-25 DIAGNOSIS — Z85.3 PERSONAL HISTORY OF MALIGNANT NEOPLASM OF BREAST: ICD-10-CM

## 2023-08-25 DIAGNOSIS — Z87.19 PERSONAL HISTORY OF OTHER DISEASES OF THE DIGESTIVE SYSTEM: ICD-10-CM

## 2023-08-25 DIAGNOSIS — Z92.21 PERSONAL HISTORY OF ANTINEOPLASTIC CHEMOTHERAPY: ICD-10-CM

## 2023-08-25 DIAGNOSIS — I10 ESSENTIAL (PRIMARY) HYPERTENSION: ICD-10-CM

## 2023-08-25 DIAGNOSIS — Z90.13 ACQUIRED ABSENCE OF BILATERAL BREASTS AND NIPPLES: Chronic | ICD-10-CM

## 2023-08-25 DIAGNOSIS — R05.8 OTHER SPECIFIED COUGH: ICD-10-CM

## 2023-08-25 DIAGNOSIS — Z91.040 LATEX ALLERGY STATUS: ICD-10-CM

## 2023-08-25 DIAGNOSIS — Z88.8 ALLERGY STATUS TO OTHER DRUGS, MEDICAMENTS AND BIOLOGICAL SUBSTANCES: ICD-10-CM

## 2023-08-25 DIAGNOSIS — E78.5 HYPERLIPIDEMIA, UNSPECIFIED: ICD-10-CM

## 2023-08-25 DIAGNOSIS — Z86.39 PERSONAL HISTORY OF OTHER ENDOCRINE, NUTRITIONAL AND METABOLIC DISEASE: ICD-10-CM

## 2023-08-25 DIAGNOSIS — Z91.013 ALLERGY TO SEAFOOD: ICD-10-CM

## 2023-08-25 LAB — LACTATE SERPL-SCNC: 1.7 MMOL/L — SIGNIFICANT CHANGE UP (ref 0.5–2)

## 2023-08-25 PROCEDURE — 87040 BLOOD CULTURE FOR BACTERIA: CPT

## 2023-08-25 PROCEDURE — 71045 X-RAY EXAM CHEST 1 VIEW: CPT | Mod: 26

## 2023-08-25 PROCEDURE — 96374 THER/PROPH/DIAG INJ IV PUSH: CPT

## 2023-08-25 PROCEDURE — 80053 COMPREHEN METABOLIC PANEL: CPT

## 2023-08-25 PROCEDURE — 36415 COLL VENOUS BLD VENIPUNCTURE: CPT

## 2023-08-25 PROCEDURE — 85025 COMPLETE CBC W/AUTO DIFF WBC: CPT

## 2023-08-25 PROCEDURE — 71045 X-RAY EXAM CHEST 1 VIEW: CPT

## 2023-08-25 PROCEDURE — 99284 EMERGENCY DEPT VISIT MOD MDM: CPT

## 2023-08-25 PROCEDURE — 99284 EMERGENCY DEPT VISIT MOD MDM: CPT | Mod: 25

## 2023-08-25 PROCEDURE — 92014 COMPRE OPH EXAM EST PT 1/>: CPT

## 2023-08-25 PROCEDURE — 83605 ASSAY OF LACTIC ACID: CPT

## 2023-08-25 PROCEDURE — 92060 SENSORIMOTOR EXAMINATION: CPT

## 2023-08-25 PROCEDURE — 0225U NFCT DS DNA&RNA 21 SARSCOV2: CPT

## 2023-08-25 RX ORDER — ALBUTEROL 90 UG/1
2 AEROSOL, METERED ORAL
Qty: 1 | Refills: 0
Start: 2023-08-25

## 2023-08-25 RX ORDER — NIRMATRELVIR AND RITONAVIR 150-100 MG
1 KIT ORAL
Qty: 10 | Refills: 0
Start: 2023-08-25 | End: 2023-08-29

## 2023-08-25 RX ORDER — KETOROLAC TROMETHAMINE 30 MG/ML
15 SYRINGE (ML) INJECTION ONCE
Refills: 0 | Status: DISCONTINUED | OUTPATIENT
Start: 2023-08-25 | End: 2023-08-25

## 2023-08-25 RX ORDER — GUAIFENESIN/DEXTROMETHORPHAN 600MG-30MG
10 TABLET, EXTENDED RELEASE 12 HR ORAL ONCE
Refills: 0 | Status: COMPLETED | OUTPATIENT
Start: 2023-08-25 | End: 2023-08-25

## 2023-08-25 RX ORDER — SODIUM CHLORIDE 9 MG/ML
500 INJECTION INTRAMUSCULAR; INTRAVENOUS; SUBCUTANEOUS ONCE
Refills: 0 | Status: COMPLETED | OUTPATIENT
Start: 2023-08-25 | End: 2023-08-25

## 2023-08-25 RX ADMIN — Medication 10 MILLILITER(S): at 11:15

## 2023-08-25 RX ADMIN — Medication 15 MILLIGRAM(S): at 11:14

## 2023-08-25 RX ADMIN — SODIUM CHLORIDE 500 MILLILITER(S): 9 INJECTION INTRAMUSCULAR; INTRAVENOUS; SUBCUTANEOUS at 11:14

## 2023-08-25 NOTE — ED ADULT NURSE NOTE - NSFALLUNIVINTERV_ED_ALL_ED
Bed/Stretcher in lowest position, wheels locked, appropriate side rails in place/Call bell, personal items and telephone in reach/Instruct patient to call for assistance before getting out of bed/chair/stretcher/Non-slip footwear applied when patient is off stretcher/[place] to call system/Physically safe environment - no spills, clutter or unnecessary equipment/Purposeful proactive rounding/Room/bathroom lighting operational, light cord in reach

## 2023-08-25 NOTE — ED PROVIDER NOTE - PROGRESS NOTE DETAILS
no resp distress sats 98-99, advised cont'd supportive care, rx paxlovid, albuterol prn, pulse ox portable, strict return prec given.
(0) indicator not present

## 2023-08-25 NOTE — ED PROVIDER NOTE - CLINICAL SUMMARY MEDICAL DECISION MAKING FREE TEXT BOX
cough, sob, fever, body aches since last night. States was sick recently w similar symptoms. No leg swelling.  ddx URI, pna, other cough, sob, fever, body aches since last night. States was sick recently w similar symptoms. No leg swelling.  ddx URI, pna, other  labs, xr, rvp, reassess

## 2023-08-25 NOTE — ED ADULT TRIAGE NOTE - OTHER COMPLAINTS
Hx copd, using CPAP at home. Hx htn, b/l mastectomy 2000. States "I was around someone with TB recently and I had a fever the other day and I took tylenol last night". Afebrile at triage. Speaking in full sentences without difficulty, denies sob/dizziness/n/v/chills. EKG in progress.

## 2023-08-25 NOTE — ED PROVIDER NOTE - PATIENT PORTAL LINK FT
You can access the FollowMyHealth Patient Portal offered by [facility] by registering at the following website: [email or web address]. By joining [employer]’s FollowMyHealth portal, you will also be able to view your health information using other applications (apps) compatible with our system.

## 2023-08-25 NOTE — ED PROVIDER NOTE - OBJECTIVE STATEMENT
f HTN, HLD, DM, GERD, COPD, JESÚS, history of epilepsy (no treatment), hypothyroidism (2/2 ROBERTS for Graves with multiple eye surgeries), history breast cancer (s/p radiation, chemo, and b/l mastectomy in 2000 s/p chemo port removal), kidney mass (follows at [place], benign in origin), history of diverticulitis, prior rotator cuff tear, chronic b/l LE pain (neuropathy and arthritis), carpal tunnel syndrome s/p release surgery, pituitary hyperplasia, retroperitoneal fibrosis    w chest discomfort, cough, sob, fever, body aches since last night. States was sick recently w similar symptoms. No leg swelling. f HTN, HLD, DM, GERD, COPD, JESÚS, history of epilepsy (no treatment), hypothyroidism (2/2 ROBERTS for Graves with multiple eye surgeries), history breast cancer (s/p radiation, chemo, and b/l mastectomy in 2000 s/p chemo port removal), kidney mass (follows at [place], benign in origin), history of diverticulitis, prior rotator cuff tear, chronic b/l LE pain (neuropathy and arthritis), carpal tunnel syndrome s/p release surgery, pituitary hyperplasia, retroperitoneal fibrosis    w non prod cough, sob, fever, body aches since last night. States friend was sick recently w similar symptoms. No leg swelling. Vaccinated against covid, no rash, abd pain.

## 2023-08-25 NOTE — ED PROVIDER NOTE - NSFOLLOWUPINSTRUCTIONS_ED_ALL_ED_FT
[facility]  Family Medicine  [address], [address] [place] [address]  Phone: [phone]     PLEASE REST AND REMAIN HYDRATED (EG, GATORADE, PEDIALYTE, ETC). TYLENOL AS NEEDED FOR FEVER (>100.4F/>38C)  PLEASE FOLLOW-UP WITH YOUR PCP IN 1-2 DAYS    PLEASE RETURN TO THE EMERGENCY DEPARTMENT IF SOMNOLENCE, CHEST PAIN, SHORTNESS OF BREATH, ABDOMINAL PAIN, VOMITING, OTHER CONCERNING SYMPTOMS    If you develop worsening symptoms, such as severe shortness of breath, please call [phone] option #9. They will assist you in determining your next steps.     Avoid highly populated and public areas. Avoid using public transportation, ride-sharing, or taxis.   As much as possible, separate yourself from other people in your home. If you can, you should sleep in a room away from other people in your home. Also, you should use a separate bathroom, if available.   Wear the supplied mask whenever you are around other people.   If you have a non-urgent medical appointment, please reschedule for a later date. If the appointment is urgent, please call the healthcare provider first   Wash your hands often with soap and water for at least 15 to 20 seconds or clean your hands with an alcohol-based hand  that contains 60 to 95% alcohol, covering all surfaces of your hands and rubbing them together until they feel dry. Soap and water should be used preferentially if hands are visibly dirty.   Cover your mouth and nose with a tissue when you cough or sneeze. Throw used tissues in a lined trash can; immediately wash your hands.   Avoid touching your eyes, nose, and mouth with your hands.   Avoid sharing personal household items. You should not share dishes, drinking glasses, cups, eating utensils, towels, or bedding with other people or pets in your home. After using these items, they should be washed thoroughly with soap and water.   Clean and disinfect all “high-touch” surfaces every day. High touch surfaces include counters, tabletops, doorknobs, light switches, remote controls, bathroom fixtures, toilets, phones, keyboards, tablets, and bedside tables. Also, clean any surfaces that may have blood, stool, or body fluids on them.

## 2023-08-25 NOTE — ED ADULT NURSE NOTE - OBJECTIVE STATEMENT
63 year old F patient, A+Ox3, ambulatory w steady gait presents to ED w c/o of cough & cold like symptoms x3days.  States she was exposed to someone who is on TB treatment, month 5, last week.  Denies bloody sputum, but states night sweats.  Pt also c/o of fever x3days.  Placed on CM, pt c/o of chest pain with coughing.  Pt speaking full clear sentences, but noted to have tachypnea w movement.  IV placed.  Pt has hx of mastectomy bilateral with lymph nodes removed, noBP on arms but blood work can be done in hands as per patient.  No distress noted.  Will continue to monitor closely.

## 2023-08-29 ENCOUNTER — APPOINTMENT (OUTPATIENT)
Dept: PULMONOLOGY | Facility: CLINIC | Age: 63
End: 2023-08-29

## 2023-09-08 ENCOUNTER — APPOINTMENT (OUTPATIENT)
Dept: OPHTHALMOLOGY | Facility: CLINIC | Age: 63
End: 2023-09-08
Payer: MEDICAID

## 2023-09-08 ENCOUNTER — NON-APPOINTMENT (OUTPATIENT)
Age: 63
End: 2023-09-08

## 2023-09-08 PROCEDURE — 92133 CPTRZD OPH DX IMG PST SGM ON: CPT

## 2023-09-08 PROCEDURE — 92083 EXTENDED VISUAL FIELD XM: CPT

## 2023-09-08 PROCEDURE — 92014 COMPRE OPH EXAM EST PT 1/>: CPT

## 2023-09-22 ENCOUNTER — APPOINTMENT (OUTPATIENT)
Dept: INTERNAL MEDICINE | Facility: CLINIC | Age: 63
End: 2023-09-22
Payer: MEDICAID

## 2023-09-22 ENCOUNTER — MED ADMIN CHARGE (OUTPATIENT)
Age: 63
End: 2023-09-22

## 2023-09-22 VITALS
SYSTOLIC BLOOD PRESSURE: 145 MMHG | TEMPERATURE: 98 F | DIASTOLIC BLOOD PRESSURE: 92 MMHG | HEIGHT: 59 IN | HEART RATE: 90 BPM | OXYGEN SATURATION: 96 % | WEIGHT: 222 LBS | BODY MASS INDEX: 44.76 KG/M2

## 2023-09-22 DIAGNOSIS — Z00.00 ENCOUNTER FOR GENERAL ADULT MEDICAL EXAMINATION W/OUT ABNORMAL FINDINGS: ICD-10-CM

## 2023-09-22 PROCEDURE — 99396 PREV VISIT EST AGE 40-64: CPT | Mod: 25

## 2023-09-22 PROCEDURE — 90686 IIV4 VACC NO PRSV 0.5 ML IM: CPT

## 2023-09-22 PROCEDURE — G0008: CPT

## 2023-09-22 RX ORDER — NEBULIZER ACCESSORIES
KIT MISCELLANEOUS
Qty: 1 | Refills: 0 | Status: ACTIVE | COMMUNITY
Start: 2021-03-18 | End: 1900-01-01

## 2023-09-22 RX ORDER — ATORVASTATIN CALCIUM 40 MG/1
40 TABLET, FILM COATED ORAL
Qty: 30 | Refills: 5 | Status: ACTIVE | COMMUNITY
Start: 2021-11-09 | End: 1900-01-01

## 2023-09-22 RX ORDER — AMLODIPINE BESYLATE 10 MG/1
10 TABLET ORAL DAILY
Qty: 90 | Refills: 1 | Status: ACTIVE | COMMUNITY
Start: 2021-01-26 | End: 1900-01-01

## 2023-09-25 LAB
ESTIMATED AVERAGE GLUCOSE: 140 MG/DL
HBA1C MFR BLD HPLC: 6.5 %
TSH SERPL-ACNC: 0.86 UIU/ML

## 2023-09-25 RX ORDER — FLUOCINONIDE 0.5 MG/ML
0.05 SOLUTION TOPICAL TWICE DAILY
Qty: 30 | Refills: 1 | Status: ACTIVE | COMMUNITY
Start: 2021-12-21 | End: 1900-01-01

## 2023-09-29 ENCOUNTER — APPOINTMENT (OUTPATIENT)
Dept: INTERNAL MEDICINE | Facility: CLINIC | Age: 63
End: 2023-09-29
Payer: MEDICAID

## 2023-09-29 VITALS
OXYGEN SATURATION: 98 % | WEIGHT: 223 LBS | TEMPERATURE: 97.1 F | HEART RATE: 66 BPM | DIASTOLIC BLOOD PRESSURE: 81 MMHG | SYSTOLIC BLOOD PRESSURE: 164 MMHG | BODY MASS INDEX: 44.96 KG/M2 | HEIGHT: 59 IN

## 2023-09-29 DIAGNOSIS — Z02.1 ENCOUNTER FOR PRE-EMPLOYMENT EXAMINATION: ICD-10-CM

## 2023-09-29 PROCEDURE — 99214 OFFICE O/P EST MOD 30 MIN: CPT

## 2023-10-06 ENCOUNTER — OUTPATIENT (OUTPATIENT)
Dept: OUTPATIENT SERVICES | Facility: HOSPITAL | Age: 63
LOS: 1 days | End: 2023-10-06
Payer: COMMERCIAL

## 2023-10-06 DIAGNOSIS — Z98.890 OTHER SPECIFIED POSTPROCEDURAL STATES: Chronic | ICD-10-CM

## 2023-10-06 DIAGNOSIS — Z90.13 ACQUIRED ABSENCE OF BILATERAL BREASTS AND NIPPLES: Chronic | ICD-10-CM

## 2023-10-06 PROCEDURE — 71045 X-RAY EXAM CHEST 1 VIEW: CPT | Mod: 26

## 2023-10-06 PROCEDURE — 71045 X-RAY EXAM CHEST 1 VIEW: CPT

## 2023-10-10 ENCOUNTER — APPOINTMENT (OUTPATIENT)
Dept: INTERNAL MEDICINE | Facility: CLINIC | Age: 63
End: 2023-10-10
Payer: MEDICAID

## 2023-10-10 ENCOUNTER — NON-APPOINTMENT (OUTPATIENT)
Age: 63
End: 2023-10-10

## 2023-10-10 VITALS
TEMPERATURE: 98.6 F | DIASTOLIC BLOOD PRESSURE: 71 MMHG | OXYGEN SATURATION: 97 % | BODY MASS INDEX: 46.16 KG/M2 | HEART RATE: 80 BPM | HEIGHT: 59 IN | SYSTOLIC BLOOD PRESSURE: 164 MMHG | WEIGHT: 229 LBS

## 2023-10-10 VITALS — DIASTOLIC BLOOD PRESSURE: 78 MMHG | SYSTOLIC BLOOD PRESSURE: 142 MMHG

## 2023-10-10 DIAGNOSIS — R91.1 SOLITARY PULMONARY NODULE: ICD-10-CM

## 2023-10-10 PROCEDURE — 99214 OFFICE O/P EST MOD 30 MIN: CPT | Mod: GC

## 2023-10-10 RX ORDER — HYDRALAZINE HYDROCHLORIDE 25 MG/1
25 TABLET ORAL 3 TIMES DAILY
Qty: 270 | Refills: 0 | Status: DISCONTINUED | COMMUNITY
Start: 2022-09-20 | End: 2023-10-10

## 2023-10-10 RX ORDER — TRIAMCINOLONE ACETONIDE 1 MG/G
0.1 CREAM TOPICAL 3 TIMES DAILY
Qty: 1 | Refills: 0 | Status: DISCONTINUED | COMMUNITY
Start: 2022-02-08 | End: 2023-10-10

## 2023-10-10 RX ORDER — KETOCONAZOLE 20 MG/G
2 CREAM TOPICAL TWICE DAILY
Qty: 30 | Refills: 2 | Status: ACTIVE | COMMUNITY
Start: 2021-11-20

## 2023-10-16 ENCOUNTER — LABORATORY RESULT (OUTPATIENT)
Age: 63
End: 2023-10-16

## 2023-10-16 ENCOUNTER — APPOINTMENT (OUTPATIENT)
Dept: HEMATOLOGY ONCOLOGY | Facility: CLINIC | Age: 63
End: 2023-10-16
Payer: MEDICAID

## 2023-10-16 VITALS
TEMPERATURE: 96.5 F | HEIGHT: 59 IN | DIASTOLIC BLOOD PRESSURE: 81 MMHG | BODY MASS INDEX: 46.37 KG/M2 | WEIGHT: 230 LBS | OXYGEN SATURATION: 96 % | SYSTOLIC BLOOD PRESSURE: 130 MMHG | RESPIRATION RATE: 18 BRPM | HEART RATE: 83 BPM

## 2023-10-16 LAB
ALBUMIN SERPL ELPH-MCNC: 3.5 G/DL
ALP BLD-CCNC: 87 U/L
ALT SERPL-CCNC: 16 U/L
ANION GAP SERPL CALC-SCNC: 3 MMOL/L
AST SERPL-CCNC: 21 U/L
BILIRUB SERPL-MCNC: 0.5 MG/DL
BUN SERPL-MCNC: 17 MG/DL
CALCIUM SERPL-MCNC: 9.7 MG/DL
CHLORIDE SERPL-SCNC: 107 MMOL/L
CO2 SERPL-SCNC: 29 MMOL/L
CREAT SERPL-MCNC: 0.7 MG/DL
EGFR: 98 ML/MIN/1.73M2
GLUCOSE SERPL-MCNC: 179 MG/DL
MAGNESIUM SERPL-MCNC: 2.1 MG/DL
POTASSIUM SERPL-SCNC: 3.7 MMOL/L
PROT SERPL-MCNC: 7.2 G/DL
SODIUM SERPL-SCNC: 139 MMOL/L

## 2023-10-16 PROCEDURE — 99213 OFFICE O/P EST LOW 20 MIN: CPT | Mod: 25

## 2023-10-17 LAB
B2 MICROGLOB SERPL-MCNC: 2.5 MG/L
TSH SERPL-ACNC: 0.16 UIU/ML

## 2023-10-18 LAB
ALBUMIN MFR SERPL ELPH: 53.1 %
ALBUMIN SERPL-MCNC: 3.6 G/DL
ALBUMIN/GLOB SERPL: 1.1 RATIO
ALBUPE: 19.5 %
ALPHA1 GLOB MFR SERPL ELPH: 5 %
ALPHA1 GLOB SERPL ELPH-MCNC: 0.3 G/DL
ALPHA1UPE: 33.9 %
ALPHA2 GLOB MFR SERPL ELPH: 12 %
ALPHA2 GLOB SERPL ELPH-MCNC: 0.8 G/DL
ALPHA2UPE: 23.4 %
B-GLOBULIN MFR SERPL ELPH: 19.2 %
B-GLOBULIN SERPL ELPH-MCNC: 1.3 G/DL
BETAUPE: 15.4 %
CREAT 24H UR-MCNC: NORMAL G/24 H
CREATININE UR (MAYO): 106 MG/DL
DEPRECATED KAPPA LC FREE/LAMBDA SER: 1.55 RATIO
GAMMA GLOB FLD ELPH-MCNC: 0.7 G/DL
GAMMA GLOB MFR SERPL ELPH: 10.7 %
GAMMAUPE: 7.8 %
IGA 24H UR QL IFE: NORMAL
IGA SER QL IEP: 228 MG/DL
IGG SER QL IEP: 1212 MG/DL
IGM SER QL IEP: 50 MG/DL
INTERPRETATION SERPL IEP-IMP: NORMAL
KAPPA LC 24H UR QL: NORMAL
KAPPA LC CSF-MCNC: 2.44 MG/DL
KAPPA LC SERPL-MCNC: 3.79 MG/DL
M PROTEIN MFR SERPL ELPH: 6 %
M PROTEIN SPEC IFE-MCNC: NORMAL
MONOCLON BAND OBS SERPL: 0.4 G/DL
PROT PATTERN 24H UR ELPH-IMP: NORMAL
PROT SERPL-MCNC: 6.8 G/DL
PROT SERPL-MCNC: 6.8 G/DL
PROT UR-MCNC: 11 MG/DL
PROT UR-MCNC: 11 MG/DL
SPECIMEN VOL 24H UR: NORMAL ML

## 2023-10-23 ENCOUNTER — APPOINTMENT (OUTPATIENT)
Dept: INTERNAL MEDICINE | Facility: CLINIC | Age: 63
End: 2023-10-23
Payer: MEDICAID

## 2023-10-23 VITALS
HEIGHT: 59 IN | BODY MASS INDEX: 47.58 KG/M2 | WEIGHT: 236 LBS | OXYGEN SATURATION: 98 % | TEMPERATURE: 98.6 F | SYSTOLIC BLOOD PRESSURE: 154 MMHG | DIASTOLIC BLOOD PRESSURE: 86 MMHG | HEART RATE: 70 BPM

## 2023-10-23 VITALS — DIASTOLIC BLOOD PRESSURE: 88 MMHG | SYSTOLIC BLOOD PRESSURE: 130 MMHG

## 2023-10-23 PROCEDURE — 99213 OFFICE O/P EST LOW 20 MIN: CPT | Mod: GC

## 2023-11-06 ENCOUNTER — APPOINTMENT (OUTPATIENT)
Dept: INTERNAL MEDICINE | Facility: CLINIC | Age: 63
End: 2023-11-06
Payer: COMMERCIAL

## 2023-11-06 VITALS
TEMPERATURE: 98.1 F | HEIGHT: 59 IN | OXYGEN SATURATION: 98 % | HEART RATE: 71 BPM | WEIGHT: 236 LBS | BODY MASS INDEX: 47.58 KG/M2

## 2023-11-06 VITALS — SYSTOLIC BLOOD PRESSURE: 152 MMHG | DIASTOLIC BLOOD PRESSURE: 84 MMHG

## 2023-11-06 DIAGNOSIS — Z85.3 PERSONAL HISTORY OF MALIGNANT NEOPLASM OF BREAST: ICD-10-CM

## 2023-11-06 PROCEDURE — 99213 OFFICE O/P EST LOW 20 MIN: CPT | Mod: GC

## 2023-11-07 ENCOUNTER — TRANSCRIPTION ENCOUNTER (OUTPATIENT)
Age: 63
End: 2023-11-07

## 2023-11-08 ENCOUNTER — APPOINTMENT (OUTPATIENT)
Dept: PULMONOLOGY | Facility: CLINIC | Age: 63
End: 2023-11-08
Payer: COMMERCIAL

## 2023-11-08 PROCEDURE — 99214 OFFICE O/P EST MOD 30 MIN: CPT | Mod: 25

## 2023-11-08 PROCEDURE — 94060 EVALUATION OF WHEEZING: CPT

## 2023-11-08 PROCEDURE — 94729 DIFFUSING CAPACITY: CPT

## 2023-11-08 PROCEDURE — 94727 GAS DIL/WSHOT DETER LNG VOL: CPT

## 2023-11-08 PROCEDURE — ZZZZZ: CPT

## 2023-11-15 ENCOUNTER — EMERGENCY (EMERGENCY)
Facility: HOSPITAL | Age: 63
LOS: 1 days | Discharge: HOME | End: 2023-11-15
Attending: STUDENT IN AN ORGANIZED HEALTH CARE EDUCATION/TRAINING PROGRAM | Admitting: STUDENT IN AN ORGANIZED HEALTH CARE EDUCATION/TRAINING PROGRAM
Payer: COMMERCIAL

## 2023-11-15 VITALS
OXYGEN SATURATION: 98 % | HEART RATE: 80 BPM | RESPIRATION RATE: 18 BRPM | WEIGHT: 227.08 LBS | SYSTOLIC BLOOD PRESSURE: 160 MMHG | DIASTOLIC BLOOD PRESSURE: 93 MMHG | HEIGHT: 59 IN | TEMPERATURE: 98 F

## 2023-11-15 VITALS
RESPIRATION RATE: 18 BRPM | OXYGEN SATURATION: 97 % | TEMPERATURE: 97 F | HEART RATE: 63 BPM | DIASTOLIC BLOOD PRESSURE: 64 MMHG | SYSTOLIC BLOOD PRESSURE: 101 MMHG

## 2023-11-15 DIAGNOSIS — Z90.13 ACQUIRED ABSENCE OF BILATERAL BREASTS AND NIPPLES: Chronic | ICD-10-CM
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
## 2023-11-15 DIAGNOSIS — Z91.040 LATEX ALLERGY STATUS: ICD-10-CM

## 2023-11-15 DIAGNOSIS — Z91.041 RADIOGRAPHIC DYE ALLERGY STATUS: ICD-10-CM

## 2023-11-15 DIAGNOSIS — M25.512 PAIN IN LEFT SHOULDER: ICD-10-CM

## 2023-11-15 DIAGNOSIS — S80.02XA CONTUSION OF LEFT KNEE, INITIAL ENCOUNTER: ICD-10-CM

## 2023-11-15 DIAGNOSIS — M54.50 LOW BACK PAIN, UNSPECIFIED: ICD-10-CM

## 2023-11-15 DIAGNOSIS — Z88.8 ALLERGY STATUS TO OTHER DRUGS, MEDICAMENTS AND BIOLOGICAL SUBSTANCES: ICD-10-CM

## 2023-11-15 DIAGNOSIS — Z98.890 OTHER SPECIFIED POSTPROCEDURAL STATES: Chronic | ICD-10-CM

## 2023-11-15 DIAGNOSIS — M25.552 PAIN IN LEFT HIP: ICD-10-CM

## 2023-11-15 DIAGNOSIS — Z91.013 ALLERGY TO SEAFOOD: ICD-10-CM

## 2023-11-15 PROCEDURE — 73030 X-RAY EXAM OF SHOULDER: CPT

## 2023-11-15 PROCEDURE — 96372 THER/PROPH/DIAG INJ SC/IM: CPT

## 2023-11-15 PROCEDURE — 99284 EMERGENCY DEPT VISIT MOD MDM: CPT

## 2023-11-15 PROCEDURE — 73502 X-RAY EXAM HIP UNI 2-3 VIEWS: CPT

## 2023-11-15 PROCEDURE — 73060 X-RAY EXAM OF HUMERUS: CPT | Mod: 26,LT

## 2023-11-15 PROCEDURE — 73560 X-RAY EXAM OF KNEE 1 OR 2: CPT

## 2023-11-15 PROCEDURE — 73502 X-RAY EXAM HIP UNI 2-3 VIEWS: CPT | Mod: 26,LT

## 2023-11-15 PROCEDURE — 73070 X-RAY EXAM OF ELBOW: CPT

## 2023-11-15 PROCEDURE — 73070 X-RAY EXAM OF ELBOW: CPT | Mod: 26,LT

## 2023-11-15 PROCEDURE — 99284 EMERGENCY DEPT VISIT MOD MDM: CPT | Mod: 25

## 2023-11-15 PROCEDURE — 73560 X-RAY EXAM OF KNEE 1 OR 2: CPT | Mod: 26,LT

## 2023-11-15 PROCEDURE — 71046 X-RAY EXAM CHEST 2 VIEWS: CPT | Mod: 26

## 2023-11-15 PROCEDURE — 73030 X-RAY EXAM OF SHOULDER: CPT | Mod: 26,LT

## 2023-11-15 PROCEDURE — 73060 X-RAY EXAM OF HUMERUS: CPT

## 2023-11-15 PROCEDURE — 71046 X-RAY EXAM CHEST 2 VIEWS: CPT

## 2023-11-15 RX ORDER — OXYCODONE AND ACETAMINOPHEN 5; 325 MG/1; MG/1
1 TABLET ORAL ONCE
Refills: 0 | Status: DISCONTINUED | OUTPATIENT
Start: 2023-11-15 | End: 2023-11-15

## 2023-11-15 RX ORDER — ONDANSETRON 8 MG/1
4 TABLET, FILM COATED ORAL ONCE
Refills: 0 | Status: COMPLETED | OUTPATIENT
Start: 2023-11-15 | End: 2023-11-15

## 2023-11-15 RX ORDER — KETOROLAC TROMETHAMINE 30 MG/ML
30 SYRINGE (ML) INJECTION ONCE
Refills: 0 | Status: DISCONTINUED | OUTPATIENT
Start: 2023-11-15 | End: 2023-11-15

## 2023-11-15 RX ADMIN — OXYCODONE AND ACETAMINOPHEN 1 TABLET(S): 5; 325 TABLET ORAL at 10:07

## 2023-11-15 RX ADMIN — Medication 30 MILLIGRAM(S): at 10:07

## 2023-11-15 RX ADMIN — ONDANSETRON 4 MILLIGRAM(S): 8 TABLET, FILM COATED ORAL at 10:19

## 2023-11-15 RX ADMIN — OXYCODONE AND ACETAMINOPHEN 1 TABLET(S): 5; 325 TABLET ORAL at 07:59

## 2023-11-15 RX ADMIN — Medication 30 MILLIGRAM(S): at 07:59
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
## 2023-11-15 NOTE — ED PROVIDER NOTE - PHYSICAL EXAMINATION
VITAL SIGNS: I have reviewed nursing notes and confirm.  CONSTITUTIONAL: Well appearing, in no acute distress.   GCS 15 no midline cervical thoracic or lumbar tenderness has PSIS tenderness quadratus lumborum tenderness without hematoma no CVA tenderness no flank ecchymoses no crepitus posterior coastal margins no chest wall tenderness lungs are clear on exam patient does have proximal left humeral tenderness but has full range of motion in her left shoulder without wrist drop, distal pulses are intact patient is otherwise well-appearing and ambulatory, has mild  left knee tenderness without ecchymoses  SKIN:  warm and dry, no acute rash.   HEAD:  normocephalic, atraumatic.  EYES: EOM intact; conjunctiva and sclera clear.  ENT: No nasal discharge; airway clear.   NECK: Supple.  CARD: S1, S2, Regular rate and rhythm.   RESP:  Clear to auscultation b/l, no wheezes, rales or rhonchi.  ABD: Normal bowel sounds; soft; non-distended; non-tender; no guarding/ rebound.  EXT: Normal ROM. No peripheral edema. Pulses intact and equal b/l.  NEURO: Alert, oriented, grossly unremarkable  PSYCH: Cooperative, mood and affect appropriate.

## 2023-11-15 NOTE — ED ADULT NURSE REASSESSMENT NOTE - NS ED NURSE REASSESS COMMENT FT1
Received report from nightshift rn, pt reports pain 7/10, dizziness and nausea after ct scan. Pt resting comfortably in stretcher, A&Ox4, denies c/p, sob, f/c.

## 2023-11-15 NOTE — ED ADULT NURSE NOTE - OBJECTIVE STATEMENT
63 yo female c/o L shoulder, L hip pain. Pt reports she tripped on a step and fell onto her side. Denies LOC, thinners. No PMH. AAOx4, NAD, VSS on RA.

## 2023-11-15 NOTE — ED PROVIDER NOTE - OBJECTIVE STATEMENT
63-year-old female denies past medical history not on anticoagulation here for trip and fall.  Patient states she was in the storage facility tripped over a step landing on her left side onto the left shoulder now complaining of left hip pain left shoulder pain left knee pain.  Denies head injury LOC.  No neck pain midline back pain or paresthesias has also some left sided, lower back pain.  no midline lower back pain.  No paresthesias.

## 2023-11-15 NOTE — ED PROVIDER NOTE - PATIENT PORTAL LINK FT
You can access the FollowMyHealth Patient Portal offered by [facility] by registering at the following website: [email or web address]. By joining [facility]’s FollowMyHealth portal, you will also be able to view your health information using other applications (apps) compatible with our system.

## 2023-11-15 NOTE — ED ADULT TRIAGE NOTE - ARRIVAL INFO ADDITIONAL COMMENTS
pt tripped and fell last night on left side.    this am c/o bilateral knee, and left arm pain and left side pain

## 2023-11-22 ENCOUNTER — APPOINTMENT (OUTPATIENT)
Dept: INTERNAL MEDICINE | Facility: CLINIC | Age: 63
End: 2023-11-22
Payer: COMMERCIAL

## 2023-11-22 ENCOUNTER — APPOINTMENT (OUTPATIENT)
Dept: INTERNAL MEDICINE | Facility: CLINIC | Age: 63
End: 2023-11-22

## 2023-11-22 VITALS — TEMPERATURE: 98.2 F | HEART RATE: 71 BPM | OXYGEN SATURATION: 98 % | RESPIRATION RATE: 16 BRPM

## 2023-11-22 DIAGNOSIS — M79.604 PAIN IN RIGHT LEG: ICD-10-CM

## 2023-11-22 PROCEDURE — 99215 OFFICE O/P EST HI 40 MIN: CPT

## 2023-11-27 ENCOUNTER — RESULT REVIEW (OUTPATIENT)
Age: 63
End: 2023-11-27

## 2023-11-30 ENCOUNTER — OUTPATIENT (OUTPATIENT)
Dept: OUTPATIENT SERVICES | Facility: HOSPITAL | Age: 63
LOS: 1 days | End: 2023-11-30

## 2023-11-30 ENCOUNTER — APPOINTMENT (OUTPATIENT)
Dept: ULTRASOUND IMAGING | Facility: CLINIC | Age: 63
End: 2023-11-30
Payer: COMMERCIAL

## 2023-11-30 DIAGNOSIS — Z98.890 OTHER SPECIFIED POSTPROCEDURAL STATES: Chronic | ICD-10-CM

## 2023-11-30 DIAGNOSIS — Z90.13 ACQUIRED ABSENCE OF BILATERAL BREASTS AND NIPPLES: Chronic | ICD-10-CM

## 2023-11-30 PROCEDURE — 93970 EXTREMITY STUDY: CPT | Mod: 26

## 2023-12-07 ENCOUNTER — APPOINTMENT (OUTPATIENT)
Dept: VASCULAR SURGERY | Facility: CLINIC | Age: 63
End: 2023-12-07
Payer: COMMERCIAL

## 2023-12-07 VITALS — HEIGHT: 59 IN | WEIGHT: 237 LBS | BODY MASS INDEX: 47.78 KG/M2

## 2023-12-07 PROCEDURE — 93971 EXTREMITY STUDY: CPT

## 2023-12-07 PROCEDURE — 99204 OFFICE O/P NEW MOD 45 MIN: CPT

## 2023-12-07 RX ORDER — EPINEPHRINE 0.3 MG/.3ML
0.3 INJECTION INTRAMUSCULAR
Qty: 1 | Refills: 0 | Status: DISCONTINUED | COMMUNITY
Start: 2021-01-26 | End: 2023-12-07

## 2023-12-08 ENCOUNTER — OUTPATIENT (OUTPATIENT)
Dept: OUTPATIENT SERVICES | Facility: HOSPITAL | Age: 63
LOS: 1 days | End: 2023-12-08
Payer: COMMERCIAL

## 2023-12-08 ENCOUNTER — APPOINTMENT (OUTPATIENT)
Dept: ORTHOPEDIC SURGERY | Facility: CLINIC | Age: 63
End: 2023-12-08
Payer: COMMERCIAL

## 2023-12-08 ENCOUNTER — RESULT REVIEW (OUTPATIENT)
Age: 63
End: 2023-12-08

## 2023-12-08 ENCOUNTER — APPOINTMENT (OUTPATIENT)
Dept: INTERNAL MEDICINE | Facility: CLINIC | Age: 63
End: 2023-12-08
Payer: COMMERCIAL

## 2023-12-08 VITALS
WEIGHT: 237 LBS | TEMPERATURE: 98 F | HEIGHT: 59 IN | OXYGEN SATURATION: 96 % | BODY MASS INDEX: 47.78 KG/M2 | HEART RATE: 64 BPM

## 2023-12-08 VITALS
SYSTOLIC BLOOD PRESSURE: 138 MMHG | HEIGHT: 59 IN | OXYGEN SATURATION: 96 % | WEIGHT: 237 LBS | DIASTOLIC BLOOD PRESSURE: 86 MMHG | HEART RATE: 73 BPM | BODY MASS INDEX: 47.78 KG/M2

## 2023-12-08 VITALS — DIASTOLIC BLOOD PRESSURE: 60 MMHG | SYSTOLIC BLOOD PRESSURE: 130 MMHG

## 2023-12-08 DIAGNOSIS — Z74.09 OTHER REDUCED MOBILITY: ICD-10-CM

## 2023-12-08 DIAGNOSIS — Z90.13 ACQUIRED ABSENCE OF BILATERAL BREASTS AND NIPPLES: Chronic | ICD-10-CM

## 2023-12-08 PROCEDURE — 99214 OFFICE O/P EST MOD 30 MIN: CPT | Mod: 25

## 2023-12-08 PROCEDURE — 73564 X-RAY EXAM KNEE 4 OR MORE: CPT

## 2023-12-08 PROCEDURE — 76942 ECHO GUIDE FOR BIOPSY: CPT | Mod: RT

## 2023-12-08 PROCEDURE — 73564 X-RAY EXAM KNEE 4 OR MORE: CPT | Mod: 26,50

## 2023-12-08 PROCEDURE — 20612 ASPIRATE/INJ GANGLION CYST: CPT | Mod: RT

## 2023-12-08 RX ORDER — HYALURONATE SODIUM 20 MG/2 ML
20 SYRINGE (ML) INTRAARTICULAR
Qty: 2 | Refills: 0 | Status: ACTIVE | COMMUNITY
Start: 2023-12-08

## 2023-12-11 ENCOUNTER — APPOINTMENT (OUTPATIENT)
Dept: NEUROSURGERY | Facility: CLINIC | Age: 63
End: 2023-12-11
Payer: COMMERCIAL

## 2023-12-11 ENCOUNTER — NON-APPOINTMENT (OUTPATIENT)
Age: 63
End: 2023-12-11

## 2023-12-11 LAB
T4 FREE SERPL-MCNC: 1.2 NG/DL
TSH SERPL-ACNC: 15.5 UIU/ML

## 2023-12-11 PROCEDURE — 99441: CPT

## 2023-12-12 ENCOUNTER — APPOINTMENT (OUTPATIENT)
Dept: INTERNAL MEDICINE | Facility: CLINIC | Age: 63
End: 2023-12-12
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
## 2023-12-15 LAB
ANION GAP SERPL CALC-SCNC: 14 MMOL/L
BUN SERPL-MCNC: 20 MG/DL
CALCIUM SERPL-MCNC: 9.8 MG/DL
CHLORIDE SERPL-SCNC: 100 MMOL/L
CO2 SERPL-SCNC: 25 MMOL/L
CREAT SERPL-MCNC: 0.66 MG/DL
EGFR: 100 ML/MIN/1.73M2
GLUCOSE SERPL-MCNC: 147 MG/DL
POTASSIUM SERPL-SCNC: 4.2 MMOL/L
SODIUM SERPL-SCNC: 139 MMOL/L

## 2023-12-18 ENCOUNTER — OUTPATIENT (OUTPATIENT)
Dept: OUTPATIENT SERVICES | Facility: HOSPITAL | Age: 63
LOS: 1 days | End: 2023-12-18
Payer: COMMERCIAL

## 2023-12-18 ENCOUNTER — APPOINTMENT (OUTPATIENT)
Dept: SLEEP CENTER | Facility: HOSPITAL | Age: 63
End: 2023-12-18

## 2023-12-18 DIAGNOSIS — Z98.890 OTHER SPECIFIED POSTPROCEDURAL STATES: Chronic | ICD-10-CM

## 2023-12-18 DIAGNOSIS — G47.33 OBSTRUCTIVE SLEEP APNEA (ADULT) (PEDIATRIC): ICD-10-CM

## 2023-12-18 DIAGNOSIS — Z90.13 ACQUIRED ABSENCE OF BILATERAL BREASTS AND NIPPLES: Chronic | ICD-10-CM

## 2023-12-18 PROCEDURE — 95811 POLYSOM 6/>YRS CPAP 4/> PARM: CPT

## 2023-12-18 PROCEDURE — 95811 POLYSOM 6/>YRS CPAP 4/> PARM: CPT | Mod: 26

## 2023-12-22 ENCOUNTER — APPOINTMENT (OUTPATIENT)
Dept: INTERNAL MEDICINE | Facility: CLINIC | Age: 63
End: 2023-12-22

## 2023-12-29 ENCOUNTER — APPOINTMENT (OUTPATIENT)
Dept: INTERNAL MEDICINE | Facility: CLINIC | Age: 63
End: 2023-12-29

## 2024-01-03 ENCOUNTER — NON-APPOINTMENT (OUTPATIENT)
Age: 64
End: 2024-01-03

## 2024-01-08 ENCOUNTER — APPOINTMENT (OUTPATIENT)
Dept: INTERNAL MEDICINE | Facility: CLINIC | Age: 64
End: 2024-01-08
Payer: COMMERCIAL

## 2024-01-08 VITALS
SYSTOLIC BLOOD PRESSURE: 143 MMHG | BODY MASS INDEX: 46.37 KG/M2 | DIASTOLIC BLOOD PRESSURE: 72 MMHG | HEIGHT: 59 IN | RESPIRATION RATE: 14 BRPM | HEART RATE: 74 BPM | OXYGEN SATURATION: 98 % | WEIGHT: 230 LBS

## 2024-01-08 PROCEDURE — 99213 OFFICE O/P EST LOW 20 MIN: CPT | Mod: GC

## 2024-01-08 NOTE — END OF VISIT
[] : Resident [FreeTextEntry3] :  63 year old F presenting for follow up. Reports recent fatigue. Notes that sleep is still poor, CPAP settings need to be changed per recent sleep study, but she has had difficulty communicating with CPAP supply company to do so. On exam, fatigued appearing, ambulates w/ walker. Ordered labs today including TSH and CBC to further evaluate fatigue, she prefers to have them drawn when she is fasting at next visit so that we can also check TGs; advised her to come to lab sooner if symptoms progress prior to next visit.

## 2024-01-08 NOTE — PHYSICAL EXAM
[No Acute Distress] : no acute distress [Normal Sclera/Conjunctiva] : normal sclera/conjunctiva [Normal Outer Ear/Nose] : the outer ears and nose were normal in appearance [Normal Oropharynx] : the oropharynx was normal [No JVD] : no jugular venous distention [No Lymphadenopathy] : no lymphadenopathy [Supple] : supple [No Respiratory Distress] : no respiratory distress  [Clear to Auscultation] : lungs were clear to auscultation bilaterally [Normal Rate] : normal rate  [Regular Rhythm] : with a regular rhythm [No Edema] : there was no peripheral edema [Soft] : abdomen soft [Normal Affect] : the affect was normal [Normal Insight/Judgement] : insight and judgment were intact [de-identified] : glasses, prominent eyes [de-identified] : walks with rolling walker

## 2024-01-08 NOTE — HISTORY OF PRESENT ILLNESS
[FreeTextEntry1] : BP, fatigue [de-identified] : 64yo F PMH IgG4 disease, RP fibrosis, COPD, JESÚS (CPAP), HTN (measure on LLE), elilepsy (off meds), past breast ca (s/p b/l mastectomy, radiation/chemo), pituitary hyperplasia, T2DM, hypothyroidism who presents to follow up on blood pressure. Machine at home is still broken. She can only take BP on LLE due to mastectomy and vascular procedures on legs. Occasionally gets light headache, has blurry vision from thyroid eye disease, no CP. Stopped coffee 2 days ago. Uses CPAP but was told she needs new settings but needs company to change it.  Has been fatigued for last month. Takes her levothyyroxine on empty stomach and before her other medications. Sleep a little better, 4 hours but takes several naps throughout day and falls asleep. No change in mood.

## 2024-01-08 NOTE — REVIEW OF SYSTEMS
[Vision Problems] : vision problems [Fever] : no fever [Night Sweats] : no night sweats [Chest Pain] : no chest pain [Lower Ext Edema] : no lower extremity edema [Shortness Of Breath] : no shortness of breath 29.7 [Cough] : no cough [Dyspnea on Exertion] : no dyspnea on exertion [Nausea] : no nausea [Vomiting] : no vomiting [Skin Rash] : no skin rash [Anxiety] : no anxiety [Depression] : no depression

## 2024-01-12 ENCOUNTER — OUTPATIENT (OUTPATIENT)
Dept: OUTPATIENT SERVICES | Facility: HOSPITAL | Age: 64
LOS: 1 days | End: 2024-01-12
Payer: COMMERCIAL

## 2024-01-12 ENCOUNTER — APPOINTMENT (OUTPATIENT)
Dept: MRI IMAGING | Facility: HOSPITAL | Age: 64
End: 2024-01-12

## 2024-01-12 DIAGNOSIS — Z98.890 OTHER SPECIFIED POSTPROCEDURAL STATES: Chronic | ICD-10-CM

## 2024-01-12 DIAGNOSIS — Z90.13 ACQUIRED ABSENCE OF BILATERAL BREASTS AND NIPPLES: Chronic | ICD-10-CM

## 2024-01-12 PROCEDURE — 70553 MRI BRAIN STEM W/O & W/DYE: CPT

## 2024-01-12 PROCEDURE — 70553 MRI BRAIN STEM W/O & W/DYE: CPT | Mod: 26

## 2024-01-12 PROCEDURE — A9585: CPT

## 2024-01-16 PROBLEM — E23.7 PITUITARY LESION: Status: ACTIVE | Noted: 2022-12-06

## 2024-01-16 NOTE — REASON FOR VISIT
[Follow-Up: _____] : a [unfilled] follow-up visit [Home] : at home, [unfilled] , at the time of the visit. [Medical Office: ([facility])___] : at the medical office located in  [Patient] : the patient

## 2024-01-17 ENCOUNTER — APPOINTMENT (OUTPATIENT)
Dept: NEUROSURGERY | Facility: CLINIC | Age: 64
End: 2024-01-17
Payer: COMMERCIAL

## 2024-01-17 DIAGNOSIS — E23.7 DISORDER OF PITUITARY GLAND, UNSPECIFIED: ICD-10-CM

## 2024-01-17 PROCEDURE — 99212 OFFICE O/P EST SF 10 MIN: CPT | Mod: 95

## 2024-01-17 NOTE — DATA REVIEWED
[de-identified] : ACC: [id number]     EXAM:  MR SELLA ONLY WAW IC   ORDERED BY: SIMONA NEWMAN  PROCEDURE DATE:  01/12/2024    INTERPRETATION:  Multiplanar multisequence MR imaging of the brain with attention to the sella was performed before and after the administration of intravenous contrast.  Contrast dose: 7.5 cc gadolinium was administered intravenously  CLINICAL INFORMATION: Pituitary lesion. Known Graves' disease now hypothyroid.  COMPARISON: Sella MRI 11/6/2022 and 10/26/2021  FINDINGS: The pituitary gland is again mildly enlarged measuring 1.1 cm in height, totally symmetric in shape with convex superior border. No differential enhancement or signal heterogeneity to suggest pituitary neuroendocrine tumor. Appearance is most consistent with hyperplasia.  Infundibulum: The stalk is midline without thickening. Optic chiasm: Normal. Cavernous sinuses: Normal . Carotid flow voids: Normal. Intracranial contents: Within normal limits. Paranasal sinuses: Clear. Orbits: Redemonstrated enlargement and heterogeneous T2 signal and extraocular muscles compatible with thyroid orbitopathy.   IMPRESSION: Stable enlargement of the pituitary gland comparing back to October 2021 consistent with hyperplasia.  --- End of Report ---

## 2024-01-17 NOTE — HISTORY OF PRESENT ILLNESS
[FreeTextEntry1] : 64 y/o Female with PMHx of HTN, hyperlipidemia, GERD, COPD (on albuterol, Ellipta, azithromycin), JESÚS, history of epilepsy (no treatment), hypothyroidism (S/p ROBERTS for Grave's), history breast cancer (invasive ductal carcinoma s/p radiation, chemo, and b/l mastectomy in 2000, recent chemo port removal), chronic kidney mass (follows at [place], benign in origin), history of diverticulitis, prior rotator cuff tear, LE pain b/l (managed with cannabis), hx of Pituitary lesion dx in 1992 at [facility] (Dr. Noonan?), who presented as referral from Dr. Bernard to r-establish care of pituitary lesion.  12/6/22 pt presented to Dr. D'Amico: Endorses was dx with pituitary tumor 1992 at [facility] and since lost to f/u after neurosurgeon there retired (Dr. Noonan?). 11/6/22 MRI brain with contrast with report of stable lesion when compared to Oct 2021 study. Does not follow with endo but follows with PCP Dr. Isaacs for hx of Grave's disease; also with Dr. Joel Schmitt for opth exams. Endo labs ordered and recommended to repeat MRI 1 year.  Returns today for annual f/u apt and MRI review.  She endorses continued visual blurriness and black spots bilaterally that is not new or worsened. Denies worsening of peripheral vision. Follows with opth Dr. Joel Schmitt for thyroid eye disease. Last f/u was Sept 2023. Notes some sinus pressure headaches, but otherwise denies dizziness, other new/focal neuro deficits. Follows with PCP for thyroid levels, on replacement medication. Not following with endo.    PCP: Bety Isaacs-> Christina Underwood Onc: Meghan Bernard Neuro-opth: Joel Schmitt

## 2024-01-17 NOTE — PHYSICAL EXAM
[Oriented To Time, Place, And Person] : oriented to person, place, and time [Impaired Insight] : insight and judgment were intact [Neck Appearance] : the appearance of the neck was normal [] : no respiratory distress [Respiration, Rhythm And Depth] : normal respiratory rhythm and effort [Skin Color & Pigmentation] : normal skin color and pigmentation

## 2024-01-17 NOTE — ASSESSMENT
[FreeTextEntry1] : Patient's pituitary gland size has not changed since last year. Recommended that patient continue with current care. No further appointments necessary regarding the pituitary gland condition.  Dr. D'Amico independently reviewed all available images with patient.   PLAN: - Continue f/u with Dr. Bernard, follow up with PCP/endo for thyroid management - RTC prn   Patient verbalizes understanding of today's discussion and next steps in treatment plan.     A total of 15 minutes was spent reviewing the labs, imaging and physical examination of the patient. We discussed the diagnosis, and the plan. The patient's questions were answered. The patient demonstrated an excellent understanding of the plan.

## 2024-02-05 ENCOUNTER — APPOINTMENT (OUTPATIENT)
Dept: ORTHOPEDIC SURGERY | Facility: CLINIC | Age: 64
End: 2024-02-05
Payer: COMMERCIAL

## 2024-02-05 VITALS
HEIGHT: 59 IN | HEART RATE: 75 BPM | SYSTOLIC BLOOD PRESSURE: 145 MMHG | WEIGHT: 230 LBS | BODY MASS INDEX: 46.37 KG/M2 | OXYGEN SATURATION: 97 % | DIASTOLIC BLOOD PRESSURE: 82 MMHG

## 2024-02-05 PROCEDURE — 20611 DRAIN/INJ JOINT/BURSA W/US: CPT | Mod: 50

## 2024-02-05 NOTE — PROCEDURE
[de-identified] : ZAC VELEZ is a 63 year old female with bilateral knee OA who presents for viscosupplementation injection of bilateral knees. #1/3  No recent infection, fever or chills.  Ultrasound-guided intra-articular viscosupplementation injection of right knee:   Following a discussion of the risks (bleeding, infection) and benefits, verbal consent was obtained. Patient placed in supine position. The suprapatellar recess and surrounding structures (patella, femur, quadriceps tendon, suprapatellar fat pad and prefemoral fat pad) were visualized in SAX and LAX with Sonosite 15 Hz linear transducer.   Superolateral knee was anaesthetised with ethyl chloride spray. Under strict sterile technique the right knee was prepped with chlorhexadine. Using ultrasound guidance (superolateral approach) a 25 G 1.5 inch needle was inserted at the superolateral to the patella and a mixture of 1mL of 1% lidocaine and 2 mL of 0.5% Bupivacaine was injected subcutaneously.  After adequate anaesthesia, a 20 G 1.5 inch needle was inserted into the suprapatellar recess and 2 mL of Euflexxa HA gel was injected intra-articularly without resistance.   The use of direct ultrasound visualization was necessary to increase patient safety by identifying and avoiding inadvertent needle placement within the neurovascular and osteochondral structures. Additionally, the increased accuracy of needle placement may improve therapeutic efficacy and allow higher diagnostic specificity when evaluating the effectiveness of this injection.  Ultrasound-guided intra-articular viscosupplementation injection of left knee:   The suprapatellar recess and surrounding structures (patella, femur, quadriceps tendon, suprapatellar fat pad and prefemoral fat pad) were visualized in SAX and LAX with Sonosite 15 Hz linear transducer.   Superolateral knee was anaesthetised with ethyl chloride spray. Under strict sterile technique the right knee was prepped with chlorhexadine. Using ultrasound guidance (superolateral approach) a 25 G 1.5 inch needle was inserted at the superolateral to the patella and a mixture of 1mL of 1% lidocaine and 2 mL of 0.5% Bupivacaine was injected subcutaneously.  After adequate anaesthesia, a 20 G 1.5 inch needle was inserted into the suprapatellar recess and 2mL of Euflexxa HA gel was injected intra-articularly without resistance.   The use of direct ultrasound visualization was necessary to increase patient safety by identifying and avoiding inadvertent needle placement within the neurovascular and osteochondral structures. Additionally, the increased accuracy of needle placement may improve therapeutic efficacy and allow higher diagnostic specificity when evaluating the effectiveness of this injection.   The patient tolerated the procedures well. Post-injection instructions given (no strenuous activity for 48 hours, ice, elevate). Patient verbalized understanding. Follow up in 1 week for #2/3 injection.   EUFLEXXA BILATERAL KNEE JOINT  LOT: [id number] EXP: 2024-12-09 MAN: [employer] NDC: [id number]

## 2024-02-06 ENCOUNTER — RX RENEWAL (OUTPATIENT)
Age: 64
End: 2024-02-06

## 2024-02-06 RX ORDER — FAMOTIDINE 40 MG/1
40 TABLET, FILM COATED ORAL DAILY
Qty: 90 | Refills: 0 | Status: ACTIVE | COMMUNITY
Start: 2021-12-21 | End: 1900-01-01

## 2024-02-12 ENCOUNTER — APPOINTMENT (OUTPATIENT)
Dept: ORTHOPEDIC SURGERY | Facility: CLINIC | Age: 64
End: 2024-02-12
Payer: COMMERCIAL

## 2024-02-12 VITALS
OXYGEN SATURATION: 96 % | DIASTOLIC BLOOD PRESSURE: 79 MMHG | HEART RATE: 75 BPM | WEIGHT: 230 LBS | SYSTOLIC BLOOD PRESSURE: 163 MMHG | HEIGHT: 59 IN | BODY MASS INDEX: 46.37 KG/M2

## 2024-02-12 PROCEDURE — 20611 DRAIN/INJ JOINT/BURSA W/US: CPT | Mod: 50

## 2024-02-21 ENCOUNTER — APPOINTMENT (OUTPATIENT)
Dept: ORTHOPEDIC SURGERY | Facility: CLINIC | Age: 64
End: 2024-02-21
Payer: COMMERCIAL

## 2024-02-21 VITALS
HEART RATE: 88 BPM | WEIGHT: 230 LBS | BODY MASS INDEX: 46.37 KG/M2 | DIASTOLIC BLOOD PRESSURE: 75 MMHG | HEIGHT: 59 IN | SYSTOLIC BLOOD PRESSURE: 191 MMHG | OXYGEN SATURATION: 97 %

## 2024-02-21 VITALS — SYSTOLIC BLOOD PRESSURE: 166 MMHG | DIASTOLIC BLOOD PRESSURE: 116 MMHG

## 2024-02-21 PROCEDURE — 20611 DRAIN/INJ JOINT/BURSA W/US: CPT | Mod: 50

## 2024-02-21 NOTE — PROCEDURE
[de-identified] : ZAC VELEZ is a 63 year old female with bilateral knee OA who presents for viscosupplementation injection of bilateral knees. #2/3 No recent infection, fever or chills.  Ultrasound-guided intra-articular viscosupplementation injection of left knee:  Following a discussion of the risks (bleeding, infection) and benefits, verbal consent was obtained. Patient placed in supine position. The suprapatellar recess and surrounding structures (patella, femur, quadriceps tendon, suprapatellar fat pad and prefemoral fat pad) were visualized in SAX and LAX with Sonosite 15 Hz linear transducer.  Superolateral knee was anaesthetised with ethyl chloride spray. Under strict sterile technique the right knee was prepped with chlorhexadine. Using ultrasound guidance (superolateral approach) a 25 G 1.5 inch needle was inserted at the superolateral to the patella and a mixture of 1mL of 1% lidocaine and 2 mL of 0.5% Bupivacaine was injected subcutaneously. After adequate anaesthesia, a 20 G 1.5 inch needle was inserted into the suprapatellar recess and 2 mL of Euflexxa HA gel was injected intra-articularly without resistance.  The use of direct ultrasound visualization was necessary to increase patient safety by identifying and avoiding inadvertent needle placement within the neurovascular and osteochondral structures. Additionally, the increased accuracy of needle placement may improve therapeutic efficacy and allow higher diagnostic specificity when evaluating the effectiveness of this injection.  Ultrasound-guided intra-articular viscosupplementation injection of right knee:  The suprapatellar recess and surrounding structures (patella, femur, quadriceps tendon, suprapatellar fat pad and prefemoral fat pad) were visualized in SAX and LAX with Sonosite 15 Hz linear transducer.  Superolateral knee was anaesthetised with ethyl chloride spray. Under strict sterile technique the right knee was prepped with chlorhexadine. Using ultrasound guidance (superolateral approach) a 25 G 1.5 inch needle was inserted at the superolateral to the patella and a mixture of 1mL of 1% lidocaine and 2 mL of 0.5% Bupivacaine was injected subcutaneously. After adequate anaesthesia, a 20 G 1.5 inch needle was inserted into the suprapatellar recess and 2mL of Euflexxa HA gel was injected intra-articularly without resistance.  The use of direct ultrasound visualization was necessary to increase patient safety by identifying and avoiding inadvertent needle placement within the neurovascular and osteochondral structures. Additionally, the increased accuracy of needle placement may improve therapeutic efficacy and allow higher diagnostic specificity when evaluating the effectiveness of this injection.  The patient tolerated the procedures well. Post-injection instructions given (no strenuous activity for 48 hours, ice, elevate). Patient verbalized understanding. Referral for cane provided today. Follow up in 8 weeks.  EUFLEXXA BILATERAL KNEE JOINT  LOT: [id number] EXP: 2024-12-09 MAN: [employer] NDC: [address]-3.

## 2024-02-21 NOTE — PROCEDURE
[de-identified] : ZAC VELEZ is a 63 year old female with bilateral knee OA who presents for viscosupplementation injection of bilateral knees. No recent infection, fever or chills.  Ultrasound-guided intra-articular viscosupplementation injection of right knee:   Following a discussion of the risks (bleeding, infection) and benefits, verbal consent was obtained. Patient placed in supine position. The suprapatellar recess and surrounding structures (patella, femur, quadriceps tendon, suprapatellar fat pad and prefemoral fat pad) were visualized in SAX and LAX with Sonosite 15 Hz linear transducer.   Superolateral knee was anaesthetised with ethyl chloride spray. Under strict sterile technique the right knee was prepped with chlorhexadine. Using ultrasound guidance (superolateral approach) a 25 G 1.5 inch needle was inserted at the superolateral to the patella and a mixture of 1mL of 1% lidocaine and 2 mL of 0.5% Bupivacaine was injected subcutaneously. After adequate anaesthesia, 20 G 1.5 inch needle was inserted into the suprapatellar recess and 2 mL of Euflexxa HA gel was injected intra-articularly without resistance.   The use of direct ultrasound visualization was necessary to increase patient safety by identifying and avoiding inadvertent needle placement within the neurovascular and osteochondral structures. Additionally, the increased accuracy of needle placement may improve therapeutic efficacy and allow higher diagnostic specificity when evaluating the effectiveness of this injection.    Ultrasound-guided intra-articular viscosupplementation injection of left knee:    The suprapatellar recess and surrounding structures (patella, femur, quadriceps tendon, suprapatellar fat pad and prefemoral fat pad) were visualized in SAX and LAX with Sonosite 15 Hz linear transducer.   Superolateral knee was anaesthetised with ethyl chloride spray. Under strict sterile technique the right knee was prepped with chlorhexadine. Using ultrasound guidance (superolateral approach) a 25 G 1.5 inch needle was inserted at the superolateral to the patella and a mixture of 1mL of 1% lidocaine and 2 mL of 0.5% Bupivacaine was injected subcutaneously. After adequate anaesthesia, a 20 G 1.5 inch needle was inserted into the suprapatellar recess and 2 mL of Euflexxa HA gel was injected intra-articularly without resistance.   The use of direct ultrasound visualization was necessary to increase patient safety by identifying and avoiding inadvertent needle placement within the neurovascular and osteochondral structures. Additionally, the increased accuracy of needle placement may improve therapeutic efficacy and allow higher diagnostic specificity when evaluating the effectiveness of this injection.   The patient tolerated the procedures well. Post-injection instructions given (no strenuous activity for 48 hours, ice, elevate). Patient verbalized understanding. Follow up in 1 week for #3/3 HA injection.  EUFLEXXA BILATERAL KNEE JOINT  LOT:  [id number] EXP:  2024-12-09 MAN: [employer]  NDC: [id number]

## 2024-02-22 ENCOUNTER — NON-APPOINTMENT (OUTPATIENT)
Age: 64
End: 2024-02-22

## 2024-02-23 ENCOUNTER — APPOINTMENT (OUTPATIENT)
Dept: DERMATOLOGY | Facility: CLINIC | Age: 64
End: 2024-02-23
Payer: COMMERCIAL

## 2024-02-23 VITALS — BODY MASS INDEX: 47.25 KG/M2 | WEIGHT: 234.38 LBS | HEIGHT: 59 IN

## 2024-02-23 DIAGNOSIS — R22.41 LOCALIZED SWELLING, MASS AND LUMP, RIGHT LOWER LIMB: ICD-10-CM

## 2024-02-23 DIAGNOSIS — R52 PAIN, UNSPECIFIED: ICD-10-CM

## 2024-02-23 PROCEDURE — 99203 OFFICE O/P NEW LOW 30 MIN: CPT

## 2024-02-23 RX ORDER — LIDOCAINE 5% 700 MG/1
5 PATCH TOPICAL
Qty: 1 | Refills: 2 | Status: ACTIVE | COMMUNITY
Start: 2024-02-23 | End: 1900-01-01

## 2024-02-23 NOTE — ASSESSMENT
[FreeTextEntry1] : #Subcutaneous mass - right lower extremity clinical ddx: erythema nodosum, nodular vasculitis, lupus panniculitis or other CTD , sarcoidosis, other panniculitic processes vs. thromboplebitis. ? 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 ?Possible inflamed cyst although the lesion is not very well defined with palpation.    I recommended obtaining a formal ultrasound for further evaluation. Depending on findings, may plan to bring her back for biopsy and tissue culture. She is in agreement with this plan.   -U/S R LE, ordered

## 2024-02-23 NOTE — HISTORY OF PRESENT ILLNESS
[FreeTextEntry1] : NPV- Right leg mass [de-identified] : Eloisa Sapp is a 64 y/o F presenting for the above.  Exquisitely painful mass on her right calf x 1 month. The pain is limited within a round [place] on her right calf. She says it feels like pain coming from underneath, not her skin. However, she has significant tenderness just from light palpation.  Reports that she mentioned it to her orthopedic surgeon last week during an appointment for a baker cyst aspiration on the same leg . An U/S was performed during the appointment and reportedly showed a cyst and derm referral was recommended.  She has history of prior vascular procedures in that extremity.   PMH IgG4 disease, RP fibrosis, COPD, JESÚS (CPAP), HTN (measure on LLE), epilepsy (off meds), past breast ca (s/p b/l mastectomy, radiation/chemo), pituitary hyperplasia, T2DM, hypothyroidism

## 2024-02-23 NOTE — PHYSICAL EXAM
[Alert] : alert [Oriented x 3] : ~L oriented x 3 [Well Nourished] : well nourished [Conjunctiva Non-injected] : conjunctiva non-injected [No Visual Lymphadenopathy] : no visual  lymphadenopathy [No Clubbing] : no clubbing [No Edema] : no edema [No Bromhidrosis] : no bromhidrosis [No Chromhidrosis] : no chromhidrosis [Face] : Face [R Leg] : R Leg [L Leg] : L Leg [FreeTextEntry3] : right calf- circular edematous erythematous smooth plaque with ill defined ?subcutaneous mass   Very tender to light palpation

## 2024-02-25 ENCOUNTER — OUTPATIENT (OUTPATIENT)
Dept: OUTPATIENT SERVICES | Facility: HOSPITAL | Age: 64
LOS: 1 days | End: 2024-02-25
Payer: COMMERCIAL

## 2024-02-25 ENCOUNTER — APPOINTMENT (OUTPATIENT)
Dept: ULTRASOUND IMAGING | Facility: HOSPITAL | Age: 64
End: 2024-02-25
Payer: COMMERCIAL

## 2024-02-25 DIAGNOSIS — Z98.890 OTHER SPECIFIED POSTPROCEDURAL STATES: Chronic | ICD-10-CM

## 2024-02-25 DIAGNOSIS — Z90.13 ACQUIRED ABSENCE OF BILATERAL BREASTS AND NIPPLES: Chronic | ICD-10-CM

## 2024-02-25 PROCEDURE — 93971 EXTREMITY STUDY: CPT | Mod: 26,RT

## 2024-02-25 PROCEDURE — 93971 EXTREMITY STUDY: CPT

## 2024-02-25 PROCEDURE — 76882 US LMTD JT/FCL EVL NVASC XTR: CPT

## 2024-02-25 PROCEDURE — 76882 US LMTD JT/FCL EVL NVASC XTR: CPT | Mod: 26,59

## 2024-02-26 ENCOUNTER — APPOINTMENT (OUTPATIENT)
Dept: INTERNAL MEDICINE | Facility: CLINIC | Age: 64
End: 2024-02-26
Payer: COMMERCIAL

## 2024-02-26 VITALS
HEIGHT: 59 IN | WEIGHT: 237 LBS | OXYGEN SATURATION: 99 % | SYSTOLIC BLOOD PRESSURE: 150 MMHG | DIASTOLIC BLOOD PRESSURE: 87 MMHG | HEART RATE: 76 BPM | BODY MASS INDEX: 47.78 KG/M2 | TEMPERATURE: 97.4 F

## 2024-02-26 DIAGNOSIS — I83.819 VARICOSE VEINS OF UNSPECIFIED LOWER EXTREMITY WITH PAIN: ICD-10-CM

## 2024-02-26 DIAGNOSIS — R26.9 UNSPECIFIED ABNORMALITIES OF GAIT AND MOBILITY: ICD-10-CM

## 2024-02-26 DIAGNOSIS — G47.33 OBSTRUCTIVE SLEEP APNEA (ADULT) (PEDIATRIC): ICD-10-CM

## 2024-02-26 DIAGNOSIS — E03.9 HYPOTHYROIDISM, UNSPECIFIED: ICD-10-CM

## 2024-02-26 DIAGNOSIS — I83.90 ASYMPTOMATIC VARICOSE VEINS OF UNSPECIFIED LOWER EXTREMITY: ICD-10-CM

## 2024-02-26 DIAGNOSIS — R53.83 OTHER FATIGUE: ICD-10-CM

## 2024-02-26 PROCEDURE — 99215 OFFICE O/P EST HI 40 MIN: CPT | Mod: GC,25

## 2024-02-26 RX ORDER — TRIAMCINOLONE ACETONIDE 1 MG/G
0.1 OINTMENT TOPICAL TWICE DAILY
Qty: 1 | Refills: 1 | Status: ACTIVE | COMMUNITY
Start: 2022-02-10 | End: 1900-01-01

## 2024-02-26 RX ORDER — ONDANSETRON 4 MG/1
4 TABLET, ORALLY DISINTEGRATING ORAL EVERY 8 HOURS
Qty: 21 | Refills: 0 | Status: DISCONTINUED | COMMUNITY
Start: 2023-06-07 | End: 2024-02-26

## 2024-02-26 RX ORDER — HYDROCHLOROTHIAZIDE 12.5 MG/1
12.5 CAPSULE ORAL DAILY
Qty: 30 | Refills: 2 | Status: DISCONTINUED | COMMUNITY
Start: 2023-10-10 | End: 2024-02-26

## 2024-02-26 RX ORDER — OXYCODONE 5 MG/1
5 TABLET ORAL
Qty: 20 | Refills: 0 | Status: DISCONTINUED | COMMUNITY
Start: 2023-06-07 | End: 2024-02-26

## 2024-02-26 RX ORDER — UMECLIDINIUM BROMIDE AND VILANTEROL TRIFENATATE 62.5; 25 UG/1; UG/1
62.5-25 POWDER RESPIRATORY (INHALATION)
Qty: 1 | Refills: 11 | Status: DISCONTINUED | COMMUNITY
Start: 2021-05-19 | End: 2024-02-26

## 2024-02-26 RX ORDER — IBUPROFEN 600 MG/1
600 TABLET, FILM COATED ORAL 4 TIMES DAILY
Qty: 24 | Refills: 0 | Status: DISCONTINUED | COMMUNITY
Start: 2023-06-07 | End: 2024-02-26

## 2024-02-26 RX ORDER — GABAPENTIN 300 MG/1
300 CAPSULE ORAL TWICE DAILY
Qty: 60 | Refills: 5 | Status: DISCONTINUED | COMMUNITY
Start: 2022-12-22 | End: 2024-02-26

## 2024-02-27 ENCOUNTER — NON-APPOINTMENT (OUTPATIENT)
Age: 64
End: 2024-02-27

## 2024-02-28 ENCOUNTER — APPOINTMENT (OUTPATIENT)
Dept: ORTHOPEDIC SURGERY | Facility: CLINIC | Age: 64
End: 2024-02-28

## 2024-02-28 NOTE — PHYSICAL EXAM
[Normal Sclera/Conjunctiva] : normal sclera/conjunctiva [Normal Oropharynx] : the oropharynx was normal [No Lymphadenopathy] : no lymphadenopathy [Supple] : supple [No Respiratory Distress] : no respiratory distress  [Clear to Auscultation] : lungs were clear to auscultation bilaterally [Normal Rate] : normal rate  [No Murmur] : no murmur heard [Soft] : abdomen soft [Non Tender] : non-tender [de-identified] : R calf tender enlarged veins, tender posterior knee [de-identified] : gait favoring left leg [de-identified] : frustrated

## 2024-02-28 NOTE — HEALTH RISK ASSESSMENT
[0] : 2) Feeling down, depressed, or hopeless: Not at all (0) [PHQ-2 Negative - No further assessment needed] : PHQ-2 Negative - No further assessment needed [JTH2Tmktr] : 0

## 2024-02-28 NOTE — END OF VISIT
[] : Resident [Time Spent: ___ minutes] : I have spent [unfilled] minutes of time on the encounter. [FreeTextEntry3] :  63 year old F presenting for follow up of pain. Reports severe body pain, most prominent in R lower leg. Has superficial thrombophlebitis in that area, notes pain over the site of the clot as well as over other varicose veins in the area and behind her knee where baker's cyst recently reaccumulated (based on repeat u/s ordered by derm). Also notes shooting nerve pain in both legs, bilateral knee OA for which she sees Dr. Tena. Sees an outside pain specialist for her back, is prescribed MSK relaxant by that provider, does not take it every day. Well appearing on exam, became frustrated and tearful when discussing recent medical issues, feels that her providers don't listen to her. Also frustrated about ongoing issues getting CPAP supplies and settings corrected. Denies depression, but notes that her pain is interfering with her daily functioning, she is not interested in pursuing disability application, would like to keep on working. RLE with focal nodule anterior lower shin w/ mild erythema in area that superficial clot was confirmed on 2 recent ultrasounds. Varicosities RLE below knee are soft, tender to palpation, no erythema. Baker's cyst posterior R knee tender to palpation. Discussed multifactorial nature of her lower extremity pain. Referred to vascular for another opinion regarding pain in RLE varicose veins. Recommended f/u with Dr. Tena for recurrent baker's cysts and knee OA. For chronic neuropathic pain, will resume neuropathic agent. Tried gabapentin in the past without success, no improvement in pain and caused some daytime sleepiness. Will try pregabalin at night. 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 RTC 1 month, earlier prn.

## 2024-02-28 NOTE — REVIEW OF SYSTEMS
[Joint Pain] : joint pain [Fever] : no fever [Chest Pain] : no chest pain [Chills] : no chills [Palpitations] : no palpitations [Shortness Of Breath] : no shortness of breath [Cough] : no cough [Abdominal Pain] : no abdominal pain [Skin Rash] : no skin rash

## 2024-02-28 NOTE — HISTORY OF PRESENT ILLNESS
[FreeTextEntry1] : follow up [de-identified] : 68yo F IgG4, RP fibrosis, COPD, JESÚS, htn (measured on LLE), epilepsy, past breast cancer, thyroid presenting for BP follow up. Reports continued leg pain, she feels her cyst has come back. Having trouble getting CPAP machine figurd out, they keep sending the wrong machine parts.

## 2024-02-29 ENCOUNTER — APPOINTMENT (OUTPATIENT)
Dept: VASCULAR SURGERY | Facility: CLINIC | Age: 64
End: 2024-02-29
Payer: COMMERCIAL

## 2024-02-29 VITALS
SYSTOLIC BLOOD PRESSURE: 156 MMHG | DIASTOLIC BLOOD PRESSURE: 78 MMHG | HEIGHT: 59 IN | HEART RATE: 78 BPM | BODY MASS INDEX: 47.78 KG/M2 | WEIGHT: 237 LBS

## 2024-02-29 DIAGNOSIS — R20.0 ANESTHESIA OF SKIN: ICD-10-CM

## 2024-02-29 PROCEDURE — 99214 OFFICE O/P EST MOD 30 MIN: CPT

## 2024-03-01 ENCOUNTER — NON-APPOINTMENT (OUTPATIENT)
Age: 64
End: 2024-03-01

## 2024-03-01 ENCOUNTER — APPOINTMENT (OUTPATIENT)
Dept: ORTHOPEDIC SURGERY | Facility: CLINIC | Age: 64
End: 2024-03-01
Payer: COMMERCIAL

## 2024-03-01 VITALS
HEART RATE: 78 BPM | DIASTOLIC BLOOD PRESSURE: 92 MMHG | WEIGHT: 237 LBS | SYSTOLIC BLOOD PRESSURE: 151 MMHG | BODY MASS INDEX: 47.78 KG/M2 | OXYGEN SATURATION: 97 % | HEIGHT: 59 IN

## 2024-03-01 PROBLEM — R20.0 BILATERAL HAND NUMBNESS: Status: ACTIVE | Noted: 2022-11-09

## 2024-03-01 LAB
ALBUMIN SERPL ELPH-MCNC: 4.3 G/DL
ALP BLD-CCNC: 121 U/L
ALT SERPL-CCNC: 25 U/L
ANION GAP SERPL CALC-SCNC: 13 MMOL/L
AST SERPL-CCNC: 18 U/L
BILIRUB SERPL-MCNC: 0.3 MG/DL
BUN SERPL-MCNC: 16 MG/DL
CALCIUM SERPL-MCNC: 9.4 MG/DL
CHLORIDE SERPL-SCNC: 101 MMOL/L
CHOLEST SERPL-MCNC: 140 MG/DL
CO2 SERPL-SCNC: 24 MMOL/L
CREAT SERPL-MCNC: 0.62 MG/DL
EGFR: 101 ML/MIN/1.73M2
ESTIMATED AVERAGE GLUCOSE: 166 MG/DL
GLUCOSE SERPL-MCNC: 129 MG/DL
HBA1C MFR BLD HPLC: 7.4 %
HCT VFR BLD CALC: 40 %
HDLC SERPL-MCNC: 36 MG/DL
HGB BLD-MCNC: 12.3 G/DL
LDLC SERPL CALC-MCNC: 71 MG/DL
MCHC RBC-ENTMCNC: 26.3 PG
MCHC RBC-ENTMCNC: 30.8 GM/DL
MCV RBC AUTO: 85.7 FL
NONHDLC SERPL-MCNC: 104 MG/DL
PLATELET # BLD AUTO: 330 K/UL
POTASSIUM SERPL-SCNC: 4.4 MMOL/L
PROT SERPL-MCNC: 7.4 G/DL
RBC # BLD: 4.67 M/UL
RBC # FLD: 15.7 %
SODIUM SERPL-SCNC: 138 MMOL/L
TRIGL SERPL-MCNC: 193 MG/DL
TSH SERPL-ACNC: 0.49 UIU/ML
WBC # FLD AUTO: 5.65 K/UL

## 2024-03-01 PROCEDURE — 20611 DRAIN/INJ JOINT/BURSA W/US: CPT | Mod: RT

## 2024-03-01 NOTE — PROCEDURE
[de-identified] : ZAC VELEZ is a 63 year old female with posterior right knee pain and swelling.  Last visit was 2/21/24 at which time she completed HA gel injection (Euflexxa) series.  She had right Baker's cyst aspirated followed by [facility] on 12/8/24.  Duplex US right LE on 2/25/24 demonstrated no evidence of right lower extremity deep venous thrombosis. Superficial venous thrombosis within the right mid calf greater saphenous vein. Right popliteal fossa Baker's cyst measuring 4.9 x 0.9 x 4.5 cm. No focal mass within the posterior calf in the area of clinical concern.  Ultrasound-guided aspiration of right popliteal cyst:   Following a discussion of the risks (bleeding, infection, reaccumulation of fluid) and benefits, verbal consent was obtained. Patient placed in prone position. A multiloculated Baker's cyst and surrounding structures (Semitendinosus tendon, Semimembranosus tendon, medial head of gastroc tendon and posterior medial femoral condyle) were visualized in SAX and LAX with Sonosite 15 Hz linear transducer.   Popliteal area was anaesthetised with ethyl chloride spray then prepped with chlorhexadine .  A 25G 1.5 inch needed was inserted and 4mL 0.5% bupivacaine was injected subcutaneously and along tract towards the popliteal cyst under US guidance in long axis. After adequate anaesthesia, an 18 G 3.5 inch needle was inserted along the tract into the popliteal cyst. Needle location was confirmed with US and following aspiration of 4 mL of clear, yellow serous fluid, a mixture of 1mL 40mg triamcinolone, 2mL of 1% lidocaine and 4mL of 0.5% bupivacaine was injected into the residual cyst.     The use of direct ultrasound visualization was necessary to increase patient safety by identifying and avoiding inadvertent needle placement within the neurovascular and osteochondral structures. Additionally, the increased accuracy of needle placement may improve therapeutic efficacy and allow higher diagnostic specificity when evaluating the effectiveness of this injection.  The patient tolerated the procedure well. Post-injection instructions given (no strenuous activity for 48 hours, ice, elevate). Patient verbalized understanding. ACE wrap applied. Limited US of the posterior left knee was performed. There was no evidence of a Baker's cyst. Follow in 4 weeks.

## 2024-03-03 NOTE — PHYSICAL EXAM
[Respiratory Effort] : normal respiratory effort [Normal Heart Sounds] : normal heart sounds [2+] : right 2+ [Alert] : alert [Calm] : calm [Varicose Veins Of Lower Extremities] : not present [Ankle Swelling (On Exam)] : not present [] : not present [Abdomen Tenderness] : ~T ~M No abdominal tenderness [de-identified] : WN/WD, NAD [de-identified] : NC/AT [de-identified] : supple [de-identified] : +FROM 5/5x4 [de-identified] : RLE with erythematous, indurated single vein over anterior lower leg

## 2024-03-03 NOTE — HISTORY OF PRESENT ILLNESS
[FreeTextEntry1] : 63yoF with multiple medical conditions who was originally seen in December, 2023, returns for reevaluation. She underwent bilateral LEs venous ablation and after RLE procedure and sclerotherapy with Varithena with another provider after which she developed pain over her right lower leg. She had venous doppler done on 11/30/23 that demonstrated no evidence of DVT, there is thrombosis of the right greater saphenous vein at the site of right calf pain and Baker's cyst. She denies previous history of SVT/DVT, CP, SOB, claudication. Venous doppler in our office demonstrated evidence of DVT, GSV is closed (s/p ablation at OSH), SVT in varicose vein of calf, Baker's cyst. She was referred to an orthopedic specialist to be evaluated for her pain and Baker's cyst and in interim she had another US and was told that "she has a clot" and she returns today to discuss it. She continues to experience right leg pain and she noticed "a painful lump" on her lower right leg. She is scheduled for right Baker's cyst drainage.  She ambulates with a walker.

## 2024-03-03 NOTE — ADDENDUM
[FreeTextEntry1] : I agree with the above. Venous duplex US shows small focal SVT in medial calf. Would do warm compresses. Has known closed R GSV from prior ablation procedure. She is scheduled for R bakers cyst drainage. No other acute vascular surgical intervention needed at this time.    I, Dr. Sergio Paz, personally performed the evaluation and management (E/M) services for this established patient who presents today with (an) existing condition(s).  That E/M includes conducting the examination, assessing all conditions, and (re)establishing/reinforcing a plan of care.  Today, my ACP, Sadaf MERIDA, was here to observe my evaluation and management services for this condition to be followed going forward.

## 2024-03-03 NOTE — ASSESSMENT
[Arterial/Venous Disease] : arterial/venous disease [FreeTextEntry1] : 63yoF with multiple medical conditions who was originally seen in December, 2023, returns for reevaluation. She continues to experience right leg pain and she noticed "a painful lump" on her lower right leg. She is scheduled for right Baker's cyst drainage.  On exam, she appears well with palpable femoral, popliteal and pedal pulses. Her feet are warm with good capillary refill. Her legs are mildly swollen. She has some tenderness in her anterior right calf below the knee. Limited R lower leg US was done in the office that demonstrated SVT of a small vein over her lower anterior leg. We discussed the findings and explained that no vascular intervention is needed at this time. She should continue warm compresses to the area. We also went over findings on her previous US, all questions were answered to patient's satisfaction.

## 2024-03-03 NOTE — PROCEDURE
[FreeTextEntry1] : Limited R lower leg US was done in the office that demonstrated SVT of a small vein over her lower anterior leg.

## 2024-03-04 ENCOUNTER — APPOINTMENT (OUTPATIENT)
Dept: ORTHOPEDIC SURGERY | Facility: CLINIC | Age: 64
End: 2024-03-04

## 2024-03-05 ENCOUNTER — TRANSCRIPTION ENCOUNTER (OUTPATIENT)
Age: 64
End: 2024-03-05

## 2024-03-11 ENCOUNTER — APPOINTMENT (OUTPATIENT)
Dept: RHEUMATOLOGY | Facility: CLINIC | Age: 64
End: 2024-03-11
Payer: COMMERCIAL

## 2024-03-11 VITALS
BODY MASS INDEX: 45.96 KG/M2 | DIASTOLIC BLOOD PRESSURE: 78 MMHG | OXYGEN SATURATION: 98 % | SYSTOLIC BLOOD PRESSURE: 156 MMHG | HEIGHT: 59 IN | TEMPERATURE: 97.8 F | WEIGHT: 228 LBS | HEART RATE: 80 BPM

## 2024-03-11 PROCEDURE — 99214 OFFICE O/P EST MOD 30 MIN: CPT

## 2024-03-11 RX ORDER — LOSARTAN POTASSIUM 100 MG/1
100 TABLET, FILM COATED ORAL DAILY
Qty: 90 | Refills: 2 | Status: DISCONTINUED | COMMUNITY
Start: 2021-01-26 | End: 2024-03-11

## 2024-03-11 NOTE — REVIEW OF SYSTEMS
[Negative] : Heme/Lymph [Joint Pain] : joint pain [Arthralgias] : arthralgias [FreeTextEntry9] : knee pain

## 2024-03-11 NOTE — ASSESSMENT
[FreeTextEntry1] : 63-year-old woman with history of retroperitoneal mass noted in 1998, initially biopsied in 2008 which was nondiagnostic although suspicious for IgG4 related disease, which was not yet described at that time. Patient also with elevated serum IgG4 which raises the suspicion for IgG4 related disease as etiology of retroperitoneal fibrosis. Patient follows closely with urologist, with monitoring with imaging, which appears to be stable as per PET and CT. patient with stable renal function. Patient recently presented at the ER with chest pain and difficulty breathing noted  low blood pressure and bradycardia as well as chest wall pain post emesis. Patient was admitted to the hospital overnight for observation, taken off blood pressure medications and given lidocaine patches for chest wall upon discharge. Patient also following with orthopedist, s/p HLA injections x 3 in each knee as well as aspiration of baker's cyst. Given history of IgG4, previous discussion of treatment with steroids as well as rituximab, patient concerned about side effects of medications, and given stability of findings, will continue to monitor clinically at this time, however with low threshold to start treatment if develops symptoms or progression noted on follow-up imaging. Patient will see urologist this week, patient will follow-up in 2 months or sooner as needed.

## 2024-03-11 NOTE — PHYSICAL EXAM
[General Appearance - Alert] : alert [General Appearance - In No Acute Distress] : in no acute distress [General Appearance - Well Nourished] : well nourished [General Appearance - Well Developed] : well developed [General Appearance - Well-Appearing] : healthy appearing [Sclera] : the sclera and conjunctiva were normal [Exaggerated Use Of Accessory Muscles For Inspiration] : no accessory muscle use [Respiration, Rhythm And Depth] : normal respiratory rhythm and effort [Edema] : there was no peripheral edema [Abnormal Walk] : normal gait [Nail Clubbing] : no clubbing  or cyanosis of the fingernails [Motor Tone] : muscle strength and tone were normal [] : no rash [Impaired Insight] : insight and judgment were intact [Oriented To Time, Place, And Person] : oriented to person, place, and time [Affect] : the affect was normal [Examination Of The Oral Cavity] : the lips and gums were normal [Oropharynx] : the oropharynx was normal [Neck Cervical Mass (___cm)] : no neck mass was observed [Auscultation Breath Sounds / Voice Sounds] : lungs were clear to auscultation bilaterally [FreeTextEntry1] : Crepitus of the bilateral knees left more than right with pain with flexion and extension, decreased flexion.  No effusion noted no joint line tenderness.  Negative straight leg raises bilaterally.  No active synovitis of the PIPs MCPs wrists.  Decreased range of motion of bilateral shoulders as well at extremes of rotation, decreased flexion of the lumbar spine.

## 2024-03-11 NOTE — ADDENDUM
[FreeTextEntry1] : I, Eduardo Obrien, documented this note as a scribe on behalf of Dr. Mckayla Berg MD on 03/11/2024.

## 2024-03-11 NOTE — HISTORY OF PRESENT ILLNESS
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 Tena +superficial venous thrombosis, evaluated by vascular as well, treated with warm compresses, compression Blood pressure in office today (156/78), reports normal for patient who sometimes has systolic around 190 Previously had gel injections in bilateral knees with no benefit, feels worsening in knee pain Patient is not doing PT therapy at home, feels can do it at home Follow up with PCP Dr. 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 Blunt Patient with mild retroperitoneal mass which was first identified 1998, presented with obstructive uropathy, status post biopsy January 9, 2008. Pathology at that time suggested a benign fibrotic retroperitoneal process, Also history of breast cancer, invasive ductal carcinoma, status post radiation, chemo, and bilateral mastectomy in 2000 History of Graves' disease status post radioactive iodine treatment continues Synthroid, multiple eye surgeries History of psoriasis, scalp face back of the ear treated with topical DJD of the knees as well as chronic back pain, follows with pain management PET scan completed August 29, 2022: Masslike periureteral fat stranding and soft tissue density about the proximal ureters unchanged demonstrating minimal uptake.  Limited differential diagnosis is the same as described on prior CT including retroperitoneal fibrosis and malignancy.  Focal uptake within the lesion most likely represents misregistered physiologic urinary uptake within the ureter tissue typing of one of the lesions may be of benefit Reviewed x-rays of the knees, moderate DJD, discussed possible injections, patient is not interested at this time Reviewed previous records Reviewed previous imaging and labs. IgG4 elevated There was discussion of treatment with steroids and possible rituximab for her underlying condition however given stability of lesions since 1998, will continue to monitor clinically as well as with imaging Patient evaluated by Heme-onc as well. Patient with recent hospitalization from June 2 to June 5 for abdominal pain, diagnosed with epiploic appendagitis

## 2024-03-11 NOTE — END OF VISIT
[FreeTextEntry3] : All medical record entries made by the Scribe were at my, Dr. Mckayla Berg MD, direction and personally dictated by me on 03/11/2024. I have reviewed the chart and agree that the record accurately reflects my personal performance of the history, physical exam, assessment and plan. I have also personally directed, reviewed, and agreed with the chart. [Time Spent: ___ minutes] : I have spent [unfilled] minutes of time on the encounter.

## 2024-03-17 ENCOUNTER — NON-APPOINTMENT (OUTPATIENT)
Age: 64
End: 2024-03-17

## 2024-03-21 ENCOUNTER — APPOINTMENT (OUTPATIENT)
Dept: PULMONOLOGY | Facility: CLINIC | Age: 64
End: 2024-03-21
Payer: COMMERCIAL

## 2024-03-21 ENCOUNTER — APPOINTMENT (OUTPATIENT)
Dept: DERMATOLOGY | Facility: CLINIC | Age: 64
End: 2024-03-21

## 2024-03-21 VITALS
SYSTOLIC BLOOD PRESSURE: 146 MMHG | TEMPERATURE: 98.2 F | DIASTOLIC BLOOD PRESSURE: 77 MMHG | RESPIRATION RATE: 12 BRPM | BODY MASS INDEX: 46.16 KG/M2 | HEART RATE: 78 BPM | OXYGEN SATURATION: 97 % | WEIGHT: 229 LBS | HEIGHT: 59 IN

## 2024-03-21 DIAGNOSIS — G47.33 OBSTRUCTIVE SLEEP APNEA (ADULT) (PEDIATRIC): ICD-10-CM

## 2024-03-21 DIAGNOSIS — J44.9 CHRONIC OBSTRUCTIVE PULMONARY DISEASE, UNSPECIFIED: ICD-10-CM

## 2024-03-21 PROCEDURE — 99214 OFFICE O/P EST MOD 30 MIN: CPT

## 2024-03-21 PROCEDURE — G2211 COMPLEX E/M VISIT ADD ON: CPT

## 2024-03-21 RX ORDER — AZITHROMYCIN 250 MG/1
250 TABLET, FILM COATED ORAL
Qty: 30 | Refills: 5 | Status: ACTIVE | COMMUNITY
Start: 2022-06-16 | End: 1900-01-01

## 2024-03-21 RX ORDER — ALBUTEROL SULFATE 2.5 MG/3ML
(2.5 MG/3ML) SOLUTION RESPIRATORY (INHALATION) DAILY
Qty: 1 | Refills: 0 | Status: ACTIVE | COMMUNITY
Start: 2023-09-25 | End: 1900-01-01

## 2024-03-21 RX ORDER — ALBUTEROL SULFATE 90 UG/1
108 (90 BASE) INHALANT RESPIRATORY (INHALATION)
Qty: 1 | Refills: 11 | Status: ACTIVE | COMMUNITY
Start: 2021-03-18 | End: 1900-01-01

## 2024-03-21 RX ORDER — UMECLIDINIUM BROMIDE AND VILANTEROL TRIFENATATE 62.5; 25 UG/1; UG/1
62.5-25 POWDER RESPIRATORY (INHALATION)
Qty: 3 | Refills: 3 | Status: ACTIVE | COMMUNITY
Start: 2021-05-12 | End: 1900-01-01

## 2024-03-24 PROBLEM — J44.9 CHRONIC OBSTRUCTIVE PULMONARY DISEASE, UNSPECIFIED COPD TYPE: Status: ACTIVE | Noted: 2021-01-26

## 2024-03-24 PROBLEM — G47.33 OSA (OBSTRUCTIVE SLEEP APNEA): Status: ACTIVE | Noted: 2021-05-12

## 2024-03-24 NOTE — HISTORY OF PRESENT ILLNESS
[Former] : former [Never] : never [TextBox_13] : 26 [TextBox_4] : The patient is a 63 year old F with PMHx of IgG4 related disease, RP fibrosis, COPD, JESÚS (on PAP), HTN, Epilepsy, Breast CA (s/p mastectomy, RT, CTX), pituitary hyperplasia, Type II DM, hypothyroidism, COPD, former patient of Dr. Isaacs's following up today to establish care he has left practice. Went to the ER for chest pain w/ difficulty breathing recently. Felt to have inflamed chest muscle after throwing up. Given lidocaine patches. Had extensive testing during ER visit including CTA that were all negative per patient. Symptoms have improved. Continues to take Anoro Ellipta, albuterol PRN. Ran out of Azithromycin about 1 month ago. Previously on triple therapy but states she was taken off by ICS by Dr. Isaacs and started on Azithromycin due to frequent exacerbations. Some worsening of breathing in past week (using albuterol daily) which she attributes to weather changes. Uses CPAP and has benefit from it. Has not used it in the past month due to issue with insurance company not covering heated tubing.   DME: [employer] [facility]  Mask: FFM [TextBox_11] : 1.5 [YearQuit] : 2000 [ESS] : 4

## 2024-03-24 NOTE — ASSESSMENT
[FreeTextEntry1] : Reviewed PFT 6/7/2022: FEV1 106, FEV1/, DLCO 65 HST 1/2022: AHI 12.7, t89 6.6 minutes CT chest 7/2022: Subsegmental atelectasis in the lingula with adjacent bronchiectasis. No significant interval changes in small linear area of mucous plugging within the apical posterior segment of the left upper lobe. CT Chest 7/2023: Left lingula linear atelectasis and/or parenchymal scarring similar to prior. Stable tubular shaped nodule within the apical posterior segment of the left upper lobe. PFT 11/08/2023: , FEV1/, TLC 94, DLCO 75 CPAP titration study 12/18/2023: Best mask F-P Vitera M, full face. JESÚS corrected at 10 cm H2O. AirView 2/2024-3/2024: Average use of 5 hours 16 minutes, 37% adherence of >4 hours per night of use, therapeutic AHI of 2.2, minimal leak   The patient is a 63-year-old F, former smoker with PMHx of IgG4 related disease, RP fibrosis, COPD, JESÚS (on PAP), HTN, Epilepsy, Breast CA (s/p mastectomy, RT, CTX), pituitary hyperplasia, Type II DM, hypothyroidism, COPD, former patient of Dr. Isaacs's following up today to establish care he has left practice.   COPD, smoking hx: No exacerbations since she was last seen. Continue LAMA/LABA and Azithromycin three times weekly (refills sent today). Was previously on triple therapy but ICS was stopped due to frequent exacerbations. Continue albuterol PRN. Was hospitalized at [facility] 2 weeks ago for CP; will request records including EKG and CTA done during admission. Does not qualify for lung cancer screening program as she quit smoking >15 years ago. Patient states PFTs were done in January but no records in our EMR, will repeat this summer.   Mild JESÚS: Has benefit when she uses PAP. Had some issues with insurance recently not covering heated tubing. AHI therapeutic, adherence suboptimal but motivated to increase use.   Follow-up 6/2024.

## 2024-03-24 NOTE — CONSULT LETTER
[Courtesy Letter:] : I had the pleasure of seeing your patient, [unfilled], in my office today. [Please see my note below.] : Please see my note below. [Dear  ___] : Dear  [unfilled], [Consult Closing:] : Thank you very much for allowing me to participate in the care of this patient.  If you have any questions, please do not hesitate to contact me. [Sincerely,] : Sincerely, [FreeTextEntry3] : Lillian Echavarria MD  [employer] [facility] Pulmonary, Critical Care, and Sleep Medicine

## 2024-03-25 ENCOUNTER — APPOINTMENT (OUTPATIENT)
Dept: INTERNAL MEDICINE | Facility: CLINIC | Age: 64
End: 2024-03-25

## 2024-03-27 ENCOUNTER — APPOINTMENT (OUTPATIENT)
Dept: DERMATOLOGY | Facility: CLINIC | Age: 64
End: 2024-03-27
Payer: COMMERCIAL

## 2024-03-27 ENCOUNTER — APPOINTMENT (OUTPATIENT)
Dept: ORTHOPEDIC SURGERY | Facility: CLINIC | Age: 64
End: 2024-03-27
Payer: COMMERCIAL

## 2024-03-27 VITALS
DIASTOLIC BLOOD PRESSURE: 83 MMHG | HEART RATE: 77 BPM | SYSTOLIC BLOOD PRESSURE: 150 MMHG | HEIGHT: 59 IN | OXYGEN SATURATION: 96 % | BODY MASS INDEX: 46.16 KG/M2 | WEIGHT: 229 LBS

## 2024-03-27 DIAGNOSIS — L30.9 DERMATITIS, UNSPECIFIED: ICD-10-CM

## 2024-03-27 DIAGNOSIS — L21.9 SEBORRHEIC DERMATITIS, UNSPECIFIED: ICD-10-CM

## 2024-03-27 PROCEDURE — 99214 OFFICE O/P EST MOD 30 MIN: CPT

## 2024-03-27 PROCEDURE — 20612 ASPIRATE/INJ GANGLION CYST: CPT | Mod: 1L,RT

## 2024-03-27 PROCEDURE — 99213 OFFICE O/P EST LOW 20 MIN: CPT | Mod: 25

## 2024-03-27 PROCEDURE — 76942 ECHO GUIDE FOR BIOPSY: CPT | Mod: RT

## 2024-03-27 RX ORDER — FLUOCINONIDE 0.5 MG/ML
0.05 SOLUTION TOPICAL
Qty: 1 | Refills: 2 | Status: ACTIVE | COMMUNITY
Start: 2024-03-27 | End: 1900-01-01

## 2024-03-27 RX ORDER — KETOCONAZOLE 20 MG/G
2 CREAM TOPICAL
Qty: 60 | Refills: 2 | Status: ACTIVE | COMMUNITY
Start: 2024-03-27 | End: 1900-01-01

## 2024-03-27 RX ORDER — TRIAMCINOLONE ACETONIDE 1 MG/G
0.1 CREAM TOPICAL
Qty: 1 | Refills: 1 | Status: ACTIVE | COMMUNITY
Start: 2024-03-27 | End: 1900-01-01

## 2024-03-27 NOTE — ASSESSMENT
[FreeTextEntry1] : #Superficial thrombophlebitis- right calf Improved, continue to monitor.  Rest of management for osteoarthritis and bake cysts, per sports medicine  #  Seborrheic dermatitis Patient reassured this is a benign dermatitis caused by yeast overgrowth.  Discussed the typical chronic nature of this condition with periods of waxing and waning.    For the scalp: - Ketoconazole 2% shampoo to entire scalp every other day.  Patient was instructed to leave on for 5-10 minutes before rinsing off. - Lidex solution to affected areas BID for 2 weeks on and 2 weeks off as needed for itching.   #Dermatitis - upper arms -triamcinolone 0.1% cream BID for 2 weeks, then as needed -gentle skin care and daily moisturization with thick moisturizer such as vanicream  RTC prn

## 2024-03-27 NOTE — PHYSICAL EXAM
[FreeTextEntry3] : R calf- hyperpigmented indurated nodule, smaller compared to prior exam. Not tender to palpation  Posterior R knee- no cutaneous abnormality visualized, no unilateral leg swelling or concerning skin finding  Involving the scalp are ill-defined pink patches with flaky scale.  Posterior upper arms- ill defined papules and scaly plaques

## 2024-03-27 NOTE — HISTORY OF PRESENT ILLNESS
[FreeTextEntry1] : RPV- follow up [de-identified] : ZAC VELEZ 62yo F follow up. LV 2/23/24 for right medial calf lesion.   U/S on 2/23/24 of right medial calf c/w superficial venous thrombosis.  This lesion has improved with warm compresses, no longer very bothersome.  Complains of pain on back of R knee following guided aspiration of synovial cyst and HA gel injections.  Has follow up appt with sports med today.   2) Has seb derm of scalp. Wants refill of ketoconazole shampoo , lidex  3) itchy rash back of arms.   PMH IgG4 disease, RP fibrosis, COPD, JESÚS (CPAP), HTN (measure on LLE), epilepsy (off meds), past breast ca (s/p b/l mastectomy, radiation/chemo), pituitary hyperplasia, T2DM, hypothyroidism

## 2024-03-28 ENCOUNTER — LABORATORY RESULT (OUTPATIENT)
Age: 64
End: 2024-03-28

## 2024-03-28 ENCOUNTER — APPOINTMENT (OUTPATIENT)
Dept: INTERNAL MEDICINE | Facility: CLINIC | Age: 64
End: 2024-03-28
Payer: COMMERCIAL

## 2024-03-28 VITALS
HEART RATE: 85 BPM | BODY MASS INDEX: 45.96 KG/M2 | DIASTOLIC BLOOD PRESSURE: 82 MMHG | WEIGHT: 228 LBS | OXYGEN SATURATION: 96 % | HEIGHT: 59 IN | TEMPERATURE: 97.7 F | SYSTOLIC BLOOD PRESSURE: 161 MMHG

## 2024-03-28 DIAGNOSIS — G57.93 UNSPECIFIED MONONEUROPATHY OF BILATERAL LOWER LIMBS: ICD-10-CM

## 2024-03-28 PROCEDURE — 99214 OFFICE O/P EST MOD 30 MIN: CPT | Mod: 25

## 2024-03-28 PROCEDURE — G0444 DEPRESSION SCREEN ANNUAL: CPT | Mod: 59

## 2024-03-28 RX ORDER — DULOXETINE HYDROCHLORIDE 20 MG/1
20 CAPSULE, DELAYED RELEASE PELLETS ORAL TWICE DAILY
Qty: 60 | Refills: 1 | Status: ACTIVE | COMMUNITY
Start: 2024-03-28 | End: 1900-01-01

## 2024-03-28 RX ORDER — PREGABALIN 25 MG/1
25 CAPSULE ORAL
Qty: 60 | Refills: 1 | Status: DISCONTINUED | COMMUNITY
Start: 2024-02-26 | End: 2024-03-28

## 2024-03-28 NOTE — REVIEW OF SYSTEMS
[Joint Pain] : joint pain [Fever] : no fever [Palpitations] : no palpitations [Chest Pain] : no chest pain [Shortness Of Breath] : no shortness of breath [Cough] : no cough [Abdominal Pain] : no abdominal pain [Headache] : no headache

## 2024-03-28 NOTE — PHYSICAL EXAM
[No Acute Distress] : no acute distress [Well Nourished] : well nourished [Well Developed] : well developed [Well-Appearing] : well-appearing [Normal Sclera/Conjunctiva] : normal sclera/conjunctiva [EOMI] : extraocular movements intact [Supple] : supple [Normal Oropharynx] : the oropharynx was normal [No Respiratory Distress] : no respiratory distress  [No Accessory Muscle Use] : no accessory muscle use [Clear to Auscultation] : lungs were clear to auscultation bilaterally [Normal Rate] : normal rate  [Regular Rhythm] : with a regular rhythm [Normal S1, S2] : normal S1 and S2 [Soft] : abdomen soft [Normal Bowel Sounds] : normal bowel sounds [Coordination Grossly Intact] : coordination grossly intact [Normal Affect] : the affect was normal [Normal Insight/Judgement] : insight and judgment were intact [de-identified] : abnormal gait per baseline no acute changes

## 2024-03-28 NOTE — PHYSICAL EXAM
[No Acute Distress] : no acute distress [Well Nourished] : well nourished [Well Developed] : well developed [Well-Appearing] : well-appearing [Normal Sclera/Conjunctiva] : normal sclera/conjunctiva [EOMI] : extraocular movements intact [Normal Oropharynx] : the oropharynx was normal [Supple] : supple [No Respiratory Distress] : no respiratory distress  [No Accessory Muscle Use] : no accessory muscle use [Clear to Auscultation] : lungs were clear to auscultation bilaterally [Normal Rate] : normal rate  [Regular Rhythm] : with a regular rhythm [Normal S1, S2] : normal S1 and S2 [Soft] : abdomen soft [Normal Bowel Sounds] : normal bowel sounds [Coordination Grossly Intact] : coordination grossly intact [Normal Affect] : the affect was normal [Normal Insight/Judgement] : insight and judgment were intact [de-identified] : abnormal gait per baseline no acute changes

## 2024-03-28 NOTE — PLAN
[FreeTextEntry1] : 66yo F IgG4, RP fibrosis, COPD, JESÚS, htn (measured on LLE), epilepsy, past breast cancer, thyroid presenting for BP follow up and follow up after recent hospitalization.  HTN (hypertension) (401.9) (I10) Can only take BP on L calf due to hx of b/l mastectomy and varicose vein procedures on b/l legs. Patient recently admitted to [facility], had hypotension to 90/60s briefly, discharged only on amlodipine 10 mg. Previously was also on losartan 100 mg and HCTz 25 mg. Patient not amenable to taking more than amlodipine 10 mg but says she will keep daily BP log at home.  -2-3 week BP follow up with home BP log -continue amlodipine 10 mg for now; if remains HTN, may need to add 2nd agent again  Neuropathy involving both lower extremities (356.9) (G57.93) Pt has been off of gabapentin for some time now, reportedly due to past kidney issues. Pregabalin not covered by insurance. -Resent prescription for Cymbalta 20 mg daily  Fatigue (780.79) (R53.83) 2 month of fatigue. Sleeps 2-4hrs at night, but CPAP not working and due to multiple etiologies of pain. TSH wnl last visit.  JESÚS on CPAP (327.23) (G47.33) -Recently saw Pulm; continue to follow their recommendations  Abnormal gait (781.2) (R26.9) gave pt Rx to have walker repaired or replaced     - also reportedly got an Rx for a cane from another provider but likely would benefit more     from walker  Baker's cyst (727.51) (M71.20) Has large R sided bakers cyst of recent dopplers. Recently saw orthopedics for drainage.  Hypothyroid (244.9) (E03.9) Hx of hypothyroid and thyroid eye disease.     Does not follow with Endo     TSH 15 last visit.     Fatigue since last visit as well.     - takes 175 mcg mond-fri and 200 on sat/sun--has been doing this for about 1.5 wks     only     - c/w regimen above     - check TSH today  Right calf pain (729.5) (M79.661) Multiple etiologies of R lower leg pain but causing severe symptoms, decreased QOL     and much distress for pt who is tearful in office.     - pain due to bakers cyst and OA and venous vessel pain (from IgG4 disease?)     - Vascular referral (wants to see different provider) for another opinion on large tender     varicose veins, has had successful treatment of varicose veins on thighs before and     interested in treatment of symptomatic varicose veins in lower legs     - Ortho for bakers cyst  RTC in 2-3 weeks

## 2024-03-28 NOTE — PLAN
[FreeTextEntry1] : 68yo F IgG4, RP fibrosis, COPD, JESÚS, htn (measured on LLE), epilepsy, past breast cancer, thyroid presenting for BP follow up and follow up after recent hospitalization.  HTN (hypertension) (401.9) (I10) Can only take BP on L calf due to hx of b/l mastectomy and varicose vein procedures on b/l legs. Patient recently admitted to [facility], had hypotension to 90/60s briefly, discharged only on amlodipine 10 mg. Previously was also on losartan 100 mg and HCTz 25 mg. Patient not amenable to taking more than amlodipine 10 mg but says she will keep daily BP log at home.  -2-3 week BP follow up with home BP log -continue amlodipine 10 mg for now; if remains HTN, may need to add 2nd agent again  Neuropathy involving both lower extremities (356.9) (G57.93) Pt has been off of gabapentin for some time now, reportedly due to past kidney issues. Pregabalin not covered by insurance. -Resent prescription for Cymbalta 20 mg daily  Fatigue (780.79) (R53.83) 2 month of fatigue. Sleeps 2-4hrs at night, but CPAP not working and due to multiple etiologies of pain. TSH wnl last visit.  JESÚS on CPAP (327.23) (G47.33) -Recently saw Pulm; continue to follow their recommendations  Abnormal gait (781.2) (R26.9) gave pt Rx to have walker repaired or replaced     - also reportedly got an Rx for a cane from another provider but likely would benefit more     from walker  Baker's cyst (727.51) (M71.20) Has large R sided bakers cyst of recent dopplers. Recently saw orthopedics for drainage.  Hypothyroid (244.9) (E03.9) Hx of hypothyroid and thyroid eye disease.     Does not follow with Endo     TSH 15 last visit.     Fatigue since last visit as well.     - takes 175 mcg mond-fri and 200 on sat/sun--has been doing this for about 1.5 wks     only     - c/w regimen above     - check TSH today  Right calf pain (729.5) (M79.661) Multiple etiologies of R lower leg pain but causing severe symptoms, decreased QOL     and much distress for pt who is tearful in office.     - pain due to bakers cyst and OA and venous vessel pain (from IgG4 disease?)     - Vascular referral (wants to see different provider) for another opinion on large tender     varicose veins, has had successful treatment of varicose veins on thighs before and     interested in treatment of symptomatic varicose veins in lower legs     - Ortho for bakers cyst  RTC in 2-3 weeks

## 2024-03-30 NOTE — ASSESSMENT
[FreeTextEntry1] : ZAC VELEZ is a 63 year old female with right knee pain. I discussed with the patient that their symptoms, signs, and imaging are most consistent with osteoarthritis, Baker's cyst and possible venous thrombosis. We reviewed the natural history of this condition and treatment options. We agreed on the following plan:  US guided aspiration as detailed above. Ace wrap applied today. Encouraged to start home exercises.  Handout provided. Start physical therapy.  Referral provided Medication: Continue with extra strength Tylenol 1 g 4 times daily as needed.  Nabumetone twice daily as needed prescription provided. Imaging: Duplex US right lower extremity to rule out DVT Follow up after testing.

## 2024-03-30 NOTE — PROCEDURE
[de-identified] : Ultrasound-guided aspiration of right popliteal cyst:   Following a discussion of the risks (bleeding, infection, reaccumulation of fluid) and benefits, verbal consent was obtained. Patient placed in prone position.The baker's cyst and surrounding structures (Semitendinosus tendon, Semimembranosus tendon, medial head of gastroc tendon and posterior medial femoral condyle) were visualized in SAX and LAX with Sonosite 15 Hz linear transducer.   Popliteal area was anaesthetised with ethyl chloride spray then prepped with chlorhexadine .  A  25G 1.5 inch needed was inserted and 4mL 0.5% bupivacaine was injected subcutaneously and along tract towards the popliteal cyst under US guidance in long axis. After adequate anaesthesia, an 18 G 3.5 inch needle was inserted along the tract into the popliteal cyst. Needle location was confirmed with US and following aspiration of 4 mL of clear, yellow serous fluid, a mixture of 1 mL of 1% lidocaine and 2 mL of 0.5% bupivacaine was injected into the residual cyst.     The use of direct ultrasound visualization was necessary to increase patient safety by identifying and avoiding inadvertent needle placement within the neurovascular and osteochondral structures. Additionally, the increased accuracy of needle placement may improve therapeutic efficacy and allow higher diagnostic specificity when evaluating the effectiveness of this injection.  The patient tolerated the procedure well. Post-injection instructions given (no strenuous activity for 48 hours, ice, elevate). Patient verbalized understanding.

## 2024-03-30 NOTE — HISTORY OF PRESENT ILLNESS
[de-identified] : ZAC VELEZ is a 63 year old female presents to follow up with right knee pain. Last visit was 03/01/24 at which time patient had US guided aspiration of popliteal synovial cyst and CSI. She had HA gel injections Feb 2024. Had 3-4 days relief from CSI but pain has returned in posterior knee radiating into calf. Taking Acetaminophen and Tramadol with limited relief. Had tried Meloxicam but it did not help.   Duplex US right LE (2/25/24) No evidence of right lower extremity deep venous thrombosis. Superficial venous thrombosis within the right mid calf greater saphenous vein is again seen. Right popliteal fossa Baker's cyst measuring 4.9 x 0.9 x 4.5 cm. No focal mass within the posterior calf in the area of clinical concern.

## 2024-03-30 NOTE — PHYSICAL EXAM
[de-identified] : General: Well-nourished, well-developed, alert, and in no acute distress. Head: Normocephalic. Eyes: Pupils equal, extraocular muscles intact, normal sclera. Nose: No nasal discharge. Cardiovascular: Extremities are warm and well perfused. Distal pulses are symmetric bilaterally. Respiratory: No labored breathing. Extremities: Sensation is intact distally bilaterally. Distal pulses are symmetric bilaterally Lymphatic: No regional lymphadenopathy, no lymphedema Neurologic: No focal deficits Skin: Normal skin color, texture, and turgor Psychiatric: Normal affect  MSK: Examination of [right] knee:  Gait shuffling, antalagic Mild effusion No erythema, hematoma or skin lesion Tender to palpation: medial joint line, lateral joint line, popliteal fossa Nontender to palpation: medial patellar facet, lateral patellar facet, quad tendon, patellar tendon, pes, Gerdy's tubercle, tibial tuberosity, hamstrings, ITB No warmth Baker's cyst palpable (pain) ROM: 0-90 [Mild] patellar crepitus  Right calf tender to palpation. Shravan's negative.

## 2024-04-02 LAB
ANACR T: NEGATIVE
CCP AB SER IA-ACNC: <8 UNITS
CK SERPL-CCNC: 44 U/L
IGG4 SER-MCNC: 317.8 MG/DL
RF+CCP IGG SER-IMP: NEGATIVE
RHEUMATOID FACT SER QL: <10 IU/ML

## 2024-04-02 NOTE — HEALTH RISK ASSESSMENT
[0] : 2) Feeling down, depressed, or hopeless: Not at all (0) [PHQ-2 Negative - No further assessment needed] : PHQ-2 Negative - No further assessment needed [[id number]] : 0

## 2024-04-02 NOTE — HEALTH RISK ASSESSMENT
[0] : 1) Little interest or pleasure doing things: Not at all (0) [PHQ-2 Negative - No further assessment needed] : PHQ-2 Negative - No further assessment needed [[id number]] : 0

## 2024-04-02 NOTE — END OF VISIT
[] : Resident [FreeTextEntry3] : 63M w/HTN, recent hospitalization for ?costochondritis, here for BP check HTN - BP elevated

## 2024-04-02 NOTE — HISTORY OF PRESENT ILLNESS
[de-identified] : 62yo F IgG4, RP fibrosis, COPD, JESÚS, htn (measured on LLE), epilepsy, past breast cancer, thyroid presenting for BP follow up and follow up after recent hospitalization. Two weeks ago she felt SOB, lightheaded, and had some chest pain and was admitted at [facility]. She was diagnosed with costochondritis and was discharged only on amlodipine 10 mg as she was hypotensive during her hospitalization. She recently had her popliteal cyst drained. She still has not received Cymbalta for her neuropathy, as pregabalin is not covered by her insurance. She currently is not taking home BP readings consistently.   [FreeTextEntry1] : Follow up

## 2024-04-02 NOTE — HISTORY OF PRESENT ILLNESS
[de-identified] : 64yo F IgG4, RP fibrosis, COPD, JESÚS, htn (measured on LLE), epilepsy, past breast cancer, thyroid presenting for BP follow up and follow up after recent hospitalization. Two weeks ago she felt SOB, lightheaded, and had some chest pain and was admitted at [facility]. She was diagnosed with costochondritis and was discharged only on amlodipine 10 mg as she was hypotensive during her hospitalization. She recently had her popliteal cyst drained. She still has not received Cymbalta for her neuropathy, as pregabalin is not covered by her insurance. She currently is not taking home BP readings consistently.   [FreeTextEntry1] : Follow up

## 2024-04-03 LAB
RF IGA SER-ACNC: <5 U
RF IGG SER-ACNC: <5 U
RF IGM SER-ACNC: <5 U

## 2024-04-05 ENCOUNTER — APPOINTMENT (OUTPATIENT)
Dept: ULTRASOUND IMAGING | Facility: HOSPITAL | Age: 64
End: 2024-04-05
Payer: COMMERCIAL

## 2024-04-05 ENCOUNTER — TRANSCRIPTION ENCOUNTER (OUTPATIENT)
Age: 64
End: 2024-04-05

## 2024-04-05 ENCOUNTER — OUTPATIENT (OUTPATIENT)
Dept: OUTPATIENT SERVICES | Facility: HOSPITAL | Age: 64
LOS: 1 days | End: 2024-04-05
Payer: COMMERCIAL

## 2024-04-05 DIAGNOSIS — Z90.13 ACQUIRED ABSENCE OF BILATERAL BREASTS AND NIPPLES: Chronic | ICD-10-CM

## 2024-04-05 DIAGNOSIS — Z98.890 OTHER SPECIFIED POSTPROCEDURAL STATES: Chronic | ICD-10-CM

## 2024-04-05 PROCEDURE — 93971 EXTREMITY STUDY: CPT

## 2024-04-05 PROCEDURE — 93971 EXTREMITY STUDY: CPT | Mod: 26,RT

## 2024-04-10 ENCOUNTER — APPOINTMENT (OUTPATIENT)
Dept: ORTHOPEDIC SURGERY | Facility: CLINIC | Age: 64
End: 2024-04-10
Payer: COMMERCIAL

## 2024-04-10 VITALS
HEART RATE: 77 BPM | OXYGEN SATURATION: 94 % | BODY MASS INDEX: 45.96 KG/M2 | WEIGHT: 228 LBS | DIASTOLIC BLOOD PRESSURE: 77 MMHG | HEIGHT: 59 IN | SYSTOLIC BLOOD PRESSURE: 159 MMHG

## 2024-04-10 DIAGNOSIS — M79.89 OTHER SPECIFIED SOFT TISSUE DISORDERS: ICD-10-CM

## 2024-04-10 DIAGNOSIS — I80.00 PHLEBITIS AND THROMBOPHLEBITIS OF SUPERFICIAL VESSELS OF UNSPECIFIED LOWER EXTREMITY: ICD-10-CM

## 2024-04-10 DIAGNOSIS — M71.21 SYNOVIAL CYST OF POPLITEAL SPACE [BAKER], RIGHT KNEE: ICD-10-CM

## 2024-04-10 PROCEDURE — 99214 OFFICE O/P EST MOD 30 MIN: CPT

## 2024-04-11 ENCOUNTER — APPOINTMENT (OUTPATIENT)
Dept: INTERNAL MEDICINE | Facility: CLINIC | Age: 64
End: 2024-04-11
Payer: COMMERCIAL

## 2024-04-11 VITALS
TEMPERATURE: 98.2 F | DIASTOLIC BLOOD PRESSURE: 94 MMHG | WEIGHT: 228 LBS | BODY MASS INDEX: 45.96 KG/M2 | OXYGEN SATURATION: 97 % | HEIGHT: 59 IN | SYSTOLIC BLOOD PRESSURE: 158 MMHG | HEART RATE: 84 BPM

## 2024-04-11 DIAGNOSIS — I10 ESSENTIAL (PRIMARY) HYPERTENSION: ICD-10-CM

## 2024-04-11 LAB
ALBUMIN MFR SERPL ELPH: 54.7 %
ALBUMIN SERPL-MCNC: 3.9 G/DL
ALBUMIN/GLOB SERPL: 1.2 RATIO
ALPHA1 GLOB MFR SERPL ELPH: 4.2 %
ALPHA1 GLOB SERPL ELPH-MCNC: 0.3 G/DL
ALPHA2 GLOB MFR SERPL ELPH: 11.2 %
ALPHA2 GLOB SERPL ELPH-MCNC: 0.8 G/DL
B-GLOBULIN MFR SERPL ELPH: 19.3 %
B-GLOBULIN SERPL ELPH-MCNC: 1.4 G/DL
GAMMA GLOB FLD ELPH-MCNC: 0.8 G/DL
GAMMA GLOB MFR SERPL ELPH: 10.6 %
INTERPRETATION SERPL IEP-IMP: NORMAL
M PROTEIN MFR SERPL ELPH: NORMAL
MONOCLON BAND OBS SERPL: NORMAL
PROT SERPL-MCNC: 7.1 G/DL
PROT SERPL-MCNC: 7.1 G/DL

## 2024-04-11 PROCEDURE — G2211 COMPLEX E/M VISIT ADD ON: CPT

## 2024-04-11 PROCEDURE — G0444 DEPRESSION SCREEN ANNUAL: CPT | Mod: 59

## 2024-04-11 PROCEDURE — 99214 OFFICE O/P EST MOD 30 MIN: CPT | Mod: GC

## 2024-04-11 RX ORDER — LOSARTAN POTASSIUM 25 MG/1
25 TABLET, FILM COATED ORAL DAILY
Qty: 1 | Refills: 1 | Status: ACTIVE | COMMUNITY
Start: 2024-04-11 | End: 1900-01-01

## 2024-04-11 RX ORDER — HYDROCHLOROTHIAZIDE 25 MG/1
25 TABLET ORAL
Refills: 0 | Status: COMPLETED | COMMUNITY
End: 2024-04-11

## 2024-04-12 ENCOUNTER — APPOINTMENT (OUTPATIENT)
Dept: ORTHOPEDIC SURGERY | Facility: CLINIC | Age: 64
End: 2024-04-12
Payer: COMMERCIAL

## 2024-04-12 DIAGNOSIS — M79.661 PAIN IN RIGHT LOWER LEG: ICD-10-CM

## 2024-04-12 DIAGNOSIS — M71.21 SYNOVIAL CYST OF POPLITEAL SPACE [BAKER], RIGHT KNEE: ICD-10-CM

## 2024-04-12 PROBLEM — M79.89 SWELLING OF LOWER EXTREMITY: Status: ACTIVE | Noted: 2021-08-12

## 2024-04-12 PROBLEM — I80.00 SUPERFICIAL THROMBOPHLEBITIS OF LOWER EXTREMITY, UNSPECIFIED LATERALITY: Status: ACTIVE | Noted: 2023-12-08

## 2024-04-12 PROCEDURE — 99204 OFFICE O/P NEW MOD 45 MIN: CPT

## 2024-04-12 NOTE — DISCUSSION/SUMMARY

## 2024-04-12 NOTE — HISTORY OF PRESENT ILLNESS
[FreeTextEntry1] : This is a 64-year-old female with a history of IgG4, RP fibrosis, COPD, JESÚS, HTN (measured on LLE), epilepsy, past breast cancer, thyroid who has been having right more than left leg pain.  She was seen by Dr. Tena and was found to have significant arthritic changes.  She ended up having ultrasound as well as drainage of her popliteal cyst.  She also has had viscosupplementation in bilateral knees.  She says that this will close supplementation has helped her motion but not with her pain.  She also has a history of varicose veins and had sclerosis.  She has had cyst on the medial side of her right leg by an area of a superstitious Chilo thrombosis.  She had an ultrasound of her leg in order to look at her veins as well as of the popliteal cyst and then was sent to me for further evaluation.  It hurts her to walk all the time and both legs.  She has had chronic pain. [Worsening] : worsening [___ mths] : [unfilled] month(s) ago [4] : currently ~his/her~ pain is 4 out of 10 [Bending] : not exacerbated by bending [Direct Pressure] : not exacerbated by direct pressure

## 2024-04-12 NOTE — PHYSICAL EXAM
[No Acute Distress] : no acute distress [Well-Appearing] : well-appearing [Supple] : supple [No Respiratory Distress] : no respiratory distress  [No Edema] : there was no peripheral edema [No Extremity Clubbing/Cyanosis] : no extremity clubbing/cyanosis [Grossly Normal Strength/Tone] : grossly normal strength/tone [Coordination Grossly Intact] : coordination grossly intact [No Focal Deficits] : no focal deficits [Normal Gait] : normal gait [Normal] : affect was normal and insight and judgment were intact [de-identified] : ambulates with rollator

## 2024-04-12 NOTE — HEALTH RISK ASSESSMENT
[0] : 2) Feeling down, depressed, or hopeless: Not at all (0) [PHQ-2 Negative - No further assessment needed] : PHQ-2 Negative - No further assessment needed [de-identified] : >5 minutes spent  [[id number]] : 0

## 2024-04-12 NOTE — CONSULT LETTER
[Dear  ___] : Dear  [unfilled], [Consult Letter:] : I had the pleasure of evaluating your patient, [unfilled]. [Please see my note below.] : Please see my note below. [Consult Closing:] : Thank you very much for allowing me to participate in the care of this patient.  If you have any questions, please do not hesitate to contact me. [Sincerely,] : Sincerely, [FreeTextEntry3] : Dorothy Tena MD

## 2024-04-12 NOTE — HISTORY OF PRESENT ILLNESS
[FreeTextEntry1] : follow up  [de-identified] : Patient is a 63yo F with PMH of IgG4, RP fibrosis, COPD, JESÚS, HTN (measured on LLE), epilepsy, past breast cancer, thyroid presenting for follow-up.  Labs drawn during last visit showing some abnormalities, waiting to hear back from Dr. Berg. Has an appointment scheduled for May. Continues to have significant chronic pain, fatigue. Additionally with elevated BP. During the last visit, opted for amlodipine monotherapy after being hospitalized iso hypotension and infection. Now, given BP elevated on monotherapy, is agreeable to starting lower dose of second antihypertensive medication.

## 2024-04-12 NOTE — PHYSICAL EXAM
[FreeTextEntry1] : On exam the patient has generalized pain in her knee.  She is overweight and so has significant soft tissue behind the knee.  She is tender throughout without any palpable firm masses.  She also is tender laterally as well as and throughout the rest of the leg.  She has range of motion of 0 to 85 degrees with pain past this.  She also has a 2 cm area over the medial leg by the saphenous vein which is tender and somewhat discolored with no obvious erythema.  She has bilateral neuropathy but is not showing any signs of paresthesias right now. [General Appearance - Well-Appearing] : Well appearing [General Appearance - Well Nourished] : well nourished [Oriented To Time, Place, And Person] : Oriented to person, place, and time [Sclera] : the sclera and conjunctiva were normal [Neck Cervical Mass (___cm)] : no neck mass was observed [Heart Rate And Rhythm] : heart rate was normal and rhythm regular [] : No respiratory distress [Abdomen Soft] : Soft [Normal Station and Gait] : gait and station were normal [Tenderness] : tenderness [Swelling] : swelling [Skin Changes - Describe changes:] : No skin changes noted [Normal] : Palpable DP and PT pulses, warm and pink with brisk capillary refill [Full ROM Unless otherwise noted:] : Full range of motion unless otherwise noted: [LE  Motor Strength Normal unless otherwise noted:] : 5/5 strength in bilateral lower extemities unless otherwise noted.

## 2024-04-12 NOTE — DISCUSSION/SUMMARY
Date of Service: 05/26/2023    CHIEF COMPLAINT:    Follow up on ADD.      HISTORY OF PRESENT ILLNESS:    This is a 27-year-old male who just underwent neuropsych evaluation and was found to have ADHD, inattentive type.  He does have some anxiety due to the fact of performance at work due to his ADHD.  Otherwise, no significant history of alcohol abuse or control medication abuse neither.    PHYSICAL EXAMINATION:    VITAL SIGNS:  Stable.  Please see flowsheet.  PSYCHIATRIC:  His affect is normal and speech is fluent.  Thought processes are logical.  NEUROLOGICAL:  Cranial nerves 2-12 intact.  Gait is normal and speech is fluent.    ASSESSMENT AND PLAN:    Attention deficit hyperactivity disorder, predominantly inattentive type.  We will start him on Adderall at 10 mg b.i.d.  He will let me know if that is ineffective at his next refill.  Otherwise, he did sign the medication management contract today.  He could not give a urine sample today since he just went to the bathroom.  Therefore, we will do it next time.  I will see him back in 3 months.      Dictated By: Martell Rebolledo MD  Signing Provider: Martell Rebolledo MD    DBS/tk ([id number])   DD: 05/30/2023 12:33:23 PM TD: 05/31/2023 5:27:15 AM       [All Questions Answered] : Patient (and family) had all questions answered to an agreeable level of satisfaction [Interested in Proceeding] : Patient (and family) expressed understanding and interest in proceeding with the plan as outlined [de-identified] :   Patient has a popliteal cyst in the back of the right knee.  There is a 1.2 cm cyst which should not be at all and however there may be something which is full of either old blood or proteinaceous material from a previous synovitic cyst from her autoimmune and for her arthritis.  I do not think that this is necessarily the right thing to operate on however she does have a high risk of recurrence and continued problems from this.  In general with arthritic knees there is high chance of recurrence of cyst as they are more of a symptom than the actual problem themselves.  However if we can pinpoint exactly where this is based on an MRI scan and see that she is exactly painful there we can consider doing a resection of this cyst.  I like to see her with an MRI with and without contrast of her right knee to fully evaluate the popliteal space and see where the lesion is and where she may is having pain.  We may can discuss surgery later.  Follow-up after MRI.  If imaging or pathology/biopsy was ordered, the patient was told to make an appointment to review findings right after all imaging is completed.  We discussed risks, benefits and alternatives. Rationale of care was reviewed and all questions were answered. Patient (and family) had all questions answered to her degree of the level of satisfaction. Patient (and family) expressed understanding and interest in proceeding with the plan as outlined.     This note was done with a voice recognition transcription software and any typos are related to this rather than medical error. Surgical risks reviewed. Patient (and family) had all questions answered to an agreeable level of satisfaction. Patient (and family) expressed understanding and interest in proceeding with the plan as outlined.

## 2024-04-12 NOTE — END OF VISIT
[] : Resident [FreeTextEntry3] : 64F w/IgG4, RP fibrosis, COPD, JESÚS, HTN, epilepsy, past breast cancer, thyroid here for f/u BP still elevated, agreeable to restart Losartan at 25mg, and titrate up. To see Dr. Price and Dr. Berg soon (missed last appt, tasked Dr. Berg to review labs and f/u with pt)  RTC in 2-4 weeks for BP check  [Time Spent: ___ minutes] : I have spent [unfilled] minutes of time on the encounter.

## 2024-04-12 NOTE — PHYSICAL EXAM
[de-identified] : General: Well-nourished, well-developed, alert, and in no acute distress. Head: Normocephalic. Eyes: Pupils equal, extraocular muscles intact, normal sclera. Nose: No nasal discharge. Cardiovascular: Extremities are warm and well perfused. Distal pulses are symmetric bilaterally. Respiratory: No labored breathing. Extremities: Sensation is intact distally bilaterally. Distal pulses are symmetric bilaterally Lymphatic: No regional lymphadenopathy, no lymphedema Neurologic: No focal deficits Skin: Normal skin color, texture, and turgor Psychiatric: Normal affect  MSK: Examination of right lower extremity:  Gait shuffling, antalagic Mild effusion No erythema, hematoma or skin lesion Tender to palpation: medial joint line, lateral joint line, popliteal fossa Nontender to palpation: medial patellar facet, lateral patellar facet, quad tendon, patellar tendon, pes, Gerdy's tubercle, tibial tuberosity, hamstrings, ITB No warmth Baker's cyst palpable (pain) ROM: 0-90 [Mild] patellar crepitus  Right anteromedial calf mass tender to palpation. Shravan's negative.

## 2024-04-12 NOTE — DATA REVIEWED
[Imaging Present] : Present [de-identified] : Ultrasound from April 5, 2024 shows Persistent complex popliteal cyst with internal debris or clot measuring 4.3 x 0.9 x 2.5 cm. There is also a 1.2 cm simple appearing cyst or old hematoma in the musculature of the right calf.  IMPRESSION:  Persistent SVT involving the greater saphenous vein in the right calf.  Persistent complex popliteal cyst.  1.2 cm simple appearing cyst or old hematoma in the musculature of the right calf.  X-rays of bilateral knees reviewed from December 8, 2023 show: IMPRESSION: Tricompartmental knee arthrosis, most advanced within the bilateral medial compartments with joint space narrowing and osteophyte formation. Medial overhang of the bilateral femurs with respect to the tibias, left greater than right. Small bilateral knee effusions.

## 2024-04-12 NOTE — HISTORY OF PRESENT ILLNESS
[de-identified] : ZAC VELEZ is a 64 year old female presents to follow up right lower extremity pain. Last visit was 03/27/24 at which time patient was referred for Doppler US of right lower extremity and received US guided aspiration of the right Baker's cyst. US detailed below. Pain is unchanged.

## 2024-04-12 NOTE — REVIEW OF SYSTEMS
[Feeling Tired] : not feeling tired [Joint Pain] : joint pain [Joint Stiffness] : joint stiffness [Joint Swelling] : joint swelling [Breast Lump] : no breast lump

## 2024-04-15 ENCOUNTER — APPOINTMENT (OUTPATIENT)
Dept: HEMATOLOGY ONCOLOGY | Facility: CLINIC | Age: 64
End: 2024-04-15
Payer: COMMERCIAL

## 2024-04-15 VITALS
RESPIRATION RATE: 18 BRPM | WEIGHT: 230 LBS | DIASTOLIC BLOOD PRESSURE: 98 MMHG | OXYGEN SATURATION: 97 % | HEART RATE: 73 BPM | TEMPERATURE: 97.2 F | BODY MASS INDEX: 46.37 KG/M2 | SYSTOLIC BLOOD PRESSURE: 180 MMHG | HEIGHT: 59 IN

## 2024-04-15 DIAGNOSIS — D89.84 IGG4-RELATED DISEASE: ICD-10-CM

## 2024-04-15 DIAGNOSIS — K68.2 RETROPERITONEAL FIBROSIS: ICD-10-CM

## 2024-04-15 PROCEDURE — 99213 OFFICE O/P EST LOW 20 MIN: CPT

## 2024-04-15 NOTE — HISTORY OF PRESENT ILLNESS
[Disease: _____________________] : Disease: [unfilled] [de-identified] : 64F with RP fibrosis x 20+ years here for f/u.  OncHx: Prior breast cancer, invasive ductal carcinoma, T1b, N0, ER+MD+ as below: Pt received 5 sessions of RT and 1 cycle of chemotherapy and due to radiation burn and chemotherapy adverse effects, decided to proceed with L mastectomy and R prophylactic mastectomy with TRAM flap reconstruction on 12/14/2000 (Adria Alberto/Nicholas Salas). 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 She underwent revision of bilateral breasts and reduction mammoplasty in 2015. She did not receive any adjuvant RT, systemic therapy, or endocrine therapy. She was told that her upper extremity veins could not be accessed due to hx. b/l axillary lymphadenectomy. Due to poor peripheral vein access in the legs for blood draw and surgery, she had a mediport placed in 2005 and replacement in 2007. She was lost to follow up for 5 years and her doctors kept changing. She decided to transfer care from [facility] to [facility]. PCP recommended removal of mediport but patient has concerns due to fears of lymphedema.   4/5/24: US Duplex: Persistent SVT involving the greater saphenous vein in the right calf. Persistent complex popliteal cyst. 1.2 cm simple appearing cyst or old hematoma in the musculature of the right calf.  [de-identified] : Pulm: \par  Rheum:  [de-identified] : Chronic lower back.  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  She is being followed by her her orthopedic doctor who told her to do compresses and she will have a repeat scan soon.

## 2024-04-15 NOTE — ASSESSMENT
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  Retroperitoneal fibrosis -M spike remains stable -Continue to monitor yearly  Breast cancer -Follow-up with Dr. Garcia  Superficial vein thrombosis These typically do not require anticoagulation, especially if resolved on repeating ultrasound within 2 weeks. -Defer management to the orthopedic doctor who is following her  RTC in 1 year Labs: CBC, CMP, IGNACIA, Beta 2 microglobulin

## 2024-04-19 ENCOUNTER — APPOINTMENT (OUTPATIENT)
Dept: MRI IMAGING | Facility: HOSPITAL | Age: 64
End: 2024-04-19

## 2024-04-19 ENCOUNTER — OUTPATIENT (OUTPATIENT)
Dept: OUTPATIENT SERVICES | Facility: HOSPITAL | Age: 64
LOS: 1 days | End: 2024-04-19
Payer: COMMERCIAL

## 2024-04-19 DIAGNOSIS — Z98.890 OTHER SPECIFIED POSTPROCEDURAL STATES: Chronic | ICD-10-CM

## 2024-04-19 DIAGNOSIS — Z90.13 ACQUIRED ABSENCE OF BILATERAL BREASTS AND NIPPLES: Chronic | ICD-10-CM

## 2024-04-19 PROCEDURE — 73723 MRI JOINT LWR EXTR W/O&W/DYE: CPT | Mod: 26,RT

## 2024-04-19 PROCEDURE — 73723 MRI JOINT LWR EXTR W/O&W/DYE: CPT

## 2024-04-19 PROCEDURE — A9585: CPT

## 2024-04-25 ENCOUNTER — APPOINTMENT (OUTPATIENT)
Dept: ORTHOPEDIC SURGERY | Facility: CLINIC | Age: 64
End: 2024-04-25
Payer: COMMERCIAL

## 2024-04-25 DIAGNOSIS — M71.20 SYNOVIAL CYST OF POPLITEAL SPACE [BAKER], UNSPECIFIED KNEE: ICD-10-CM

## 2024-04-25 PROCEDURE — 99447 NTRPROF PH1/NTRNET/EHR 11-20: CPT

## 2024-05-01 ENCOUNTER — APPOINTMENT (OUTPATIENT)
Dept: ORTHOPEDIC SURGERY | Facility: CLINIC | Age: 64
End: 2024-05-01
Payer: COMMERCIAL

## 2024-05-01 VITALS
OXYGEN SATURATION: 99 % | BODY MASS INDEX: 49.59 KG/M2 | SYSTOLIC BLOOD PRESSURE: 162 MMHG | HEART RATE: 95 BPM | WEIGHT: 246 LBS | DIASTOLIC BLOOD PRESSURE: 89 MMHG | HEIGHT: 59 IN

## 2024-05-01 DIAGNOSIS — Z80.9 FAMILY HISTORY OF MALIGNANT NEOPLASM, UNSPECIFIED: ICD-10-CM

## 2024-05-01 PROCEDURE — 99214 OFFICE O/P EST MOD 30 MIN: CPT

## 2024-05-01 RX ORDER — MELOXICAM 15 MG/1
15 TABLET ORAL DAILY
Qty: 30 | Refills: 2 | Status: ACTIVE | COMMUNITY
Start: 2023-12-08 | End: 1900-01-01

## 2024-05-02 NOTE — END OF VISIT
[FreeTextEntry3] : All medical record entries made by the Scribe were at my, Dr. Norbert Guerrero, direction and personally dictated by me on 05/01/2024. I have reviewed the chart and agree that the record accurately reflects my personal performance of the history, physical exam, assessment and plan. I have also personally directed, reviewed, and agreed with the chart.

## 2024-05-02 NOTE — ADDENDUM
[FreeTextEntry1] : I, Terence Jara, documented this note as a scribe on behalf of Dr. Norbert Guerrero on 05/01/2024.

## 2024-05-02 NOTE — DISCUSSION/SUMMARY
[de-identified] : Imp: 64 year old female with bilateral knee osteoarthritis, right more symptomatic than left, in the setting of morbid obesity. - She would benefit from staged bilateral total knee arthroplasty, starting with the more symptomatic right, but is currently not optimized for surgery due to her morbid obesity. - We will therefore proceed with conservative treatment at this time, including PT, HEP, Tylenol + meloxicam as needed. - She will follow up with her PMD to discuss weight loss efforts, including possibility for bariatric surgery, and also advised her to continue working on diet. We discussed that she needs to lose approximately 50 lbs to become a surgical candidate. Once she nears that goal, we will able to have a further in-depth discussion regarding knee replacement surgery. - RTC in 4 weeks, we will review her clinical progress and will plan to administer bilateral knee aspirations and CSIs. She had worsening symptoms following past HA injections; will avoid these in future. No need for new knee XRs until Dec 2025, but she should be reweighed at every visit.

## 2024-05-02 NOTE — HISTORY OF PRESENT ILLNESS
[9] : a current pain level of 9/10 [Bending] : worsened by bending [Walking] : worsened by walking [Heat] : relieved by heat [Ice] : relieved by ice [Rest] : relieved by rest [de-identified] : 05/01/2024: 64 year old female with right > left knee pain, present for many years. She has had knee CS and HA injections in the past. Her most recent procedure was a right knee Baker's cyst aspiration and CSI with Dr. Tena 2 months ago. Pain is 8-9/10 on the right, only moderate and manageable on the left. She cannot tolerate tramadol secondary to sedation. Ambulatory tolerance of 1 city block with either shopping cart or walker. She is presenting to discuss TKA.  PMH significant for T2DM, HTN, HLD History of left knee arthroscopy in ~2007 Relevant allergies include betadine (rash), Latex (rash) [de-identified] : SHARP, ACHY, BURNING, THROBBING, CRAMPING, SHOOTING, STABBING [de-identified] : & LAYING DOWN [de-identified] : & MEDICATION

## 2024-05-02 NOTE — PHYSICAL EXAM
[de-identified] : General appearance: well nourished and hydrated, pleasant, alert and oriented x 3, cooperative.  Morbidly obese body habitus HEENT: normocephalic, EOM intact, wearing mask, external auditory canal clear.   Cardiovascular: 2+ bilateral LE pitting edema, no varicosities, dorsalis pedis pulses palpable and symmetric.   Lymphatics: no palpable lymphadenopathy, no lymphedema.   Neurologic: sensation is normal, no muscle weakness in upper or lower extremities, patella tendon reflexes present and symmetric.  left ankle dorsiflexion strength 4/5,  right ankle dorsiflexion strength 5/5 Dermatologic: skin moist, warm, no rash.   Spine: cervical spine with normal lordosis and painless range of motion, thoracic spine with normal kyphosis and painless range of motion, lumbosacral spine with normal lordosis and painless range of motion.  No tenderness to palpation along midline spine and paraspinal musculature.  Sacroiliac joints nontender bilaterally. Negative SLR and crossed SLR tests bilaterally. Gait: slow to raise from a seated position, demonstrates a bilateral antalgic gait and waddling pattern  Left knee:  - Focal soft tissue swelling: none - Ecchymosis: none - Erythema: none - Effusion: moderate, no palpable Baker's cyst - Wounds: none - Alignment: medial > lateral jointline tenderness to palpation, mild pain and crepitus with patellar compression test - Tenderness: none - ROM: 10-90 - Collateral laxity: increased pseudolaxity to varus - Cruciate laxity: none - Popliteal angle (degrees): 25 - Quad strength: 4+/5  Right knee: - Focal soft tissue swelling: none - Ecchymosis: none - Erythema: none - Effusion: large, no palpable Baker's cyst - Wounds: none - Alignment: varus - Tenderness: medial and lateral jointline tenderness to palpation; distal hamstring tenderness to palpation; pain and minimal crepitus with patellar compression test - ROM: 5-80, limited by pain and apprehension - Collateral laxity: increased pseudolaxity to varus - Cruciate laxity: none - Popliteal angle (degrees): 45 - Quad strength: 4/5; patient demonstrates an approximately 15 degree extensor lag [de-identified] : Bilateral knee XRs available on [facility], taken 12/8/23, demonstrate:  Right knee --  Alignment: varus with tibial subluxation Arthritis: tricompartmental osteoarthritis, most pronounced medially with bone-on-bone articulation, Kellgren & Yohan 4 Patellar height: normal Patellar tracking: central  Left knee --  Alignment: varus with tibial subluxation Arthritis: tricompartmental osteoarthritis, most pronounced medially with bone-on-bone articulation, Kellgren & Yohan 4 Patellar height: normal Patellar tracking: central

## 2024-05-06 ENCOUNTER — APPOINTMENT (OUTPATIENT)
Dept: INTERNAL MEDICINE | Facility: CLINIC | Age: 64
End: 2024-05-06
Payer: COMMERCIAL

## 2024-05-06 VITALS
WEIGHT: 240 LBS | DIASTOLIC BLOOD PRESSURE: 71 MMHG | BODY MASS INDEX: 48.38 KG/M2 | HEIGHT: 59 IN | TEMPERATURE: 97.2 F | HEART RATE: 78 BPM | SYSTOLIC BLOOD PRESSURE: 132 MMHG | OXYGEN SATURATION: 98 %

## 2024-05-06 DIAGNOSIS — E66.01 MORBID (SEVERE) OBESITY DUE TO EXCESS CALORIES: ICD-10-CM

## 2024-05-06 DIAGNOSIS — R11.0 NAUSEA: ICD-10-CM

## 2024-05-06 PROCEDURE — 99214 OFFICE O/P EST MOD 30 MIN: CPT | Mod: GC

## 2024-05-06 PROCEDURE — G2211 COMPLEX E/M VISIT ADD ON: CPT

## 2024-05-06 RX ORDER — ONDANSETRON 4 MG/1
4 TABLET ORAL 3 TIMES DAILY
Qty: 21 | Refills: 0 | Status: ACTIVE | COMMUNITY
Start: 2024-05-06 | End: 1900-01-01

## 2024-05-06 RX ORDER — DICLOFENAC SODIUM 1% 10 MG/G
1 GEL TOPICAL DAILY
Qty: 1 | Refills: 3 | Status: ACTIVE | COMMUNITY
Start: 2024-05-06 | End: 1900-01-01

## 2024-05-06 NOTE — REVIEW OF SYSTEMS
[Joint Pain] : joint pain [Joint Stiffness] : joint stiffness [Joint Swelling] : joint swelling [Unsteady Walking] : ataxia [Negative] : Heme/Lymph

## 2024-05-08 ENCOUNTER — APPOINTMENT (OUTPATIENT)
Dept: ORTHOPEDIC SURGERY | Facility: CLINIC | Age: 64
End: 2024-05-08
Payer: COMMERCIAL

## 2024-05-08 VITALS
BODY MASS INDEX: 48.38 KG/M2 | HEART RATE: 84 BPM | DIASTOLIC BLOOD PRESSURE: 80 MMHG | OXYGEN SATURATION: 95 % | SYSTOLIC BLOOD PRESSURE: 164 MMHG | HEIGHT: 59 IN | WEIGHT: 240 LBS

## 2024-05-08 PROCEDURE — 20611 DRAIN/INJ JOINT/BURSA W/US: CPT | Mod: RT

## 2024-05-08 PROCEDURE — 99213 OFFICE O/P EST LOW 20 MIN: CPT | Mod: 25

## 2024-05-09 NOTE — PHYSICAL EXAM
[No Acute Distress] : no acute distress [Well Nourished] : well nourished [Well Developed] : well developed [Well-Appearing] : well-appearing [Normal Sclera/Conjunctiva] : normal sclera/conjunctiva [PERRL] : pupils equal round and reactive to light [EOMI] : extraocular movements intact [Normal Outer Ear/Nose] : the outer ears and nose were normal in appearance [Normal Oropharynx] : the oropharynx was normal [No JVD] : no jugular venous distention [No Lymphadenopathy] : no lymphadenopathy [Supple] : supple [Thyroid Normal, No Nodules] : the thyroid was normal and there were no nodules present [No Respiratory Distress] : no respiratory distress  [No Accessory Muscle Use] : no accessory muscle use [Clear to Auscultation] : lungs were clear to auscultation bilaterally [Normal Rate] : normal rate  [Regular Rhythm] : with a regular rhythm [Normal S1, S2] : normal S1 and S2 [No Murmur] : no murmur heard [No Carotid Bruits] : no carotid bruits [No Abdominal Bruit] : a ~M bruit was not heard ~T in the abdomen [No Varicosities] : no varicosities [Pedal Pulses Present] : the pedal pulses are present [No Edema] : there was no peripheral edema [No Palpable Aorta] : no palpable aorta [No Extremity Clubbing/Cyanosis] : no extremity clubbing/cyanosis [Soft] : abdomen soft [Non Tender] : non-tender [Non-distended] : non-distended [No Masses] : no abdominal mass palpated [No HSM] : no HSM [Normal Bowel Sounds] : normal bowel sounds [Normal Posterior Cervical Nodes] : no posterior cervical lymphadenopathy [Normal Anterior Cervical Nodes] : no anterior cervical lymphadenopathy [No CVA Tenderness] : no CVA  tenderness [No Spinal Tenderness] : no spinal tenderness [No Rash] : no rash [Coordination Grossly Intact] : coordination grossly intact [No Focal Deficits] : no focal deficits [Normal Gait] : normal gait [Deep Tendon Reflexes (DTR)] : deep tendon reflexes were 2+ and symmetric [Normal Affect] : the affect was normal [Normal Insight/Judgement] : insight and judgment were intact [de-identified] : Obese body habitus  [de-identified] : CTAB [de-identified] : + Large effusion in R knee. + TTP of patellar. + LROM of R knee, extensive pain with extension of knee and with valgus/varus/anterior/posterior pressure. +L knee similar exam findings to R but milder in intensity. LROM in the L knee but less severe than R.

## 2024-05-09 NOTE — ASSESSMENT
[FreeTextEntry1] : Ms. Sapp is a 65 y/o F with a PMHx of IgG4 disease with RP fibrosis, COPD, JESÚS, HTN (measured on LLE), epilepsy, past breast cancer, hypothyroidism presenting for follow up visit.

## 2024-05-09 NOTE — END OF VISIT
[] : Resident [FreeTextEntry3] :  64 year old F presenting for follow up. Reports ongoing R knee pain. Had recent MRI, was recommended to have TKR by ortho, but needs to lose 50 lbs prior to doing so. Reports that she has been eating a healthy diet, is limited in the particular things (including certain vegetables) that she eats due to concerns about how they may affect levels of inflammation related to her IgG4 disease. Declines to see nutrition again for that reason. Physical activity limited due to pain. Discussed option of GLP1a for diabetes and weight loss. Discussed risks and benefits, prescribed ozempic (which appears to be on her insurance formulary). Pt does not feel able to inject medication herself, is open to seeing RN for first injection, will do so in June when RN returns from vacation. For pain, will start topical diclofenac. Advised her to start duloxetine which we have prescribed previously, but she never started. Start duloxetine 20mg daily for 1-2 weeks, then increase as tolerated to 2 tabs (40mg) daily. RTC 1 month for f/u above, earlier prn.

## 2024-05-09 NOTE — PLAN
[FreeTextEntry1] : #R knee pain  Pt has adequate followup with Dr. Norbert Guerrero (whom she speaks of highly). Per Dr. Guerrero, pt would need to reduce her weight by 50 pounds to become a surgical candidate. Once she nears that goal, pt would be able to have a further in-depth discussion regarding knee replacement surgery. Pt complaining of severe pain in her knees R>L in no small part due to radial tearing of the body of the medial meniscus with probable oblique tearing extending to the posterior horn as found on MRI of R knee on 4/25/2024. The pain is so severe it keeps her up at night. Pt's knee pain is likely exacerbated by her morbid obesity which has only worsened with increased inactivity stemming from her knee pain. We discussed weight loss extensively in problem below.  Plan: - For now, pt encouraged to take her Duloxetine which was previously not taken (for reasons pt states was due to her not knowing what it was for).  - Pt to c/w anti-inflammatory meals including fish, beets, avocados, zucchinis, etc. Pt declined nutrition consult  - Can c/w Tylenol but would limit NSAIDs as much as tolerable  - Topical Diclofenac rx'd to pharmacy today  - See below for morbid obesity mgmt, mainstay of treatment for knee pain   #DM #Morbid obesity  Pt with morbid obesity and calculated BMI of 48. Her knee pain likely causes worsening of her obesity due to immobility which further adds strains to her knees. Given pt's most recent A1C of 7.4% in 2/26/2024, pt eligible for GLP1 agonist such as Ozempic. Extensive conversation had with pt about its risks/benefits, as pt has significant reservations to trialing medications given her extensive PMHx. Pt also reluctant to take any medications by injection which is also why she has not taken insulin for her diabetes which she states was previously recommended to her. For now, pt states she will think about Ozempic and take it only if someone would be willing to inject it for her.  Plan: - Rx'd Ozempic 0.25mg once a week to her pharmacy - Pt will RTO for weekly injections beginning in June when RN at [facility] may be available to help administer this medication  - If barriers to injection of ozempic continues to be an issue, can consider Rybelsus (Semaglutide) at next visit for PO formulation - Pt to f/u with us in 4-6 weeks for continued management of her Ozempic and knee pain   #HTN Can only take BP on L calf due to hx of b/l mastectomy and varicose vein procedures on b/l legs. During recent hospitalization, discontinued Losartan-HCTZ combo pill due to hypotension but was restarted back on Losartan 25mg QD given high pressures. Pressures taken on L calf with literature showing SBP in ankle/calf measurements are ~10-20mmhg higher than arm measurements. Girish P, Isac K, Girish B, Yu NOYOLA. Comparison of arm and calf blood pressure.  J Anaesth. 2012 Paulino;56(1):83-5. doi: 10.4223/6258-1130.43620. PMID: 92094483; PMCID: [id number]. 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 Berg.  - advised pt to f/u with rheumatology  Case discussed with Dr. Christina Underwood Pt to f/u with us in 4-6 weeks for continued management of her Ozempic and knee pain

## 2024-05-09 NOTE — HISTORY OF PRESENT ILLNESS
[FreeTextEntry1] : Pt is here for a follow up.  [de-identified] : Ms. Sapp is a 65 y/o F with a PMHx of IgG4 disease with RP fibrosis, COPD, JESÚS, HTN (measured on LLE), epilepsy, past breast cancer, hypothyroidism presenting for follow up on knee pain. Ms. Montanez recently saw her Orthopedic physician Dr. Guerrero on 5/1 whom she referenced in high regard during her visit. Per her discussion with Dr. Guerrero, pt was recommended for a total knee replacement as her knee MRI has shown extensive damage. MRI of R knee on 4/25 showed, amongst other findings, a large joint effusion with synovitis, focal high-grade full-thickness loss of the central aspect of the lateral patellar facet measuring about 3 mm, and full-thickness fissures of the medial patellar facet.   Pt has suffered immensely from her knee pain stating it keeps her up at night and worsens with movement and weight bearing. Pt requesting a medication to help her sleep in light of this pain. Apart from severe knee pain, pt denies fevers, chills, n/v/d, but endorses non-specific symptoms that "I cant explain" related to the inflammatory process of her IgG4 disease. Pt states she has made many dietary modifications in light of her inflammatory process and alternates between ice and hot packs to aid in her knee pain.

## 2024-05-10 NOTE — ASSESSMENT
[FreeTextEntry1] : ZAC VELEZ is a 64 year old female with bilateral R>L knee pain.    I discussed with the patient that their symptoms, signs, and imaging are most consistent with osteoarthritis. We reviewed the natural history of this condition and treatment options. We agreed on the following plan:  US guided aspiration and CSI as detailed above. Encouraged to continue home exercises per handout. Continue physical therapy. Recommend 150 min per week of moderate intensity aerobic activity  Medication: Meloxicam 15mg daily prn. (Prescription provided by Dr. Guerrero. Patient has not yet picked up). Follow up in 3 months.

## 2024-05-10 NOTE — PROCEDURE
[de-identified] : Ultrasound guided intra-articular aspiration and corticosteroid injection of right knee:  Following a discussion of the risks (bleeding, infection) and benefits, verbal consent was obtained. Patient placed in supine position. The suprapatellar recess and surrounding structures (patella, femur, quadriceps tendon, suprapatellar fat pad and prefemoral fat pad) were visualized in SAX and LAX with Sonosite 15 Hz linear transducer.   Superolateral knee was anaesthetised with ethyl chloride spray. Under strict sterile technique the right knee was prepped with chlorhexadine. Using ultrasound guidance (superolateral approach) a 20 G 1.5 inch needle was inserted into the suprapatellar recess and after aspiration of 40 mL of serosanguinous fluid, 1mL Triamcinolone, 4mL 0.5% Bupivacaine and 2mL 1% lidocaine was injected intra-articularly.    The use of direct ultrasound visualization was necessary to increase patient safety by identifying and avoiding inadvertent needle placement within the neurovascular and osteochondral structures. Additionally, the increased accuracy of needle placement may improve therapeutic efficacy and allow higher diagnostic specificity when evaluating the effectiveness of this injection.  The patient tolerated the procedure well. Post-injection instructions given (no strenuous activity for 48 hours, ice, elevate). Patient verbalized understanding.

## 2024-05-10 NOTE — HISTORY OF PRESENT ILLNESS
[de-identified] : ZAC VELEZ is a 64 year old female presents to follow up right knee pain. Last visit was 04/10/24  Pain and swelling have worsened. Saw Dr. Guerrero who advised patient to lose weight prior to TKA. Patient to start Ozempic. 03-Aug-2019 23:56

## 2024-05-10 NOTE — PHYSICAL EXAM
[de-identified] : General: Well-nourished, well-developed, alert, and in no acute distress. Head: Normocephalic. Eyes: Pupils equal, extraocular muscles intact, normal sclera. Nose: No nasal discharge. Cardiovascular: Extremities are warm and well perfused. Distal pulses are symmetric bilaterally. Respiratory: No labored breathing. Extremities: Sensation is intact distally bilaterally. Distal pulses are symmetric bilaterally Lymphatic: No regional lymphadenopathy, no lymphedema Neurologic: No focal deficits Skin: Normal skin color, texture, and turgor Psychiatric: Normal affect   MSK: Examination of right lower extremity:  Gait shuffling, antalagic Moderate effusion No erythema, hematoma or skin lesion Tender to palpation: medial joint line, pes anserine, lateral joint line, popliteal fossa Nontender to palpation: medial patellar facet, lateral patellar facet, quad tendon, patellar tendon, Gerdy's tubercle, tibial tuberosity, hamstrings, ITB No warmth Baker's cyst palpable (pain) ROM: 0-90 [Mild] patellar crepitus  Right anteromedial calf mass tender to palpation. Shravan's negative.

## 2024-05-10 NOTE — HISTORY OF PRESENT ILLNESS
[de-identified] : ZAC VELEZ is a 64 year old female presents to follow up right knee pain. Last visit was 04/10/24  Pain and swelling have worsened. Saw Dr. Guerrero who advised patient to lose weight prior to TKA. Patient to start Ozempic.

## 2024-05-10 NOTE — PHYSICAL EXAM
[de-identified] : General: Well-nourished, well-developed, alert, and in no acute distress. Head: Normocephalic. Eyes: Pupils equal, extraocular muscles intact, normal sclera. Nose: No nasal discharge. Cardiovascular: Extremities are warm and well perfused. Distal pulses are symmetric bilaterally. Respiratory: No labored breathing. Extremities: Sensation is intact distally bilaterally. Distal pulses are symmetric bilaterally Lymphatic: No regional lymphadenopathy, no lymphedema Neurologic: No focal deficits Skin: Normal skin color, texture, and turgor Psychiatric: Normal affect   MSK: Examination of right lower extremity:  Gait shuffling, antalagic Moderate effusion No erythema, hematoma or skin lesion Tender to palpation: medial joint line, pes anserine, lateral joint line, popliteal fossa Nontender to palpation: medial patellar facet, lateral patellar facet, quad tendon, patellar tendon, Gerdy's tubercle, tibial tuberosity, hamstrings, ITB No warmth Baker's cyst palpable (pain) ROM: 0-90 [Mild] patellar crepitus  Right anteromedial calf mass tender to palpation. Shravan's negative.

## 2024-05-10 NOTE — PROCEDURE
[de-identified] : Ultrasound guided intra-articular aspiration and corticosteroid injection of right knee:  Following a discussion of the risks (bleeding, infection) and benefits, verbal consent was obtained. Patient placed in supine position. The suprapatellar recess and surrounding structures (patella, femur, quadriceps tendon, suprapatellar fat pad and prefemoral fat pad) were visualized in SAX and LAX with Sonosite 15 Hz linear transducer.   Superolateral knee was anaesthetised with ethyl chloride spray. Under strict sterile technique the right knee was prepped with chlorhexadine. Using ultrasound guidance (superolateral approach) a 20 G 1.5 inch needle was inserted into the suprapatellar recess and after aspiration of 40 mL of serosanguinous fluid, 1mL Triamcinolone, 4mL 0.5% Bupivacaine and 2mL 1% lidocaine was injected intra-articularly.    The use of direct ultrasound visualization was necessary to increase patient safety by identifying and avoiding inadvertent needle placement within the neurovascular and osteochondral structures. Additionally, the increased accuracy of needle placement may improve therapeutic efficacy and allow higher diagnostic specificity when evaluating the effectiveness of this injection.  The patient tolerated the procedure well. Post-injection instructions given (no strenuous activity for 48 hours, ice, elevate). Patient verbalized understanding.

## 2024-05-31 RX ORDER — SEMAGLUTIDE 0.68 MG/ML
2 INJECTION, SOLUTION SUBCUTANEOUS
Qty: 1 | Refills: 0 | Status: ACTIVE | COMMUNITY
Start: 2024-05-06 | End: 1900-01-01

## 2024-06-03 ENCOUNTER — APPOINTMENT (OUTPATIENT)
Dept: INTERNAL MEDICINE | Facility: CLINIC | Age: 64
End: 2024-06-03

## 2024-06-14 ENCOUNTER — APPOINTMENT (OUTPATIENT)
Dept: ORTHOPEDIC SURGERY | Facility: CLINIC | Age: 64
End: 2024-06-14

## 2024-06-17 ENCOUNTER — APPOINTMENT (OUTPATIENT)
Dept: INTERNAL MEDICINE | Facility: CLINIC | Age: 64
End: 2024-06-17
Payer: COMMERCIAL

## 2024-06-17 ENCOUNTER — NON-APPOINTMENT (OUTPATIENT)
Age: 64
End: 2024-06-17

## 2024-06-17 VITALS
BODY MASS INDEX: 44.96 KG/M2 | WEIGHT: 223 LBS | TEMPERATURE: 97.2 F | DIASTOLIC BLOOD PRESSURE: 92 MMHG | SYSTOLIC BLOOD PRESSURE: 158 MMHG | HEART RATE: 78 BPM | OXYGEN SATURATION: 97 % | HEIGHT: 59 IN

## 2024-06-17 DIAGNOSIS — M19.90 UNSPECIFIED OSTEOARTHRITIS, UNSPECIFIED SITE: ICD-10-CM

## 2024-06-17 DIAGNOSIS — E11.9 TYPE 2 DIABETES MELLITUS W/OUT COMPLICATIONS: ICD-10-CM

## 2024-06-17 DIAGNOSIS — M79.7 FIBROMYALGIA: ICD-10-CM

## 2024-06-17 PROCEDURE — 99211 OFF/OP EST MAY X REQ PHY/QHP: CPT

## 2024-06-17 PROCEDURE — G2211 COMPLEX E/M VISIT ADD ON: CPT

## 2024-06-17 PROCEDURE — 99214 OFFICE O/P EST MOD 30 MIN: CPT | Mod: GC

## 2024-06-17 RX ORDER — LEVOTHYROXINE SODIUM 0.17 MG/1
175 TABLET ORAL DAILY
Qty: 30 | Refills: 2 | Status: ACTIVE | COMMUNITY
Start: 2022-04-15 | End: 1900-01-01

## 2024-06-17 RX ORDER — LEVOTHYROXINE SODIUM 0.2 MG/1
200 TABLET ORAL
Qty: 20 | Refills: 2 | Status: ACTIVE | COMMUNITY
Start: 2024-01-08 | End: 1900-01-01

## 2024-06-17 NOTE — HISTORY OF PRESENT ILLNESS
[FreeTextEntry1] : follow up [de-identified] : Ms. Sapp is a 65 y/o F with a PMHx of IgG4 disease with RP fibrosis, COPD, JESÚS, HTN (measured on LLE), epilepsy, past breast cancer now s/p BL mastectomy, hypothyroidism presenting for follow up on knee pain as well as ozempic teaching.   On our last visit, we discussed that the patient needs to lose around 50 pounds in order to get the TKR that she needs. When she last saw ortho, they drained fluid from the knee and gave her a steroid injection. She has also been taking the Duloxetine 20mg daily and does feel that it has helped with the burning sensation around the knee that she was experiencing prior. She follows closely with ortho and has another Apt with Dr. Cesar soni, and is highly motivated to lose weight so that she can get this surgery. She has no other major concerns at this time and is a very pleasant woman overall.

## 2024-06-17 NOTE — END OF VISIT
[] : Resident [FreeTextEntry3] : Recently had right knee drained, has follow-up with Ortho this week.  Reports some improvement in symptoms with addition of duloxetine.  Was able to obtain Ozempic at the pharmacy, has some hesitation about injecting it and concerns about possible side effect of nausea.  Reviewed her diet in detail, she has made significant dietary changes since last visit, has lost some weight.  Well-appearing on exam.  Will complete Ozempic teaching with Dulce garcia RN.  Return to clinic in 1 month for further Ozempic titration, earlier as needed.

## 2024-06-17 NOTE — ASSESSMENT
[FreeTextEntry1] : Ms. Sapp is a 65 y/o F with a PMHx of IgG4 disease with RP fibrosis, COPD, JESÚS, HTN (measured on LLE), epilepsy, past breast cancer now s/p BL mastectomy, hypothyroidism presenting for follow up on knee pain as well as ozempic teaching.   #R Knee OA #Morbid obesity  - patient to get Ozempic teaching and first injection with Natalia today  - continue follow up with Dr. Guerrero  - c/w Duloxetine 20mg daily (pt wants to c/w this dose for now rather than increase)   RTC in 4 weeks to f/u Ozempic tolerance and increase dose. Follow knee pain and if we should increase duloxetine from 20mg daily to 40mg daily.   Pt seen with Adelso Salas, DO Medicine Resident

## 2024-06-17 NOTE — PHYSICAL EXAM
[No Acute Distress] : no acute distress [Well Nourished] : well nourished [Well Developed] : well developed [Well-Appearing] : well-appearing [Normal Sclera/Conjunctiva] : normal sclera/conjunctiva [PERRL] : pupils equal round and reactive to light [EOMI] : extraocular movements intact [Normal Outer Ear/Nose] : the outer ears and nose were normal in appearance [Normal Oropharynx] : the oropharynx was normal [No JVD] : no jugular venous distention [No Lymphadenopathy] : no lymphadenopathy [Supple] : supple [Thyroid Normal, No Nodules] : the thyroid was normal and there were no nodules present [No Respiratory Distress] : no respiratory distress  [No Accessory Muscle Use] : no accessory muscle use [Clear to Auscultation] : lungs were clear to auscultation bilaterally [Normal Rate] : normal rate  [Regular Rhythm] : with a regular rhythm [Normal S1, S2] : normal S1 and S2 [No Murmur] : no murmur heard [No Carotid Bruits] : no carotid bruits [No Abdominal Bruit] : a ~M bruit was not heard ~T in the abdomen [No Varicosities] : no varicosities [Pedal Pulses Present] : the pedal pulses are present [No Edema] : there was no peripheral edema [No Palpable Aorta] : no palpable aorta [No Extremity Clubbing/Cyanosis] : no extremity clubbing/cyanosis [Soft] : abdomen soft [Non Tender] : non-tender [Non-distended] : non-distended [No Masses] : no abdominal mass palpated [No HSM] : no HSM [Normal Bowel Sounds] : normal bowel sounds [Normal Posterior Cervical Nodes] : no posterior cervical lymphadenopathy [Normal Anterior Cervical Nodes] : no anterior cervical lymphadenopathy [No CVA Tenderness] : no CVA  tenderness [No Spinal Tenderness] : no spinal tenderness [Grossly Normal Strength/Tone] : grossly normal strength/tone [No Rash] : no rash [Coordination Grossly Intact] : coordination grossly intact [No Focal Deficits] : no focal deficits [Normal Gait] : normal gait [Deep Tendon Reflexes (DTR)] : deep tendon reflexes were 2+ and symmetric [Normal Affect] : the affect was normal [Normal Insight/Judgement] : insight and judgment were intact [de-identified] : TTP with mild swelling over the right knee

## 2024-06-19 ENCOUNTER — APPOINTMENT (OUTPATIENT)
Dept: ORTHOPEDIC SURGERY | Facility: CLINIC | Age: 64
End: 2024-06-19
Payer: COMMERCIAL

## 2024-06-19 VITALS
DIASTOLIC BLOOD PRESSURE: 94 MMHG | HEIGHT: 59 IN | HEART RATE: 70 BPM | WEIGHT: 221.6 LBS | OXYGEN SATURATION: 96 % | BODY MASS INDEX: 44.68 KG/M2 | SYSTOLIC BLOOD PRESSURE: 161 MMHG

## 2024-06-19 DIAGNOSIS — M17.0 BILATERAL PRIMARY OSTEOARTHRITIS OF KNEE: ICD-10-CM

## 2024-06-19 PROCEDURE — 99213 OFFICE O/P EST LOW 20 MIN: CPT

## 2024-06-20 NOTE — HISTORY OF PRESENT ILLNESS
[Disease: _____________________] : Disease: [unfilled] [T: ___] : T[unfilled] [N: ___] : N[unfilled] [de-identified] : 64F with a remote history of left breast cancer, here for 1 yr f/u.   OncHx: Pt received 5 sessions of RT and 1 cycle of chemotherapy and due to radiation burn and chemotherapy adverse effects, decided to proceed with L mastectomy and R prophylactic mastectomy with TRAM flap reconstruction on 12/14/2000 (Adria Alberto/Nicholas Salas). 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 She underwent revision of bilateral breasts and reduction mammoplasty in 2015. She did not receive any adjuvant RT, systemic therapy, or endocrine therapy. She was told that her upper extremity veins could not be accessed due to hx. b/l axillary lymphadenectomy. Due to poor peripheral vein access in the legs for blood draw and surgery, she had a mediport placed in 2005 and replacement in 2007. She was lost to follow up for 5 years and her doctors kept changing. She decided to transfer care from [facility] to [facility]. PCP recommended removal of mediport but patient has concerns due to fears of lymphedema. She ultimately underwent port removal on 5/6/22.  10/22/1999 left breast upper outer stereotactic biopsy: Intraductal hyperplasia, mild, focal. Fibrocystic change, non proliferative. Chronic inflammatory infiltrate. Microcalcifications.  8/4/2000 left nipple secretion: rare clusters of ductal epithelial cells present in a background of macrophages, inflammatory cells and rare red blood cells.  11/2/2000 left breast stereotactic biopsy:  left upper outer: intraductal comedocarcinoma, ductal hyperplasia, adenosis, microcalcifications  12/14/2000 b/l mastectomy and b/l ALD: 1. 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 DCIS, high nuclear grade, solid, cribriform and micropapillary types with focal central necrosis, comprises approximately 10% of the tumor area. No PNI or LVI. Surrounding breast parenchyma shows proliferative fibrocystic changes. Microcalcifications present in association with invasive and in situ carcinoma as well as the benign epithelial elements. Nipple without significant changes. Seven axillary lymph nodes negative for metastatic carcinoma. 2. Right breast: fibrocystic changes including stromal fibrosis, mild duct cell hyperplasia, adenosis,apocrine metaplasia, cysts, sclerosing adenosis; small hyalinized from adenoma and microcalcifications. Nipple without significant changes. Five benign axillary lymph nodes. ER (invasive 50-75%, in-situ 75-90%), IA (invasive 25-50%, in situ 50-75%), HER-2 (in situ 3+, invasive 2+)  5/25/21 CT chest: no evidence of intrathoracic metastasis.   5/6/22 port removal.  5/21/22 CT A/P: Colonic diverticulosis without evidence of acute diverticulitis.  Infiltration of the fat surrounding both ureters. No hydronephrosis. Differential diagnosis includes inflammatory processes such as retroperitoneal fibrosis, IgG4 related disease and less likely lymphoma.  6/21/22 CT chest: no evidence of thoracic metastasis. Mild cardiomegaly.  8/29/22 PETCT: 1.  Masslike periureteral fat stranding and soft tissue density about the proximal ureters, unchanged, demonstrating minimal FDG uptake. Limited differential diagnosis is the same as described on prior CT, including retroperitoneal fibrosis and malignancy. Focal uptake within the lesion most likely represents misregistered physiologic urinary uptake within the ureter. Tissue typing of one of the lesions may be of benefit.  11/6/22 MRI brain: Since prior MRI sella 10/26/2021, stable diffuse prominence of the pituitary gland measuring 12 mm, most compatible with pituitary hyperplasia. No evidence of pituitary adenoma or chiasmatic compression.  6/2/23 CT AP: 1. Acute epiploic appendagitis (focal fat infarct) in left mid abdomen. 2. Bilateral periureteral stranding most likely related to history of IgG for related disease/retroperitoneal fibrosis, improved on the right, stable on the left.  4/5/24: US Duplex: Persistent SVT involving the greater saphenous vein in the right calf. Persistent complex popliteal cyst. 1.2 cm simple appearing cyst or old hematoma in the musculature of the right calf. [de-identified] : well differentiated invasive ductal carcinoma [de-identified] : ER+, AL+ [de-identified] : She presents for 1 yr f/u.  She sees Dr. 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  Planning staged b/l knee replacements with Dr. Guerrero in October 2024.

## 2024-06-21 ENCOUNTER — NON-APPOINTMENT (OUTPATIENT)
Age: 64
End: 2024-06-21

## 2024-06-21 ENCOUNTER — APPOINTMENT (OUTPATIENT)
Dept: HEMATOLOGY ONCOLOGY | Facility: CLINIC | Age: 64
End: 2024-06-21

## 2024-06-26 PROBLEM — M17.0 PRIMARY OSTEOARTHRITIS OF BOTH KNEES: Status: ACTIVE | Noted: 2023-12-08

## 2024-06-26 NOTE — HISTORY OF PRESENT ILLNESS
[de-identified] : 06/19/2024: 64 year old female presenting for follow up evaluation of bilateral knee osteoarthritis, right far more symptomatic than. We saw her at the beginning of May and indicated her at that time for bilateral total knee replacements. We decided to hold off secondary to her morbid obesity. She is here today to follow up on the progress of her weight loss so far. At the last visit, she was 246 pounds, and today she is 221. She did receive another right knee aspiration and CSI in May 2024 with Dr. Dexter that she reports gave her about 5 weeks of relief. Her right knee pain has begun to recur. She is using a rollator secondary to right knee buckling. She has been doing her HEP and pursuing weight loss, primarily through diet. She started Ozempic last week and has lost 25 pounds since we last saw her.   05/01/2024: 64 year old female with right > left knee pain, present for many years. She has had knee CS and HA injections in the past. Her most recent procedure was a right knee Baker's cyst aspiration and CSI with Dr. Tena 2 months ago. Pain is 8-9/10 on the right, only moderate and manageable on the left. She cannot tolerate tramadol secondary to sedation. Ambulatory tolerance of 1 city block with either shopping cart or walker. She is presenting to discuss TKA.  PMH significant for T2DM, HTN, HLD History of left knee arthroscopy in ~2007 Relevant allergies include betadine (rash), Latex (rash)

## 2024-06-26 NOTE — END OF VISIT
[FreeTextEntry3] : All medical record entries made by the Scribe were at my, Dr. Norbert Guerrero, direction and personally dictated by me on 06/19/2024. I have reviewed the chart and agree that the record accurately reflects my personal performance of the history, physical exam, assessment and plan. I have alsopersonally directed, reviewed, and agreed with the chart.

## 2024-06-26 NOTE — PHYSICAL EXAM
[de-identified] : General appearance: well nourished and hydrated, pleasant, alert and oriented x 3, cooperative.  Morbidly obese body habitus HEENT: normocephalic, EOM intact, wearing mask, external auditory canal clear.   Cardiovascular: 2+ bilateral LE pitting edema, no varicosities, dorsalis pedis pulses palpable and symmetric.   Lymphatics: no palpable lymphadenopathy, no lymphedema.   Neurologic: sensation is normal, no muscle weakness in upper or lower extremities, patella tendon reflexes present and symmetric.  left ankle dorsiflexion strength 4/5,  right ankle dorsiflexion strength 5/5 Dermatologic: skin moist, warm, no rash.   Spine: cervical spine with normal lordosis and painless range of motion, thoracic spine with normal kyphosis and painless range of motion, lumbosacral spine with normal lordosis and painless range of motion.  No tenderness to palpation along midline spine and paraspinal musculature.  Sacroiliac joints nontender bilaterally. Negative SLR and crossed SLR tests bilaterally. Gait: slow to raise from a seated position, demonstrates a bilateral antalgic gait and waddling pattern  Left knee:  - Focal soft tissue swelling: none - Ecchymosis: none - Erythema: none - Effusion: moderate, no palpable Baker's cyst - Wounds: none - Alignment: medial > lateral jointline tenderness to palpation, mild pain and crepitus with patellar compression test - Tenderness: none - ROM: 10-90 - Collateral laxity: increased pseudolaxity to varus - Cruciate laxity: none - Popliteal angle (degrees): 25 - Quad strength: 4+/5  Right knee: - Focal soft tissue swelling: none - Ecchymosis: none - Erythema: none - Effusion: large, no palpable Baker's cyst - Wounds: none - Alignment: varus - Tenderness: medial and lateral jointline tenderness to palpation; distal hamstring tenderness to palpation; pain and minimal crepitus with patellar compression test - ROM: 5-80, limited by pain and apprehension - Collateral laxity: increased pseudolaxity to varus - Cruciate laxity: none - Popliteal angle (degrees): 45 - Quad strength: 4/5; patient demonstrates an approximately 15 degree extensor lag

## 2024-06-26 NOTE — DISCUSSION/SUMMARY
[de-identified] : IMP: 64 year old female with bilateral knee osteoarthritis with improving morbid obesity  - As before, she would stand to benefit from staged bilateral total knee arthroplasty, starting with the right knee.  - She has made excellent progress with her weight, I encouraged her to continue pursuing weight loss.  - She requested that we plan for surgery in October 2024 to align with her vacation time at work, which I think is an attainable time line.  - She will follow up in September 2024 with no new XRs to be reweighed.  - As long as the current trend of weight loss is continuing, we should be able to schedule for surgery in October at that time.  - I instructed her that she should not plan to receive any further right knee injections within 3 months of October if she wishes to move forward with surgery.

## 2024-06-26 NOTE — ADDENDUM
[FreeTextEntry1] : I, Ladan Jarrell, documented this note as a scribe on behalf of Dr. Norbert Guerrero on 06/19/2024.

## 2024-07-10 ENCOUNTER — APPOINTMENT (OUTPATIENT)
Dept: DERMATOLOGY | Facility: CLINIC | Age: 64
End: 2024-07-10
Payer: COMMERCIAL

## 2024-07-10 ENCOUNTER — NON-APPOINTMENT (OUTPATIENT)
Age: 64
End: 2024-07-10

## 2024-07-10 DIAGNOSIS — L29.9 PRURITUS, UNSPECIFIED: ICD-10-CM

## 2024-07-10 DIAGNOSIS — L21.9 SEBORRHEIC DERMATITIS, UNSPECIFIED: ICD-10-CM

## 2024-07-10 DIAGNOSIS — L30.9 DERMATITIS, UNSPECIFIED: ICD-10-CM

## 2024-07-10 PROCEDURE — 99214 OFFICE O/P EST MOD 30 MIN: CPT

## 2024-07-22 ENCOUNTER — APPOINTMENT (OUTPATIENT)
Dept: INTERNAL MEDICINE | Facility: CLINIC | Age: 64
End: 2024-07-22

## 2024-07-22 ENCOUNTER — APPOINTMENT (OUTPATIENT)
Dept: INTERNAL MEDICINE | Facility: CLINIC | Age: 64
End: 2024-07-22
Payer: COMMERCIAL

## 2024-07-22 VITALS
WEIGHT: 219 LBS | OXYGEN SATURATION: 98 % | DIASTOLIC BLOOD PRESSURE: 78 MMHG | HEIGHT: 59 IN | TEMPERATURE: 97.8 F | SYSTOLIC BLOOD PRESSURE: 147 MMHG | HEART RATE: 76 BPM | BODY MASS INDEX: 44.15 KG/M2

## 2024-07-22 PROCEDURE — G2211 COMPLEX E/M VISIT ADD ON: CPT

## 2024-07-22 PROCEDURE — 99214 OFFICE O/P EST MOD 30 MIN: CPT | Mod: GC

## 2024-07-22 RX ORDER — BLOOD-GLUCOSE METER
W/DEVICE EACH MISCELLANEOUS
Qty: 1 | Refills: 0 | Status: ACTIVE | COMMUNITY
Start: 2024-07-22 | End: 1900-01-01

## 2024-07-22 RX ORDER — CETIRIZINE HYDROCHLORIDE 10 MG/1
10 TABLET, FILM COATED ORAL
Qty: 90 | Refills: 3 | Status: ACTIVE | COMMUNITY
Start: 2024-07-10 | End: 1900-01-01

## 2024-07-22 RX ORDER — HYDROXYZINE HYDROCHLORIDE 10 MG/1
10 TABLET ORAL
Qty: 60 | Refills: 2 | Status: ACTIVE | COMMUNITY
Start: 2024-07-10

## 2024-07-22 RX ORDER — EPINEPHRINE 0.3 MG/.3ML
0.3 INJECTION INTRAMUSCULAR
Qty: 2 | Refills: 3 | Status: ACTIVE | COMMUNITY
Start: 2024-07-22 | End: 1900-01-01

## 2024-07-22 RX ORDER — ISOPROPYL ALCOHOL 70 ML/100ML
SWAB TOPICAL
Qty: 1 | Refills: 0 | Status: ACTIVE | COMMUNITY
Start: 2024-07-22 | End: 1900-01-01

## 2024-07-22 RX ORDER — CLOBETASOL PROPIONATE 0.5 MG/G
0.05 OINTMENT TOPICAL
Qty: 1 | Refills: 1 | Status: ACTIVE | COMMUNITY
Start: 2024-07-10

## 2024-07-22 NOTE — PHYSICAL EXAM
[No Acute Distress] : no acute distress [Well Nourished] : well nourished [Well Developed] : well developed [Well-Appearing] : well-appearing [Normal Sclera/Conjunctiva] : normal sclera/conjunctiva [EOMI] : extraocular movements intact [Normal Outer Ear/Nose] : the outer ears and nose were normal in appearance [Normal Oropharynx] : the oropharynx was normal [Supple] : supple [No Respiratory Distress] : no respiratory distress  [No Accessory Muscle Use] : no accessory muscle use [Clear to Auscultation] : lungs were clear to auscultation bilaterally [Normal Rate] : normal rate  [Regular Rhythm] : with a regular rhythm [Normal S1, S2] : normal S1 and S2 [No Varicosities] : no varicosities [No Edema] : there was no peripheral edema [No Extremity Clubbing/Cyanosis] : no extremity clubbing/cyanosis [Soft] : abdomen soft [Non Tender] : non-tender [Non-distended] : non-distended [No Joint Swelling] : no joint swelling [Grossly Normal Strength/Tone] : grossly normal strength/tone [No Rash] : no rash [Coordination Grossly Intact] : coordination grossly intact [No Focal Deficits] : no focal deficits [Normal Gait] : normal gait [Normal Affect] : the affect was normal [Normal Insight/Judgement] : insight and judgment were intact

## 2024-07-23 NOTE — REVIEW OF SYSTEMS
[Nausea] : nausea [Negative] : Heme/Lymph [Fever] : no fever [Chills] : no chills [Chest Pain] : no chest pain [Shortness Of Breath] : no shortness of breath [Abdominal Pain] : no abdominal pain [Constipation] : no constipation [Diarrhea] : diarrhea [Vomiting] : no vomiting [Headache] : no headache [de-identified] : +lightheadedness

## 2024-07-23 NOTE — ASSESSMENT
[FreeTextEntry1] : 65 y/o F with a PMHx of IgG4 disease with RP fibrosis, COPD, JESÚS, HTN (measured on LLE), epilepsy, past breast cancer now s/p BL mastectomy, hypothyroidism presenting for follow up visit regarding uptitration of ozempic.  #DM #Obesity A1C 3/2024 7.4, BMI 44.23 today. Has been on ozempic 0.25mg SQ weekly on  for 6 weeks, lost 20 lbs in combination w/ strict diet. Goal to lose 60 lbs by 2024 for TKR. 2 episodes of nausea/lightheadedness that resolved after drinking juice, does not have a glucometer. Has been on zofran 4mg PO PRN (used once in the past week). - increase ozempic from 0.25 to 0.5mg SQ weekly on  - prescribed - c/w zofran 4mg PO TID PRN for nausea - refilled - prescribed glucometer, test strips, alcohol swabs - advised pt to check FS if she feels nauseous/lightheaded again, then drink juice or eat a meal - advised pt to present to ED if symptoms don't resolve w/ PO intake - RTC in 1 month to assess tolerance and uptitrate ozempic  #COPD Previously followed w/ Dr. Isaacs, now seeing Dr. Echavarria, last seen 3/2024. - c/w anoro ellipta 1 puff qd - refilled - c/w albuterol 2 puffs q4-6h PRN for SOB/wheezing - refilled - c/w azithromycin 250mg PO MWF - refilled - f/u w/ pulm Dr. Echavarria   #Hx of anaphylaxis Allergic to shellfish, epipen , had a recent reaction 2 weeks ago. - epipen 0.3mg - refilled  #GERD - c/w pepcid 40mg PO qd - refilled  #HLD Lipid profile 2024 LDL 71, HDL 36, , tot chol 140. - c/w lipitor 40mg PO qhs - refilled  #HTN /78 in office today. CMP 2024 wnl. Urine albumin/Cr ratio 2022 wnl. - c/w amlodipine 10mg PO qd - refilled - c/w HCTZ 12.5mg PO qd - refilled - c/w losartan 25mg PO qd - refilled - obtain urine albumin/Cr ratio at next visit  #Hypothyroidism TSH 2024 wnl. - c/w synthroid 175mcg PO qd Mon-Fri - c/w synthroid 200mcg PO qd Sat & Sun  #B/l knee OA #Neuropathy Plan for possible TKR w/ Dr. Guerrero in 2024 if she is able to lose enough weight. Underwent aspiration and corticosteroid injection every 3 months, was told hold off on CSI and lidocaine injections until surgery. - c/w duloxetine 20mg PO BID - refilled - c/w lidocaine 5% patch qd PRN - refilled - c/w diclofenac 1% gel QID PRN - refilled - c/w meloxicam 15mg PO qd PRN - refilled - c/w PT - f/u w/ ortho Dr. Norbert Guerrero  - f/u w/ sports med Dr. 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 Elevated serum IgG4. Follows w/ urology and rheum Dr. Berg (seen 3/2024). Hx of HLA injections x3 in b/l knees, aspiration of Baker's cyst, steroids, rituximab. - f/u w/ rheum Dr. Berg  - f/u w/ urology  #Hx of L breast cancer S/p radiation, chemo, s/p b/l mastectomy and b/l axillary lymphadenectomy (), s/p revision & mammoplasty (). Hx of chemoport placed for difficult access given b/l lymphadenectomy (placed , replaced , removed ). Seen by heme/onc 24. - f/u w/ heme/onc Dr. Bernard 4/3/25  #Eczema Seen by derm Dr. Kenney . - c/w clobetasol 0.05% ointment - c/w hydroxyzine 10-20mg PO qhs - c/w zyrtec 10mg PO BID - f/u w/ derm Dr. Kenney   #Seborrheic dermatitis Seen by derm Dr. Kenney . - c/w fluocinide 0.05% solution BID x 2 weeks on 2 weeks off PRN for itching - c/w ketoconazole 2% shampoo every other day - f/u w/ derm Dr. Kenney   #Glaucoma #Thyroid eye disease Hx of thyroid eye disease, s/p b/l 2 wall orbital decompression, s/p strabismus surgery, s/p L lower lid retraction retraction repair (all surgeries >20 years ago). Seen by ophtho Dr. Schmitt 2023. - c/w brimonidine 0.2% 1 gtt b/l eyes TID  - c/w dorzolamide-timolol 2-0.5% gtt 1 gtt b/l eyes BID - c/w latanoprost 0.005% gtt 1 b/l eyes qhs - f/u w/ ophtho Dr. Joel Schmitt   #HCM - discuss vaccines at next visit: updated COVID, RSV, PCV20, shingles, tdap - discuss CRC screening at next visit - discuss pap smear at next visit - discuss smoking/substance hx at next visit - hep C 2022 negative - HIV 2022 negative - discuss STI screening at next visit   RTC in 1 month for f/u on ozempic (side effects, uptitration).   Patient seen and evaluated with attending physician Dr. Underwood.

## 2024-07-23 NOTE — HISTORY OF PRESENT ILLNESS
[FreeTextEntry1] : Pt is here for a follow up.  [de-identified] : 65 y/o F with a PMHx of IgG4 disease with RP fibrosis, COPD, JESÚS, HTN (measured on LLE), epilepsy, past breast cancer now s/p BL mastectomy, hypothyroidism presenting for follow up visit regarding ozempic. Pt has been on ozempic 0.25mg SQ weekly for about 6 weeks, is taking the last dose today. Has lost about 20 pounds since she started ozempic. Has also been on a strict diet, counting her sugar intake (limits to 15g of carbs per day). Her orthopedic surgeon Dr. Norbert Guerrero is planning for a TKR in 2024, but pt needs to lose about 60 pounds total in order to have the procedure done. Pt reports 2 episodes of nausea/lightheadedness, believed her blood sugar was low, drank juice and symptoms resolved within 10 mins. She is not sure if these are true hypoglycemic episodes, does not have a glucometer. Of note, pt requesting refill on all her meds including epipen - had an allergic reaction after eating shrimp 2 weeks ago and her epipen was , took 4 benadryl w/ relief of symptoms. No other complaints at this time.

## 2024-07-23 NOTE — REVIEW OF SYSTEMS
[Nausea] : nausea [Negative] : Heme/Lymph [Fever] : no fever [Chills] : no chills [Chest Pain] : no chest pain [Shortness Of Breath] : no shortness of breath [Abdominal Pain] : no abdominal pain [Constipation] : no constipation [Diarrhea] : diarrhea [Vomiting] : no vomiting [Headache] : no headache [de-identified] : +lightheadedness

## 2024-07-23 NOTE — END OF VISIT
[] : Resident [FreeTextEntry3] : 64-year-old female presenting for follow-up.  Has been tolerating Ozempic 0.25 mg weekly, notes intermittent episodes of nausea and what feels like low blood sugar to her.  If she drinks juice or eats she feels better.  She has never checked her blood sugar, does not have a glucometer at home.  Is aiming for a right total knee replacement in September with her orthopedist, she needs to lose 60 pounds to qualify for that surgery.  She has already lost 20 pounds in the last 2 months.  She is very cautious about her diet avoid sugar and carbohydrates.  She has follow-up with Dr. Dave in August for possible drainage of her right knee effusion.  Prescribed glucometer, unclear if nausea is related to medication side effect or true hypoglycemia, advised her to check sugar during future episodes if they occur.  Will increase Ozempic to 0.5 mg weekly.  Renewed medications as requested.  Follow-up in 1 month to further increase Ozempic as tolerated.

## 2024-07-23 NOTE — HISTORY OF PRESENT ILLNESS
[FreeTextEntry1] : Pt is here for a follow up.  [de-identified] : 63 y/o F with a PMHx of IgG4 disease with RP fibrosis, COPD, JESÚS, HTN (measured on LLE), epilepsy, past breast cancer now s/p BL mastectomy, hypothyroidism presenting for follow up visit regarding ozempic. Pt has been on ozempic 0.25mg SQ weekly for about 6 weeks, is taking the last dose today. Has lost about 20 pounds since she started ozempic. Has also been on a strict diet, counting her sugar intake (limits to 15g of carbs per day). Her orthopedic surgeon Dr. Norbert Guerrero is planning for a TKR in 2024, but pt needs to lose about 60 pounds total in order to have the procedure done. Pt reports 2 episodes of nausea/lightheadedness, believed her blood sugar was low, drank juice and symptoms resolved within 10 mins. She is not sure if these are true hypoglycemic episodes, does not have a glucometer. Of note, pt requesting refill on all her meds including epipen - had an allergic reaction after eating shrimp 2 weeks ago and her epipen was , took 4 benadryl w/ relief of symptoms. No other complaints at this time.

## 2024-07-26 ENCOUNTER — NON-APPOINTMENT (OUTPATIENT)
Age: 64
End: 2024-07-26

## 2024-08-14 ENCOUNTER — APPOINTMENT (OUTPATIENT)
Dept: ORTHOPEDIC SURGERY | Facility: CLINIC | Age: 64
End: 2024-08-14
Payer: COMMERCIAL

## 2024-08-14 VITALS — HEART RATE: 82 BPM | SYSTOLIC BLOOD PRESSURE: 151 MMHG | OXYGEN SATURATION: 96 % | DIASTOLIC BLOOD PRESSURE: 86 MMHG

## 2024-08-14 DIAGNOSIS — M17.0 BILATERAL PRIMARY OSTEOARTHRITIS OF KNEE: ICD-10-CM

## 2024-08-14 PROCEDURE — 20611 DRAIN/INJ JOINT/BURSA W/US: CPT | Mod: RT

## 2024-08-14 PROCEDURE — 99213 OFFICE O/P EST LOW 20 MIN: CPT | Mod: 25

## 2024-08-14 NOTE — PHYSICAL EXAM
[de-identified] : General: Well-nourished, well-developed, alert, and in no acute distress. Head: Normocephalic. Eyes: Pupils equal, extraocular muscles intact, normal sclera. Nose: No nasal discharge. Cardiovascular: Extremities are warm and well perfused. Distal pulses are symmetric bilaterally. Respiratory: No labored breathing. Extremities: Sensation is intact distally bilaterally. Distal pulses are symmetric bilaterally Lymphatic: No regional lymphadenopathy, no lymphedema Neurologic: No focal deficits Skin: Normal skin color, texture, and turgor Psychiatric: Normal affect  MSK: Examination of [right] knee:   Moderate effusion No erythema, hematoma or skin lesion Tender to palpation:  medial joint line, lateral joint line, medial patellar facet, lateral patellar facet, popliteal fossa Nontender to palpation: quad tendon, patellar tendon, pes, Gerdy's tubercle, tibial tuberosity, hamstrings, ITB No warmth Baker's cyst palpable ROM: 5-80 Mild patellar crepitus   Log roll negative Lachman negative Anterior drawer negative Posterior drawer negative Varus/valgus stress negative at 0 and 30 deg Aj negative   Examination of [left] knee:   No effusion, erythema, hematoma or skin lesion Nontender to palpation: medial joint line, lateral joint line, medial patellar facet, lateral patellar facet, quad tendon, patellar tendon, pes, Gerdy's tubercle, tibial tuberosity, popliteal fossa, hamstrings, ITB No warmth No Baker's cyst palpable ROM: 0-90 No patellar crepitus   Log roll negative Lachman negative Anterior drawer negative Posterior drawer negative Varus/valgus stress negative at 0 and 30 deg Aj negative   Sensation is intact to light touch over the superficial and deep peroneal nerve distributions and the posterior tibial nerve distribution. Capillary refill is less than two seconds. Posterior tibial and dorsalis pedis pulses 2+ equal bilaterally. No calf swelling or tenderness bilaterally. Strength testing shows Hip flexion 5/5, Hip adduction 5/5, Hip abduction 5/5, Knee Extension 5/5, Knee Flexion 5/5, dorsiflexion 5/5, plantar flexion 5/5, EHL 5/5 Reflexes: Patellar 2+, Achilles 2+

## 2024-08-14 NOTE — PROCEDURE
[de-identified] :  Ultrasound guided aspiration of right knee:  Following a discussion of the risks (bleeding, infection) and benefits, verbal consent was obtained. Patient placed in supine position. The suprapatellar recess and surrounding structures (patella, femur, quadriceps tendon, suprapatellar fat pad and prefemoral fat pad) were visualized in SAX and LAX with Sonosite 15 Hz linear transducer.   Superolateral knee was anaesthetised with ethyl chloride spray. Under strict sterile technique the right knee was prepped with chlorhexadine. Using ultrasound guidance (superolateral approach) a 25 G 1.5 inch needle was inserted and after negative aspiration, 3mL of 0.5% Bupivacaine and 2mL of 1 % lidocaine was injected subcutaneously and along track into the suprapatellar recess. After adequate anaesthesia, a 20 G 1.5 inch needle was inserted into the suprapatellar recess and 33 mL of clear, synovial fluid was aspirated.  The use of direct ultrasound visualization was necessary to increase patient safety by identifying and avoiding inadvertent needle placement within the neurovascular and osteochondral structures. Additionally, the increased accuracy of needle placement may improve therapeutic efficacy and allow higher diagnostic specificity when evaluating the effectiveness of this injection.  The patient tolerated the procedure well. Post-injection instructions given (no strenuous activity for 48 hours, ice, elevate). Patient verbalized understanding.

## 2024-08-14 NOTE — PHYSICAL EXAM
[de-identified] : General: Well-nourished, well-developed, alert, and in no acute distress. Head: Normocephalic. Eyes: Pupils equal, extraocular muscles intact, normal sclera. Nose: No nasal discharge. Cardiovascular: Extremities are warm and well perfused. Distal pulses are symmetric bilaterally. Respiratory: No labored breathing. Extremities: Sensation is intact distally bilaterally. Distal pulses are symmetric bilaterally Lymphatic: No regional lymphadenopathy, no lymphedema Neurologic: No focal deficits Skin: Normal skin color, texture, and turgor Psychiatric: Normal affect  MSK: Examination of [right] knee:   Moderate effusion No erythema, hematoma or skin lesion Tender to palpation:  medial joint line, lateral joint line, medial patellar facet, lateral patellar facet, popliteal fossa Nontender to palpation: quad tendon, patellar tendon, pes, Gerdy's tubercle, tibial tuberosity, hamstrings, ITB No warmth Baker's cyst palpable ROM: 5-80 Mild patellar crepitus   Log roll negative Lachman negative Anterior drawer negative Posterior drawer negative Varus/valgus stress negative at 0 and 30 deg Aj negative   Examination of [left] knee:   No effusion, erythema, hematoma or skin lesion Nontender to palpation: medial joint line, lateral joint line, medial patellar facet, lateral patellar facet, quad tendon, patellar tendon, pes, Gerdy's tubercle, tibial tuberosity, popliteal fossa, hamstrings, ITB No warmth No Baker's cyst palpable ROM: 0-90 No patellar crepitus   Log roll negative Lachman negative Anterior drawer negative Posterior drawer negative Varus/valgus stress negative at 0 and 30 deg Aj negative   Sensation is intact to light touch over the superficial and deep peroneal nerve distributions and the posterior tibial nerve distribution. Capillary refill is less than two seconds. Posterior tibial and dorsalis pedis pulses 2+ equal bilaterally. No calf swelling or tenderness bilaterally. Strength testing shows Hip flexion 5/5, Hip adduction 5/5, Hip abduction 5/5, Knee Extension 5/5, Knee Flexion 5/5, dorsiflexion 5/5, plantar flexion 5/5, EHL 5/5 Reflexes: Patellar 2+, Achilles 2+

## 2024-08-14 NOTE — HISTORY OF PRESENT ILLNESS
[de-identified] : ZAC VELEZ is a 64-year-old female presents to follow up for bilateral knee.  Right has greater pain than left. She presented WBAT with a cane. Worsen within 2 weeks. She has been doing home exercises. She states her right knee needs to be aspiration. She is experiencing sharp pain. Last visit was 05/08/2024 pt. had Right knee CSI.

## 2024-08-14 NOTE — PROCEDURE
[de-identified] :  Ultrasound guided aspiration of right knee:  Following a discussion of the risks (bleeding, infection) and benefits, verbal consent was obtained. Patient placed in supine position. The suprapatellar recess and surrounding structures (patella, femur, quadriceps tendon, suprapatellar fat pad and prefemoral fat pad) were visualized in SAX and LAX with Sonosite 15 Hz linear transducer.   Superolateral knee was anaesthetised with ethyl chloride spray. Under strict sterile technique the right knee was prepped with chlorhexadine. Using ultrasound guidance (superolateral approach) a 25 G 1.5 inch needle was inserted and after negative aspiration, 3mL of 0.5% Bupivacaine and 2mL of 1 % lidocaine was injected subcutaneously and along track into the suprapatellar recess. After adequate anaesthesia, a 20 G 1.5 inch needle was inserted into the suprapatellar recess and 33 mL of clear, synovial fluid was aspirated.  The use of direct ultrasound visualization was necessary to increase patient safety by identifying and avoiding inadvertent needle placement within the neurovascular and osteochondral structures. Additionally, the increased accuracy of needle placement may improve therapeutic efficacy and allow higher diagnostic specificity when evaluating the effectiveness of this injection.  The patient tolerated the procedure well. Post-injection instructions given (no strenuous activity for 48 hours, ice, elevate). Patient verbalized understanding.

## 2024-08-14 NOTE — ASSESSMENT
[FreeTextEntry1] : ZAC VELEZ is a 64 year old female with bilateral knee pain R>L.     I discussed with the patient that their symptoms, signs, and imaging are most consistent with osteoarthritis. We reviewed the natural history of this condition and treatment options. We agreed on the following plan:  US guided right knee aspiration as detailed above.  Advised against cortisone injection per Dr. Guerrero if TKA to take place in October.  Encouraged to continue home exercises per handout. Encouraged to continue with lifestyle modifications for weight loss. Consider Baker's cyst, left knee aspiration if symptoms worsen. Follow up in 6-8 weeks.

## 2024-08-14 NOTE — HISTORY OF PRESENT ILLNESS
[de-identified] : ZAC VELEZ is a 64-year-old female presents to follow up for bilateral knee.  Right has greater pain than left. She presented WBAT with a cane. Worsen within 2 weeks. She has been doing home exercises. She states her right knee needs to be aspiration. She is experiencing sharp pain. Last visit was 05/08/2024 pt. had Right knee CSI.

## 2024-08-21 ENCOUNTER — APPOINTMENT (OUTPATIENT)
Dept: DERMATOLOGY | Facility: CLINIC | Age: 64
End: 2024-08-21
Payer: COMMERCIAL

## 2024-08-21 DIAGNOSIS — L29.9 PRURITUS, UNSPECIFIED: ICD-10-CM

## 2024-08-21 PROCEDURE — 99214 OFFICE O/P EST MOD 30 MIN: CPT

## 2024-08-21 RX ORDER — HYDROCORTISONE 25 MG/G
2.5 OINTMENT TOPICAL
Qty: 1 | Refills: 2 | Status: ACTIVE | COMMUNITY
Start: 2024-08-21 | End: 1900-01-01

## 2024-08-21 NOTE — ASSESSMENT
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 -c/w  zyrtec 10mg bid -c/w hydroxyzine 10mg nightly before bed. Can increase to 20mg as tolerated.  warned about risk of sedation, lethargy and falling.  - start tacrolimus .1% ointment BID during 2 weeks off clobetasol - rtc 12 weeks. other options include adding oral gabapentin to address itching on back  #  Seborrheic dermatitis - stable Patient reassured this is a benign dermatitis caused by yeast overgrowth.  Discussed the typical chronic nature of this condition with periods of waxing and waning.    For the scalp: - continue Ketoconazole 2% shampoo to entire scalp every other day.  Patient was instructed to leave on for 5-10 minutes before rinsing off. - c/w Lidex solution to affected areas BID for 2 weeks on and 2 weeks off as needed for itching.   #New vulvar itching Favor contact dermatitis  -start HCN 2.5% oint bid for two weeks on and two weeks as needed for itching. -gentle skin care reviewed, stop all other topical products. stop betamethasone/clotrimazole cream. -instructed to use only mild soap and vaseline/aquaphor  RTC 12 weeks

## 2024-08-21 NOTE — HISTORY OF PRESENT ILLNESS
[FreeTextEntry1] : RPA - Rash / Seborrheic Dermatitis  [de-identified] : Eloisa Sapp is a 63 y/o F presenting for a F/U of the above.   Rash still present on forearms and back - As soon as pt stops using the medication, it comes back  Seborrheic dermatitis on scalp  - Went away w/ fluo solution and came back a week ago - Solution has been working well. stopped 3.5 weeks ago, coming back now.   Significant itching on vulva after waxing x few weeks ago. Given rx for betamethason/clotrimazole by gynecologist, not helping.

## 2024-08-21 NOTE — HISTORY OF PRESENT ILLNESS
[FreeTextEntry1] : RPA - Rash / Seborrheic Dermatitis  [de-identified] : Eloisa Sapp is a 63 y/o F presenting for a F/U of the above.   Rash still present on forearms and back - As soon as pt stops using the medication, it comes back  Seborrheic dermatitis on scalp  - Went away w/ fluo solution and came back a week ago - Solution has been working well. stopped 3.5 weeks ago, coming back now.   Significant itching on vulva after waxing x few weeks ago. Given rx for betamethason/clotrimazole by gynecologist, not helping.

## 2024-08-21 NOTE — PHYSICAL EXAM
[Alert] : alert [Oriented x 3] : ~L oriented x 3 [Well Nourished] : well nourished [Conjunctiva Non-injected] : conjunctiva non-injected [No Visual Lymphadenopathy] : no visual  lymphadenopathy [No Clubbing] : no clubbing [No Edema] : no edema [No Bromhidrosis] : no bromhidrosis [No Chromhidrosis] : no chromhidrosis [Full Body Skin Exam Performed] : performed [FreeTextEntry3] : upper and mid back - urticated erythematous plaques with surrounding hyperpigmentation.   Involving the scalp are ill-defined pink patches with flaky scale.  forearms upper arms- improved  vulva/inner thigh, perineum: ill defined erythema and scaling

## 2024-08-26 ENCOUNTER — APPOINTMENT (OUTPATIENT)
Dept: INTERNAL MEDICINE | Facility: CLINIC | Age: 64
End: 2024-08-26
Payer: COMMERCIAL

## 2024-08-26 VITALS — WEIGHT: 214 LBS | BODY MASS INDEX: 43.22 KG/M2

## 2024-08-26 DIAGNOSIS — E78.5 HYPERLIPIDEMIA, UNSPECIFIED: ICD-10-CM

## 2024-08-26 DIAGNOSIS — I10 ESSENTIAL (PRIMARY) HYPERTENSION: ICD-10-CM

## 2024-08-26 DIAGNOSIS — E11.9 TYPE 2 DIABETES MELLITUS W/OUT COMPLICATIONS: ICD-10-CM

## 2024-08-26 PROCEDURE — G2211 COMPLEX E/M VISIT ADD ON: CPT

## 2024-08-26 PROCEDURE — 99215 OFFICE O/P EST HI 40 MIN: CPT | Mod: GC

## 2024-08-26 RX ORDER — LOSARTAN POTASSIUM 100 MG/1
100 TABLET, FILM COATED ORAL
Qty: 90 | Refills: 3 | Status: ACTIVE | COMMUNITY
Start: 2024-08-26 | End: 1900-01-01

## 2024-08-26 RX ORDER — TACROLIMUS 1 MG/G
0.1 OINTMENT TOPICAL
Qty: 1 | Refills: 2 | Status: ACTIVE | COMMUNITY
Start: 2024-08-21

## 2024-08-26 RX ORDER — SEMAGLUTIDE 1.34 MG/ML
4 INJECTION, SOLUTION SUBCUTANEOUS
Qty: 1 | Refills: 0 | Status: ACTIVE | COMMUNITY
Start: 2024-08-26 | End: 1900-01-01

## 2024-08-27 NOTE — ASSESSMENT
[FreeTextEntry1] : 65 y/o F with a PMHx of IgG4 disease with RP fibrosis, COPD, JESÚS, HTN (measured on LLE), epilepsy, past breast cancer now s/p BL mastectomy, hypothyroidism presenting for follow up visit regarding uptitration of ozempic.  #DM #Obesity A1C 3/2024 7.4, BMI ~43 today. Has been on ozempic 0.5mg SQ weekly for 3 weeks, 4th dose was due today however the pen ran out/ did not have enough of the med. Lost total of ~25 lbs in combination w/ strict diet, goal weight 180 prior to R TKR. No side effects, well tolerated, wants to increase dose, lost 5 pounds in the last month. - increase ozempic from 0.5 to 1mg SQ weekly on  - prescribed - c/w zofran 4mg PO TID PRN for nausea - RTC in 1 month to assess tolerance and uptitrate ozempic  #Exposure to STI Recently exposed to chlamydia, was seen by GYN 24, BV panel and GC/chlamydia negative, other labs pending. Was prescribed doxy 100mg PO BID x 7 days and diflucan 150mg x 2 doses by her GYN empirically while other labs are pending. - c/w doxycycline and fluconazole as prescribed by GYN  #Vulvar lesion Per pt, first noted by dermatology and planned for biopsy w/ GYN on 24. - f/u biopsy w/ GYN on   #COPD Previously followed w/ Dr. Isaacs, now seeing Dr. Echavarria, last seen 3/2024. - c/w anoro ellipta 1 puff qd - c/w albuterol 2 puffs q4-6h PRN for SOB/wheezing - c/w azithromycin 250mg PO MWF - f/u w/ pulm Dr. Echavarria   #Hx of anaphylaxis Allergic to shellfish, epipen , had a recent reaction 2 weeks ago. - c/w epipen 0.3mg IM PRN  #GERD - c/w pepcid 40mg PO qd  #HLD Lipid profile 2024 LDL 71, HDL 36, , tot chol 140. - c/w lipitor 40mg PO qhs  #HTN /86 in office today, did not take her meds yet. BP at home: 120s-140s/70s-90s, checks daily. CMP 2024 wnl. Urine albumin/Cr ratio 2022 wnl. Of note pt was previously on losartan 100mg which was decreased to 25mg during a recent hospitalization, however an outside provider uptitrated it back to 100mg. She has been taking 100mg and is now requesting a refill. - c/w amlodipine 10mg PO qd - c/w HCTZ 12.5mg PO qd - c/w losartan 100mg PO qd - refilled - obtain urine albumin/Cr ratio at next visit  #Hypothyroidism TSH 2024 wnl. - c/w synthroid 175mcg PO qd Mon-Fri - c/w synthroid 200mcg PO qd Sat & Sun  #B/l knee OA #Neuropathy Plan for possible R TKR w/ Dr. Guerrero if she is able to lose enough weight. Underwent aspiration and corticosteroid injection every 3 months, was told hold off on CSI and lidocaine injections until surgery. Seen by sports med Dr. Tena  and underwent R knee joint aspiration. - c/w duloxetine 20mg PO BID - c/w lidocaine 5% patch qd PRN - c/w diclofenac 1% gel QID PRN - c/w meloxicam 15mg PO qd PRN - c/w PT - f/u w/ ortho Dr. Norbert Guerrero  - f/u w/ sports med Dr. 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 Elevated serum IgG4. Follows w/ urology and rheum Dr. Berg (seen 3/2024). Hx of HLA injections x3 in b/l knees, aspiration of Baker's cyst, steroids, rituximab. - f/u w/ rheum Dr. Berg 10/16 - f/u w/ urology  #Hx of L breast cancer S/p radiation, chemo, s/p b/l mastectomy and b/l axillary lymphadenectomy (), s/p revision & mammoplasty (). Hx of chemoport placed for difficult access given b/l lymphadenectomy (placed , replaced , removed ). Seen by heme/onc 24. - f/u w/ heme/onc Dr. 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 Kenney , was started on tacrolimus ointment, will f/u in 12 weeks and consider adding oral gabapentin to address pruritus on back. - c/w clobetasol 0.05% ointment BID x 2 weeks on 2 weeks off PRN for itching - c/w hydroxyzine 10mg PO qhs - can increase to 20mg as tolerated - c/w zyrtec 10mg PO BID - c/w tacrolimus 1% ointment BID during 2 weeks off clobetasol - f/u w/ derm Dr. Kenney   #Seborrheic dermatitis Seen by derm Dr. Kenney . - c/w fluocinide (Lidex) 0.05% solution BID x 2 weeks on 2 weeks off PRN for itching - c/w ketoconazole 2% shampoo every other day - f/u w/ derm Dr. Kenney   #Vulvar itching 2/2 contact dermatitis Seen by derm Dr. Kenney . - c/w hydrocortisone 2.5% ointment BID x 2 weeks on 2 weeks of PRN for itching  #Glaucoma #Thyroid eye disease Hx of thyroid eye disease, s/p b/l 2 wall orbital decompression, s/p strabismus surgery, s/p L lower lid retraction retraction repair (all surgeries >20 years ago). Seen by ophtho Dr. Schmitt 2023. - c/w brimonidine 0.2% 1 gtt b/l eyes TID  - c/w dorzolamide-timolol 2-0.5% gtt 1 gtt b/l eyes BID - c/w latanoprost 0.005% gtt 1 b/l eyes qhs - f/u w/ ophtho Dr. Joel Schmitt 10/11  #HCM - declining COVID/RSV vaccines - will administer PCV20, shingles, tdap at next visit (hx of b/l mastectomy, pt states she only obtains vaccines intragluteally) - c-scope reportedly done  per pt - pap smear 24 per pt - discuss smoking/substance hx at next visit - 2024 hep C, HIV, syphilis negative -- see GC/chlamydia above   RTC in 1 month for f/u on ozempic (side effects, uptitration).   Patient seen and evaluated with attending physician Dr. Del Cid.

## 2024-08-27 NOTE — ASSESSMENT
[FreeTextEntry1] : 63 y/o F with a PMHx of IgG4 disease with RP fibrosis, COPD, JESÚS, HTN (measured on LLE), epilepsy, past breast cancer now s/p BL mastectomy, hypothyroidism presenting for follow up visit regarding uptitration of ozempic.  #DM #Obesity A1C 3/2024 7.4, BMI ~43 today. Has been on ozempic 0.5mg SQ weekly for 3 weeks, 4th dose was due today however the pen ran out/ did not have enough of the med. Lost total of ~25 lbs in combination w/ strict diet, goal weight 180 prior to R TKR. No side effects, well tolerated, wants to increase dose, lost 5 pounds in the last month. - increase ozempic from 0.5 to 1mg SQ weekly on  - prescribed - c/w zofran 4mg PO TID PRN for nausea - RTC in 1 month to assess tolerance and uptitrate ozempic  #Exposure to STI Recently exposed to chlamydia, was seen by GYN 24, BV panel and GC/chlamydia negative, other labs pending. Was prescribed doxy 100mg PO BID x 7 days and diflucan 150mg x 2 doses by her GYN empirically while other labs are pending. - c/w doxycycline and fluconazole as prescribed by GYN  #Vulvar lesion Per pt, first noted by dermatology and planned for biopsy w/ GYN on 24. - f/u biopsy w/ GYN on   #COPD Previously followed w/ Dr. Isaacs, now seeing Dr. Echavarria, last seen 3/2024. - c/w anoro ellipta 1 puff qd - c/w albuterol 2 puffs q4-6h PRN for SOB/wheezing - c/w azithromycin 250mg PO MWF - f/u w/ pulm Dr. Echavarria   #Hx of anaphylaxis Allergic to shellfish, epipen , had a recent reaction 2 weeks ago. - c/w epipen 0.3mg IM PRN  #GERD - c/w pepcid 40mg PO qd  #HLD Lipid profile 2024 LDL 71, HDL 36, , tot chol 140. - c/w lipitor 40mg PO qhs  #HTN /86 in office today, did not take her meds yet. BP at home: 120s-140s/70s-90s, checks daily. CMP 2024 wnl. Urine albumin/Cr ratio 2022 wnl. Of note pt was previously on losartan 100mg which was decreased to 25mg during a recent hospitalization, however an outside provider uptitrated it back to 100mg. She has been taking 100mg and is now requesting a refill. - c/w amlodipine 10mg PO qd - c/w HCTZ 12.5mg PO qd - c/w losartan 100mg PO qd - refilled - obtain urine albumin/Cr ratio at next visit  #Hypothyroidism TSH 2024 wnl. - c/w synthroid 175mcg PO qd Mon-Fri - c/w synthroid 200mcg PO qd Sat & Sun  #B/l knee OA #Neuropathy Plan for possible R TKR w/ Dr. Guerrero if she is able to lose enough weight. Underwent aspiration and corticosteroid injection every 3 months, was told hold off on CSI and lidocaine injections until surgery. Seen by sports med Dr. Tena  and underwent R knee joint aspiration. - c/w duloxetine 20mg PO BID - c/w lidocaine 5% patch qd PRN - c/w diclofenac 1% gel QID PRN - c/w meloxicam 15mg PO qd PRN - c/w PT - f/u w/ ortho Dr. Norbert Guerrero  - f/u w/ sports med Dr. 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 Elevated serum IgG4. Follows w/ urology and rheum Dr. Berg (seen 3/2024). Hx of HLA injections x3 in b/l knees, aspiration of Baker's cyst, steroids, rituximab. - f/u w/ rheum Dr. Berg 10/16 - f/u w/ urology  #Hx of L breast cancer S/p radiation, chemo, s/p b/l mastectomy and b/l axillary lymphadenectomy (), s/p revision & mammoplasty (). Hx of chemoport placed for difficult access given b/l lymphadenectomy (placed , replaced , removed ). Seen by heme/onc 24. - f/u w/ heme/onc Dr. 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 Kenney , was started on tacrolimus ointment, will f/u in 12 weeks and consider adding oral gabapentin to address pruritus on back. - c/w clobetasol 0.05% ointment BID x 2 weeks on 2 weeks off PRN for itching - c/w hydroxyzine 10mg PO qhs - can increase to 20mg as tolerated - c/w zyrtec 10mg PO BID - c/w tacrolimus 1% ointment BID during 2 weeks off clobetasol - f/u w/ derm Dr. Kenney   #Seborrheic dermatitis Seen by derm Dr. Kenney . - c/w fluocinide (Lidex) 0.05% solution BID x 2 weeks on 2 weeks off PRN for itching - c/w ketoconazole 2% shampoo every other day - f/u w/ derm Dr. Kenney   #Vulvar itching 2/2 contact dermatitis Seen by derm Dr. Kenney . - c/w hydrocortisone 2.5% ointment BID x 2 weeks on 2 weeks of PRN for itching  #Glaucoma #Thyroid eye disease Hx of thyroid eye disease, s/p b/l 2 wall orbital decompression, s/p strabismus surgery, s/p L lower lid retraction retraction repair (all surgeries >20 years ago). Seen by ophtho Dr. Schmitt 2023. - c/w brimonidine 0.2% 1 gtt b/l eyes TID  - c/w dorzolamide-timolol 2-0.5% gtt 1 gtt b/l eyes BID - c/w latanoprost 0.005% gtt 1 b/l eyes qhs - f/u w/ ophtho Dr. Joel Schmitt 10/11  #HCM - declining COVID/RSV vaccines - will administer PCV20, shingles, tdap at next visit (hx of b/l mastectomy, pt states she only obtains vaccines intragluteally) - c-scope reportedly done  per pt - pap smear 24 per pt - discuss smoking/substance hx at next visit - 2024 hep C, HIV, syphilis negative -- see GC/chlamydia above   RTC in 1 month for f/u on ozempic (side effects, uptitration).   Patient seen and evaluated with attending physician Dr. Del Cid.

## 2024-08-27 NOTE — HISTORY OF PRESENT ILLNESS
[FreeTextEntry1] : Pt is here for a follow up visit.  [de-identified] : 63 y/o F with a PMHx of IgG4 disease w/ RP fibrosis, COPD, JESÚS, HTN (measured on LLE), epilepsy, past breast cancer now s/p BL mastectomy, hypothyroidism presenting for follow up visit regarding ozempic. Pt was increased from 0.25mg to 0.5mg weekly during her last visit on 7/22/24. She reports side effects are well-controlled, has not had any further episodes of nausea or lightheadedness. Appetite is low but she makes sure to eat a few small meals throughout the day and hydrate. Her orthopedic surgeon Dr. Norbert Guerrero was planning for a TKR in 9/2024, however pt needs to lose 60 pounds (goal weight ~180) in order to have the procedure. She last lost 5 lbs since last visit. She has chronic constipation which is well controlled. No other complaints at this time. Pt also requested refill on her losartan. Of note, pt reports recent exposure to chlamydia, was seen by her GYN and tested negative however she was prescribed doxy and fluconazole empirically while the remainder of her labs are pending.

## 2024-08-27 NOTE — REVIEW OF SYSTEMS
[Constipation] : constipation [Negative] : Heme/Lymph [Fever] : no fever [Chills] : no chills [Chest Pain] : no chest pain [Shortness Of Breath] : no shortness of breath [Abdominal Pain] : no abdominal pain [Nausea] : no nausea [Diarrhea] : diarrhea [Vomiting] : no vomiting [Headache] : no headache

## 2024-08-27 NOTE — HISTORY OF PRESENT ILLNESS
[FreeTextEntry1] : Pt is here for a follow up visit.  [de-identified] : 63 y/o F with a PMHx of IgG4 disease w/ RP fibrosis, COPD, JESÚS, HTN (measured on LLE), epilepsy, past breast cancer now s/p BL mastectomy, hypothyroidism presenting for follow up visit regarding ozempic. Pt was increased from 0.25mg to 0.5mg weekly during her last visit on 7/22/24. She reports side effects are well-controlled, has not had any further episodes of nausea or lightheadedness. Appetite is low but she makes sure to eat a few small meals throughout the day and hydrate. Her orthopedic surgeon Dr. Norbert Guerrero was planning for a TKR in 9/2024, however pt needs to lose 60 pounds (goal weight ~180) in order to have the procedure. She last lost 5 lbs since last visit. She has chronic constipation which is well controlled. No other complaints at this time. Pt also requested refill on her losartan. Of note, pt reports recent exposure to chlamydia, was seen by her GYN and tested negative however she was prescribed doxy and fluconazole empirically while the remainder of her labs are pending.

## 2024-08-27 NOTE — PHYSICAL EXAM
[No Acute Distress] : no acute distress [Well Nourished] : well nourished [Well Developed] : well developed [Well-Appearing] : well-appearing [Normal Sclera/Conjunctiva] : normal sclera/conjunctiva [EOMI] : extraocular movements intact [Normal Outer Ear/Nose] : the outer ears and nose were normal in appearance [Normal Oropharynx] : the oropharynx was normal [Supple] : supple [No Respiratory Distress] : no respiratory distress  [No Accessory Muscle Use] : no accessory muscle use [Clear to Auscultation] : lungs were clear to auscultation bilaterally [Normal Rate] : normal rate  [Regular Rhythm] : with a regular rhythm [Normal S1, S2] : normal S1 and S2 [No Varicosities] : no varicosities [No Edema] : there was no peripheral edema [No Extremity Clubbing/Cyanosis] : no extremity clubbing/cyanosis [Soft] : abdomen soft [Non Tender] : non-tender [Non-distended] : non-distended [No Joint Swelling] : no joint swelling [Grossly Normal Strength/Tone] : grossly normal strength/tone [No Rash] : no rash [Coordination Grossly Intact] : coordination grossly intact [No Focal Deficits] : no focal deficits [Normal Gait] : normal gait [Normal Affect] : the affect was normal [Normal Insight/Judgement] : insight and judgment were intact [de-identified] : obese [de-identified] : ambulates with cane

## 2024-08-27 NOTE — HEALTH RISK ASSESSMENT
[Other reason not done] : Other reason not done [Former] : Former [de-identified] : Pt is being seen for depression

## 2024-08-27 NOTE — END OF VISIT
[] : Resident [Time Spent: ___ minutes] : I have spent [unfilled] minutes of time on the encounter which excludes teaching and separately reported services. [FreeTextEntry3] : General follow-up but had many questions about healthcare maintenance. Obesity -- ran out of Ozempic but seems weight loss has plateaued. Can resume at higher dose. We discussed intermittent post-prandial nausea is not a contraindication as long as she can tolerate small meals. HTN -- c/w regimen as above. She was taking 100mg already without problems. Rest of chronic condition management per Dr. Maya's excellent note.

## 2024-08-27 NOTE — PHYSICAL EXAM
[No Acute Distress] : no acute distress [Well Nourished] : well nourished [Well Developed] : well developed [Well-Appearing] : well-appearing [Normal Sclera/Conjunctiva] : normal sclera/conjunctiva [EOMI] : extraocular movements intact [Normal Outer Ear/Nose] : the outer ears and nose were normal in appearance [Normal Oropharynx] : the oropharynx was normal [Supple] : supple [No Respiratory Distress] : no respiratory distress  [No Accessory Muscle Use] : no accessory muscle use [Clear to Auscultation] : lungs were clear to auscultation bilaterally [Normal Rate] : normal rate  [Regular Rhythm] : with a regular rhythm [Normal S1, S2] : normal S1 and S2 [No Varicosities] : no varicosities [No Edema] : there was no peripheral edema [No Extremity Clubbing/Cyanosis] : no extremity clubbing/cyanosis [Soft] : abdomen soft [Non Tender] : non-tender [Non-distended] : non-distended [No Joint Swelling] : no joint swelling [Grossly Normal Strength/Tone] : grossly normal strength/tone [No Rash] : no rash [Coordination Grossly Intact] : coordination grossly intact [No Focal Deficits] : no focal deficits [Normal Gait] : normal gait [Normal Affect] : the affect was normal [Normal Insight/Judgement] : insight and judgment were intact [de-identified] : obese [de-identified] : ambulates with cane

## 2024-08-27 NOTE — HEALTH RISK ASSESSMENT
[Other reason not done] : Other reason not done [Former] : Former [de-identified] : Pt is being seen for depression

## 2024-09-04 ENCOUNTER — APPOINTMENT (OUTPATIENT)
Dept: ORTHOPEDIC SURGERY | Facility: CLINIC | Age: 64
End: 2024-09-04
Payer: COMMERCIAL

## 2024-09-04 VITALS
HEART RATE: 77 BPM | OXYGEN SATURATION: 97 % | DIASTOLIC BLOOD PRESSURE: 89 MMHG | WEIGHT: 214 LBS | BODY MASS INDEX: 43.14 KG/M2 | SYSTOLIC BLOOD PRESSURE: 143 MMHG | HEIGHT: 59 IN

## 2024-09-04 DIAGNOSIS — M17.0 BILATERAL PRIMARY OSTEOARTHRITIS OF KNEE: ICD-10-CM

## 2024-09-04 PROCEDURE — 20610 DRAIN/INJ JOINT/BURSA W/O US: CPT | Mod: RT

## 2024-09-08 NOTE — PROCEDURE
[de-identified] : 9/4/24: Eloisa Sapp is a 64F, following up for reevaluation of bilateral knee pain, right worse than left. We last saw her and discussed R TKA. However, her BMI was prohibitive at the time, although she is making good progress with weight loss. On August 14th, she saw Dr. Tena and got her right knee aspirated. However, they did not do a cortisone injection because of intended future surgery. She reports today her knee is swelling again. She does use a walker when ambulating. She can walk one block pain-free. She does endorse night pains. Currently, she's 211lbs putting her at a BMI of 43. She believes she can reach under 200 pounds by October and does wish to continue with the intended plan of having surgery in October. She's also inquiring if she can have another knee aspiration today.  Procedure: Knee joint aspiration Laterality: Right Indication: diagnostic - discussed with patient Skin prep: alcohol and chlorhexidine Anesthesia: ethyl chloride spray Needle: 18-gauge Portal: superolateral Aspiration: 30cc normal appearing synovial fluid Injectate: none Dressing: Band-aid Complications: None  64F with bilateral knee osteoarthritis.  She has made good progress with her weight loss, but is still not at our threshold for elective surgery. I do think that it's promising that we should be able to get it done within the next few months. I encourage her to continue with weight loss via Ozempic. Another right knee aspiration was performed today per her request, but no injectate was administered. She'll follow up next in six weeks to recheck her weight with no new x-rays needed. If she has attained a weight of 205 pounds or less by that time, then I think that it would be reasonable to begin making preparations for surgery in November. Full risks, benefits, and alternatives of this discussion should be conducted at the time of her next visit if she is indicated for surgery. Repeat b/l knee x-rays should be taken at the next visit.

## 2024-09-08 NOTE — PROCEDURE
[de-identified] : 9/4/24: Eloisa Sapp is a 64F, following up for reevaluation of bilateral knee pain, right worse than left. We last saw her and discussed R TKA. However, her BMI was prohibitive at the time, although she is making good progress with weight loss. On August 14th, she saw Dr. Tena and got her right knee aspirated. However, they did not do a cortisone injection because of intended future surgery. She reports today her knee is swelling again. She does use a walker when ambulating. She can walk one block pain-free. She does endorse night pains. Currently, she's 211lbs putting her at a BMI of 43. She believes she can reach under 200 pounds by October and does wish to continue with the intended plan of having surgery in October. She's also inquiring if she can have another knee aspiration today.  Procedure: Knee joint aspiration Laterality: Right Indication: diagnostic - discussed with patient Skin prep: alcohol and chlorhexidine Anesthesia: ethyl chloride spray Needle: 18-gauge Portal: superolateral Aspiration: 30cc normal appearing synovial fluid Injectate: none Dressing: Band-aid Complications: None  64F with bilateral knee osteoarthritis.  She has made good progress with her weight loss, but is still not at our threshold for elective surgery. I do think that it's promising that we should be able to get it done within the next few months. I encourage her to continue with weight loss via Ozempic. Another right knee aspiration was performed today per her request, but no injectate was administered. She'll follow up next in six weeks to recheck her weight with no new x-rays needed. If she has attained a weight of 205 pounds or less by that time, then I think that it would be reasonable to begin making preparations for surgery in November. Full risks, benefits, and alternatives of this discussion should be conducted at the time of her next visit if she is indicated for surgery. Repeat b/l knee x-rays should be taken at the next visit.

## 2024-09-08 NOTE — END OF VISIT
[FreeTextEntry3] : Documented by Lisa Lopez acting as a scribe for Dr. Norbert Guerrero. 09/04/2024   All medical record entries made by the Scribe were at my, Dr. Norbert Guerrero, direction and personally dictated by me on 09/04/2024. I have reviewed the chart and agree that the record accurately reflects my personal performance of the history, physical exam, assessment and plan. I have also personally directed, reviewed, and agreed with the chart.

## 2024-09-08 NOTE — PROCEDURE
[de-identified] : 9/4/24: Eloisa Sapp is a 64F, following up for reevaluation of bilateral knee pain, right worse than left. We last saw her and discussed R TKA. However, her BMI was prohibitive at the time, although she is making good progress with weight loss. On August 14th, she saw Dr. Tena and got her right knee aspirated. However, they did not do a cortisone injection because of intended future surgery. She reports today her knee is swelling again. She does use a walker when ambulating. She can walk one block pain-free. She does endorse night pains. Currently, she's 211lbs putting her at a BMI of 43. She believes she can reach under 200 pounds by October and does wish to continue with the intended plan of having surgery in October. She's also inquiring if she can have another knee aspiration today.  Procedure: Knee joint aspiration Laterality: Right Indication: diagnostic - discussed with patient Skin prep: alcohol and chlorhexidine Anesthesia: ethyl chloride spray Needle: 18-gauge Portal: superolateral Aspiration: 30cc normal appearing synovial fluid Injectate: none Dressing: Band-aid Complications: None  64F with bilateral knee osteoarthritis.  She has made good progress with her weight loss, but is still not at our threshold for elective surgery. I do think that it's promising that we should be able to get it done within the next few months. I encourage her to continue with weight loss via Ozempic. Another right knee aspiration was performed today per her request, but no injectate was administered. She'll follow up next in six weeks to recheck her weight with no new x-rays needed. If she has attained a weight of 205 pounds or less by that time, then I think that it would be reasonable to begin making preparations for surgery in November. Full risks, benefits, and alternatives of this discussion should be conducted at the time of her next visit if she is indicated for surgery. Repeat b/l knee x-rays should be taken at the next visit.

## 2024-09-16 ENCOUNTER — RESULT CHARGE (OUTPATIENT)
Age: 64
End: 2024-09-16

## 2024-09-16 ENCOUNTER — APPOINTMENT (OUTPATIENT)
Dept: INTERNAL MEDICINE | Facility: CLINIC | Age: 64
End: 2024-09-16
Payer: COMMERCIAL

## 2024-09-16 VITALS
DIASTOLIC BLOOD PRESSURE: 112 MMHG | BODY MASS INDEX: 43.14 KG/M2 | HEIGHT: 59 IN | SYSTOLIC BLOOD PRESSURE: 173 MMHG | TEMPERATURE: 97.9 F | OXYGEN SATURATION: 99 % | HEART RATE: 79 BPM | WEIGHT: 214 LBS

## 2024-09-16 DIAGNOSIS — Z23 ENCOUNTER FOR IMMUNIZATION: ICD-10-CM

## 2024-09-16 DIAGNOSIS — E11.9 TYPE 2 DIABETES MELLITUS W/OUT COMPLICATIONS: ICD-10-CM

## 2024-09-16 DIAGNOSIS — R11.2 NAUSEA WITH VOMITING, UNSPECIFIED: ICD-10-CM

## 2024-09-16 PROCEDURE — G0008: CPT

## 2024-09-16 PROCEDURE — 99213 OFFICE O/P EST LOW 20 MIN: CPT | Mod: 25,GC

## 2024-09-16 PROCEDURE — 90656 IIV3 VACC NO PRSV 0.5 ML IM: CPT

## 2024-09-16 NOTE — HEALTH RISK ASSESSMENT
Normal vision: sees adequately in most situations; can see medication labels, newsprint [Little interest or pleasure doing things] : 1) Little interest or pleasure doing things [Feeling down, depressed, or hopeless] : 2) Feeling down, depressed, or hopeless [0] : 2) Feeling down, depressed, or hopeless: Not at all (0) [PHQ-9 Negative - No further assessment needed] : PHQ-9 Negative - No further assessment needed [Former] : Former [[id number]] : 0

## 2024-09-16 NOTE — HEALTH RISK ASSESSMENT
[Little interest or pleasure doing things] : 1) Little interest or pleasure doing things [Feeling down, depressed, or hopeless] : 2) Feeling down, depressed, or hopeless [0] : 2) Feeling down, depressed, or hopeless: Not at all (0) [PHQ-9 Negative - No further assessment needed] : PHQ-9 Negative - No further assessment needed [Former] : Former [[id number]] : 0

## 2024-09-16 NOTE — HEALTH RISK ASSESSMENT
[Little interest or pleasure doing things] : 1) Little interest or pleasure doing things [Feeling down, depressed, or hopeless] : 2) Feeling down, depressed, or hopeless [0] : 2) Feeling down, depressed, or hopeless: Not at all (0) [PHQ-9 Negative - No further assessment needed] : PHQ-9 Negative - No further assessment needed [Former] : Former [[id number]] : 0

## 2024-09-16 NOTE — HEALTH RISK ASSESSMENT
[Little interest or pleasure doing things] : 1) Little interest or pleasure doing things [Feeling down, depressed, or hopeless] : 2) Feeling down, depressed, or hopeless [0] : 2) Feeling down, depressed, or hopeless: Not at all (0) [PHQ-9 Negative - No further assessment needed] : PHQ-9 Negative - No further assessment needed [Former] : Former [[id number]] : 0

## 2024-09-19 ENCOUNTER — APPOINTMENT (OUTPATIENT)
Dept: PULMONOLOGY | Facility: CLINIC | Age: 64
End: 2024-09-19
Payer: COMMERCIAL

## 2024-09-19 ENCOUNTER — OUTPATIENT (OUTPATIENT)
Dept: OUTPATIENT SERVICES | Facility: HOSPITAL | Age: 64
LOS: 1 days | End: 2024-09-19
Payer: COMMERCIAL

## 2024-09-19 VITALS
SYSTOLIC BLOOD PRESSURE: 149 MMHG | TEMPERATURE: 97.7 F | DIASTOLIC BLOOD PRESSURE: 74 MMHG | BODY MASS INDEX: 42.74 KG/M2 | HEART RATE: 83 BPM | RESPIRATION RATE: 13 BRPM | WEIGHT: 212 LBS | OXYGEN SATURATION: 97 % | HEIGHT: 59 IN

## 2024-09-19 DIAGNOSIS — Z98.890 OTHER SPECIFIED POSTPROCEDURAL STATES: Chronic | ICD-10-CM

## 2024-09-19 DIAGNOSIS — K21.9 GASTRO-ESOPHAGEAL REFLUX DISEASE W/OUT ESOPHAGITIS: ICD-10-CM

## 2024-09-19 DIAGNOSIS — R05.9 COUGH, UNSPECIFIED: ICD-10-CM

## 2024-09-19 DIAGNOSIS — J44.9 CHRONIC OBSTRUCTIVE PULMONARY DISEASE, UNSPECIFIED: ICD-10-CM

## 2024-09-19 PROCEDURE — 72050 X-RAY EXAM NECK SPINE 4/5VWS: CPT

## 2024-09-19 PROCEDURE — 99214 OFFICE O/P EST MOD 30 MIN: CPT

## 2024-09-19 PROCEDURE — G2211 COMPLEX E/M VISIT ADD ON: CPT

## 2024-09-19 PROCEDURE — 72050 X-RAY EXAM NECK SPINE 4/5VWS: CPT | Mod: 26

## 2024-09-19 RX ORDER — PANTOPRAZOLE 40 MG/1
40 TABLET, DELAYED RELEASE ORAL
Qty: 45 | Refills: 1 | Status: ACTIVE | COMMUNITY
Start: 2024-09-19 | End: 1900-01-01

## 2024-09-19 NOTE — PHYSICAL EXAM
[de-identified] : unable to complete physical exam, author (resident) was kicked out of the room prior to completing the visit.

## 2024-09-19 NOTE — ASSESSMENT
[FreeTextEntry1] : 65 y/o F with a PMHx of IgG4 disease with RP fibrosis, COPD, JESÚS, HTN (measured on LLE), epilepsy, past breast cancer now s/p BL mastectomy, hypothyroidism presenting for acute visit regarding vomiting.  #Nausea/vomiting Presenting w/ NBNB vomiting x 1.5 weeks, twice daily since starting higher dose of ozempic (1mg). Unable to tolerate regular means. Can tolerate water and very small meals/snacks. Was advised to present to ED for IV fluids and IV antiemetics, however pt declined. Her next dose of ozempic is due Wednesday 9/18/24. - encouraged PO hydration - advised pt to eat smaller more frequent meals throughout the day - skip this week's dose of ozempic, then resume at 0.5mg SQ weekly starting next Wednesday 9/25/24 - c/w zofran 4mg PO q6h PRN for nausea/vomiting - will obtain EKG at next visit to monitor QTc given consistent use of zofran - RTC in 2 weeks to assess for resolution of symptoms  #DM #Obesity A1C 2/2024 7.4, BMI ~43. Lost total of ~25 lbs in combination w/ strict diet, goal weight 180 prior to R TKR. - POCT A1C 6.3 today - as above, decreased ozempic dose to 0.5mg SQ weekly starting 9/25/24  #Vulvar lesion Per pt, first noted by dermatology and planned for biopsy w/ GYN on 9/18/24. - f/u biopsy w/ GYN on 9/18  #COPD Previously followed w/ Dr. Isaacs, now seeing Dr. Echavarria, last seen 3/2024. - c/w anoro ellipta 1 puff qd - c/w albuterol 2 puffs q4-6h PRN for SOB/wheezing - c/w azithromycin 250mg PO MWF - f/u w/ pulm Dr. Echavarria 9/19  #Hx of anaphylaxis Allergic to shellfish. - epipen 0.3mg IM PRN  #GERD - c/w pepcid 40mg PO qd  #HLD Lipid profile 2/2024 LDL 71, HDL 36, , tot chol 140. - c/w lipitor 40mg PO qhs  #HTN /112 in office, however pt was actively vomiting today. Likely elevated i/s/o acute illness. CMP 2/2024 wnl. Urine albumin/Cr ratio 6/2022 wnl. - c/w amlodipine 10mg PO qd - c/w HCTZ 12.5mg PO qd - c/w losartan 100mg PO qd - obtain urine albumin/Cr ratio at next visit  #Hypothyroidism TSH 2/2024 wnl. - c/w synthroid 175mcg PO qd Mon-Fri - c/w synthroid 200mcg PO qd Sat & Sun  #B/l knee OA #Neuropathy Plan for possible R TKR w/ Dr. Guerrero if she is able to lose enough weight. Underwent aspiration and corticosteroid injection every 3 months, was told hold off on CSI and lidocaine injections until surgery. Seen by sports med Dr. Tena 8/14 and underwent R knee joint aspiration. - c/w duloxetine 20mg PO BID - c/w lidocaine 5% patch qd PRN - c/w diclofenac 1% gel QID PRN - c/w meloxicam 15mg PO qd PRN - c/w PT - f/u w/ sports med Dr. Tena 10/2 - f/u w/ ortho Dr. 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 Elevated serum IgG4. Follows w/ urology and rheum Dr. Berg (seen 3/2024). Hx of HLA injections x3 in b/l knees, aspiration of Baker's cyst, steroids, rituximab. - f/u w/ rheum Dr. Berg 10/16 - f/u w/ urology  #Hx of L breast cancer S/p radiation, chemo, s/p b/l mastectomy and b/l axillary lymphadenectomy (2000), s/p revision & mammoplasty (2015). Hx of chemoport placed for difficult access given b/l lymphadenectomy (placed 2005, replaced 2007, removed 2022). Seen by heme/onc 6/21/24. - f/u w/ heme/onc Dr. 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 Kenney 8/21, was started on tacrolimus ointment, will f/u in 12 weeks and consider adding oral gabapentin to address pruritus on back. - c/w clobetasol 0.05% ointment BID x 2 weeks on 2 weeks off PRN for itching - c/w hydroxyzine 10mg PO qhs - can increase to 20mg as tolerated - c/w zyrtec 10mg PO BID - c/w tacrolimus 1% ointment BID during 2 weeks off clobetasol - f/u w/ derm Dr. Kenney 11/20  #Seborrheic dermatitis Seen by derm Dr. Kenney 8/21. - c/w fluocinide (Lidex) 0.05% solution BID x 2 weeks on 2 weeks off PRN for itching - c/w ketoconazole 2% shampoo every other day - f/u w/ derm Dr. Kenney 11/20  #Vulvar itching 2/2 contact dermatitis Seen by derm Dr. Kenney 8/21. - c/w hydrocortisone 2.5% ointment BID x 2 weeks on 2 weeks off PRN for itching  #Glaucoma #Thyroid eye disease Hx of thyroid eye disease, s/p b/l 2 wall orbital decompression, s/p strabismus surgery, s/p L lower lid retraction retraction repair (all surgeries >20 years ago). Seen by ophtho Dr. Schmitt 9/2023. - c/w brimonidine 0.2% 1 gtt b/l eyes TID  - c/w dorzolamide-timolol 2-0.5% gtt 1 gtt b/l eyes BID - c/w latanoprost 0.005% gtt 1 b/l eyes qhs - f/u w/ ophtho Dr. Joel Schmitt 10/11  #HCM - previously declined COVID/RSV vaccines (hx of b/l mastectomy, pt states she only obtains vaccines intragluteally) - flu shot administered today - deferred PCV20 given latex allergy (latex in plunger in syringe) - will administer shingrix and tdap vaccine at next visit - c-scope reportedly done 2021 per pt - pap smear 8/23/24 per pt - discuss smoking/substance hx at next visit - 7/2024 hep C, HIV, syphilis negative - 8/2024 GC/chlamydia negative at GYN, was also treated empirically due to exposure to chlamydia   RTC in 2 weeks to assess for resolution of nausea/vomiting.   Patient seen and evaluated with attending physician Dr. Underwood.

## 2024-09-19 NOTE — ASSESSMENT
[FreeTextEntry1] : 63 y/o F with a PMHx of IgG4 disease with RP fibrosis, COPD, JESÚS, HTN (measured on LLE), epilepsy, past breast cancer now s/p BL mastectomy, hypothyroidism presenting for acute visit regarding vomiting.  #Nausea/vomiting Presenting w/ NBNB vomiting x 1.5 weeks, twice daily since starting higher dose of ozempic (1mg). Unable to tolerate regular means. Can tolerate water and very small meals/snacks. Was advised to present to ED for IV fluids and IV antiemetics, however pt declined. Her next dose of ozempic is due Wednesday 9/18/24. - encouraged PO hydration - advised pt to eat smaller more frequent meals throughout the day - skip this week's dose of ozempic, then resume at 0.5mg SQ weekly starting next Wednesday 9/25/24 - c/w zofran 4mg PO q6h PRN for nausea/vomiting - will obtain EKG at next visit to monitor QTc given consistent use of zofran - RTC in 2 weeks to assess for resolution of symptoms  #DM #Obesity A1C 2/2024 7.4, BMI ~43. Lost total of ~25 lbs in combination w/ strict diet, goal weight 180 prior to R TKR. - POCT A1C 6.3 today - as above, decreased ozempic dose to 0.5mg SQ weekly starting 9/25/24  #Vulvar lesion Per pt, first noted by dermatology and planned for biopsy w/ GYN on 9/18/24. - f/u biopsy w/ GYN on 9/18  #COPD Previously followed w/ Dr. Isaacs, now seeing Dr. Echavarria, last seen 3/2024. - c/w anoro ellipta 1 puff qd - c/w albuterol 2 puffs q4-6h PRN for SOB/wheezing - c/w azithromycin 250mg PO MWF - f/u w/ pulm Dr. Echavarria 9/19  #Hx of anaphylaxis Allergic to shellfish. - epipen 0.3mg IM PRN  #GERD - c/w pepcid 40mg PO qd  #HLD Lipid profile 2/2024 LDL 71, HDL 36, , tot chol 140. - c/w lipitor 40mg PO qhs  #HTN /112 in office, however pt was actively vomiting today. Likely elevated i/s/o acute illness. CMP 2/2024 wnl. Urine albumin/Cr ratio 6/2022 wnl. - c/w amlodipine 10mg PO qd - c/w HCTZ 12.5mg PO qd - c/w losartan 100mg PO qd - obtain urine albumin/Cr ratio at next visit  #Hypothyroidism TSH 2/2024 wnl. - c/w synthroid 175mcg PO qd Mon-Fri - c/w synthroid 200mcg PO qd Sat & Sun  #B/l knee OA #Neuropathy Plan for possible R TKR w/ Dr. Guerrero if she is able to lose enough weight. Underwent aspiration and corticosteroid injection every 3 months, was told hold off on CSI and lidocaine injections until surgery. Seen by sports med Dr. Tena 8/14 and underwent R knee joint aspiration. - c/w duloxetine 20mg PO BID - c/w lidocaine 5% patch qd PRN - c/w diclofenac 1% gel QID PRN - c/w meloxicam 15mg PO qd PRN - c/w PT - f/u w/ sports med Dr. Tena 10/2 - f/u w/ ortho Dr. 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 Elevated serum IgG4. Follows w/ urology and rheum Dr. Berg (seen 3/2024). Hx of HLA injections x3 in b/l knees, aspiration of Baker's cyst, steroids, rituximab. - f/u w/ rheum Dr. Berg 10/16 - f/u w/ urology  #Hx of L breast cancer S/p radiation, chemo, s/p b/l mastectomy and b/l axillary lymphadenectomy (2000), s/p revision & mammoplasty (2015). Hx of chemoport placed for difficult access given b/l lymphadenectomy (placed 2005, replaced 2007, removed 2022). Seen by heme/onc 6/21/24. - f/u w/ heme/onc Dr. 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 Kenney 8/21, was started on tacrolimus ointment, will f/u in 12 weeks and consider adding oral gabapentin to address pruritus on back. - c/w clobetasol 0.05% ointment BID x 2 weeks on 2 weeks off PRN for itching - c/w hydroxyzine 10mg PO qhs - can increase to 20mg as tolerated - c/w zyrtec 10mg PO BID - c/w tacrolimus 1% ointment BID during 2 weeks off clobetasol - f/u w/ derm Dr. Kenney 11/20  #Seborrheic dermatitis Seen by derm Dr. Kenney 8/21. - c/w fluocinide (Lidex) 0.05% solution BID x 2 weeks on 2 weeks off PRN for itching - c/w ketoconazole 2% shampoo every other day - f/u w/ derm Dr. Kenney 11/20  #Vulvar itching 2/2 contact dermatitis Seen by derm Dr. Kenney 8/21. - c/w hydrocortisone 2.5% ointment BID x 2 weeks on 2 weeks off PRN for itching  #Glaucoma #Thyroid eye disease Hx of thyroid eye disease, s/p b/l 2 wall orbital decompression, s/p strabismus surgery, s/p L lower lid retraction retraction repair (all surgeries >20 years ago). Seen by ophtho Dr. Schmitt 9/2023. - c/w brimonidine 0.2% 1 gtt b/l eyes TID  - c/w dorzolamide-timolol 2-0.5% gtt 1 gtt b/l eyes BID - c/w latanoprost 0.005% gtt 1 b/l eyes qhs - f/u w/ ophtho Dr. Joel Schmitt 10/11  #HCM - previously declined COVID/RSV vaccines (hx of b/l mastectomy, pt states she only obtains vaccines intragluteally) - flu shot administered today - deferred PCV20 given latex allergy (latex in plunger in syringe) - will administer shingrix and tdap vaccine at next visit - c-scope reportedly done 2021 per pt - pap smear 8/23/24 per pt - discuss smoking/substance hx at next visit - 7/2024 hep C, HIV, syphilis negative - 8/2024 GC/chlamydia negative at GYN, was also treated empirically due to exposure to chlamydia   RTC in 2 weeks to assess for resolution of nausea/vomiting.   Patient seen and evaluated with attending physician Dr. Underwood.

## 2024-09-19 NOTE — HISTORY OF PRESENT ILLNESS
[FreeTextEntry1] : Follow up visit. [FreeTextEntry8] : 63 y/o F with a PMHx of IgG4 disease w/ RP fibrosis, COPD, JSEÚS, HTN (measured on LLE), epilepsy, past breast cancer now s/p BL mastectomy, hypothyroidism presenting for an acute visit regarding vomiting.  Pt is on ozempic, was previously on 0.5mg SQ weekly, was seen 8/26/24 and dose was increased to 1mg SQ weekly. She now reports that she was off the med for 2 weeks because the higher dose required a new prior authorization. She started the 1mg dose 1.5 weeks ago, has taken the new dose twice so far, and has been nauseous/vomiting about twice a day everyday. She describes the vomiting as NBNB, has been using zofran 4mg PO q6h around the clock with no relief of her nausea. She is able to tolerate water and very small meals/snacks but is unable to keep down any normal meals. She is actively vomiting in the office. She also reports constipation that is relieved by metamucil. No other associated symptoms. Denies fevers, chills, abd pain, hematochezia, hematemesis. No other complaints at this time.  Pt was advised to present to ED for IV fluids and antiemetics but she declined. She also expressed frustration over the new dose of ozempic requiring a new prior authorization, and that the author (resident) had advised her the prescription was sent to the pharmacy without knowing that this would require a new prior auth. She kicked resident out of the room and the visit was completed by the attending separately.  Note: during her last visit on 8/26/24, pt reported that she was recently exposed to chlamydia, was seen by GYN 8/23/24, BV panel and GC/chlamydia negative, other labs were pending. Was prescribed doxy 100mg PO BID x 7 days and diflucan 150mg x 2 doses by her GYN empirically. Now reports she completed course of abx.

## 2024-09-19 NOTE — PHYSICAL EXAM
[de-identified] : unable to complete physical exam, author (resident) was kicked out of the room prior to completing the visit.

## 2024-09-19 NOTE — PHYSICAL EXAM
[de-identified] : unable to complete physical exam, author (resident) was kicked out of the room prior to completing the visit.

## 2024-09-19 NOTE — REVIEW OF SYSTEMS
[Nausea] : nausea [Constipation] : constipation [Vomiting] : vomiting [Negative] : Heme/Lymph [Fever] : no fever [Chills] : no chills [Chest Pain] : no chest pain [Shortness Of Breath] : no shortness of breath [Abdominal Pain] : no abdominal pain [Diarrhea] : diarrhea

## 2024-09-19 NOTE — PHYSICAL EXAM
[de-identified] : unable to complete physical exam, author (resident) was kicked out of the room prior to completing the visit.

## 2024-09-19 NOTE — HISTORY OF PRESENT ILLNESS
[FreeTextEntry1] : Follow up visit. [FreeTextEntry8] : 63 y/o F with a PMHx of IgG4 disease w/ RP fibrosis, COPD, JESÚS, HTN (measured on LLE), epilepsy, past breast cancer now s/p BL mastectomy, hypothyroidism presenting for an acute visit regarding vomiting.  Pt is on ozempic, was previously on 0.5mg SQ weekly, was seen 8/26/24 and dose was increased to 1mg SQ weekly. She now reports that she was off the med for 2 weeks because the higher dose required a new prior authorization. She started the 1mg dose 1.5 weeks ago, has taken the new dose twice so far, and has been nauseous/vomiting about twice a day everyday. She describes the vomiting as NBNB, has been using zofran 4mg PO q6h around the clock with no relief of her nausea. She is able to tolerate water and very small meals/snacks but is unable to keep down any normal meals. She is actively vomiting in the office. She also reports constipation that is relieved by metamucil. No other associated symptoms. Denies fevers, chills, abd pain, hematochezia, hematemesis. No other complaints at this time.  Pt was advised to present to ED for IV fluids and antiemetics but she declined. She also expressed frustration over the new dose of ozempic requiring a new prior authorization, and that the author (resident) had advised her the prescription was sent to the pharmacy without knowing that this would require a new prior auth. She kicked resident out of the room and the visit was completed by the attending separately.  Note: during her last visit on 8/26/24, pt reported that she was recently exposed to chlamydia, was seen by GYN 8/23/24, BV panel and GC/chlamydia negative, other labs were pending. Was prescribed doxy 100mg PO BID x 7 days and diflucan 150mg x 2 doses by her GYN empirically. Now reports she completed course of abx.

## 2024-09-19 NOTE — END OF VISIT
[] : Resident [FreeTextEntry3] : 64-year-old female presenting for nausea and vomiting.  Patient reports that she had issues obtaining new dose of Ozempic 1 mg weekly after last visit due to a prior authorization issue.  She is very upset about this.  She was out of Ozempic for 2 weeks because of this and then after obtaining 1 mg dose took 2 doses most recently Wednesday of last week.  Since starting this dose she has had frequent nausea and vomiting, particularly with with larger meals.  She denies any sick contacts or other infectious symptoms.  She had several episodes of vomiting after arriving to clinic today.  On exam, well-appearing.  Will resume Ozempic at 0.5 mg weekly.  She will likely skip this coming weeks dose, continue p.o. fluids and small meals.  If symptoms persist she will notify us.  Otherwise follow-up in 2 weeks.  Flu shot administered today.  Of note patient has a latex allergy (rash) so we deferred Prevnar 20 which has a latex plunger in the syringe.

## 2024-09-23 NOTE — HISTORY OF PRESENT ILLNESS
[Former] : former [Never] : never [TextBox_4] : The patient is a 63 year old F with PMHx of IgG4 related disease, RP fibrosis, COPD, JESÚS (on PAP), HTN, Epilepsy, Breast CA (s/p mastectomy, RT, CTX), pituitary hyperplasia, Type II DM, hypothyroidism, COPD, former patient of Dr. Isaacs's following up today to establish care he has left practice. Went to the ER for chest pain w/ difficulty breathing recently. Felt to have inflamed chest muscle after throwing up. Given lidocaine patches. Had extensive testing during ER visit including CTA that were all negative per patient. Symptoms have improved. Continues to take Anoro Ellipta, albuterol PRN. Ran out of Azithromycin about 1 month ago. Previously on triple therapy but states she was taken off by ICS by Dr. Isaacs and started on Azithromycin due to frequent exacerbations. Some worsening of breathing in past week (using albuterol daily) which she attributes to weather changes. Uses CPAP and has benefit from it. Has not used it in the past month due to issue with insurance company not covering heated tubing.  9/19/2024 Coughing started about 2-3 months ago, worse at night. Mostly dry. Compliant with Anoro. SOB w/ coughing and sometimes makes her vomit. Stopped Azithromycin as she ran out and unable to reach office for refills. Neb machine broke needs new one. No significant nasal congestion. Does have longstanding hx of GERD on famotidine. Started Ozempic x 2 months ago. Cough predates ozempic. Has new sore throat worse on left side. FFM broke; told by company for new one she must pay out of pocket. No recent hospitalizations or URI.  DME: [employer] [facility] Mask: [facility].   [TextBox_11] : 1.5 [YearQuit] : 2000 [TextBox_13] : 26 [ESS] : 6

## 2024-09-23 NOTE — CONSULT LETTER
[Dear  ___] : Dear  [unfilled], [Courtesy Letter:] : I had the pleasure of seeing your patient, [unfilled], in my office today. [Please see my note below.] : Please see my note below. [Consult Closing:] : Thank you very much for allowing me to participate in the care of this patient.  If you have any questions, please do not hesitate to contact me. [FreeTextEntry3] : Lillian Echavarria MD  [employer] at [facility] Pulmonary, Critical Care, and Sleep Medicine

## 2024-09-23 NOTE — HISTORY OF PRESENT ILLNESS
[Former] : former [Never] : never [TextBox_4] : The patient is a 63 year old F with PMHx of IgG4 related disease, RP fibrosis, COPD, JESÚS (on PAP), HTN, Epilepsy, Breast CA (s/p mastectomy, RT, CTX), pituitary hyperplasia, Type II DM, hypothyroidism, COPD, former patient of Dr. Isaacs's following up today to establish care he has left practice. Went to the ER for chest pain w/ difficulty breathing recently. Felt to have inflamed chest muscle after throwing up. Given lidocaine patches. Had extensive testing during ER visit including CTA that were all negative per patient. Symptoms have improved. Continues to take Anoro Ellipta, albuterol PRN. Ran out of Azithromycin about 1 month ago. Previously on triple therapy but states she was taken off by ICS by Dr. Isaacs and started on Azithromycin due to frequent exacerbations. Some worsening of breathing in past week (using albuterol daily) which she attributes to weather changes. Uses CPAP and has benefit from it. Has not used it in the past month due to issue with insurance company not covering heated tubing.  9/19/2024 Coughing started about 2-3 months ago, worse at night. Mostly dry. Compliant with Anoro. SOB w/ coughing and sometimes makes her vomit. Stopped Azithromycin as she ran out and unable to reach office for refills. Neb machine broke needs new one. No significant nasal congestion. Does have longstanding hx of GERD on famotidine. Started Ozempic x 2 months ago. Cough predates ozempic. Has new sore throat worse on left side. FFM broke; told by company for new one she must pay out of pocket. No recent hospitalizations or URI.  DME: [employer] [facility] Mask: [facility].   [TextBox_11] : 1.5 [TextBox_13] : 26 [YearQuit] : 2000 [ESS] : 6

## 2024-09-23 NOTE — CONSULT LETTER
[Dear  ___] : Dear  [unfilled], [Courtesy Letter:] : I had the pleasure of seeing your patient, [unfilled], in my office today. [Please see my note below.] : Please see my note below. [Consult Closing:] : Thank you very much for allowing me to participate in the care of this patient.  If you have any questions, please do not hesitate to contact me. [FreeTextEntry3] : Lillain Echavarria MD  [employer] at [facility] Pulmonary, Critical Care, and Sleep Medicine

## 2024-09-23 NOTE — ASSESSMENT
[FreeTextEntry1] : Reviewed PFT 6/7/2022: FEV1 106, FEV1/, DLCO 65 HST 1/2022: AHI 12.7, t89 6.6 minutes CT chest 7/2022: Subsegmental atelectasis in the lingula with adjacent bronchiectasis. No significant interval changes in small linear area of mucous plugging within the apical posterior segment of the left upper lobe. CT Chest 7/2023: Left lingula linear atelectasis and/or parenchymal scarring similar to prior. Stable tubular shaped nodule within the apical posterior segment of the left upper lobe. PFT 11/08/2023: , FEV1/, TLC 94, DLCO 75 CPAP titration study 12/18/2023: Best mask F-P Vitera M, full face. JESÚS corrected at 10 cm H2O. AirView 2/2024-3/2024: Average use of 5 hours 16 minutes, 37% adherence of >4 hours per night of use, therapeutic AHI of 2.2, minimal leak  The patient is a 64-year-old F, former smoker with PMHx of IgG4 related disease, RP fibrosis, COPD, JESÚS (on PAP), HTN, Epilepsy, Breast CA (s/p mastectomy, RT, CTX), pituitary hyperplasia, Type II DM, hypothyroidism, COPD, former patient of Dr. Tyler, here today with new cough x 2-3 months, minimally productive of sputum, worse at night.   Cough: Suspect cough secondary to GERD; worse at night and with new sore throat and does coincide somewhat with starting Ozempic. Discussed that delayed gastric emptying can flare reflux. Will start PPI, has been on famotidine chronically and suspect it is no longer efficacious. Will need to repeat PFTs to evaluate lung function and obtain CT chest for evaluation of lung parenchyma given hx of smoking and bronchiectasis.  COPD, smoking hx: No exacerbations since she was last seen. Continue LAMA/LABA and Azithromycin three times weekly (refills sent today). Was previously on triple therapy but ICS was stopped due to frequent exacerbations. Continue albuterol PRN.  Does not qualify for lung cancer screening program as she quit smoking >15 years ago. Patient states PFTs were done in January but no records in our EMR, will repeat. New nebulizer ordered today per patient request.   Mild JESÚS: Has benefit when she uses PAP. AHI therapeutic, adherence suboptimal but motivated to increase use. Mask broke about 3 weeks ago and has not been able to use. Will follow-up with DME for new mask.   Follow-up 6/2024.

## 2024-09-26 ENCOUNTER — APPOINTMENT (OUTPATIENT)
Dept: PULMONOLOGY | Facility: CLINIC | Age: 64
End: 2024-09-26

## 2024-09-30 ENCOUNTER — APPOINTMENT (OUTPATIENT)
Dept: INTERNAL MEDICINE | Facility: CLINIC | Age: 64
End: 2024-09-30

## 2024-10-02 ENCOUNTER — APPOINTMENT (OUTPATIENT)
Dept: ORTHOPEDIC SURGERY | Facility: CLINIC | Age: 64
End: 2024-10-02
Payer: COMMERCIAL

## 2024-10-02 VITALS
HEART RATE: 78 BPM | WEIGHT: 213 LBS | BODY MASS INDEX: 42.94 KG/M2 | DIASTOLIC BLOOD PRESSURE: 81 MMHG | OXYGEN SATURATION: 97 % | HEIGHT: 59 IN | SYSTOLIC BLOOD PRESSURE: 131 MMHG

## 2024-10-02 DIAGNOSIS — M17.0 BILATERAL PRIMARY OSTEOARTHRITIS OF KNEE: ICD-10-CM

## 2024-10-02 PROCEDURE — 99213 OFFICE O/P EST LOW 20 MIN: CPT | Mod: 25

## 2024-10-02 PROCEDURE — 20611 DRAIN/INJ JOINT/BURSA W/US: CPT | Mod: RT

## 2024-10-02 NOTE — PHYSICAL EXAM
[de-identified] : General: Well-nourished, well-developed, alert, and in no acute distress. Head: Normocephalic. Eyes: Pupils equal, extraocular muscles intact, normal sclera. Nose: No nasal discharge. Cardiovascular: Extremities are warm and well perfused. Distal pulses are symmetric bilaterally. Respiratory: No labored breathing. Extremities: Sensation is intact distally bilaterally. Distal pulses are symmetric bilaterally Lymphatic: No regional lymphadenopathy, no lymphedema Neurologic: No focal deficits Skin: Normal skin color, texture, and turgor Psychiatric: Normal affect  MSK: Examination of [right] knee:  Moderate effusion No erythema, hematoma or skin lesion Tender to palpation: medial joint line, lateral joint line, medial patellar facet, lateral patellar facet, popliteal fossa Nontender to palpation: quad tendon, patellar tendon, pes, Gerdy's tubercle, tibial tuberosity, hamstrings, ITB No warmth Baker's cyst palpable ROM: 5-80 Mild patellar crepitus

## 2024-10-02 NOTE — PROCEDURE
[de-identified] :  Ultrasound guided aspiration of right knee:  Following a discussion of the risks (bleeding, infection) and benefits, verbal consent was obtained. Patient placed in supine position. The suprapatellar recess and surrounding structures (patella, femur, quadriceps tendon, suprapatellar fat pad and prefemoral fat pad) were visualized in SAX and LAX with Sonosite 15 Hz linear transducer.   Superolateral knee was anaesthetised with ethyl chloride spray. Under strict sterile technique the right knee was prepped with chlorhexadine. Using ultrasound guidance (superolateral approach) a 25 G 1.5 inch needle was inserted and after negative aspiration, 3mL of 0.5% Bupivacaine and 2mL of 1 % lidocaine was injected subcutaneously and along track into the suprapatellar recess. After adequate anaesthesia, an 18 G 1.5 inch needle was inserted into the suprapatellar recess and 40 mL of clear, serous fluid was aspirated. The use of direct ultrasound visualization was necessary to increase patient safety by identifying and avoiding inadvertent needle placement within the neurovascular and osteochondral structures. Additionally, the increased accuracy of needle placement may improve therapeutic efficacy and allow higher diagnostic specificity when evaluating the effectiveness of this injection.  The patient tolerated the procedure well. Post-injection instructions given (no strenuous activity for 48 hours, ice, elevate). Patient verbalized understanding.

## 2024-10-02 NOTE — DISCUSSION/SUMMARY

## 2024-10-02 NOTE — HISTORY OF PRESENT ILLNESS
[de-identified] : ZAC VELEZ is a 64-year-old female presents to follow up for right knee pain. Last visit was 08/14/2024 patient had right knee aspiration.  Had further 30 mL aspirated on 9/4/24 with Dr. Guerrero's PA. No recent vaccines within last 14 days.  Pt. is performing at home exercises and attending PT. Pt. would like to have right knee aspirated again today. Had been losing weight but gained 3 lb recently as had to lower GLP1 agonist dose due to GI upset.

## 2024-10-02 NOTE — PROCEDURE
[de-identified] :  Ultrasound guided aspiration of right knee:  Following a discussion of the risks (bleeding, infection) and benefits, verbal consent was obtained. Patient placed in supine position. The suprapatellar recess and surrounding structures (patella, femur, quadriceps tendon, suprapatellar fat pad and prefemoral fat pad) were visualized in SAX and LAX with Sonosite 15 Hz linear transducer.   Superolateral knee was anaesthetised with ethyl chloride spray. Under strict sterile technique the right knee was prepped with chlorhexadine. Using ultrasound guidance (superolateral approach) a 25 G 1.5 inch needle was inserted and after negative aspiration, 3mL of 0.5% Bupivacaine and 2mL of 1 % lidocaine was injected subcutaneously and along track into the suprapatellar recess. After adequate anaesthesia, an 18 G 1.5 inch needle was inserted into the suprapatellar recess and 40 mL of clear, serous fluid was aspirated. The use of direct ultrasound visualization was necessary to increase patient safety by identifying and avoiding inadvertent needle placement within the neurovascular and osteochondral structures. Additionally, the increased accuracy of needle placement may improve therapeutic efficacy and allow higher diagnostic specificity when evaluating the effectiveness of this injection.  The patient tolerated the procedure well. Post-injection instructions given (no strenuous activity for 48 hours, ice, elevate). Patient verbalized understanding.

## 2024-10-02 NOTE — DISCUSSION/SUMMARY

## 2024-10-02 NOTE — ASSESSMENT
[FreeTextEntry1] : ZAC VELEZ is a 64 year old female with right knee pain.     I discussed with the patient that their symptoms, signs, and imaging are most consistent with osteoarthritis. We reviewed the natural history of this condition and treatment options. We agreed on the following plan:  US guided aspiration as above. Encouraged to continue home exercises per handout. Continue physical therapy. Follow up in 6 weeks.

## 2024-10-02 NOTE — HISTORY OF PRESENT ILLNESS
[de-identified] : ZAC VELEZ is a 64-year-old female presents to follow up for right knee pain. Last visit was 08/14/2024 patient had right knee aspiration.  Had further 30 mL aspirated on 9/4/24 with Dr. Guerrero's PA. No recent vaccines within last 14 days.  Pt. is performing at home exercises and attending PT. Pt. would like to have right knee aspirated again today. Had been losing weight but gained 3 lb recently as had to lower GLP1 agonist dose due to GI upset.

## 2024-10-02 NOTE — PHYSICAL EXAM
[de-identified] : General: Well-nourished, well-developed, alert, and in no acute distress. Head: Normocephalic. Eyes: Pupils equal, extraocular muscles intact, normal sclera. Nose: No nasal discharge. Cardiovascular: Extremities are warm and well perfused. Distal pulses are symmetric bilaterally. Respiratory: No labored breathing. Extremities: Sensation is intact distally bilaterally. Distal pulses are symmetric bilaterally Lymphatic: No regional lymphadenopathy, no lymphedema Neurologic: No focal deficits Skin: Normal skin color, texture, and turgor Psychiatric: Normal affect  MSK: Examination of [right] knee:  Moderate effusion No erythema, hematoma or skin lesion Tender to palpation: medial joint line, lateral joint line, medial patellar facet, lateral patellar facet, popliteal fossa Nontender to palpation: quad tendon, patellar tendon, pes, Gerdy's tubercle, tibial tuberosity, hamstrings, ITB No warmth Baker's cyst palpable ROM: 5-80 Mild patellar crepitus

## 2024-10-07 ENCOUNTER — APPOINTMENT (OUTPATIENT)
Dept: INTERNAL MEDICINE | Facility: CLINIC | Age: 64
End: 2024-10-07
Payer: COMMERCIAL

## 2024-10-07 VITALS
HEART RATE: 82 BPM | HEIGHT: 59 IN | SYSTOLIC BLOOD PRESSURE: 142 MMHG | WEIGHT: 217 LBS | TEMPERATURE: 98.2 F | OXYGEN SATURATION: 97 % | DIASTOLIC BLOOD PRESSURE: 90 MMHG | BODY MASS INDEX: 43.75 KG/M2

## 2024-10-07 DIAGNOSIS — E66.01 MORBID (SEVERE) OBESITY DUE TO EXCESS CALORIES: ICD-10-CM

## 2024-10-07 DIAGNOSIS — I10 ESSENTIAL (PRIMARY) HYPERTENSION: ICD-10-CM

## 2024-10-07 DIAGNOSIS — N90.4 LEUKOPLAKIA OF VULVA: ICD-10-CM

## 2024-10-07 DIAGNOSIS — Z23 ENCOUNTER FOR IMMUNIZATION: ICD-10-CM

## 2024-10-07 DIAGNOSIS — K21.9 GASTRO-ESOPHAGEAL REFLUX DISEASE W/OUT ESOPHAGITIS: ICD-10-CM

## 2024-10-07 PROCEDURE — G0009: CPT

## 2024-10-07 PROCEDURE — 99214 OFFICE O/P EST MOD 30 MIN: CPT | Mod: 25,GC

## 2024-10-07 PROCEDURE — 90732 PPSV23 VACC 2 YRS+ SUBQ/IM: CPT

## 2024-10-22 ENCOUNTER — APPOINTMENT (OUTPATIENT)
Dept: ORTHOPEDIC SURGERY | Facility: CLINIC | Age: 64
End: 2024-10-22
Payer: COMMERCIAL

## 2024-10-22 ENCOUNTER — OUTPATIENT (OUTPATIENT)
Dept: OUTPATIENT SERVICES | Facility: HOSPITAL | Age: 64
LOS: 1 days | End: 2024-10-22
Payer: COMMERCIAL

## 2024-10-22 ENCOUNTER — RESULT REVIEW (OUTPATIENT)
Age: 64
End: 2024-10-22

## 2024-10-22 VITALS
OXYGEN SATURATION: 95 % | DIASTOLIC BLOOD PRESSURE: 84 MMHG | BODY MASS INDEX: 42.94 KG/M2 | HEIGHT: 59 IN | SYSTOLIC BLOOD PRESSURE: 162 MMHG | WEIGHT: 213 LBS | HEART RATE: 92 BPM

## 2024-10-22 DIAGNOSIS — Z98.890 OTHER SPECIFIED POSTPROCEDURAL STATES: Chronic | ICD-10-CM

## 2024-10-22 DIAGNOSIS — Z90.13 ACQUIRED ABSENCE OF BILATERAL BREASTS AND NIPPLES: Chronic | ICD-10-CM

## 2024-10-22 DIAGNOSIS — M17.0 BILATERAL PRIMARY OSTEOARTHRITIS OF KNEE: ICD-10-CM

## 2024-10-22 PROCEDURE — 73564 X-RAY EXAM KNEE 4 OR MORE: CPT

## 2024-10-22 PROCEDURE — 99215 OFFICE O/P EST HI 40 MIN: CPT | Mod: 25

## 2024-10-22 PROCEDURE — 73564 X-RAY EXAM KNEE 4 OR MORE: CPT | Mod: 26,50

## 2024-10-24 ENCOUNTER — APPOINTMENT (OUTPATIENT)
Dept: OPHTHALMOLOGY | Facility: CLINIC | Age: 64
End: 2024-10-24

## 2024-11-04 ENCOUNTER — APPOINTMENT (OUTPATIENT)
Dept: ORTHOPEDIC SURGERY | Facility: CLINIC | Age: 64
End: 2024-11-04
Payer: COMMERCIAL

## 2024-11-04 ENCOUNTER — APPOINTMENT (OUTPATIENT)
Dept: INTERNAL MEDICINE | Facility: CLINIC | Age: 64
End: 2024-11-04
Payer: COMMERCIAL

## 2024-11-04 ENCOUNTER — OUTPATIENT (OUTPATIENT)
Dept: OUTPATIENT SERVICES | Facility: HOSPITAL | Age: 64
LOS: 1 days | End: 2024-11-04
Payer: COMMERCIAL

## 2024-11-04 VITALS
WEIGHT: 213 LBS | OXYGEN SATURATION: 95 % | SYSTOLIC BLOOD PRESSURE: 175 MMHG | BODY MASS INDEX: 42.94 KG/M2 | DIASTOLIC BLOOD PRESSURE: 78 MMHG | HEART RATE: 70 BPM | HEIGHT: 59 IN

## 2024-11-04 VITALS
WEIGHT: 213 LBS | OXYGEN SATURATION: 98 % | BODY MASS INDEX: 42.94 KG/M2 | HEIGHT: 59 IN | SYSTOLIC BLOOD PRESSURE: 142 MMHG | DIASTOLIC BLOOD PRESSURE: 95 MMHG | TEMPERATURE: 98.7 F | HEART RATE: 76 BPM

## 2024-11-04 DIAGNOSIS — I10 ESSENTIAL (PRIMARY) HYPERTENSION: ICD-10-CM

## 2024-11-04 DIAGNOSIS — M17.0 BILATERAL PRIMARY OSTEOARTHRITIS OF KNEE: ICD-10-CM

## 2024-11-04 DIAGNOSIS — K21.9 GASTRO-ESOPHAGEAL REFLUX DISEASE W/OUT ESOPHAGITIS: ICD-10-CM

## 2024-11-04 DIAGNOSIS — Z98.890 OTHER SPECIFIED POSTPROCEDURAL STATES: Chronic | ICD-10-CM

## 2024-11-04 PROCEDURE — 71046 X-RAY EXAM CHEST 2 VIEWS: CPT | Mod: 26

## 2024-11-04 PROCEDURE — 20611 DRAIN/INJ JOINT/BURSA W/US: CPT | Mod: RT

## 2024-11-04 PROCEDURE — 99214 OFFICE O/P EST MOD 30 MIN: CPT

## 2024-11-04 PROCEDURE — 99213 OFFICE O/P EST LOW 20 MIN: CPT | Mod: 25

## 2024-11-04 PROCEDURE — 71046 X-RAY EXAM CHEST 2 VIEWS: CPT

## 2024-11-04 PROCEDURE — G2211 COMPLEX E/M VISIT ADD ON: CPT

## 2024-11-05 ENCOUNTER — APPOINTMENT (OUTPATIENT)
Dept: RHEUMATOLOGY | Facility: CLINIC | Age: 64
End: 2024-11-05
Payer: COMMERCIAL

## 2024-11-05 VITALS
HEART RATE: 73 BPM | SYSTOLIC BLOOD PRESSURE: 150 MMHG | WEIGHT: 211 LBS | BODY MASS INDEX: 42.54 KG/M2 | DIASTOLIC BLOOD PRESSURE: 94 MMHG | HEIGHT: 59 IN | TEMPERATURE: 97.2 F | OXYGEN SATURATION: 98 %

## 2024-11-05 DIAGNOSIS — D89.84 IGG4-RELATED DISEASE: ICD-10-CM

## 2024-11-05 PROCEDURE — G2211 COMPLEX E/M VISIT ADD ON: CPT

## 2024-11-05 PROCEDURE — 99214 OFFICE O/P EST MOD 30 MIN: CPT

## 2024-11-06 LAB
ALBUMIN SERPL ELPH-MCNC: 4.1 G/DL
ALP BLD-CCNC: 111 U/L
ALT SERPL-CCNC: 20 U/L
ANION GAP SERPL CALC-SCNC: 20 MMOL/L
AST SERPL-CCNC: 21 U/L
BASOPHILS # BLD AUTO: 0.02 K/UL
BASOPHILS NFR BLD AUTO: 0.4 %
BILIRUB SERPL-MCNC: <0.2 MG/DL
BUN SERPL-MCNC: 19 MG/DL
CALCIUM SERPL-MCNC: 10 MG/DL
CHLORIDE SERPL-SCNC: 99 MMOL/L
CO2 SERPL-SCNC: 22 MMOL/L
CREAT SERPL-MCNC: 0.59 MG/DL
EGFR: 102 ML/MIN/1.73M2
EOSINOPHIL # BLD AUTO: 0.13 K/UL
EOSINOPHIL NFR BLD AUTO: 2.3 %
GLUCOSE SERPL-MCNC: 83 MG/DL
HCT VFR BLD CALC: 44.3 %
HGB BLD-MCNC: 13.7 G/DL
IGG4 SER-MCNC: 332 MG/DL
IMM GRANULOCYTES NFR BLD AUTO: 0.2 %
LYMPHOCYTES # BLD AUTO: 1.35 K/UL
LYMPHOCYTES NFR BLD AUTO: 24.2 %
MAN DIFF?: NORMAL
MCHC RBC-ENTMCNC: 26.3 PG
MCHC RBC-ENTMCNC: 30.9 G/DL
MCV RBC AUTO: 85 FL
MONOCYTES # BLD AUTO: 0.35 K/UL
MONOCYTES NFR BLD AUTO: 6.3 %
NEUTROPHILS # BLD AUTO: 3.72 K/UL
NEUTROPHILS NFR BLD AUTO: 66.6 %
PLATELET # BLD AUTO: 364 K/UL
POTASSIUM SERPL-SCNC: 4.9 MMOL/L
PROT SERPL-MCNC: 7.3 G/DL
RBC # BLD: 5.21 M/UL
RBC # FLD: 15.9 %
SODIUM SERPL-SCNC: 140 MMOL/L
WBC # FLD AUTO: 5.58 K/UL

## 2024-11-07 ENCOUNTER — APPOINTMENT (OUTPATIENT)
Dept: PULMONOLOGY | Facility: CLINIC | Age: 64
End: 2024-11-07
Payer: COMMERCIAL

## 2024-11-07 ENCOUNTER — NON-APPOINTMENT (OUTPATIENT)
Age: 64
End: 2024-11-07

## 2024-11-07 VITALS
HEART RATE: 93 BPM | TEMPERATURE: 98 F | SYSTOLIC BLOOD PRESSURE: 145 MMHG | WEIGHT: 212 LBS | OXYGEN SATURATION: 96 % | BODY MASS INDEX: 42.74 KG/M2 | DIASTOLIC BLOOD PRESSURE: 92 MMHG | RESPIRATION RATE: 12 BRPM | HEIGHT: 59 IN

## 2024-11-07 DIAGNOSIS — G47.33 OBSTRUCTIVE SLEEP APNEA (ADULT) (PEDIATRIC): ICD-10-CM

## 2024-11-07 DIAGNOSIS — J44.9 CHRONIC OBSTRUCTIVE PULMONARY DISEASE, UNSPECIFIED: ICD-10-CM

## 2024-11-07 DIAGNOSIS — R05.3 CHRONIC COUGH: ICD-10-CM

## 2024-11-07 PROCEDURE — 94618 PULMONARY STRESS TESTING: CPT

## 2024-11-07 PROCEDURE — 94729 DIFFUSING CAPACITY: CPT

## 2024-11-07 PROCEDURE — ZZZZZ: CPT

## 2024-11-07 PROCEDURE — 94060 EVALUATION OF WHEEZING: CPT

## 2024-11-07 PROCEDURE — 94727 GAS DIL/WSHOT DETER LNG VOL: CPT

## 2024-12-05 ENCOUNTER — RX RENEWAL (OUTPATIENT)
Age: 64
End: 2024-12-05

## (undated) DEVICE — MARKING PEN W RULER

## (undated) DEVICE — TOURNIQUET CUFF 18" DUAL PORT SINGLE BLADDER W PLC  (BLACK)

## (undated) DEVICE — GLV 8 PROTEXIS (WHITE)

## (undated) DEVICE — WARMING BLANKET LOWER ADULT

## (undated) DEVICE — PACK HAND

## (undated) DEVICE — SLV COMPRESSION KNEE MED

## (undated) DEVICE — SUT SILK 4-0 18" PS-2

## (undated) DEVICE — VENODYNE/SCD SLEEVE CALF MEDIUM

## (undated) DEVICE — SYS ENDO STRATOS RELEASE 30 DEG 4MM

## (undated) DEVICE — SUT ETHILON 5-0 18" P-3